# Patient Record
Sex: FEMALE | Race: WHITE | NOT HISPANIC OR LATINO | Employment: OTHER | URBAN - METROPOLITAN AREA
[De-identification: names, ages, dates, MRNs, and addresses within clinical notes are randomized per-mention and may not be internally consistent; named-entity substitution may affect disease eponyms.]

---

## 2017-01-09 ENCOUNTER — ALLSCRIPTS OFFICE VISIT (OUTPATIENT)
Dept: OTHER | Facility: OTHER | Age: 82
End: 2017-01-09

## 2017-01-09 LAB — INR PPP: 1.8

## 2017-02-06 ENCOUNTER — GENERIC CONVERSION - ENCOUNTER (OUTPATIENT)
Dept: OTHER | Facility: OTHER | Age: 82
End: 2017-02-06

## 2017-02-13 ENCOUNTER — ALLSCRIPTS OFFICE VISIT (OUTPATIENT)
Dept: OTHER | Facility: OTHER | Age: 82
End: 2017-02-13

## 2017-02-13 LAB — INR PPP: 1.4

## 2017-02-20 ENCOUNTER — ALLSCRIPTS OFFICE VISIT (OUTPATIENT)
Dept: OTHER | Facility: OTHER | Age: 82
End: 2017-02-20

## 2017-02-20 LAB — INR PPP: 1.2

## 2017-02-23 ENCOUNTER — ALLSCRIPTS OFFICE VISIT (OUTPATIENT)
Dept: OTHER | Facility: OTHER | Age: 82
End: 2017-02-23

## 2017-02-27 ENCOUNTER — ALLSCRIPTS OFFICE VISIT (OUTPATIENT)
Dept: OTHER | Facility: OTHER | Age: 82
End: 2017-02-27

## 2017-02-27 LAB — INR PPP: 1.8

## 2017-03-10 ENCOUNTER — ALLSCRIPTS OFFICE VISIT (OUTPATIENT)
Dept: OTHER | Facility: OTHER | Age: 82
End: 2017-03-10

## 2017-03-10 ENCOUNTER — HOSPITAL ENCOUNTER (OUTPATIENT)
Dept: NON INVASIVE DIAGNOSTICS | Facility: CLINIC | Age: 82
Discharge: HOME | End: 2017-03-10
Payer: MEDICARE

## 2017-03-10 DIAGNOSIS — I35.1 NONRHEUMATIC AORTIC VALVE INSUFFICIENCY: ICD-10-CM

## 2017-03-10 PROCEDURE — 93306 TTE W/DOPPLER COMPLETE: CPT

## 2017-03-13 ENCOUNTER — ALLSCRIPTS OFFICE VISIT (OUTPATIENT)
Dept: OTHER | Facility: OTHER | Age: 82
End: 2017-03-13

## 2017-03-13 ENCOUNTER — APPOINTMENT (OUTPATIENT)
Dept: LAB | Facility: CLINIC | Age: 82
End: 2017-03-13
Payer: MEDICARE

## 2017-03-13 DIAGNOSIS — E11.9 TYPE 2 DIABETES MELLITUS WITHOUT COMPLICATIONS (HCC): ICD-10-CM

## 2017-03-13 DIAGNOSIS — E78.5 HYPERLIPIDEMIA: ICD-10-CM

## 2017-03-13 DIAGNOSIS — I10 ESSENTIAL (PRIMARY) HYPERTENSION: ICD-10-CM

## 2017-03-13 DIAGNOSIS — I48.91 ATRIAL FIBRILLATION (HCC): ICD-10-CM

## 2017-03-13 DIAGNOSIS — I25.10 ATHEROSCLEROTIC HEART DISEASE OF NATIVE CORONARY ARTERY WITHOUT ANGINA PECTORIS: ICD-10-CM

## 2017-03-13 LAB
ALBUMIN SERPL BCP-MCNC: 3.7 G/DL (ref 3.5–5)
ALP SERPL-CCNC: 76 U/L (ref 46–116)
ALT SERPL W P-5'-P-CCNC: 26 U/L (ref 12–78)
ANION GAP SERPL CALCULATED.3IONS-SCNC: 8 MMOL/L (ref 4–13)
AST SERPL W P-5'-P-CCNC: 27 U/L (ref 5–45)
BASOPHILS # BLD AUTO: 0.02 THOUSANDS/ΜL (ref 0–0.1)
BASOPHILS NFR BLD AUTO: 1 % (ref 0–1)
BILIRUB SERPL-MCNC: 0.69 MG/DL (ref 0.2–1)
BUN SERPL-MCNC: 20 MG/DL (ref 5–25)
CALCIUM SERPL-MCNC: 9.5 MG/DL (ref 8.3–10.1)
CHLORIDE SERPL-SCNC: 106 MMOL/L (ref 100–108)
CHOLEST SERPL-MCNC: 204 MG/DL (ref 50–200)
CO2 SERPL-SCNC: 26 MMOL/L (ref 21–32)
CREAT SERPL-MCNC: 0.92 MG/DL (ref 0.6–1.3)
EOSINOPHIL # BLD AUTO: 0.09 THOUSAND/ΜL (ref 0–0.61)
EOSINOPHIL NFR BLD AUTO: 2 % (ref 0–6)
ERYTHROCYTE [DISTWIDTH] IN BLOOD BY AUTOMATED COUNT: 13.7 % (ref 11.6–15.1)
EST. AVERAGE GLUCOSE BLD GHB EST-MCNC: 160 MG/DL
GFR SERPL CREATININE-BSD FRML MDRD: 57.5 ML/MIN/1.73SQ M
GLUCOSE SERPL-MCNC: 144 MG/DL (ref 65–140)
HBA1C MFR BLD: 7.2 % (ref 4.2–6.3)
HCT VFR BLD AUTO: 37 % (ref 34.8–46.1)
HDLC SERPL-MCNC: 80 MG/DL (ref 40–60)
HGB BLD-MCNC: 12.2 G/DL (ref 11.5–15.4)
INR PPP: 2.4
LDLC SERPL CALC-MCNC: 102 MG/DL (ref 0–100)
LYMPHOCYTES # BLD AUTO: 1.1 THOUSANDS/ΜL (ref 0.6–4.47)
LYMPHOCYTES NFR BLD AUTO: 27 % (ref 14–44)
MCH RBC QN AUTO: 29.5 PG (ref 26.8–34.3)
MCHC RBC AUTO-ENTMCNC: 33 G/DL (ref 31.4–37.4)
MCV RBC AUTO: 90 FL (ref 82–98)
MONOCYTES # BLD AUTO: 0.44 THOUSAND/ΜL (ref 0.17–1.22)
MONOCYTES NFR BLD AUTO: 11 % (ref 4–12)
NEUTROPHILS # BLD AUTO: 2.47 THOUSANDS/ΜL (ref 1.85–7.62)
NEUTS SEG NFR BLD AUTO: 59 % (ref 43–75)
NRBC BLD AUTO-RTO: 0 /100 WBCS
PLATELET # BLD AUTO: 153 THOUSANDS/UL (ref 149–390)
PMV BLD AUTO: 12.6 FL (ref 8.9–12.7)
POTASSIUM SERPL-SCNC: 4.2 MMOL/L (ref 3.5–5.3)
PROT SERPL-MCNC: 7.7 G/DL (ref 6.4–8.2)
RBC # BLD AUTO: 4.13 MILLION/UL (ref 3.81–5.12)
SODIUM SERPL-SCNC: 140 MMOL/L (ref 136–145)
TRIGL SERPL-MCNC: 110 MG/DL
TSH SERPL DL<=0.05 MIU/L-ACNC: 3.23 UIU/ML (ref 0.36–3.74)
WBC # BLD AUTO: 4.13 THOUSAND/UL (ref 4.31–10.16)

## 2017-03-13 PROCEDURE — 80053 COMPREHEN METABOLIC PANEL: CPT

## 2017-03-13 PROCEDURE — 80061 LIPID PANEL: CPT

## 2017-03-13 PROCEDURE — 36415 COLL VENOUS BLD VENIPUNCTURE: CPT

## 2017-03-13 PROCEDURE — 83036 HEMOGLOBIN GLYCOSYLATED A1C: CPT

## 2017-03-13 PROCEDURE — 85025 COMPLETE CBC W/AUTO DIFF WBC: CPT

## 2017-03-13 PROCEDURE — 84443 ASSAY THYROID STIM HORMONE: CPT

## 2017-03-30 ENCOUNTER — ALLSCRIPTS OFFICE VISIT (OUTPATIENT)
Dept: OTHER | Facility: OTHER | Age: 82
End: 2017-03-30

## 2017-03-30 LAB — INR PPP: 2.2

## 2017-04-13 ENCOUNTER — GENERIC CONVERSION - ENCOUNTER (OUTPATIENT)
Dept: OTHER | Facility: OTHER | Age: 82
End: 2017-04-13

## 2017-04-24 ENCOUNTER — ALLSCRIPTS OFFICE VISIT (OUTPATIENT)
Dept: OTHER | Facility: OTHER | Age: 82
End: 2017-04-24

## 2017-04-24 LAB — INR PPP: 1.4

## 2017-05-08 ENCOUNTER — ALLSCRIPTS OFFICE VISIT (OUTPATIENT)
Dept: OTHER | Facility: OTHER | Age: 82
End: 2017-05-08

## 2017-05-08 LAB — INR PPP: 2.4

## 2017-05-31 ENCOUNTER — ALLSCRIPTS OFFICE VISIT (OUTPATIENT)
Dept: OTHER | Facility: OTHER | Age: 82
End: 2017-05-31

## 2017-05-31 LAB — INR PPP: 1.9

## 2017-06-06 ENCOUNTER — ALLSCRIPTS OFFICE VISIT (OUTPATIENT)
Dept: OTHER | Facility: OTHER | Age: 82
End: 2017-06-06

## 2017-06-06 LAB — HGB BLD-MCNC: 11.4 G/DL

## 2017-06-15 ENCOUNTER — TRANSCRIBE ORDERS (OUTPATIENT)
Dept: LAB | Facility: CLINIC | Age: 82
End: 2017-06-15

## 2017-06-15 ENCOUNTER — APPOINTMENT (OUTPATIENT)
Dept: LAB | Facility: CLINIC | Age: 82
End: 2017-06-15
Payer: MEDICARE

## 2017-06-15 DIAGNOSIS — Z79.01 LONG TERM (CURRENT) USE OF ANTICOAGULANTS: Primary | ICD-10-CM

## 2017-06-15 LAB
INR PPP: 2.68 (ref 0.86–1.16)
PROTHROMBIN TIME: 28.9 SECONDS (ref 12.1–14.4)

## 2017-06-15 PROCEDURE — 36415 COLL VENOUS BLD VENIPUNCTURE: CPT

## 2017-06-15 PROCEDURE — 85610 PROTHROMBIN TIME: CPT

## 2017-06-20 ENCOUNTER — ALLSCRIPTS OFFICE VISIT (OUTPATIENT)
Dept: OTHER | Facility: OTHER | Age: 82
End: 2017-06-20

## 2017-06-27 DIAGNOSIS — I48.91 ATRIAL FIBRILLATION (HCC): ICD-10-CM

## 2017-06-27 DIAGNOSIS — I25.10 ATHEROSCLEROTIC HEART DISEASE OF NATIVE CORONARY ARTERY WITHOUT ANGINA PECTORIS: ICD-10-CM

## 2017-06-27 DIAGNOSIS — Z95.1 PRESENCE OF AORTOCORONARY BYPASS GRAFT: ICD-10-CM

## 2017-06-27 DIAGNOSIS — I35.1 NONRHEUMATIC AORTIC VALVE INSUFFICIENCY: ICD-10-CM

## 2017-06-27 DIAGNOSIS — I10 ESSENTIAL (PRIMARY) HYPERTENSION: ICD-10-CM

## 2017-06-27 DIAGNOSIS — C44.91 BASAL CELL CARCINOMA OF SKIN: ICD-10-CM

## 2017-06-27 DIAGNOSIS — I35.0 NONRHEUMATIC AORTIC VALVE STENOSIS: ICD-10-CM

## 2017-06-28 ENCOUNTER — GENERIC CONVERSION - ENCOUNTER (OUTPATIENT)
Dept: OTHER | Facility: OTHER | Age: 82
End: 2017-06-28

## 2017-07-11 ENCOUNTER — GENERIC CONVERSION - ENCOUNTER (OUTPATIENT)
Dept: OTHER | Facility: OTHER | Age: 82
End: 2017-07-11

## 2017-07-21 ENCOUNTER — ALLSCRIPTS OFFICE VISIT (OUTPATIENT)
Dept: OTHER | Facility: OTHER | Age: 82
End: 2017-07-21

## 2017-07-24 ENCOUNTER — ALLSCRIPTS OFFICE VISIT (OUTPATIENT)
Dept: OTHER | Facility: OTHER | Age: 82
End: 2017-07-24

## 2017-07-24 LAB — INR PPP: 3.2

## 2017-07-31 ENCOUNTER — ALLSCRIPTS OFFICE VISIT (OUTPATIENT)
Dept: OTHER | Facility: OTHER | Age: 82
End: 2017-07-31

## 2017-07-31 LAB — INR PPP: 4.2

## 2017-08-03 ENCOUNTER — HOSPITAL ENCOUNTER (INPATIENT)
Facility: HOSPITAL | Age: 82
LOS: 4 days | Discharge: HOME HEALTH | DRG: 292 | End: 2017-08-07
Attending: EMERGENCY MEDICINE | Admitting: INTERNAL MEDICINE
Payer: MEDICARE

## 2017-08-03 ENCOUNTER — APPOINTMENT (EMERGENCY)
Dept: RADIOLOGY | Facility: HOSPITAL | Age: 82
DRG: 292 | End: 2017-08-03
Payer: MEDICARE

## 2017-08-03 DIAGNOSIS — I21.4 ACUTE NON-ST ELEVATION MYOCARDIAL INFARCTION (NSTEMI) (HCC): Primary | ICD-10-CM

## 2017-08-03 DIAGNOSIS — I50.41 ACUTE COMBINED SYSTOLIC AND DIASTOLIC CONGESTIVE HEART FAILURE (HCC): ICD-10-CM

## 2017-08-03 PROBLEM — I25.10 CAD (CORONARY ARTERY DISEASE): Status: ACTIVE | Noted: 2017-08-03

## 2017-08-03 PROBLEM — I35.0 AORTIC STENOSIS: Status: ACTIVE | Noted: 2017-08-03

## 2017-08-03 PROBLEM — I48.91 ATRIAL FIBRILLATION (HCC): Status: ACTIVE | Noted: 2017-08-03

## 2017-08-03 PROBLEM — I10 BENIGN ESSENTIAL HTN: Status: ACTIVE | Noted: 2017-08-03

## 2017-08-03 PROBLEM — E11.9 DIABETES (HCC): Status: ACTIVE | Noted: 2017-08-03

## 2017-08-03 PROBLEM — I50.9 ACUTE CHF (HCC): Status: ACTIVE | Noted: 2017-08-03

## 2017-08-03 PROBLEM — I63.9 CVA (CEREBRAL VASCULAR ACCIDENT) (HCC): Status: ACTIVE | Noted: 2017-08-03

## 2017-08-03 LAB
ALBUMIN SERPL BCP-MCNC: 3.4 G/DL (ref 3.5–5)
ALP SERPL-CCNC: 82 U/L (ref 46–116)
ALT SERPL W P-5'-P-CCNC: 25 U/L (ref 12–78)
ANION GAP SERPL CALCULATED.3IONS-SCNC: 8 MMOL/L (ref 4–13)
APTT PPP: 35 SECONDS (ref 23–35)
AST SERPL W P-5'-P-CCNC: 25 U/L (ref 5–45)
ATRIAL RATE: 112 BPM
ATRIAL RATE: 178 BPM
ATRIAL RATE: 394 BPM
BASOPHILS # BLD AUTO: 0.02 THOUSANDS/ΜL (ref 0–0.1)
BASOPHILS NFR BLD AUTO: 0 % (ref 0–1)
BILIRUB SERPL-MCNC: 0.92 MG/DL (ref 0.2–1)
BUN SERPL-MCNC: 19 MG/DL (ref 5–25)
CALCIUM SERPL-MCNC: 8.9 MG/DL (ref 8.3–10.1)
CHLORIDE SERPL-SCNC: 105 MMOL/L (ref 100–108)
CO2 SERPL-SCNC: 25 MMOL/L (ref 21–32)
CREAT SERPL-MCNC: 0.96 MG/DL (ref 0.6–1.3)
EOSINOPHIL # BLD AUTO: 0.08 THOUSAND/ΜL (ref 0–0.61)
EOSINOPHIL NFR BLD AUTO: 2 % (ref 0–6)
ERYTHROCYTE [DISTWIDTH] IN BLOOD BY AUTOMATED COUNT: 14.3 % (ref 11.6–15.1)
GFR SERPL CREATININE-BSD FRML MDRD: 52 ML/MIN/1.73SQ M
GLUCOSE SERPL-MCNC: 112 MG/DL (ref 65–140)
GLUCOSE SERPL-MCNC: 159 MG/DL (ref 65–140)
GLUCOSE SERPL-MCNC: 231 MG/DL (ref 65–140)
HCT VFR BLD AUTO: 37 % (ref 34.8–46.1)
HGB BLD-MCNC: 12.1 G/DL (ref 11.5–15.4)
INR PPP: 1.82 (ref 0.86–1.16)
LACTATE SERPL-SCNC: 1.8 MMOL/L (ref 0.5–2)
LACTATE SERPL-SCNC: 2.5 MMOL/L (ref 0.5–2)
LYMPHOCYTES # BLD AUTO: 1.01 THOUSANDS/ΜL (ref 0.6–4.47)
LYMPHOCYTES NFR BLD AUTO: 20 % (ref 14–44)
MCH RBC QN AUTO: 29.8 PG (ref 26.8–34.3)
MCHC RBC AUTO-ENTMCNC: 32.7 G/DL (ref 31.4–37.4)
MCV RBC AUTO: 91 FL (ref 82–98)
MONOCYTES # BLD AUTO: 0.36 THOUSAND/ΜL (ref 0.17–1.22)
MONOCYTES NFR BLD AUTO: 7 % (ref 4–12)
NEUTROPHILS # BLD AUTO: 3.65 THOUSANDS/ΜL (ref 1.85–7.62)
NEUTS SEG NFR BLD AUTO: 71 % (ref 43–75)
NRBC BLD AUTO-RTO: 0 /100 WBCS
NT-PROBNP SERPL-MCNC: 3270 PG/ML
PLATELET # BLD AUTO: 163 THOUSANDS/UL (ref 149–390)
PMV BLD AUTO: 11.7 FL (ref 8.9–12.7)
POTASSIUM SERPL-SCNC: 3.9 MMOL/L (ref 3.5–5.3)
PROT SERPL-MCNC: 7.1 G/DL (ref 6.4–8.2)
PROTHROMBIN TIME: 21.2 SECONDS (ref 12.1–14.4)
QRS AXIS: -86 DEGREES
QRS AXIS: 131 DEGREES
QRS AXIS: 60 DEGREES
QRSD INTERVAL: 92 MS
QRSD INTERVAL: 94 MS
QRSD INTERVAL: 96 MS
QT INTERVAL: 336 MS
QT INTERVAL: 400 MS
QT INTERVAL: 438 MS
QTC INTERVAL: 402 MS
QTC INTERVAL: 432 MS
QTC INTERVAL: 459 MS
RBC # BLD AUTO: 4.06 MILLION/UL (ref 3.81–5.12)
SODIUM SERPL-SCNC: 138 MMOL/L (ref 136–145)
SPECIMEN SOURCE: ABNORMAL
T WAVE AXIS: 105 DEGREES
T WAVE AXIS: 81 DEGREES
T WAVE AXIS: 95 DEGREES
TROPONIN I BLD-MCNC: 0.09 NG/ML (ref 0–0.08)
TROPONIN I SERPL-MCNC: 0.75 NG/ML
TROPONIN I SERPL-MCNC: 0.75 NG/ML
TROPONIN I SERPL-MCNC: 0.77 NG/ML
VENTRICULAR RATE: 66 BPM
VENTRICULAR RATE: 70 BPM
VENTRICULAR RATE: 86 BPM
WBC # BLD AUTO: 5.13 THOUSAND/UL (ref 4.31–10.16)

## 2017-08-03 PROCEDURE — 83605 ASSAY OF LACTIC ACID: CPT

## 2017-08-03 PROCEDURE — 85730 THROMBOPLASTIN TIME PARTIAL: CPT | Performed by: EMERGENCY MEDICINE

## 2017-08-03 PROCEDURE — 82948 REAGENT STRIP/BLOOD GLUCOSE: CPT

## 2017-08-03 PROCEDURE — 84484 ASSAY OF TROPONIN QUANT: CPT | Performed by: INTERNAL MEDICINE

## 2017-08-03 PROCEDURE — 83605 ASSAY OF LACTIC ACID: CPT | Performed by: EMERGENCY MEDICINE

## 2017-08-03 PROCEDURE — 99285 EMERGENCY DEPT VISIT HI MDM: CPT

## 2017-08-03 PROCEDURE — 84484 ASSAY OF TROPONIN QUANT: CPT

## 2017-08-03 PROCEDURE — 85025 COMPLETE CBC W/AUTO DIFF WBC: CPT | Performed by: EMERGENCY MEDICINE

## 2017-08-03 PROCEDURE — 80053 COMPREHEN METABOLIC PANEL: CPT | Performed by: EMERGENCY MEDICINE

## 2017-08-03 PROCEDURE — 85610 PROTHROMBIN TIME: CPT | Performed by: EMERGENCY MEDICINE

## 2017-08-03 PROCEDURE — 36415 COLL VENOUS BLD VENIPUNCTURE: CPT

## 2017-08-03 PROCEDURE — 93005 ELECTROCARDIOGRAM TRACING: CPT

## 2017-08-03 PROCEDURE — 93005 ELECTROCARDIOGRAM TRACING: CPT | Performed by: EMERGENCY MEDICINE

## 2017-08-03 PROCEDURE — 71020 HB CHEST X-RAY 2VW FRONTAL&LATL: CPT

## 2017-08-03 PROCEDURE — 83880 ASSAY OF NATRIURETIC PEPTIDE: CPT | Performed by: EMERGENCY MEDICINE

## 2017-08-03 RX ORDER — FUROSEMIDE 10 MG/ML
40 INJECTION INTRAMUSCULAR; INTRAVENOUS DAILY
Status: DISCONTINUED | OUTPATIENT
Start: 2017-08-03 | End: 2017-08-07

## 2017-08-03 RX ORDER — DILTIAZEM HYDROCHLORIDE 120 MG/1
120 TABLET, FILM COATED ORAL DAILY
COMMUNITY
End: 2018-04-22

## 2017-08-03 RX ORDER — ONDANSETRON 2 MG/ML
4 INJECTION INTRAMUSCULAR; INTRAVENOUS EVERY 6 HOURS PRN
Status: DISCONTINUED | OUTPATIENT
Start: 2017-08-03 | End: 2017-08-07 | Stop reason: HOSPADM

## 2017-08-03 RX ORDER — ATORVASTATIN CALCIUM 80 MG/1
80 TABLET, FILM COATED ORAL
Status: DISCONTINUED | OUTPATIENT
Start: 2017-08-03 | End: 2017-08-07 | Stop reason: HOSPADM

## 2017-08-03 RX ORDER — ASPIRIN 81 MG/1
81 TABLET, CHEWABLE ORAL DAILY
Status: DISCONTINUED | OUTPATIENT
Start: 2017-08-03 | End: 2017-08-07 | Stop reason: HOSPADM

## 2017-08-03 RX ORDER — DILTIAZEM HYDROCHLORIDE 120 MG/1
120 CAPSULE, COATED, EXTENDED RELEASE ORAL DAILY
Status: DISCONTINUED | OUTPATIENT
Start: 2017-08-04 | End: 2017-08-07 | Stop reason: HOSPADM

## 2017-08-03 RX ORDER — CALCIUM CARBONATE 500(1250)
1 TABLET ORAL
Status: DISCONTINUED | OUTPATIENT
Start: 2017-08-04 | End: 2017-08-07 | Stop reason: HOSPADM

## 2017-08-03 RX ORDER — MORPHINE SULFATE 2 MG/ML
1 INJECTION, SOLUTION INTRAMUSCULAR; INTRAVENOUS EVERY 4 HOURS PRN
Status: DISCONTINUED | OUTPATIENT
Start: 2017-08-03 | End: 2017-08-07 | Stop reason: HOSPADM

## 2017-08-03 RX ORDER — WARFARIN SODIUM 2.5 MG/1
2.5 TABLET ORAL
COMMUNITY
End: 2018-02-20

## 2017-08-03 RX ORDER — OXYCODONE HYDROCHLORIDE 5 MG/1
5 TABLET ORAL EVERY 4 HOURS PRN
Status: DISCONTINUED | OUTPATIENT
Start: 2017-08-03 | End: 2017-08-07 | Stop reason: HOSPADM

## 2017-08-03 RX ADMIN — ASPIRIN 81 MG 81 MG: 81 TABLET ORAL at 17:53

## 2017-08-03 RX ADMIN — INSULIN LISPRO 1 UNITS: 100 INJECTION, SOLUTION INTRAVENOUS; SUBCUTANEOUS at 17:53

## 2017-08-03 RX ADMIN — ATORVASTATIN CALCIUM 80 MG: 80 TABLET, FILM COATED ORAL at 17:53

## 2017-08-03 RX ADMIN — FUROSEMIDE 40 MG: 10 INJECTION, SOLUTION INTRAMUSCULAR; INTRAVENOUS at 11:08

## 2017-08-03 RX ADMIN — NITROGLYCERIN 1 INCH: 20 OINTMENT TOPICAL at 11:09

## 2017-08-03 RX ADMIN — NITROGLYCERIN 0.5 INCH: 20 OINTMENT TOPICAL at 17:53

## 2017-08-04 ENCOUNTER — GENERIC CONVERSION - ENCOUNTER (OUTPATIENT)
Dept: OTHER | Facility: OTHER | Age: 82
End: 2017-08-04

## 2017-08-04 PROBLEM — I50.41 ACUTE COMBINED SYSTOLIC AND DIASTOLIC CONGESTIVE HEART FAILURE (HCC): Status: ACTIVE | Noted: 2017-08-03

## 2017-08-04 LAB
ANION GAP SERPL CALCULATED.3IONS-SCNC: 10 MMOL/L (ref 4–13)
BUN SERPL-MCNC: 20 MG/DL (ref 5–25)
CALCIUM SERPL-MCNC: 9.2 MG/DL (ref 8.3–10.1)
CHLORIDE SERPL-SCNC: 103 MMOL/L (ref 100–108)
CO2 SERPL-SCNC: 26 MMOL/L (ref 21–32)
CREAT SERPL-MCNC: 0.89 MG/DL (ref 0.6–1.3)
EST. AVERAGE GLUCOSE BLD GHB EST-MCNC: 160 MG/DL
GFR SERPL CREATININE-BSD FRML MDRD: 57 ML/MIN/1.73SQ M
GLUCOSE SERPL-MCNC: 133 MG/DL (ref 65–140)
GLUCOSE SERPL-MCNC: 161 MG/DL (ref 65–140)
GLUCOSE SERPL-MCNC: 161 MG/DL (ref 65–140)
GLUCOSE SERPL-MCNC: 168 MG/DL (ref 65–140)
GLUCOSE SERPL-MCNC: 173 MG/DL (ref 65–140)
GLUCOSE SERPL-MCNC: 202 MG/DL (ref 65–140)
HBA1C MFR BLD: 7.2 % (ref 4.2–6.3)
INR PPP: 1.62 (ref 0.86–1.16)
MAGNESIUM SERPL-MCNC: 1.8 MG/DL (ref 1.6–2.6)
MAGNESIUM SERPL-MCNC: 1.9 MG/DL (ref 1.6–2.6)
POTASSIUM SERPL-SCNC: 3.6 MMOL/L (ref 3.5–5.3)
PROTHROMBIN TIME: 19.4 SECONDS (ref 12.1–14.4)
SODIUM SERPL-SCNC: 139 MMOL/L (ref 136–145)
TROPONIN I SERPL-MCNC: 0.79 NG/ML

## 2017-08-04 PROCEDURE — 85610 PROTHROMBIN TIME: CPT | Performed by: INTERNAL MEDICINE

## 2017-08-04 PROCEDURE — 83735 ASSAY OF MAGNESIUM: CPT | Performed by: INTERNAL MEDICINE

## 2017-08-04 PROCEDURE — 84484 ASSAY OF TROPONIN QUANT: CPT | Performed by: PHYSICIAN ASSISTANT

## 2017-08-04 PROCEDURE — 83735 ASSAY OF MAGNESIUM: CPT | Performed by: PHYSICIAN ASSISTANT

## 2017-08-04 PROCEDURE — 80048 BASIC METABOLIC PNL TOTAL CA: CPT | Performed by: INTERNAL MEDICINE

## 2017-08-04 PROCEDURE — 82948 REAGENT STRIP/BLOOD GLUCOSE: CPT

## 2017-08-04 PROCEDURE — 83036 HEMOGLOBIN GLYCOSYLATED A1C: CPT | Performed by: INTERNAL MEDICINE

## 2017-08-04 PROCEDURE — 93005 ELECTROCARDIOGRAM TRACING: CPT | Performed by: PHYSICIAN ASSISTANT

## 2017-08-04 RX ORDER — METOPROLOL TARTRATE 5 MG/5ML
2.5 INJECTION INTRAVENOUS ONCE
Status: COMPLETED | OUTPATIENT
Start: 2017-08-04 | End: 2017-08-04

## 2017-08-04 RX ORDER — METOPROLOL TARTRATE 5 MG/5ML
5 INJECTION INTRAVENOUS EVERY 6 HOURS PRN
Status: DISCONTINUED | OUTPATIENT
Start: 2017-08-04 | End: 2017-08-07 | Stop reason: HOSPADM

## 2017-08-04 RX ADMIN — METOPROLOL TARTRATE 2.5 MG: 5 INJECTION INTRAVENOUS at 10:46

## 2017-08-04 RX ADMIN — NITROGLYCERIN 0.5 INCH: 20 OINTMENT TOPICAL at 08:42

## 2017-08-04 RX ADMIN — METOPROLOL TARTRATE 25 MG: 25 TABLET ORAL at 20:54

## 2017-08-04 RX ADMIN — ASPIRIN 81 MG 81 MG: 81 TABLET ORAL at 08:51

## 2017-08-04 RX ADMIN — INSULIN LISPRO 1 UNITS: 100 INJECTION, SOLUTION INTRAVENOUS; SUBCUTANEOUS at 11:23

## 2017-08-04 RX ADMIN — DILTIAZEM HYDROCHLORIDE 120 MG: 120 CAPSULE, COATED, EXTENDED RELEASE ORAL at 08:51

## 2017-08-04 RX ADMIN — INSULIN LISPRO 1 UNITS: 100 INJECTION, SOLUTION INTRAVENOUS; SUBCUTANEOUS at 22:25

## 2017-08-04 RX ADMIN — ONDANSETRON 4 MG: 2 INJECTION INTRAMUSCULAR; INTRAVENOUS at 14:14

## 2017-08-04 RX ADMIN — METOPROLOL TARTRATE 2.5 MG: 5 INJECTION INTRAVENOUS at 07:52

## 2017-08-04 RX ADMIN — FUROSEMIDE 40 MG: 10 INJECTION, SOLUTION INTRAMUSCULAR; INTRAVENOUS at 08:58

## 2017-08-04 RX ADMIN — BISACODYL 5 MG: 5 TABLET, COATED ORAL at 17:59

## 2017-08-04 RX ADMIN — ATORVASTATIN CALCIUM 80 MG: 80 TABLET, FILM COATED ORAL at 16:35

## 2017-08-04 RX ADMIN — METOPROLOL TARTRATE 25 MG: 25 TABLET ORAL at 16:34

## 2017-08-04 RX ADMIN — INSULIN LISPRO 1 UNITS: 100 INJECTION, SOLUTION INTRAVENOUS; SUBCUTANEOUS at 16:37

## 2017-08-05 LAB
GLUCOSE SERPL-MCNC: 101 MG/DL (ref 65–140)
GLUCOSE SERPL-MCNC: 157 MG/DL (ref 65–140)
GLUCOSE SERPL-MCNC: 230 MG/DL (ref 65–140)
GLUCOSE SERPL-MCNC: 96 MG/DL (ref 65–140)

## 2017-08-05 PROCEDURE — 82948 REAGENT STRIP/BLOOD GLUCOSE: CPT

## 2017-08-05 RX ADMIN — FUROSEMIDE 40 MG: 10 INJECTION, SOLUTION INTRAMUSCULAR; INTRAVENOUS at 08:23

## 2017-08-05 RX ADMIN — BISACODYL 5 MG: 5 TABLET, COATED ORAL at 08:35

## 2017-08-05 RX ADMIN — DILTIAZEM HYDROCHLORIDE 120 MG: 120 CAPSULE, COATED, EXTENDED RELEASE ORAL at 08:23

## 2017-08-05 RX ADMIN — INSULIN LISPRO 1 UNITS: 100 INJECTION, SOLUTION INTRAVENOUS; SUBCUTANEOUS at 21:50

## 2017-08-05 RX ADMIN — ATORVASTATIN CALCIUM 80 MG: 80 TABLET, FILM COATED ORAL at 17:12

## 2017-08-05 RX ADMIN — METOPROLOL TARTRATE 25 MG: 25 TABLET ORAL at 08:22

## 2017-08-05 RX ADMIN — METOPROLOL TARTRATE 25 MG: 25 TABLET ORAL at 20:44

## 2017-08-05 RX ADMIN — ASPIRIN 81 MG 81 MG: 81 TABLET ORAL at 08:23

## 2017-08-05 RX ADMIN — INSULIN LISPRO 2 UNITS: 100 INJECTION, SOLUTION INTRAVENOUS; SUBCUTANEOUS at 12:20

## 2017-08-05 RX ADMIN — Medication 1 TABLET: at 08:23

## 2017-08-06 ENCOUNTER — APPOINTMENT (INPATIENT)
Dept: RADIOLOGY | Facility: HOSPITAL | Age: 82
DRG: 292 | End: 2017-08-06
Payer: MEDICARE

## 2017-08-06 ENCOUNTER — GENERIC CONVERSION - ENCOUNTER (OUTPATIENT)
Dept: OTHER | Facility: OTHER | Age: 82
End: 2017-08-06

## 2017-08-06 ENCOUNTER — APPOINTMENT (INPATIENT)
Dept: NON INVASIVE DIAGNOSTICS | Facility: HOSPITAL | Age: 82
DRG: 292 | End: 2017-08-06
Payer: MEDICARE

## 2017-08-06 PROBLEM — G93.40 ACUTE ENCEPHALOPATHY: Status: ACTIVE | Noted: 2017-08-06

## 2017-08-06 LAB
ALBUMIN SERPL BCP-MCNC: 3.8 G/DL (ref 3.5–5)
ALP SERPL-CCNC: 99 U/L (ref 46–116)
ALT SERPL W P-5'-P-CCNC: 25 U/L (ref 12–78)
AMMONIA PLAS-SCNC: 17 UMOL/L (ref 11–35)
AMYLASE SERPL-CCNC: 24 IU/L (ref 25–115)
ANION GAP SERPL CALCULATED.3IONS-SCNC: 11 MMOL/L (ref 4–13)
AST SERPL W P-5'-P-CCNC: 25 U/L (ref 5–45)
BACTERIA UR QL AUTO: ABNORMAL /HPF
BILIRUB DIRECT SERPL-MCNC: 0.4 MG/DL (ref 0–0.2)
BILIRUB SERPL-MCNC: 1.51 MG/DL (ref 0.2–1)
BILIRUB UR QL STRIP: NEGATIVE
BUN SERPL-MCNC: 26 MG/DL (ref 5–25)
CALCIUM SERPL-MCNC: 9.9 MG/DL (ref 8.3–10.1)
CHEST PAIN STATEMENT: NORMAL
CHLORIDE SERPL-SCNC: 100 MMOL/L (ref 100–108)
CLARITY UR: CLEAR
CO2 SERPL-SCNC: 28 MMOL/L (ref 21–32)
COLOR UR: YELLOW
CREAT SERPL-MCNC: 1.06 MG/DL (ref 0.6–1.3)
ERYTHROCYTE [DISTWIDTH] IN BLOOD BY AUTOMATED COUNT: 14.3 % (ref 11.6–15.1)
GFR SERPL CREATININE-BSD FRML MDRD: 46 ML/MIN/1.73SQ M
GLUCOSE SERPL-MCNC: 169 MG/DL (ref 65–140)
GLUCOSE SERPL-MCNC: 202 MG/DL (ref 65–140)
GLUCOSE SERPL-MCNC: 206 MG/DL (ref 65–140)
GLUCOSE SERPL-MCNC: 208 MG/DL (ref 65–140)
GLUCOSE SERPL-MCNC: 227 MG/DL (ref 65–140)
GLUCOSE UR STRIP-MCNC: NEGATIVE MG/DL
HCT VFR BLD AUTO: 42.1 % (ref 34.8–46.1)
HGB BLD-MCNC: 13.9 G/DL (ref 11.5–15.4)
HGB UR QL STRIP.AUTO: NEGATIVE
HYALINE CASTS #/AREA URNS LPF: ABNORMAL /LPF
KETONES UR STRIP-MCNC: ABNORMAL MG/DL
LACTATE SERPL-SCNC: 1.6 MMOL/L (ref 0.5–2)
LEUKOCYTE ESTERASE UR QL STRIP: NEGATIVE
LIPASE SERPL-CCNC: 134 U/L (ref 73–393)
MAX DIASTOLIC BP: 83 MMHG
MAX HEART RATE: 100 BPM
MAX PREDICTED HEART RATE: 130 BPM
MAX. SYSTOLIC BP: 166 MMHG
MCH RBC QN AUTO: 30.1 PG (ref 26.8–34.3)
MCHC RBC AUTO-ENTMCNC: 33 G/DL (ref 31.4–37.4)
MCV RBC AUTO: 91 FL (ref 82–98)
NITRITE UR QL STRIP: NEGATIVE
NON-SQ EPI CELLS URNS QL MICRO: ABNORMAL /HPF
PH UR STRIP.AUTO: 6 [PH] (ref 4.5–8)
PLATELET # BLD AUTO: 175 THOUSANDS/UL (ref 149–390)
PMV BLD AUTO: 11.6 FL (ref 8.9–12.7)
POTASSIUM SERPL-SCNC: 3.7 MMOL/L (ref 3.5–5.3)
PROT SERPL-MCNC: 8.2 G/DL (ref 6.4–8.2)
PROT UR STRIP-MCNC: ABNORMAL MG/DL
PROTOCOL NAME: NORMAL
RBC # BLD AUTO: 4.62 MILLION/UL (ref 3.81–5.12)
RBC #/AREA URNS AUTO: ABNORMAL /HPF
REASON FOR TERMINATION: NORMAL
SODIUM SERPL-SCNC: 139 MMOL/L (ref 136–145)
SP GR UR STRIP.AUTO: 1.01 (ref 1–1.03)
TARGET HR FORMULA: NORMAL
TEST INDICATION: NORMAL
TIME IN EXERCISE PHASE: 189 S
TROPONIN I SERPL-MCNC: 0.79 NG/ML
UROBILINOGEN UR QL STRIP.AUTO: 0.2 E.U./DL
WBC # BLD AUTO: 7.26 THOUSAND/UL (ref 4.31–10.16)
WBC #/AREA URNS AUTO: ABNORMAL /HPF

## 2017-08-06 PROCEDURE — 85027 COMPLETE CBC AUTOMATED: CPT | Performed by: NURSE PRACTITIONER

## 2017-08-06 PROCEDURE — 93017 CV STRESS TEST TRACING ONLY: CPT

## 2017-08-06 PROCEDURE — 76705 ECHO EXAM OF ABDOMEN: CPT

## 2017-08-06 PROCEDURE — 80076 HEPATIC FUNCTION PANEL: CPT | Performed by: NURSE PRACTITIONER

## 2017-08-06 PROCEDURE — A9502 TC99M TETROFOSMIN: HCPCS

## 2017-08-06 PROCEDURE — 71020 HB CHEST X-RAY 2VW FRONTAL&LATL: CPT

## 2017-08-06 PROCEDURE — 81001 URINALYSIS AUTO W/SCOPE: CPT | Performed by: NURSE PRACTITIONER

## 2017-08-06 PROCEDURE — 83605 ASSAY OF LACTIC ACID: CPT | Performed by: NURSE PRACTITIONER

## 2017-08-06 PROCEDURE — 80048 BASIC METABOLIC PNL TOTAL CA: CPT | Performed by: NURSE PRACTITIONER

## 2017-08-06 PROCEDURE — 82140 ASSAY OF AMMONIA: CPT | Performed by: NURSE PRACTITIONER

## 2017-08-06 PROCEDURE — 78452 HT MUSCLE IMAGE SPECT MULT: CPT

## 2017-08-06 PROCEDURE — 82948 REAGENT STRIP/BLOOD GLUCOSE: CPT

## 2017-08-06 PROCEDURE — 83690 ASSAY OF LIPASE: CPT | Performed by: NURSE PRACTITIONER

## 2017-08-06 PROCEDURE — 84484 ASSAY OF TROPONIN QUANT: CPT | Performed by: NURSE PRACTITIONER

## 2017-08-06 PROCEDURE — 82150 ASSAY OF AMYLASE: CPT | Performed by: NURSE PRACTITIONER

## 2017-08-06 RX ORDER — POLYETHYLENE GLYCOL 3350 17 G/17G
17 POWDER, FOR SOLUTION ORAL DAILY
Status: DISCONTINUED | OUTPATIENT
Start: 2017-08-06 | End: 2017-08-07 | Stop reason: HOSPADM

## 2017-08-06 RX ORDER — POLYETHYLENE GLYCOL 3350 17 G/17G
17 POWDER, FOR SOLUTION ORAL DAILY PRN
Status: DISCONTINUED | OUTPATIENT
Start: 2017-08-06 | End: 2017-08-06

## 2017-08-06 RX ORDER — DOCUSATE SODIUM 100 MG/1
100 CAPSULE, LIQUID FILLED ORAL 2 TIMES DAILY
Status: DISCONTINUED | OUTPATIENT
Start: 2017-08-06 | End: 2017-08-07 | Stop reason: HOSPADM

## 2017-08-06 RX ADMIN — Medication 1 TABLET: at 08:28

## 2017-08-06 RX ADMIN — POLYETHYLENE GLYCOL 3350 17 G: 17 POWDER, FOR SOLUTION ORAL at 18:17

## 2017-08-06 RX ADMIN — INSULIN LISPRO 1 UNITS: 100 INJECTION, SOLUTION INTRAVENOUS; SUBCUTANEOUS at 08:43

## 2017-08-06 RX ADMIN — ASPIRIN 81 MG 81 MG: 81 TABLET ORAL at 08:28

## 2017-08-06 RX ADMIN — INSULIN LISPRO 2 UNITS: 100 INJECTION, SOLUTION INTRAVENOUS; SUBCUTANEOUS at 21:06

## 2017-08-06 RX ADMIN — REGADENOSON 0.4 MG: 0.08 INJECTION, SOLUTION INTRAVENOUS at 11:05

## 2017-08-06 RX ADMIN — METOPROLOL TARTRATE 25 MG: 25 TABLET ORAL at 21:06

## 2017-08-06 RX ADMIN — INSULIN LISPRO 1 UNITS: 100 INJECTION, SOLUTION INTRAVENOUS; SUBCUTANEOUS at 18:17

## 2017-08-06 RX ADMIN — DOCUSATE SODIUM 100 MG: 100 CAPSULE, LIQUID FILLED ORAL at 08:28

## 2017-08-06 RX ADMIN — DILTIAZEM HYDROCHLORIDE 120 MG: 120 CAPSULE, COATED, EXTENDED RELEASE ORAL at 08:28

## 2017-08-06 RX ADMIN — DOCUSATE SODIUM 100 MG: 100 CAPSULE, LIQUID FILLED ORAL at 18:14

## 2017-08-06 RX ADMIN — ATORVASTATIN CALCIUM 80 MG: 80 TABLET, FILM COATED ORAL at 18:14

## 2017-08-06 RX ADMIN — FUROSEMIDE 40 MG: 10 INJECTION, SOLUTION INTRAMUSCULAR; INTRAVENOUS at 08:28

## 2017-08-06 RX ADMIN — METOPROLOL TARTRATE 25 MG: 25 TABLET ORAL at 08:27

## 2017-08-07 VITALS
TEMPERATURE: 98 F | OXYGEN SATURATION: 95 % | DIASTOLIC BLOOD PRESSURE: 65 MMHG | RESPIRATION RATE: 18 BRPM | SYSTOLIC BLOOD PRESSURE: 141 MMHG | HEART RATE: 74 BPM | WEIGHT: 99.87 LBS

## 2017-08-07 LAB
ANION GAP SERPL CALCULATED.3IONS-SCNC: 10 MMOL/L (ref 4–13)
ANION GAP SERPL CALCULATED.3IONS-SCNC: 7 MMOL/L (ref 4–13)
ATRIAL RATE: 100 BPM
BUN SERPL-MCNC: 31 MG/DL (ref 5–25)
BUN SERPL-MCNC: 32 MG/DL (ref 5–25)
CALCIUM SERPL-MCNC: 9.3 MG/DL (ref 8.3–10.1)
CALCIUM SERPL-MCNC: 9.4 MG/DL (ref 8.3–10.1)
CHLORIDE SERPL-SCNC: 100 MMOL/L (ref 100–108)
CHLORIDE SERPL-SCNC: 100 MMOL/L (ref 100–108)
CO2 SERPL-SCNC: 30 MMOL/L (ref 21–32)
CO2 SERPL-SCNC: 33 MMOL/L (ref 21–32)
CREAT SERPL-MCNC: 0.84 MG/DL (ref 0.6–1.3)
CREAT SERPL-MCNC: 0.91 MG/DL (ref 0.6–1.3)
GFR SERPL CREATININE-BSD FRML MDRD: 56 ML/MIN/1.73SQ M
GFR SERPL CREATININE-BSD FRML MDRD: 61 ML/MIN/1.73SQ M
GLUCOSE SERPL-MCNC: 115 MG/DL (ref 65–140)
GLUCOSE SERPL-MCNC: 132 MG/DL (ref 65–140)
GLUCOSE SERPL-MCNC: 157 MG/DL (ref 65–140)
GLUCOSE SERPL-MCNC: 260 MG/DL (ref 65–140)
MAGNESIUM SERPL-MCNC: 1.7 MG/DL (ref 1.6–2.6)
PHOSPHATE SERPL-MCNC: 3.7 MG/DL (ref 2.3–4.1)
POTASSIUM SERPL-SCNC: 3.3 MMOL/L (ref 3.5–5.3)
POTASSIUM SERPL-SCNC: 3.6 MMOL/L (ref 3.5–5.3)
QRS AXIS: 48 DEGREES
QRSD INTERVAL: 94 MS
QT INTERVAL: 370 MS
QTC INTERVAL: 491 MS
SODIUM SERPL-SCNC: 140 MMOL/L (ref 136–145)
SODIUM SERPL-SCNC: 140 MMOL/L (ref 136–145)
T WAVE AXIS: 42 DEGREES
VENTRICULAR RATE: 106 BPM

## 2017-08-07 PROCEDURE — G8978 MOBILITY CURRENT STATUS: HCPCS

## 2017-08-07 PROCEDURE — 97163 PT EVAL HIGH COMPLEX 45 MIN: CPT

## 2017-08-07 PROCEDURE — 80048 BASIC METABOLIC PNL TOTAL CA: CPT | Performed by: PHYSICIAN ASSISTANT

## 2017-08-07 PROCEDURE — 97166 OT EVAL MOD COMPLEX 45 MIN: CPT

## 2017-08-07 PROCEDURE — G8979 MOBILITY GOAL STATUS: HCPCS

## 2017-08-07 PROCEDURE — 82948 REAGENT STRIP/BLOOD GLUCOSE: CPT

## 2017-08-07 PROCEDURE — 84100 ASSAY OF PHOSPHORUS: CPT | Performed by: PHYSICIAN ASSISTANT

## 2017-08-07 PROCEDURE — 80048 BASIC METABOLIC PNL TOTAL CA: CPT | Performed by: INTERNAL MEDICINE

## 2017-08-07 PROCEDURE — 83735 ASSAY OF MAGNESIUM: CPT | Performed by: PHYSICIAN ASSISTANT

## 2017-08-07 RX ORDER — FUROSEMIDE 40 MG/1
40 TABLET ORAL DAILY
Status: DISCONTINUED | OUTPATIENT
Start: 2017-08-08 | End: 2017-08-07 | Stop reason: HOSPADM

## 2017-08-07 RX ORDER — LORAZEPAM 1 MG/1
1 TABLET ORAL
Status: DISCONTINUED | OUTPATIENT
Start: 2017-08-07 | End: 2017-08-07 | Stop reason: HOSPADM

## 2017-08-07 RX ORDER — LORAZEPAM 0.5 MG/1
0.5 TABLET ORAL EVERY MORNING
Qty: 10 TABLET | Refills: 0
Start: 2017-08-07 | End: 2018-02-20

## 2017-08-07 RX ORDER — ASPIRIN 81 MG/1
81 TABLET, CHEWABLE ORAL DAILY
Refills: 0
Start: 2017-08-07 | End: 2020-01-01 | Stop reason: HOSPADM

## 2017-08-07 RX ORDER — ATORVASTATIN CALCIUM 80 MG/1
80 TABLET, FILM COATED ORAL
Qty: 30 TABLET | Refills: 0 | Status: CANCELLED
Start: 2017-08-07

## 2017-08-07 RX ORDER — FUROSEMIDE 40 MG/1
40 TABLET ORAL DAILY
Qty: 30 TABLET | Refills: 0
Start: 2017-08-08 | End: 2017-08-07

## 2017-08-07 RX ORDER — POLYETHYLENE GLYCOL 3350 17 G/17G
17 POWDER, FOR SOLUTION ORAL DAILY PRN
Qty: 14 EACH | Refills: 0
Start: 2017-08-07 | End: 2018-04-22

## 2017-08-07 RX ORDER — SIMVASTATIN 20 MG
20 TABLET ORAL
Refills: 0
Start: 2017-08-07 | End: 2018-02-20

## 2017-08-07 RX ORDER — CALCIUM CARBONATE 500(1250)
1 TABLET ORAL
Refills: 0
Start: 2017-08-07 | End: 2018-02-20

## 2017-08-07 RX ORDER — POTASSIUM CHLORIDE 750 MG/1
10 TABLET, EXTENDED RELEASE ORAL DAILY
Status: DISCONTINUED | OUTPATIENT
Start: 2017-08-07 | End: 2017-08-07 | Stop reason: HOSPADM

## 2017-08-07 RX ORDER — FUROSEMIDE 40 MG/1
40 TABLET ORAL DAILY
Qty: 30 TABLET | Refills: 0 | Status: SHIPPED | OUTPATIENT
Start: 2017-08-08 | End: 2017-08-07

## 2017-08-07 RX ORDER — POTASSIUM CHLORIDE 20 MEQ/1
40 TABLET, EXTENDED RELEASE ORAL ONCE
Status: COMPLETED | OUTPATIENT
Start: 2017-08-07 | End: 2017-08-07

## 2017-08-07 RX ORDER — ESCITALOPRAM OXALATE 10 MG/1
5 TABLET ORAL DAILY
Refills: 0
Start: 2017-08-07 | End: 2018-02-08 | Stop reason: SDUPTHER

## 2017-08-07 RX ORDER — FUROSEMIDE 40 MG/1
40 TABLET ORAL DAILY
Qty: 30 TABLET | Refills: 0 | Status: SHIPPED | OUTPATIENT
Start: 2017-08-08 | End: 2018-02-19 | Stop reason: SDUPTHER

## 2017-08-07 RX ORDER — LORAZEPAM 0.5 MG/1
0.5 TABLET ORAL EVERY MORNING
Status: DISCONTINUED | OUTPATIENT
Start: 2017-08-07 | End: 2017-08-07 | Stop reason: HOSPADM

## 2017-08-07 RX ORDER — LORAZEPAM 1 MG/1
1 TABLET ORAL
Qty: 10 TABLET | Refills: 0
Start: 2017-08-07 | End: 2018-05-08 | Stop reason: HOSPADM

## 2017-08-07 RX ORDER — POTASSIUM CHLORIDE 750 MG/1
10 TABLET, EXTENDED RELEASE ORAL DAILY
Qty: 30 TABLET | Refills: 0 | Status: SHIPPED | OUTPATIENT
Start: 2017-08-07 | End: 2018-02-19 | Stop reason: SDUPTHER

## 2017-08-07 RX ADMIN — INSULIN LISPRO 2 UNITS: 100 INJECTION, SOLUTION INTRAVENOUS; SUBCUTANEOUS at 12:11

## 2017-08-07 RX ADMIN — DOCUSATE SODIUM 100 MG: 100 CAPSULE, LIQUID FILLED ORAL at 09:02

## 2017-08-07 RX ADMIN — ASPIRIN 81 MG 81 MG: 81 TABLET ORAL at 09:02

## 2017-08-07 RX ADMIN — POLYETHYLENE GLYCOL 3350 17 G: 17 POWDER, FOR SOLUTION ORAL at 09:02

## 2017-08-07 RX ADMIN — Medication 1 TABLET: at 09:06

## 2017-08-07 RX ADMIN — INSULIN LISPRO 1 UNITS: 100 INJECTION, SOLUTION INTRAVENOUS; SUBCUTANEOUS at 09:06

## 2017-08-07 RX ADMIN — FUROSEMIDE 40 MG: 10 INJECTION, SOLUTION INTRAMUSCULAR; INTRAVENOUS at 09:02

## 2017-08-07 RX ADMIN — METOPROLOL TARTRATE 25 MG: 25 TABLET ORAL at 09:02

## 2017-08-07 RX ADMIN — DILTIAZEM HYDROCHLORIDE 120 MG: 120 CAPSULE, COATED, EXTENDED RELEASE ORAL at 09:06

## 2017-08-07 RX ADMIN — POTASSIUM CHLORIDE 10 MEQ: 750 TABLET, EXTENDED RELEASE ORAL at 09:02

## 2017-08-07 RX ADMIN — POTASSIUM CHLORIDE 40 MEQ: 1500 TABLET, EXTENDED RELEASE ORAL at 03:32

## 2017-08-07 RX ADMIN — LORAZEPAM 0.5 MG: 0.5 TABLET ORAL at 12:13

## 2017-08-09 ENCOUNTER — GENERIC CONVERSION - ENCOUNTER (OUTPATIENT)
Dept: OTHER | Facility: OTHER | Age: 82
End: 2017-08-09

## 2017-08-10 ENCOUNTER — GENERIC CONVERSION - ENCOUNTER (OUTPATIENT)
Dept: OTHER | Facility: OTHER | Age: 82
End: 2017-08-10

## 2017-08-14 ENCOUNTER — LAB REQUISITION (OUTPATIENT)
Dept: LAB | Facility: HOSPITAL | Age: 82
End: 2017-08-14
Payer: MEDICARE

## 2017-08-14 DIAGNOSIS — I11.0 HYPERTENSIVE HEART DISEASE WITH HEART FAILURE (HCC): ICD-10-CM

## 2017-08-14 DIAGNOSIS — I50.41 ACUTE COMBINED SYSTOLIC AND DIASTOLIC CONGESTIVE HEART FAILURE (HCC): ICD-10-CM

## 2017-08-14 DIAGNOSIS — I35.0 NONRHEUMATIC AORTIC VALVE STENOSIS: ICD-10-CM

## 2017-08-14 LAB
ANION GAP SERPL CALCULATED.3IONS-SCNC: 9 MMOL/L (ref 4–13)
BUN SERPL-MCNC: 30 MG/DL (ref 5–25)
CALCIUM SERPL-MCNC: 9.5 MG/DL (ref 8.3–10.1)
CHLORIDE SERPL-SCNC: 104 MMOL/L (ref 100–108)
CO2 SERPL-SCNC: 26 MMOL/L (ref 21–32)
CREAT SERPL-MCNC: 1.17 MG/DL (ref 0.6–1.3)
GFR SERPL CREATININE-BSD FRML MDRD: 41 ML/MIN/1.73SQ M
GLUCOSE SERPL-MCNC: 114 MG/DL (ref 65–140)
POTASSIUM SERPL-SCNC: 4.7 MMOL/L (ref 3.5–5.3)
SODIUM SERPL-SCNC: 139 MMOL/L (ref 136–145)

## 2017-08-14 PROCEDURE — 80048 BASIC METABOLIC PNL TOTAL CA: CPT | Performed by: FAMILY MEDICINE

## 2017-08-15 ENCOUNTER — TRANSCRIBE ORDERS (OUTPATIENT)
Dept: ADMINISTRATIVE | Facility: HOSPITAL | Age: 82
End: 2017-08-15

## 2017-08-15 ENCOUNTER — GENERIC CONVERSION - ENCOUNTER (OUTPATIENT)
Dept: OTHER | Facility: OTHER | Age: 82
End: 2017-08-15

## 2017-08-15 ENCOUNTER — ALLSCRIPTS OFFICE VISIT (OUTPATIENT)
Dept: OTHER | Facility: OTHER | Age: 82
End: 2017-08-15

## 2017-08-15 DIAGNOSIS — I35.0 NODULAR CALCIFIC AORTIC VALVE STENOSIS: Primary | ICD-10-CM

## 2017-08-17 ENCOUNTER — GENERIC CONVERSION - ENCOUNTER (OUTPATIENT)
Dept: OTHER | Facility: OTHER | Age: 82
End: 2017-08-17

## 2017-08-17 ENCOUNTER — ALLSCRIPTS OFFICE VISIT (OUTPATIENT)
Dept: OTHER | Facility: OTHER | Age: 82
End: 2017-08-17

## 2017-08-17 LAB — INR PPP: 1.1

## 2017-08-25 ENCOUNTER — APPOINTMENT (OUTPATIENT)
Dept: RADIOLOGY | Facility: MEDICAL CENTER | Age: 82
End: 2017-08-25
Attending: PHYSICIAN ASSISTANT
Payer: MEDICARE

## 2017-08-25 ENCOUNTER — OFFICE VISIT (OUTPATIENT)
Dept: URGENT CARE | Facility: MEDICAL CENTER | Age: 82
End: 2017-08-25
Payer: MEDICARE

## 2017-08-25 DIAGNOSIS — S39.92XA INJURY OF LOWER BACK: ICD-10-CM

## 2017-08-25 PROCEDURE — 72100 X-RAY EXAM L-S SPINE 2/3 VWS: CPT

## 2017-08-25 PROCEDURE — G0463 HOSPITAL OUTPT CLINIC VISIT: HCPCS

## 2017-08-25 PROCEDURE — 72072 X-RAY EXAM THORAC SPINE 3VWS: CPT

## 2017-08-25 PROCEDURE — 99202 OFFICE O/P NEW SF 15 MIN: CPT

## 2017-08-28 ENCOUNTER — GENERIC CONVERSION - ENCOUNTER (OUTPATIENT)
Dept: OTHER | Facility: OTHER | Age: 82
End: 2017-08-28

## 2017-08-29 ENCOUNTER — GENERIC CONVERSION - ENCOUNTER (OUTPATIENT)
Dept: OTHER | Facility: OTHER | Age: 82
End: 2017-08-29

## 2017-09-06 ENCOUNTER — GENERIC CONVERSION - ENCOUNTER (OUTPATIENT)
Dept: OTHER | Facility: OTHER | Age: 82
End: 2017-09-06

## 2017-09-11 ENCOUNTER — GENERIC CONVERSION - ENCOUNTER (OUTPATIENT)
Dept: OTHER | Facility: OTHER | Age: 82
End: 2017-09-11

## 2017-09-30 DIAGNOSIS — Z79.01 LONG TERM CURRENT USE OF ANTICOAGULANT: ICD-10-CM

## 2017-09-30 DIAGNOSIS — E11.9 TYPE 2 DIABETES MELLITUS WITHOUT COMPLICATIONS (HCC): ICD-10-CM

## 2017-09-30 DIAGNOSIS — E78.5 HYPERLIPIDEMIA: ICD-10-CM

## 2017-09-30 DIAGNOSIS — I48.91 ATRIAL FIBRILLATION (HCC): ICD-10-CM

## 2017-10-12 ENCOUNTER — LAB REQUISITION (OUTPATIENT)
Dept: LAB | Facility: HOSPITAL | Age: 82
End: 2017-10-12
Payer: MEDICARE

## 2017-10-12 ENCOUNTER — GENERIC CONVERSION - ENCOUNTER (OUTPATIENT)
Dept: OTHER | Facility: OTHER | Age: 82
End: 2017-10-12

## 2017-10-12 ENCOUNTER — ALLSCRIPTS OFFICE VISIT (OUTPATIENT)
Dept: OTHER | Facility: OTHER | Age: 82
End: 2017-10-12

## 2017-10-12 DIAGNOSIS — N39.0 URINARY TRACT INFECTION: ICD-10-CM

## 2017-10-12 LAB
BILIRUB UR QL STRIP: NORMAL
CLARITY UR: NORMAL
COLOR UR: YELLOW
GLUCOSE (HISTORICAL): NORMAL
HGB UR QL STRIP.AUTO: NORMAL
INR PPP: 1.9
KETONES UR STRIP-MCNC: NORMAL MG/DL
LEUKOCYTE ESTERASE UR QL STRIP: NORMAL
NITRITE UR QL STRIP: POSITIVE
PH UR STRIP.AUTO: 6 [PH]
PROT UR STRIP-MCNC: NORMAL MG/DL
SP GR UR STRIP.AUTO: 1.01
UROBILINOGEN UR QL STRIP.AUTO: 0.2

## 2017-10-12 PROCEDURE — 87086 URINE CULTURE/COLONY COUNT: CPT | Performed by: FAMILY MEDICINE

## 2017-10-12 PROCEDURE — 87186 SC STD MICRODIL/AGAR DIL: CPT | Performed by: FAMILY MEDICINE

## 2017-10-12 PROCEDURE — 87077 CULTURE AEROBIC IDENTIFY: CPT | Performed by: FAMILY MEDICINE

## 2017-10-14 LAB — BACTERIA UR CULT: ABNORMAL

## 2017-10-16 ENCOUNTER — GENERIC CONVERSION - ENCOUNTER (OUTPATIENT)
Dept: OTHER | Facility: OTHER | Age: 82
End: 2017-10-16

## 2017-10-22 ENCOUNTER — GENERIC CONVERSION - ENCOUNTER (OUTPATIENT)
Dept: OTHER | Facility: OTHER | Age: 82
End: 2017-10-22

## 2017-10-26 ENCOUNTER — ALLSCRIPTS OFFICE VISIT (OUTPATIENT)
Dept: OTHER | Facility: OTHER | Age: 82
End: 2017-10-26

## 2017-10-26 ENCOUNTER — GENERIC CONVERSION - ENCOUNTER (OUTPATIENT)
Dept: OTHER | Facility: OTHER | Age: 82
End: 2017-10-26

## 2017-10-26 LAB — INR PPP: 1.2

## 2017-12-11 ENCOUNTER — HOSPITAL ENCOUNTER (OUTPATIENT)
Dept: NON INVASIVE DIAGNOSTICS | Facility: CLINIC | Age: 82
Discharge: HOME | End: 2017-12-11
Payer: MEDICARE

## 2017-12-11 ENCOUNTER — GENERIC CONVERSION - ENCOUNTER (OUTPATIENT)
Dept: CARDIOLOGY CLINIC | Facility: CLINIC | Age: 82
End: 2017-12-11

## 2017-12-11 DIAGNOSIS — I25.10 ATHEROSCLEROTIC HEART DISEASE OF NATIVE CORONARY ARTERY WITHOUT ANGINA PECTORIS: ICD-10-CM

## 2017-12-11 DIAGNOSIS — I10 ESSENTIAL (PRIMARY) HYPERTENSION: ICD-10-CM

## 2017-12-11 DIAGNOSIS — I48.91 ATRIAL FIBRILLATION (HCC): ICD-10-CM

## 2017-12-11 DIAGNOSIS — I35.0 NONRHEUMATIC AORTIC VALVE STENOSIS: ICD-10-CM

## 2017-12-11 DIAGNOSIS — E78.5 HYPERLIPIDEMIA: ICD-10-CM

## 2017-12-11 PROCEDURE — 93306 TTE W/DOPPLER COMPLETE: CPT

## 2017-12-15 DIAGNOSIS — I10 ESSENTIAL (PRIMARY) HYPERTENSION: ICD-10-CM

## 2017-12-15 DIAGNOSIS — E78.5 HYPERLIPIDEMIA: ICD-10-CM

## 2017-12-15 DIAGNOSIS — I35.0 NONRHEUMATIC AORTIC VALVE STENOSIS: ICD-10-CM

## 2017-12-15 DIAGNOSIS — I48.91 ATRIAL FIBRILLATION (HCC): ICD-10-CM

## 2017-12-15 DIAGNOSIS — I25.10 ATHEROSCLEROTIC HEART DISEASE OF NATIVE CORONARY ARTERY WITHOUT ANGINA PECTORIS: ICD-10-CM

## 2018-01-05 ENCOUNTER — GENERIC CONVERSION - ENCOUNTER (OUTPATIENT)
Dept: OTHER | Facility: OTHER | Age: 83
End: 2018-01-05

## 2018-01-09 NOTE — MISCELLANEOUS
Message  Patient's INR was to be done this morning by visiting nurses, however she did not recall that this was going to be occurring  Based on this, the patient arrived at the office today with her  for his office visit  Yanick Baig, office nurse, decided that she should do a fingerstick PT INR, and it was 1 2  This is extremely low, and puts her at severe risk of problems  Patient daughter-in-law also reports that patient fell last night, as reported to me by Ethel Jung  Given the patient is not responsible for her medications, and I am also quite concerned about her  and it medications, I would strongly recommend not using anticoagulants, so we will discontinue warfarin at this point  I will not make a substitute recommendation as she has fallen multiple times, putting her at severe risk for bleeding from the falls  Plan  Anticoagulant long-term use, Atrial fibrillation    · PROTHROMBIN TIME- POC; Status:Complete - Retrospective By Protocol Authorization;    Done: [id number] 09:23AM  Atrial fibrillation    · Warfarin Sodium 5 MG Oral Tablet    Signatures   Electronically signed by :  JAVIER Fregoso ; Oct 26 2017  9:53AM EST                       (Author)

## 2018-01-10 NOTE — RESULT NOTES
Verified Results  (1) BASIC METABOLIC PROFILE [id number] 02:41PM [place] [facility]     Test Name Result Flag Reference   GLUCOSE,RANDM 114 mg/dL     If the patient is fasting, the ADA then defines impaired fasting glucose as > 100 mg/dL and diabetes as > or equal to 123 mg/dL  Specimen collection should occur prior to Sulfasalazine administration due to the potential for falsely depressed results  Specimen collection should occur prior to Sulfapyridine administration due to the potential for falsely elevated results  SODIUM 139 mmol/L  136-145   POTASSIUM 4 7 mmol/L  3 5-5 3   CHLORIDE 104 mmol/L  100-108   CARBON DIOXIDE 26 mmol/L  21-32   ANION GAP (CALC) 9 mmol/L  4-13   BLOOD UREA NITROGEN 30 mg/dL H 5-25   CREATININE 1 17 mg/dL  0 60-1 30   Standardized to IDMS reference method   CALCIUM 9 5 mg/dL  8 3-10 1   eGFR 41 ml/min/1 73sq [facility] Disease Education Program recommendations are as follows:  GFR calculation is accurate only with a steady state creatinine  Chronic Kidney disease less than 60 ml/min/1 73 sq  meters  Kidney failure less than 15 ml/min/1 73 sq  meters

## 2018-01-11 NOTE — MISCELLANEOUS
Message   Recorded as Task   Date: 09/11/2017 03:31 PM, Created By: Damian Smallwood   Task Name: Medical Complaint Callback   Assigned To: Jaiden Marie   Regarding Patient: Ariel Calderon, Status: Active   CommentMila  - 11 Sep 2017 3:31 PM     TASK CREATED  Recieved called from Binu Fitzgerald with visiting nurses  Pt  was in the office 2 wks ago and was started on celebrex  Pt  does not like the way this medication makes her feel and told the VN that is she just throwing out the medication and using tylenol 500mg 1 or 2 tablets once or twice a day  Binu Fitzgerald just wanted you aware and to run this by you  any concerns we can reach Binu Fitzgerald back at [phone]  Signatures   Electronically signed by :  JAVIER Sorto ; Sep 12 2017  1:33PM EST                       (Author)

## 2018-01-11 NOTE — RESULT NOTES
Message   blood work are normal need to drink more fluid      Verified Results  (1) TSH WITH FT4 REFLEX [id number] 12:37PM Nettie Shore    Order Number: [id number]     Test Name Result Flag Reference   TSH 1 610 uIU/mL  0 358-3 740   Patients undergoing fluorescein dye angiography may retain small amounts of fluorescein in the body for 48-72 hours post procedure  Samples containing fluorescein can produce falsely depressed TSH values  If the patient had this procedure,a specimen should be resubmitted post fluorescein clearance            The recommended reference ranges for TSH during pregnancy are as follows:  First trimester 0 1 to 2 5 uIU/mL  Second trimester  0 2 to 3 0 uIU/mL  Third trimester 0 3 to 3 0 uIU/m

## 2018-01-12 NOTE — MISCELLANEOUS
Message   Recorded as Task   Date: 08/10/2017 01:29 PM, Created By: Rachele Sanders   Task Name: Follow Up   Assigned To: Jaiden Marie   Regarding Patient: Gretta Lindsay, Status: Active   Comment:    Velia Wren - 10 Aug 2017 1:29 PM     TASK CREATED  phone call rec'd from Alphonso Beltrán with [facility]:  Fingerstick INR was done today, result was 2 1  She is currently on 2 5mg Coumadine since discharge from hospital   Instructed to continue same dose and recheck in 1 week  Alphonso Beltrán also reports that on her discharge papers it says to discontinue Lorazepam after 10 days  Pt has been on this long-term and would like to continue  Please advise  She will be having a BMP next week with results to you  This was ordered upon discharge from hospital   She has a SUKHJINDER appt set with you next week  --Velia Zepeda - 10 Aug 2017 2:16 PM     TASK EDITED  per [facility] [place], i called Alphonso Beltrán back and told her it is ok for pt to continue on her current Lorazepam dose  He will see her at [address] [place] [address] [address] visit on 8/17/17 and will address any other issues at that time  Active Problems    1  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)   2  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) (I63 511)   3  AI (aortic insufficiency) (424 1) (I35 1)   4  Anticoagulant long-term use (V58 61) (Z79 01)   5  Aortic stenosis (424 1) (I35 0)   6  Arteriosclerosis of coronary artery (414 00) (I25 10)   7  Asymptomatic menopausal state (V49 81) (Z78 0)   8  Atrial fibrillation (427 31) (I48 91)   9  Basal cell carcinoma of skin (173 91) (C44 91)   10  Controlled diabetes mellitus type II without complication (207 80) (G55 8)   11  Depression with anxiety (300 4) (F41 8)   12  Edema (782 3) (R60 9)   13  Esophageal reflux (530 81) (K21 9)   14  Hematuria (599 70) (R31 9)   15  History of orthostatic hypotension (V12 59) (Z86 79)   16  Hyperlipidemia (272 4) (E78 5)   17  Hypertension (401 9) (I10)   18  Need for pneumococcal vaccination (V03 82) (Z23)   19   Neoplasm of bone, soft tissue, or skin (239 2) (D49 2)   20  Nocturia (788 43) (R35 1)   21  Osteoarthritis (715 90) (M19 90)   22  History of Rectal carcinoma (154 1) (C20)   23  Recurrent UTI (urinary tract infection) (599 0) (N39 0)   24  S/P CABG (coronary artery bypass graft) (V45 81) (Z95 1)   25  Skin lesion (709 9) (L98 9)   26  Skin neoplasm (239 2) (D49 2)   27  Squamous cell carcinoma of skin (173 92) (C44 92)   28  Tinnitus (388 30) (H93 19)   29  Urinary incontinence in female (788 30) (R32)    Current Meds   1  Calcium + D 600-200 MG-UNIT TABS; TAKE AS DIRECTED; Therapy: 43DDL6179 to Recorded   2  DilTIAZem HCl ER Beads 240 MG Oral Capsule Extended Release 24 Hour; TAKE 1   CAPSULE DAILY; Therapy: 65NBI1067 to (Evaluate:31Xeq7803)  Requested for: 04HPQ0319; Last   Rx:27Paq0622 Ordered   3  Escitalopram Oxalate 5 MG Oral Tablet (Lexapro); take 1 tablet by mouth every day; Therapy: 48OOS9100 to (Evaluate:17Jan2018)  Requested for: 62Pcu4098; Last   Rx:14Anm6545 Ordered   4  LORazepam 0 5 MG Oral Tablet (Ativan); TAKE 0 5MG AM AND 1MG PM;   Therapy: 62ZSY2402 to (Evaluate:22Izo4692)  Requested for: 17Hrs1405; Last   Rx:70Jmo6228 Ordered   5  Nitrostat 0 4 MG Sublingual Tablet Sublingual (Nitroglycerin); PLACE 1 TABLET UNDER   THE TONGUE EVERY 5 MINUTES FOR UP TO 3 DOSES AS NEEDED   FOR CHEST PAIN  CALL 911 IF PAIN PERSISTS; Therapy: 75IBT4139 to (Evaluate:13Jan2016)  Requested for: 86ZTW2322; Last   Rx:24Nov2015 Ordered   6  RaNITidine HCl - 150 MG Oral Tablet; TAKE 1 TABLET DAILY; Therapy: 52GNB9973 to (Evaluate:83Kcu1126)  Requested for: 43MPR2572; Last   Rx:36Fug3517 Ordered   7  Simvastatin 20 MG Oral Tablet; Take 1 tablet daily; Therapy: 12IPX7435 to (VMQNFNDJ:06FAG6255)  Requested for: 09DTR7447; Last   Rx:30Mar2017 Ordered   8  Warfarin Sodium 5 MG Oral Tablet; TAKE 1 TABLET DAILY AS DIRECTED; Therapy: 18SVU5763 to (Evaluate:25Mar2018)  Requested for: 10CNP6834; Last   Rx:30Mar2017 Ordered    Allergies    1  Heparin    Signatures   Electronically signed by : Ian Serrano, ; Aug 10 2017  2:16PM EST                       (Author)

## 2018-01-13 VITALS
WEIGHT: 132.2 LBS | BODY MASS INDEX: 24.33 KG/M2 | SYSTOLIC BLOOD PRESSURE: 132 MMHG | DIASTOLIC BLOOD PRESSURE: 62 MMHG | HEIGHT: 62 IN | HEART RATE: 68 BPM

## 2018-01-13 VITALS
HEIGHT: 65 IN | WEIGHT: 134.4 LBS | SYSTOLIC BLOOD PRESSURE: 122 MMHG | BODY MASS INDEX: 22.39 KG/M2 | HEART RATE: 72 BPM | DIASTOLIC BLOOD PRESSURE: 58 MMHG

## 2018-01-13 VITALS
DIASTOLIC BLOOD PRESSURE: 58 MMHG | BODY MASS INDEX: 23.65 KG/M2 | SYSTOLIC BLOOD PRESSURE: 112 MMHG | HEART RATE: 60 BPM | WEIGHT: 128.5 LBS | RESPIRATION RATE: 16 BRPM | HEIGHT: 62 IN

## 2018-01-13 VITALS
DIASTOLIC BLOOD PRESSURE: 60 MMHG | HEIGHT: 62 IN | HEART RATE: 52 BPM | SYSTOLIC BLOOD PRESSURE: 120 MMHG | WEIGHT: 127 LBS | BODY MASS INDEX: 23.37 KG/M2

## 2018-01-13 NOTE — RESULT NOTES
Verified Results  (1) URINE CULTURE [id number] 09:45AM Francisco Banuelos   TW Order Number: [id number]     Test Name Result Flag Reference   CLINICAL REPORT (Report) A    Test:        Urine culture  Specimen Source:  Urine, Other  Specimen Type:   Urine  Specimen Date:   10/12/2017 9:45 AM  Result Date:    10/14/2017 10:10 AM  Result Status:   Final result  Abnormal:      Yes  Resulting Lab:   [facility] [address] [place] [address]            Tel: [phone]      CULTURE                                       ------------------                                   80,000-89,000 cfu/ml Escherichia coli ESBL (Abnormal)     *** An Extended-Spectrum Beta-Lactamase is being produced by this organism      (requires contact precautions)  Cephalosporins are NOT effective for treating these organisms  For SEVERE infections (i e  bacteremia, septic shock, etc ), Carbapenems      are the drug of choice  For MILD infections (i e  isolated urinary or biliary infection), high-dose     Zosyn may be used   ***      SUSCEPTIBILITY                                   ------------------                                                       Escherichia coli ESBL  METHOD                 [place]  -------------------------------------  --------------------------  AMPICILLIN ($$)             >16 00 ug/ml Resistant  AMPICILLIN + SULBACTAM ($)       >16/8 ug/ml  Resistant  AZTREONAM ($$$)             <=8 ug/ml   Resistant  CEFAZOLIN ($)              >16 00 ug/ml Resistant  CEFEPIME ($)              >16 00 ug/ml Resistant  CEFOTAXIME ($)             >32 00 ug/ml Resistant  CEFTAZIDIME ($$)            <=1 ug/ml   Resistant  CEFTRIAXONE ($$)            >32 00 ug/ml Resistant  CEFUROXIME ($$)             >16 ug/ml   Resistant  CIPROFLOXACIN ($)            <=1 00 ug/ml Susceptible  ERTAPENEM ($$$)             <=2 0 ug/ml Susceptible  GENTAMICIN ($$)             <=4 ug/ml   Susceptible  IMIPENEM                <=4 ug/ml   Susceptible  LEVOFLOXACIN ($)            <=2 00 ug/ml Susceptible  MEROPENEM ($$)             <=4 00 ug/ml Susceptible  NITROFURANTOIN             <=32 ug/ml  Susceptible  PIPERACILLIN + TAZOBACTAM ($$$)     <=16 ug/ml  Susceptible  TETRACYCLINE              8 ug/ml    Intermediate  TICARCILLIN/K CLAVULANATE        <=16 ug/ml  Susceptible  TOBRAMYCIN ($)             <=4 ug/ml   Susceptible  TRIMETHOPRIM + SULFAMETHOXAZOLE ($$$)  <=2/38 ug/ml Susceptible

## 2018-01-13 NOTE — PROGRESS NOTES
Chief Complaint  faxed PT/INR from 6/26/17 to Dr Saul Mckee at [phone] today  Active Problems    1  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)   2  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) (I63 511)   3  Anticoagulant long-term use (V58 61) (Z79 01)   4  Aortic regurgitation (424 1) (I35 1)   5  Aortic stenosis (424 1) (I35 0)   6  Arteriosclerosis of coronary artery (414 00) (I25 10)   7  Asymptomatic menopausal state (V49 81) (Z78 0)   8  Atrial fibrillation (427 31) (I48 91)   9  Basal cell carcinoma of skin (173 91) (C44 91)   10  Controlled diabetes mellitus type II without complication (843 55) (C58 9)   11  Depression (311) (F32 9)   12  Depression with anxiety (300 4) (F41 8)   13  Edema (782 3) (R60 9)   14  Esophageal reflux (530 81) (K21 9)   15  Hematuria (599 70) (R31 9)   16  History of orthostatic hypotension (V12 59) (Z86 79)   17  Hyperlipidemia (272 4) (E78 5)   18  Hypertension (401 9) (I10)   19  Need for pneumococcal vaccination (V03 82) (Z23)   20  Neoplasm of bone, soft tissue, or skin (239 2) (D49 2)   21  Nocturia (788 43) (R35 1)   22  Osteoarthritis (715 90) (M19 90)   23  History of Rectal carcinoma (154 1) (C20)   24  Recurrent UTI (urinary tract infection) (599 0) (N39 0)   25  Skin lesion (709 9) (L98 9)   26  Skin neoplasm (239 2) (D49 2)   27  Squamous cell carcinoma of skin (173 92) (C44 92)   28  Tinnitus (388 30) (H93 19)   29  Urinary incontinence in female (788 30) (R32)    Current Meds   1  Calcium + D 600-200 MG-UNIT TABS; TAKE AS DIRECTED; Therapy: 07DFK7228 to Recorded   2  DilTIAZem HCl - 120 MG Oral Tablet; ONE TABLET Q D;   Therapy: 56HNE0143 to (Evaluate:47Ppx0311)  Requested for: 77Cfz8394; Last   Rx:92Mcw2824 Ordered   3  Escitalopram Oxalate 5 MG Oral Tablet; take 1 tablet by mouth every day; Therapy: 21MVI2195 to (Evaluate:31Ubk5183)  Requested for: 20Jun2017; Last   Rx:20Jun2017 Ordered   4   LORazepam 0 5 MG Oral Tablet; TAKE 0 5MG AM AND 1MG PM;   Therapy: 91AZR9025 to (Evaluate:61Wlu9214)  Requested for: 17UBP6373; Last   Azeri:65TOO0140 Ordered   5  Nitrostat 0 4 MG Sublingual Tablet Sublingual; PLACE 1 TABLET UNDER THE TONGUE   EVERY 5 MINUTES FOR UP TO 3 DOSES AS NEEDED FOR CHEST PAIN  CALL   911 IF PAIN PERSISTS; Therapy: 19UXU0595 to (Evaluate:13Jan2016)  Requested for: 31XMT6417; Last   Rx:24Nov2015 Ordered   6  RaNITidine HCl - 150 MG Oral Tablet; TAKE 1 TABLET DAILY; Therapy: 57POR9330 to (Evaluate:55Jez9476)  Requested for: 87GWX2111; Last   Rx:13Feb2017 Ordered   7  Simvastatin 20 MG Oral Tablet; Take 1 tablet daily; Therapy: 85ZJH0989 to (OARXWKVN:63RQI7301)  Requested for: 55EMD6601; Last   Rx:30Mar2017 Ordered   8  Warfarin Sodium 5 MG Oral Tablet; TAKE 1 TABLET DAILY AS DIRECTED; Therapy: 67XXL3411 to (Evaluate:25Mar2018)  Requested for: 91QEJ0234; Last   Rx:30Mar2017 Ordered    Allergies    1  Heparin    Future Appointments    Date/Time Provider Specialty Site   07/03/2017 02:30 PM [facility] [address] [place] [address] Road   07/21/2017 01:00 PM JAVIER Mcqueen  [address] [place] [address]   10/05/2017 01:00 PM JAVIER Mcqueen  [address] [place] [address]     Signatures   Electronically signed by : Germán Valle, ; Jun 28 2017 10:54AM EST                       (Author)    Electronically signed by :  JAVIER Sorto ; Jun 28 2017  8:08PM EST

## 2018-01-14 VITALS
SYSTOLIC BLOOD PRESSURE: 88 MMHG | HEIGHT: 62 IN | DIASTOLIC BLOOD PRESSURE: 52 MMHG | RESPIRATION RATE: 16 BRPM | HEART RATE: 77 BPM | BODY MASS INDEX: 24.68 KG/M2 | WEIGHT: 134.13 LBS

## 2018-01-14 VITALS
BODY MASS INDEX: 23.44 KG/M2 | WEIGHT: 127.38 LBS | HEART RATE: 138 BPM | SYSTOLIC BLOOD PRESSURE: 140 MMHG | HEIGHT: 62 IN | DIASTOLIC BLOOD PRESSURE: 64 MMHG

## 2018-01-14 VITALS
SYSTOLIC BLOOD PRESSURE: 122 MMHG | HEART RATE: 68 BPM | DIASTOLIC BLOOD PRESSURE: 62 MMHG | HEIGHT: 62 IN | BODY MASS INDEX: 23.04 KG/M2 | WEIGHT: 125.2 LBS

## 2018-01-14 VITALS
SYSTOLIC BLOOD PRESSURE: 130 MMHG | BODY MASS INDEX: 24.03 KG/M2 | WEIGHT: 130.6 LBS | HEART RATE: 80 BPM | HEIGHT: 62 IN | DIASTOLIC BLOOD PRESSURE: 60 MMHG

## 2018-01-14 NOTE — MISCELLANEOUS
History of Present Illness    The patient is being contacted for follow-up after hospitalization  This was not a readmission  The date of discharge was 8/7/17  She has a high risk for readmission  Spoke with the patient  She was discharged home with home health services  Spoke to [address]  The name and phone number of the home health agency is Lyla Scott RN is patient RN CM    hospital discharge documents 100#  The patient's discharge weight was 122 on home scale on 8/8/17  The patient's weight today is 120  Patient has follow-up appointment(s) on:   to see cardiologist and PCP next week  Patient is experiencing the following symptoms: fatigue  Knowledge Assessment/Teachback Questions: the patient is following diet, foods to limit/avoid, the patient is obtaining daily weights and she knows the name of her medications/water pill, but the patient does not know when to report symptoms  Counseling was provided to the patient  Topics counseled included diet, need for daily weights, activities of daily living, patient and family education, importance of compliance with treatment, symptoms to report and home health agency benefits  [facility] Additional Notes:   Lives with   Neither are able to cook any longer  They have started to order "[employer] Meals"  Son brings to appointments  Patient is requesting a walker and I did speak to [facility]  RN to relay to physical therapist who will be coming to see patient  Current Meds   1  Calcium + D 600-200 MG-UNIT TABS; TAKE AS DIRECTED; Therapy: 10SYK6623 to Recorded   2  DilTIAZem HCl ER Beads 240 MG Oral Capsule Extended Release 24 Hour; TAKE 1   CAPSULE DAILY; Therapy: 99PRQ5080 to (Evaluate:00Nas7760)  Requested for: 67XXG1384; Last   Rx:97Wmj5848 Ordered   3  Escitalopram Oxalate 5 MG Oral Tablet (Lexapro); take 1 tablet by mouth every day; Therapy: 50RLC0383 to (Evaluate:46Lhd6144)  Requested for: 02Yvg4626; Last   Rx:35Cex7007 Ordered   4  LORazepam 0 5 MG Oral Tablet (Ativan); TAKE 0 5MG AM AND 1MG PM;   Therapy: 80MAQ1489 to (Evaluate:45Yvc9702)  Requested for: 79Ykf5041; Last   Rx:81Pgz6572 Ordered   5  Nitrostat 0 4 MG Sublingual Tablet Sublingual (Nitroglycerin); PLACE 1 TABLET UNDER   THE TONGUE EVERY 5 MINUTES FOR UP TO 3 DOSES AS NEEDED   FOR CHEST PAIN  CALL 911 IF PAIN PERSISTS; Therapy: 46LUH7078 to (Evaluate:13Jan2016)  Requested for: 54QTY2793; Last   Rx:24Nov2015 Ordered   6  RaNITidine HCl - 150 MG Oral Tablet; TAKE 1 TABLET DAILY; Therapy: 31SYI1054 to (Evaluate:08Feb2018)  Requested for: 14CWF9067; Last   Rx:95Nbm2501 Ordered   7  Simvastatin 20 MG Oral Tablet; Take 1 tablet daily; Therapy: 80TBK3028 to (GLQTQHIV:69ZCJ0924)  Requested for: 71NBB0061; Last   Rx:30Mar2017 Ordered   8  Warfarin Sodium 5 MG Oral Tablet; TAKE 1 TABLET DAILY AS DIRECTED; Therapy: 85KBN3189 to (Evaluate:25Mar2018)  Requested for: 61GMF8641; Last   Rx:30Mar2017 Ordered    Future Appointments    Date/Time Provider Specialty Site   08/15/2017 04:00 PM JAVIER Vital  Cardiology [facility]ARDIOLOGY [facility]   08/17/2017 01:30 PM JAVIER Ribera  [address] [place] [address]   10/05/2017 01:00 PM JAVIER Ribera  [facility]     Allergies    1   Heparin    Signatures   Electronically signed by : Renee Bentley, ; Aug  9 2017  4:09PM EST                       (Author)

## 2018-01-14 NOTE — MISCELLANEOUS
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   (428 43,428 0) (I50 43)   4  Anticoagulant long-term use (V58 61) (Z79 01)   5  Aortic stenosis (424 1) (I35 0)   6  Arteriosclerosis of coronary artery (414 00) (I25 10)   7  Asymptomatic menopausal state (V49 81) (Z78 0)   8  Atrial fibrillation (427 31) (I48 91)   9  Basal cell carcinoma of skin (173 91) (C44 91)   10  Closed wedge compression fracture of eleventh thoracic vertebra, initial encounter    (805 2) (S22 080A)   11  Cognitive impairment (294 9) (R41 89)   12  Constipation (564 00) (K59 00)   13  Controlled diabetes mellitus type II without complication (915 59) (J20 8)   14  Depression with anxiety (300 4) (F41 8)   15  Edema (782 3) (R60 9)   16  Esophageal reflux (530 81) (K21 9)   17  Hematuria (599 70) (R31 9)   18  History of orthostatic hypotension (V12 59) (Z86 79)   19  Hyperlipidemia (272 4) (E78 5)   20  Hypertension (401 9) (I10)   21  Injury of back due to fall, initial encounter (959 19,E888 9) (M03 54KF,G27  XXXA)   22  Need for pneumococcal vaccination (V03 82) (Z23)   23  Neoplasm of bone, soft tissue, or skin (239 2) (D49 2)   24  Nocturia (788 43) (R35 1)   25  Nonrheumatic aortic valve insufficiency (424 1) (I35 1)   26  Osteoarthritis (715 90) (M19 90)   27  History of Rectal carcinoma (154 1) (C20)   28  Recurrent UTI (urinary tract infection) (599 0) (N39 0)   29  S/P CABG (coronary artery bypass graft) (V45 81) (Z95 1)   30  Skin lesion (709 9) (L98 9)   31  Skin neoplasm (239 2) (D49 2)   32  Squamous cell carcinoma of skin (173 92) (C44 92)   33  Tinnitus (388 30) (H93 19)   34   Urinary incontinence in female (788 30) (R32)    Current Meds   1  Aspirin 81 MG TABS; TAKE 1 TABLET DAILY; Therapy: (Recorded:17Aug2017) to Recorded   2  Celecoxib 200 MG Oral Capsule (CeleBREX); TAKE 1 CAPSULE Daily; Therapy: 27Ymk5938 to (Evaluate:73Afo0728)  Requested for: 81Jhc6925; Last   Rx:14Zkj6893; Status: ACTIVE - Renewal Denied Ordered   3  DilTIAZem HCl - 120 MG Oral Tablet; ONE TABLET Q D  Requested for: 17Aug2017; Last   Rx:17Aug2017 Ordered   4  Escitalopram Oxalate 5 MG Oral Tablet (Lexapro); take 1 tablet by mouth every day; Therapy: 42SGV3270 to (Evaluate:17Jan2018)  Requested for: 00Wej5826; Last   Rx:19Ssi5225 Ordered   5  Furosemide 40 MG Oral Tablet; Take 1 tablet daily; Therapy: (Recorded:17Aug2017) to Recorded   6  Lactulose 10 GM/15ML Oral Solution; Take 1 tablespoonful daily as needed; Therapy: 21Mmc6222 to (Last Adryan Lombardo)  Requested for: 29Aug2017; Status:   ACTIVE - Renewal Denied Ordered   7  LORazepam 0 5 MG Oral Tablet; TAKE 1 TABLET Bedtime; Last Rx:42Wxx3511 Ordered   8  Methocarbamol 500 MG Oral Tablet (Robaxin); TAKE 1 TABLET EVERY 8 HOURS AS   NEEDED FOR MUSCLE SPASM; Therapy: 79Xuu9316 to (Evaluate:44Ncu0974)  Requested for: 56Clj6733; Last   Rx:38Osw9270 Ordered   9  Metoprolol Tartrate 25 MG Oral Tablet; TAKE 1 TABLET DAILY; Therapy: (Recorded:17Aug2017) to Recorded   10  Nitrostat 0 4 MG Sublingual Tablet Sublingual (Nitroglycerin); PLACE 1 TABLET UNDER    THE TONGUE EVERY 5 MINUTES FOR UP TO 3 DOSES AS NEEDED    FOR CHEST PAIN  CALL 911 IF PAIN PERSISTS; Therapy: 95FOK4628 to (Evaluate:13Jan2016)  Requested for: 89BDC5645; Last    Rx:24Nov2015 Ordered   11  Potassium Chloride 10 MEQ TBCR; TAKE 1 TABLET DAILY; Therapy: (Recorded:17Aug2017) to Recorded   12  Warfarin Sodium 5 MG Oral Tablet; alternate one-half tablet and 1 tablet; Therapy: 26BQX3839 to (Evaluate:38Dzf7578)  Requested for: 45Hzf9711; Last    Rx:12Erd5168 Ordered    Allergies    1  Heparin    Signatures   Electronically signed by : Capo Becerra, ; Aug 29 2017  5:20PM EST                       (Author)

## 2018-01-15 NOTE — MISCELLANEOUS
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)   2  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) (I63 511)   3  Anticoagulant long-term use (V58 61) (Z79 01)   4  Aortic regurgitation (424 1) (I35 1)   5  Aortic stenosis (424 1) (I35 0)   6  Arteriosclerosis of coronary artery (414 00) (I25 10)   7  Asymptomatic menopausal state (V49 81) (Z78 0)   8  Atrial fibrillation (427 31) (I48 91)   9  Basal cell carcinoma of skin (173 91) (C44 91)   10  Controlled diabetes mellitus type II without complication (867 44) (J09 5)   11  Depression (311) (F32 9)   12  Depression with anxiety (300 4) (F41 8)   13  Edema (782 3) (R60 9)   14  Esophageal reflux (530 81) (K21 9)   15  Hematuria (599 70) (R31 9)   16  History of orthostatic hypotension (V12 59) (Z86 79)   17  Hyperlipidemia (272 4) (E78 5)   18  Hypertension (401 9) (I10)   19  Need for pneumococcal vaccination (V03 82) (Z23)   20  Neoplasm of bone, soft tissue, or skin (239 2) (D49 2)   21  Nocturia (788 43) (R35 1)   22  Osteoarthritis (715 90) (M19 90)   23  History of Rectal carcinoma (154 1) (C20)   24  Recurrent UTI (urinary tract infection) (599 0) (N39 0)   25  Skin lesion (709 9) (L98 9)   26  Skin neoplasm (239 2) (D49 2)   27  Squamous cell carcinoma of skin (173 92) (C44 92)   28  Tinnitus (388 30) (H93 19)   29  Urinary incontinence in female (788 30) (R32)    Current Meds   1  Calcium + D 600-200 MG-UNIT TABS; TAKE AS DIRECTED; Therapy: 82MNS4931 to Recorded   2   DilTIAZem HCl - 120 MG Oral Tablet; ONE TABLET Q D;   Therapy: 32SVC7242 to (Evaluate:33Zfc8540) Requested for: 38Ncz5576; Last   Rx:08Tym8282 Ordered   3  Escitalopram Oxalate 5 MG Oral Tablet (Lexapro); take 1 tablet by mouth every day; Therapy: 75JKG3923 to (Evaluate:72Rsv1024)  Requested for: 20Jun2017; Last   Rx:20Jun2017 Ordered   4  LORazepam 0 5 MG Oral Tablet (Ativan); TAKE 0 5MG AM AND 1MG PM;   Therapy: 67NUZ6358 to (Evaluate:12Emh3438)  Requested for: 79PNH3745; Last   OT:08RDA2502 Ordered   5  Nitrostat 0 4 MG Sublingual Tablet Sublingual (Nitroglycerin); PLACE 1 TABLET UNDER   THE TONGUE EVERY 5 MINUTES FOR UP TO 3 DOSES AS NEEDED   FOR CHEST PAIN  CALL 911 IF PAIN PERSISTS; Therapy: 42UYM0831 to (Evaluate:13Jan2016)  Requested for: 21MVY1508; Last   Rx:24Nov2015 Ordered   6  RaNITidine HCl - 150 MG Oral Tablet; TAKE 1 TABLET DAILY; Therapy: 58PIW9418 to (Evaluate:40Lxv0327)  Requested for: 15RPC8615; Last   Rx:13Feb2017 Ordered   7  Simvastatin 20 MG Oral Tablet; Take 1 tablet daily; Therapy: 29JUB4005 to (GYJORJ:07RXO4957)  Requested for: 58SST6976; Last   Rx:30Mar2017 Ordered   8  Warfarin Sodium 5 MG Oral Tablet; TAKE 1 TABLET DAILY AS DIRECTED; Therapy: 83MBS7380 to (Evaluate:25Mar2018)  Requested for: 83ICA2045; Last   Rx:30Mar2017 Ordered    Allergies    1   Heparin    Signatures   Electronically signed by : Segundo Campos, ; Jul 11 2017 10:31AM EST                       (Author)

## 2018-01-15 NOTE — MISCELLANEOUS
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)   2  Acute UTI (599 0) (N39 0)   3  Anticoagulant long-term use (V58 61) (Z79 01)   4  Aortic regurgitation (424 1) (I35 1)   5  Asymptomatic menopausal state (V49 81) (Z78 0)   6  Atrial fibrillation (427 31) (I48 91)   7  Controlled diabetes mellitus type II without complication (199 71) (K06 0)   8  Depression (311) (F32 9)   9  Depression with anxiety (300 4) (F41 8)   10  Edema (782 3) (R60 9)   11  Esophageal reflux (530 81) (K21 9)   12  Hematuria (599 70) (R31 9)   13  Hyperlipidemia (272 4) (E78 5)   14  Hypertension (401 9) (I10)   15  Need for pneumococcal vaccination (V03 82) (Z23)   16  Nocturia (788 43) (R35 1)   17  Osteoarthritis (715 90) (M19 90)   18  Recurrent UTI (urinary tract infection) (599 0) (N39 0)   19  Skin lesion (709 9) (L98 9)   20  Skin neoplasm (239 2) (D49 2)   21  Skin Neoplasm Of The Back (239 2)   22  Squamous cell carcinoma of skin (173 92) (C44 92)   23  Stress Incontinence (788 39)   24  Tinnitus (388 30) (H93 19)   25  Urinary incontinence in female (788 30) (R32)    Current Meds   1  AmLODIPine Besylate 5 MG Oral Tablet; TAKE 1 TABLET 4 days per week; Therapy: 85DIL0698 to (Evaluate:16Qbe0491)  Requested for: 16YKW0212; Last   Rx:18May2016 Ordered   2  Benazepril HCl - 40 MG Oral Tablet; TAKE 1 TABLET 4 days per week; Therapy: 63XEU3401 to (Evaluate:13May2017)  Requested for: 84DKY1979; Last   Rx:42Rhv3711 Ordered   3  Calcium + D 600-200 MG-UNIT TABS; TAKE AS DIRECTED; Therapy: 75EUK9505 to Recorded   4   DiltiaZEM HCl - 120 MG Oral Tablet; ONE TABLET Q D;   Therapy: 93SRT5668 to (Evaluate:13May2017)  Requested for: 84YKR9904; Last   Rx:18May2016 Ordered   5  LORazepam 0 5 MG Oral Tablet (Ativan); TAKE 0 5MG AM AND 1MG PM;   Therapy: 50ZHM6442 to (Evaluate:08Jan2017)  Requested for: 23PKS2393; Last   Rx:10Oct2016 Ordered   6  Nitrostat 0 4 MG Sublingual Tablet Sublingual (Nitroglycerin); PLACE 1 TABLET UNDER   THE TONGUE EVERY 5 MINUTES FOR UP TO 3 DOSES AS NEEDED   FOR CHEST PAIN  CALL 911 IF PAIN PERSISTS; Therapy: 01ADI7834 to (Evaluate:13Jan2016)  Requested for: 54BDM6004; Last   Rx:24Nov2015 Ordered   7  Oxybutynin Chloride 5 MG Oral Tablet; 1 po qhs;   Therapy: 63GGR8297 to (Last Rx:73Hgp8875)  Requested for: 78ZUV6172 Ordered   8  Oxybutynin Chloride ER 10 MG Oral Tablet Extended Release 24 Hour; take 1 tablet by   mouth daily; Therapy: 15FVY9099 to (Evaluate:37Hgs8818)  Requested for: 92JVN9859; Last   Rx:12Oct2016 Ordered   9  Simvastatin 20 MG Oral Tablet; Take 1 tablet daily; Therapy: 23VET3859 to (Evaluate:87Loz1115)  Requested for: 37AOT1668; Last   Rx:84Ega9403 Ordered   10  Warfarin Sodium 2 5 MG Oral Tablet; TAKE 1 TABLET DAILY AS DIRECTED; Therapy: 55TWJ9792 to (Evaluate:13May2017)  Requested for: 18PZR3176; Last    Rx:11Nxz8212 Ordered   11  Warfarin Sodium 5 MG Oral Tablet; take 1  tablet daily or as directed; Therapy: 00CJA5491 to (Evaluate:29Apr2017)  Requested for: 03HNR3653; Last    GA:59IWL7234 Ordered    Allergies    1   Heparin    Plan  Acute UTI    · Sulfamethoxazole-Trimethoprim 800-160 MG Oral Tablet; TAKE 1 TABLET TWICE  DAILY   · Urine Dip Automated- POC; Status:Complete - Retrospective By Protocol Authorization;    Done: 95LCN1156 01:33PM    Signatures   Electronically signed by : Xochitl Galvez, ; Nov 28 2016  1:49PM EST                       (Author)

## 2018-01-15 NOTE — RESULT NOTES
Verified Results  (1) COMPREHENSIVE METABOLIC PANEL 28HHX5990 68:63YN Farhan Aquino     Test Name Result Flag Reference   GLUCOSE,RANDM 175 mg/dL H    If the patient is fasting, the ADA then defines impaired fasting glucose as > 100 mg/dL and diabetes as > or equal to 123 mg/dL  SODIUM 137 mmol/L  136-145   POTASSIUM 4 5 mmol/L  3 5-5 3   CHLORIDE 104 mmol/L  100-108   CARBON DIOXIDE 24 mmol/L  21-32   ANION GAP (CALC) 9 mmol/L  4-13   BLOOD UREA NITROGEN 16 mg/dL  5-25   CREATININE 1 42 mg/dL H 0 60-1 30   Standardized to IDMS reference method   CALCIUM 10 1 mg/dL  8 3-10 1   BILI, TOTAL 0 86 mg/dL  0 20-1 00   ALK PHOSPHATAS 111 U/L     ALT (SGPT) 25 U/L  12-78   AST(SGOT) 26 U/L  5-45   ALBUMIN 4 2 g/dL  3 5-5 0   TOTAL PROTEIN 8 7 g/dL H 6 4-8 2   eGFR Non-African American 34 8 ml/min/1 73sq [facility] Disease Education Program recommendations are as follows:  GFR calculation is accurate only with a steady state creatinine  Chronic Kidney disease less than 60 ml/min/1 73 sq  meters  Kidney failure less than 15 ml/min/1 73 sq  meters  (1) CBC/PLT/DIFF 78JBW5704 01:14PM Farhan Aquino     Test Name Result Flag Reference   WBC COUNT 7 18 Thousand/uL  4 31-10 16   RBC COUNT 4 42 Million/uL  3 81-5 12   HEMOGLOBIN 13 2 g/dL  11 5-15 4   HEMATOCRIT 39 7 %  34 8-46  1   MCV 90 fL  82-98   MCH 29 9 pg  26 8-34 3   MCHC 33 2 g/dL  31 4-37 4   RDW 13 8 %  11 6-15 1   MPV 12 4 fL  8 9-12 7   PLATELET COUNT 335 Thousands/uL  149-390   nRBC AUTOMATED 0 /100 WBCs     NEUTROPHILS RELATIVE PERCENT 76 % H 43-75   LYMPHOCYTES RELATIVE PERCENT 17 %  14-44   MONOCYTES RELATIVE PERCENT 6 %  4-12   EOSINOPHILS RELATIVE PERCENT 1 %  0-6   BASOPHILS RELATIVE PERCENT 0 %  0-1   NEUTROPHILS ABSOLUTE COUNT 5 42 Thousands/?L  1 85-7 62   LYMPHOCYTES ABSOLUTE COUNT 1 23 Thousands/?L  0 60-4 47   MONOCYTES ABSOLUTE COUNT 0 46 Thousand/?L  0 17-1 22   EOSINOPHILS ABSOLUTE COUNT 0 05 Thousand/?L  0 00-0 61 BASOPHILS ABSOLUTE COUNT 0 01 Thousands/?L  0 00-0 10     (1) HEMOGLOBIN A1C 39RSW3797 01:14PM Christa Valentine     Test Name Result Flag Reference   HEMOGLOBIN A1C 7 4 % H 4 2-6 3   EST  AVG  GLUCOSE 166 mg/dl       (1) LIPID PANEL FASTING W DIRECT LDL REFLEX 26XTT7880 01:14PM Christa Mcbrider     Test Name Result Flag Reference   CHOLESTEROL 219 mg/dL H    LDL CHOLESTEROL CALCULATED 110 mg/dL H 0-100   Triglyceride:         Normal              <150 mg/dl       Borderline High    150-199 mg/dl       High               200-499 mg/dl       Very High          >499 mg/dl  Cholesterol:         Desirable        <200 mg/dl      Borderline High  200-239 mg/dl      High             >239 mg/dl  HDL Cholesterol:        High    >59 mg/dL      Low     <41 mg/dL  LDL Cholesterol:        Optimal          <100 mg/dl        Near Optimal     100-129 mg/dl        Above Optimal          Borderline High   130-159 mg/dl          High              160-189 mg/dl          Very High        >189 mg/dl  LDL CALCULATED:    This screening LDL is a calculated result  It does not have the accuracy of the Direct Measured LDL in the monitoring of patients with hyperlipidemia and/or statin therapy  Direct Measure LDL (NFI144) must be ordered separately in these patients  TRIGLYCERIDES 137 mg/dL  <=150   Specimen collection should occur prior to N-Acetylcysteine or Metamizole administration due to the potential for falsely depressed results  HDL,DIRECT 82 mg/dL H 40-60   Specimen collection should occur prior to Metamizole administration due to the potential for falsely depressed results  (1) TSH 44VRE4980 01:14PM Christa Bryan     Test Name Result Flag Reference   TSH 1 930 uIU/mL  0 358-3 740   Patients undergoing fluorescein dye angiography may retain small amounts of fluorescein in the body for 48-72 hours post procedure  Samples containing fluorescein can produce falsely depressed TSH values   If the patient had this procedure,a specimen should be resubmitted post fluorescein clearance            The recommended reference ranges for TSH during pregnancy are as follows:  First trimester 0 1 to 2 5 uIU/mL  Second trimester  0 2 to 3 0 uIU/mL  Third trimester 0 3 to 3 0 uIU/m

## 2018-01-16 NOTE — PROGRESS NOTES
Active Problems    1  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)   2  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) (I63 511)   3  Anticoagulant long-term use (V58 61) (Z79 01)   4  Aortic regurgitation (424 1) (I35 1)   5  Aortic stenosis (424 1) (I35 0)   6  Arteriosclerosis of coronary artery (414 00) (I25 10)   7  Asymptomatic menopausal state (V49 81) (Z78 0)   8  Atrial fibrillation (427 31) (I48 91)   9  Basal cell carcinoma of skin (173 91) (C44 91)   10  Controlled diabetes mellitus type II without complication (315 73) (G55 9)   11  Depression with anxiety (300 4) (F41 8)   12  Edema (782 3) (R60 9)   13  Esophageal reflux (530 81) (K21 9)   14  Hematuria (599 70) (R31 9)   15  History of orthostatic hypotension (V12 59) (Z86 79)   16  Hyperlipidemia (272 4) (E78 5)   17  Hypertension (401 9) (I10)   18  Need for pneumococcal vaccination (V03 82) (Z23)   19  Neoplasm of bone, soft tissue, or skin (239 2) (D49 2)   20  Nocturia (788 43) (R35 1)   21  Osteoarthritis (715 90) (M19 90)   22  History of Rectal carcinoma (154 1) (C20)   23  Recurrent UTI (urinary tract infection) (599 0) (N39 0)   24  Skin lesion (709 9) (L98 9)   25  Skin neoplasm (239 2) (D49 2)   26  Squamous cell carcinoma of skin (173 92) (C44 92)   27  Tinnitus (388 30) (H93 19)   28  Urinary incontinence in female (788 30) (R32)    Current Meds   1  Calcium + D 600-200 MG-UNIT TABS; TAKE AS DIRECTED; Therapy: 60PKV0060 to Recorded   2  Escitalopram Oxalate 5 MG Oral Tablet (Lexapro); take 1 tablet by mouth every day; Therapy: 64TVK3215 to (Evaluate:17Jan2018)  Requested for: 49Tox1877; Last   Rx:03Bfh6671 Ordered   3  LORazepam 0 5 MG Oral Tablet (Ativan); TAKE 0 5MG AM AND 1MG PM;   Therapy: 76EVN8490 to (Evaluate:29Lef3524)  Requested for: 70Whf1407; Last   Rx:03Jmk3507 Ordered   4  Nitrostat 0 4 MG Sublingual Tablet Sublingual (Nitroglycerin); PLACE 1 TABLET UNDER   THE TONGUE EVERY 5 MINUTES FOR UP TO 3 DOSES AS NEEDED   FOR CHEST PAIN  CALL 915 IF PAIN PERSISTS; Therapy: 13LVE1294 to (Evaluate:13Jan2016)  Requested for: 42RYM0511; Last   Rx:24Nov2015 Ordered   5  RaNITidine HCl - 150 MG Oral Tablet; TAKE 1 TABLET DAILY; Therapy: 26FDP0335 to (Evaluate:08Feb2018)  Requested for: 79UDI9570; Last   Rx:13Feb2017 Ordered   6  Simvastatin 20 MG Oral Tablet; Take 1 tablet daily; Therapy: 15DLX7771 to (DDVOETBC:41DCA4263)  Requested for: 79QYC4297; Last   Rx:30Mar2017 Ordered   7  Warfarin Sodium 5 MG Oral Tablet; TAKE 1 TABLET DAILY AS DIRECTED; Therapy: 58PRF5466 to (Evaluate:25Mar2018)  Requested for: 05DCZ2489; Last   Rx:30Mar2017 Ordered    Allergies    1  Heparin    Future Appointments    Date/Time Provider Specialty Site   07/24/2017 04:20 PM JAVIER Sanders  Cardiology [facility] CARDIOLOGY [place]   10/05/2017 01:00 PM JAVIER Asencio  [address] [place] [address]     Message   Recorded as Task   Date: 07/21/2017 01:32 PM, Created By: Hansa Reno   Task Name: Care Coordination   Assigned To: Velia Wren   Regarding Patient: Leida Olsen, Status: Active   Comment: Jaiden Marie - 21 Jul 2017 1:32 PM     TASK CREATED  Please check this patient's blood pressure when she comes in for fingerstick INR  I have just discontinued the Cardizem on her, and we'll look to add benazepril 10 mg of her blood pressure slightly elevated  Also, can you ask about orthostasis symptoms since she was discontinued from the Cardizem for continued orthostasis  Velia Wren - 24 Jul 2017 3:36 PM     TASK EDITED  pt's BP was taken at protime appt  It was 120/64  However, her HR was very rapid at 144  TH was made aware and he recommended cardio consult with Dr Lanre Hernandez ASAP  Appt was made for today at 1620  Pt's daughter was with her today and she will transport  Signatures   Electronically signed by : Gerson Marx, ; Jul 24 2017  3:37PM EST                       (Author)    Electronically signed by :  Deven Cadet JAVIER Mejia ; Jul 25 2017 10:58AM EST

## 2018-01-17 NOTE — PROGRESS NOTES
Assessment  Assessed    1  Aortic regurgitation (424 1) (I35 1)   2  Atrial fibrillation (427 31) (I48 91)   3  Hyperlipidemia (272 4) (E78 5)   4  Hypertension (401 9) (I10)   5  Aortic stenosis (424 1) (I35 0)   6  Arteriosclerosis of coronary artery (414 00) (I25 10)    Plan  Aortic regurgitation, Aortic stenosis, Arteriosclerosis of coronary artery, Atrial fibrillation    · Follow-up visit in 6 months Evaluation and Treatment  Follow-up  Status: Hold For -  Scheduling  Requested for: 09RPE9737   Ordered; For: Aortic regurgitation, Aortic stenosis, Arteriosclerosis of coronary artery, Atrial fibrillation; Ordered By: Mohit Ahmadi Performed:  Due: 32KPA4423  Atrial fibrillation    · EKG/ECG- POC; Status:Complete;   Done: 15SHO2978   Perform: In Office; Due:92Mqr0708; Last Updated By:Alex Grande; 3/10/2017 2:09:04 PM;Ordered; For:Atrial fibrillation; Ordered By:Camila Pierson; Discussion/Summary  Cardiology Discussion Summary Free Text Note Form [facility]:   I will continue her present medical regimen  She appears well compensated  I've asked her to call if there is a problem in the interim otherwise I will see her in 6 months time  Chief Complaint  Chief Complaint Free Text Note Form: OD Yearly f/u      History of Present Illness  Cardiology HPI Free Text Note Form [facility]: The patient is here for a follow-up visit  She was last seen by me in August 2015  Since that time she had an echocardiogram done today which looks comparable to her prior study  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  Her LVEF is 50%  She has been feeling well  As usual she is here today with her   She has no chest pain or significant dyspnea  Aortic Regurgitation (Brief): The patient is being seen for a routine clinic follow-up of aortic valve regurgitation  The patient is currently asymptomatic  Atrial Fibrillation (Follow-Up):  The patient presents with paroxysmal atrial fibrillation  The treatment strategy for this patient is rate control  She states her atrial fibrillation has been well controlled since the last visit  Symptoms:   Coronary Artery Disease (Follow-Up): The patient states she has been doing well with her coronary artery disease symptoms since the last visit  She has no known CAD complications  Symptoms: Aortic Stenosis (Brief): The patient is being seen for a routine clinic follow-up of aortic stenosis  The patient is currently asymptomatic  Hyperlipidemia (Follow-Up): The patient states her hyperlipidemia has been under good control since the last visit  She has no significant interval events  Symptoms:   Hypertension (Follow-Up): The patient presents for follow-up of essential hypertension  The patient states she has been doing well with her blood pressure control since the last visit  Symptoms:      Review of Systems  Cardiology Female ROS:     Cardiac: No complaints of chest pain, no palpitations, no fainting  Skin: No complaints of nonhealing sores or skin rash  Genitourinary: No complaints of recurrent urinary tract infections, frequent urination at night, difficult urination, blood in urine, kidney stones, loss of bladder control, kidney problems, denies any birth control or hormone replacement, is not post menopausal, not currently pregnant  Psychological: anxiety, but No complaints of feeling depressed, anxiety, panic attacks, or difficulty concentrating  General: No complaints of trouble sleeping, lack of energy, fatigue, appetite changes, weight changes, fever, frequent infections, or night sweats  Respiratory: No complaints of shortness of breath, cough with sputum, or wheezing  HEENT: No complaints of serious problems, hearing problems, nose problems, throat problems, or snoring     Gastrointestinal: No complaints of liver problems, nausea, vomiting, heartburn, constipation, bloody stools, diarrhea, problems swallowing, adbominal pain, or rectal bleeding  Hematologic: No complaints of bleeding disorders, anemia, blood clots, or excessive brusing  Neurological: No complaints of numbness, tingling, dizziness, weakness, seizures, headaches, syncope or fainting, AM fatigue, daytime sleepiness, no witnessed apnea episodes  Musculoskeletal: No complaints of arthritis, back pain, or painfull swelling  Active Problems  Problems    1  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)   2  Anticoagulant long-term use (V58 61) (Z79 01)   3  Aortic regurgitation (424 1) (I35 1)   4  Asymptomatic menopausal state (V49 81) (Z78 0)   5  Atrial fibrillation (427 31) (I48 91)   6  Controlled diabetes mellitus type II without complication (612 99) (J52 4)   7  Depression (311) (F32 9)   8  Depression with anxiety (300 4) (F41 8)   9  Edema (782 3) (R60 9)   10  Esophageal reflux (530 81) (K21 9)   11  Hematuria (599 70) (R31 9)   12  History of basal cell carcinoma (V10 83) (Z85 828)   13  Hyperlipidemia (272 4) (E78 5)   14  Hypertension (401 9) (I10)   15  Need for pneumococcal vaccination (V03 82) (Z23)   16  Neoplasm of bone, soft tissue, or skin (239 2) (D49 2)   17  Nocturia (788 43) (R35 1)   18  Osteoarthritis (715 90) (M19 90)   19  History of Rectal carcinoma (154 1) (C20)   20  Recurrent UTI (urinary tract infection) (599 0) (N39 0)   21  Skin lesion (709 9) (L98 9)   22  Skin neoplasm (239 2) (D49 2)   23  Squamous cell carcinoma of skin (173 92) (C44 92)   24  Tinnitus (388 30) (H93 19)   25  Urinary incontinence in female (788 30) (R32)    Past Medical History  Problems    1  History of Acute UTI (599 0) (N39 0)   2  History of Cellulitis (682 9) (L03 90)   3  History of Dysuria (788 1) (R30 0)   4  History of acute bronchitis (V12 69) (Z87 09)   5  History of basal cell carcinoma (V10 83) (Z85 828)   6  History of blurred vision (V12 49) (Z86 69)   7  History of urinary tract infection (V13 02) (Z87 478)   8   History of Need for prophylactic vaccination and inoculation against influenza (V04 81)   (Z23)   9  History of Noninfected skin tear of left leg (891 0) (S81 802A)   10  Old myocardial infarction (412) (I25 2)   11  History of Rectal carcinoma (154 1) (C20)   12  History of Visit for screening mammogram (V76 12) (Z12 31)   13  History of Wound of left leg (891 0) (N84 223Y)  Active Problems And Past Medical History Reviewed: The active problems and past medical history were reviewed and updated today  Surgical History  Problems    1  History of Previous Balloon Angioplasty   2  History of Rectal Surgery    Family History  Mother    1  Family history of    2  Family history of Stroke Syndrome (V17 1)  Father    3  Family history of    4  Family history of Stroke Syndrome (V17 1)  Family History    5  No family history of mental disorder    Social History  Problems    · Alcohol Use (History)   · Former smoker (O78 10) (H24 948)   ·    · No drug use   · No secondhand smoke exposure (V49 89) (Z78 9)   · Non-smoker (V49 89) (Z78 9)   · Retired    Current Meds   1  AmLODIPine Besylate 5 MG Oral Tablet; TAKE 1 TABLET 4 days per week; Therapy: 38WOE0242 to (Evaluate:15Yjv6343)  Requested for: 91FLH3570; Last   Rx:08Muc4760 Ordered   2  Benazepril HCl - 40 MG Oral Tablet; TAKE 1 TABLET 4 days per week; Therapy: 03MSR3959 to (Evaluate:06Hda5609)  Requested for: 46LGA8947; Last   Rx:47Hyh4120 Ordered   3  Calcium + D 600-200 MG-UNIT TABS; TAKE AS DIRECTED; Therapy: 72TWR7615 to Recorded   4  DiltiaZEM HCl - 120 MG Oral Tablet; ONE TABLET Q D;   Therapy: 78CUI9758 to (Evaluate:46Ouu8645)  Requested for: 12Kfu7959; Last   Rx:62Gzn7407 Ordered   5  LORazepam 0 5 MG Oral Tablet; TAKE 0 5MG AM AND 1MG PM;   Therapy: 10LMZ7380 to (Evaluate:60Dkk6061)  Requested for: 20YFA9635; Last   Rx:29Bgv7932 Ordered   6   Nitrostat 0 4 MG Sublingual Tablet Sublingual; PLACE 1 TABLET UNDER THE TONGUE   EVERY 5 MINUTES FOR UP TO 3 DOSES AS NEEDED FOR CHEST PAIN  CALL   911 IF PAIN PERSISTS; Therapy: 04OFU3923 to (Evaluate:13Jan2016)  Requested for: 80EWK8888; Last   Rx:90Vnm4438 Ordered   7  RaNITidine HCl - 150 MG Oral Tablet; TAKE 1 TABLET DAILY; Therapy: 36QXF8586 to (Evaluate:41Dxe7776)  Requested for: 86VHY5955; Last   Rx:15Wwv8424 Ordered   8  Simvastatin 20 MG Oral Tablet; Take 1 tablet daily; Therapy: 23NGR6568 to (Evaluate:57Jxs6330)  Requested for: 78XSU7180; Last   Rx:49Jld9815 Ordered   9  Warfarin Sodium 2 5 MG Oral Tablet; TAKE 1 TABLET DAILY AS DIRECTED; Therapy: 49GVX2691 to (Evaluate:09Tdz3571)  Requested for: 73UXL2034; Last   Rx:41Mrx6422 Ordered  Medication List Reviewed: The medication list was reviewed and updated today  Allergies  Medication    1  Heparin    Vitals  Vital Signs    Recorded: 33UXL7809 02:01PM   Heart Rate 69   Systolic 865, RUE, Sitting   Diastolic 58, RUE, Sitting   Height 5 ft 5 in   Weight 133 lb 7 oz   BMI Calculated 22 21   BSA Calculated 1 67     Physical Exam    Constitutional   General appearance: No acute distress, well appearing and well nourished  Pulmonary   Auscultation of lungs: Clear to auscultation, no rales, no rhonchi, no wheezing, good air movement  Cardiovascular   Auscultation of heart: Abnormal   Early to mid peaking systolic murmur heard best at the right upper sternal    Examination of extremities for edema and/or varicosities: Normal     Chest - Chest: Normal    Musculoskeletal Digits and nails: Normal without clubbing or cyanosis     Neurologic - Speech: Normal     Psychiatric - Orientation to person, place, and time: Normal  Mood and affect: Normal       Results/Data  ECG Report: sinus rhythm with left anterior hemiblock and nonspecific ST segment change with no significant change compared to a prior tracing done in June 2014      Future Appointments    Date/Time Provider Specialty Site   03/13/2017 02:15 PM Alejandro, [address] [place]   03/30/2017 01:00 PM JAVIER Samaniego   [address] [facility]     Signatures   Electronically signed by : JAVIER Glover ; Mar 10 2017  2:28PM EST                       (Author)

## 2018-01-17 NOTE — PROGRESS NOTES
Chief Complaint  pt presented for her fingerstick inr today  Requested refill on diltiazem but this med was no longer on her med list  She has been taken off per [facility] due to hypotension  Pt states she saw Dr Tyron Boxer last week and he told her to restart  Dr Jose Cruz Carver note did recommend she re-start Diltiazem but does not mention dose, nor did he order it for pt  Per MS, Diltiazem HCl ER 240mg one daily was ordered (this is the dose that is in her past med list)  Pt was given rx for 30 days and told to clarify with Dr Tyron Boxer as to dosing  Pt states she has an appt with him on 8/3/17  ---bb      Active Problems    1  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)   2  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) (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)   3  AI (aortic insufficiency) (424 1) (I35 1)   4  Anticoagulant long-term use (V58 61) (Z79 01)   5  Aortic stenosis (424 1) (I35 0)   6  Arteriosclerosis of coronary artery (414 00) (I25 10)   7  Asymptomatic menopausal state (V49 81) (Z78 0)   8  Atrial fibrillation (427 31) (I48 91)   9  Basal cell carcinoma of skin (173 91) (C44 91)   10  Controlled diabetes mellitus type II without complication (105 94) (S61 1)   11  Depression with anxiety (300 4) (F41 8)   12  Edema (782 3) (R60 9)   13  Esophageal reflux (530 81) (K21 9)   14  Hematuria (599 70) (R31 9)   15  History of orthostatic hypotension (V12 59) (Z86 79)   16  Hyperlipidemia (272 4) (E78 5)   17  Hypertension (401 9) (I10)   18  Need for pneumococcal vaccination (V03 82) (Z23)   19  Neoplasm of bone, soft tissue, or skin (239 2) (D49 2)   20  Nocturia (788 43) (R35 1)   21  Osteoarthritis (715 90) (M19 90)   22  History of Rectal carcinoma (154 1) (C20)   23  Recurrent UTI (urinary tract infection) (599 0) (N39 0)   24  S/P CABG (coronary artery bypass graft) (V45 81) (Z95 1)   25  Skin lesion (709 9) (L98 9)   26  Skin neoplasm (239 2) (D49 2)   27  Squamous cell carcinoma of skin (173 92) (C44 92)   28  Tinnitus (388 30) (H93 19)   29  Urinary incontinence in female (788 30) (R32)    Current Meds   1  Calcium + D 600-200 MG-UNIT TABS; TAKE AS DIRECTED; Therapy: 40BIE5890 to Recorded   2  Escitalopram Oxalate 5 MG Oral Tablet (Lexapro); take 1 tablet by mouth every day; Therapy: 68TLS7681 to (Evaluate:17Jan2018)  Requested for: 15Wor5679; Last   Rx:16Bpf7094 Ordered   3  LORazepam 0 5 MG Oral Tablet (Ativan); TAKE 0 5MG AM AND 1MG PM;   Therapy: 39BRJ0214 to (Evaluate:19Sep2017)  Requested for: 99Sip3062; Last   Rx:32Mtx6042 Ordered   4  Nitrostat 0 4 MG Sublingual Tablet Sublingual (Nitroglycerin); PLACE 1 TABLET UNDER   THE TONGUE EVERY 5 MINUTES FOR UP TO 3 DOSES AS NEEDED   FOR CHEST PAIN  CALL 911 IF PAIN PERSISTS; Therapy: 08KFW3291 to (Evaluate:13Jan2016)  Requested for: 48AXY7864; Last   Rx:24Nov2015 Ordered   5  RaNITidine HCl - 150 MG Oral Tablet; TAKE 1 TABLET DAILY; Therapy: 69EIY4937 to (Evaluate:08Feb2018)  Requested for: 82KTG3783; Last   Rx:13Feb2017 Ordered   6  Simvastatin 20 MG Oral Tablet; Take 1 tablet daily; Therapy: 08POG2453 to (SXSFYTWA:77TSO4422)  Requested for: 95BGQ6162; Last   Rx:30Mar2017 Ordered   7  Warfarin Sodium 5 MG Oral Tablet; TAKE 1 TABLET DAILY AS DIRECTED; Therapy: 86YMR4858 to (Evaluate:25Mar2018)  Requested for: 53IIP9449; Last   Rx:30Mar2017 Ordered    Allergies    1   Heparin    Results/Data  Coumadin Flow Sheet 53NAF5386 02:27PM Karina Moy     Test Name Result Flag Reference   Diagnosis afib, z79 01     Managing Provider INR Goal Range 2-3     Recheck INR 28Zim2197     Current Dose 5mg daily     New Dose      hold x 2 days, then 2 5mg daily   Patient Notified      fingerstick done in office     PROTHROMBIN TIME- POC 84XTL4220 02:26PM Patrick Moy     Test Name Result Flag Reference   INR 4 2         Plan  3-vessel CAD, Hypertension    · DilTIAZem HCl ER Beads 240 MG Oral Capsule Extended Release 24 Hour;  TAKE 1 CAPSULE DAILY  Anticoagulant long-term use, Atrial fibrillation · PROTHROMBIN TIME- POC; Status:Complete - Retrospective By Protocol Authorization;    Done: 20UPM8262 02:26PM    Future Appointments    Date/Time Provider Specialty Site   08/07/2017 03:30 PM Ramón and Radha, [address]   [facility]03[facility]2017 02:00 [facility]   10/05/2017 01:00 PM Odell Moritz, M D   [address] [facility]     Signatures   Electronically signed by : Ronaldo Epperson, ; Jul 31 2017  3:09PM EST                       (Author)

## 2018-01-17 NOTE — PROGRESS NOTES
History of Present Illness  Care Coordination Encounter Information:   Type of Encounter: Telephonic    Spoke to Other   Dena Bryan in law  Care Coordination  Nurse [address]:   The reason for call is to discuss outreach for follow up/needed services and coordination of meeting care plan treatment goals  Esperanza explained that pt lives with her  and they are both having some cognitive issues  Pt was here to see Dr Joan Zavaleta today  Pt believes that her  can take care of them, but it seems that she is not fully aware of their limitations  She is agreeable to having the family help them  Explained the [place] services to [facility] & she is in agreement & has chosen [facility] as the provider, as his wife has had her services in the past  Referral placed to Martha @ [phone]  [facility] will be the primary contact  Gave Savanna her contact info  Gave Esperanza my contact info  Esperanza & pt are agreeable to Care Coordination  After the conversation with [facility], I sent the pt and family the following information:   -[facility] List   -Non-Skilled Provider List   -[employer]   -[employer] [facility] Contact: [address] [facility]      Past Medical History    1  History of Acute UTI (599 0) (N39 0)   2  Anticoagulant long-term use (V58 61) (Z79 01)   3  History of Cellulitis (682 9) (L03 90)   4  History of Dysuria (788 1) (R30 0)   5  History of acute bronchitis (V12 69) (Z87 09)   6  History of blurred vision (V12 49) (Z86 69)   7  History of urinary tract infection (V13 02) (Z87 440)   8  History of Need for prophylactic vaccination and inoculation against influenza (V04 81)   (Z23)   9  History of Noninfected skin tear of left leg (891 0) (S81 802A)   10  Old myocardial infarction (412) (I25 2)   11  History of Rectal carcinoma (154 1) (C20)   12  History of Visit for screening mammogram (V76 12) (Z12 31)   13   History of Wound of left leg (891 0) (L80 370D)    Surgical History    1  History of Previous Balloon Angioplasty   2  History of Rectal Surgery    Family History  Mother    1  Family history of    2  Family history of Stroke Syndrome (V17 1)  Father    3  Family history of    4  Family history of Stroke Syndrome (V17 1)  Family History    5  No family history of mental disorder    Social History    · Alcohol Use (History)   · Former smoker (S29 50) (D40 385)   ·    · No drug use   · No secondhand smoke exposure (V49 89) (Z78 9)   · Non-smoker (V4 89) (Z78 9)   · Retired    Current Meds    1  Nitrostat 0 4 MG Sublingual Tablet Sublingual (Nitroglycerin); PLACE 1 TABLET UNDER   THE TONGUE EVERY 5 MINUTES FOR UP TO 3 DOSES AS NEEDED   FOR CHEST PAIN  CALL 911 IF PAIN PERSISTS; Therapy: 57ZIH8680 to (Evaluate:2016)  Requested for: 94BYO7546; Last   Rx:2015 Ordered    2  DilTIAZem HCl - 120 MG Oral Tablet; ONE TABLET Q D  Requested for: 2017; Last   Rx:2017 Ordered    3  Methocarbamol 500 MG Oral Tablet (Robaxin); TAKE 1 TABLET EVERY 8 HOURS AS   NEEDED FOR MUSCLE SPASM; Therapy: 76Tok6836 to (Evaluate:60Kjj9123)  Requested for: 08Amr4371; Last   Rx:22Suf6768 Ordered    4  Celecoxib 200 MG Oral Capsule (CeleBREX); take 1 capsule by mouth daily; Therapy: 53Qkj0930 to (Evaluate:2017)  Requested for: 81Ict5690; Last   Rx:96Ezv2378 Ordered    5  Lactulose 10 GM/15ML Oral Solution; Take 1 tablespoonful daily as needed; Therapy: 45Abn1135 to (Last [facility])  Requested for: 00Ayp3618; Status:   ACTIVE - Renewal Denied Ordered    6  Escitalopram Oxalate 5 MG Oral Tablet (Lexapro); take 1 tablet by mouth every day; Therapy: 12FGW7066 to (Evaluate:2018)  Requested for: 98Kxk8707; Last   Rx:03Xkt1307 Ordered   7  LORazepam 0 5 MG Oral Tablet; TAKE 1 TABLET Bedtime; Last Rx:98Yxu3904 Ordered    8  Furosemide 40 MG Oral Tablet; Take 1 tablet daily  Requested for: 72Zom9447; Last   Rx:08Lnt8547 Ordered    9   Metoprolol Tartrate 25 MG Oral Tablet; Take 1 tablet twice daily; Therapy: 37TAB6338 to ([place])  Requested for: 77Duw7020; Last   Rx:17Tmj3757 Ordered    10  Potassium Chloride ER 10 MEQ Oral Tablet Extended Release; TAKE 1 TABLET DAILY; Therapy: 45BQB5268 to ([place])  Requested for: 35Shf9316; Last    Rx:28Fnw4269 Ordered    11  Ciprofloxacin HCl - 500 MG Oral Tablet; TAKE 1 TABLET TWICE DAILY; Therapy: 34GOD1843 to (03 17 74 30 53)  Requested for: 86VVB8670; Last    Rx:80Ems9251 Ordered    12  Aspirin 81 MG TABS; TAKE 1 TABLET DAILY; Therapy: (Recorded:38Qkp5764) to Recorded    Allergies    1  Heparin    End of Encounter Meds    1  Nitrostat 0 4 MG Sublingual Tablet Sublingual (Nitroglycerin); PLACE 1 TABLET UNDER   THE TONGUE EVERY 5 MINUTES FOR UP TO 3 DOSES AS NEEDED   FOR CHEST PAIN  CALL 911 IF PAIN PERSISTS; Therapy: 57GJZ1016 to (Evaluate:13Jan2016)  Requested for: 38NIV3430; Last   Rx:24Nov2015 Ordered    2  DilTIAZem HCl - 120 MG Oral Tablet; ONE TABLET Q D  Requested for: 17Aug2017; Last   Rx:42Hvu0286 Ordered    3  Methocarbamol 500 MG Oral Tablet (Robaxin); TAKE 1 TABLET EVERY 8 HOURS AS   NEEDED FOR MUSCLE SPASM; Therapy: 74Mnz0160 to (Evaluate:77Qio6647)  Requested for: 73Xqe3307; Last   Rx:39Ipk1590 Ordered    4  Celecoxib 200 MG Oral Capsule (CeleBREX); take 1 capsule by mouth daily; Therapy: 87Xnu6852 to (Evaluate:24Nov2017)  Requested for: 62Ddd0289; Last   Rx:81Ymy3764 Ordered    5  Lactulose 10 GM/15ML Oral Solution; Take 1 tablespoonful daily as needed; Therapy: 19Tpb8483 to (Last [employer])  Requested for: 12Vhz3332; Status:   ACTIVE - Renewal Denied Ordered    6  Escitalopram Oxalate 5 MG Oral Tablet (Lexapro); take 1 tablet by mouth every day; Therapy: 54RAL7786 to (Evaluate:17Jan2018)  Requested for: 96Asz6059; Last   Rx:49Isk4443 Ordered   7  LORazepam 0 5 MG Oral Tablet; TAKE 1 TABLET Bedtime; Last Rx:28Aug2017 Ordered    8   Furosemide 40 MG Oral Tablet; Take 1 tablet daily  Requested for: 82Rrh9796; Last   Rx:38Zkj6916 Ordered    9  Metoprolol Tartrate 25 MG Oral Tablet; Take 1 tablet twice daily; Therapy: 54CZU2450 to ([place])  Requested for: 43Ezu6433; Last   Rx:49Ibj8073 Ordered    10  Potassium Chloride ER 10 MEQ Oral Tablet Extended Release; TAKE 1 TABLET DAILY; Therapy: 94SUX1233 to ([place])  Requested for: 76Xmb8302; Last    Rx:53Eta5544 Ordered    11  Ciprofloxacin HCl - 500 MG Oral Tablet; TAKE 1 TABLET TWICE DAILY; Therapy: 18CLN5612 to [phone])  Requested for: 47DHI1116; Last    Rx:17Oct2017 Ordered    12  Aspirin 81 MG TABS; TAKE 1 TABLET DAILY;     Therapy: (Recorded:17Aug2017) to Recorded    Patient Care Team    Care Team Member Role Specialty Office Number   Dorinda Schwarz MD  Cardiology [phone] [facility]  Physician Assistant [phone]   [facility]  Physician Assistant [phone]   Harrison Sanders [facility]  Physician Assistant [phone]     Signatures   Electronically signed by : Artemio Myers RN; Oct 26 2017  3:06PM EST                       (Author)

## 2018-01-18 NOTE — MISCELLANEOUS
Message  Message Free Text Note Form: 8:03am Sunday, Answering service first contacted me to let me know patient placed a call yesterday at 1:03pm that her sugar was running high  The answering service just realized their messages did not go thru  I called patient and she states she drank plenty of fluids and is doing fine  Her sugars came back down and she will continue to check them throughout the day        Signatures   Electronically signed by : Gabriel Newman [facility]; Oct 22 2017  8:57AM EST                       (Author)

## 2018-01-18 NOTE — MISCELLANEOUS
Plan   Arteriosclerosis of coronary artery    · Continue with our present treatment plan ; Status:Complete;   Done: [id number]   Ordered; For:Arteriosclerosis of coronary artery; Ordered By:Jaiden Marie;  Atrial fibrillation    · Continue with our present treatment plan ; Status:Complete;   Done: 68YCV4589   Ordered; For:Atrial fibrillation; Ordered By:Jaiden Marie;   · Eat a low fat and low cholesterol diet ; Status:Complete;   Done: 10IMJ7022   Ordered; For:Atrial fibrillation; Ordered By:Jaiden Marie;    DilTIAZem HCl - 120 MG Oral Tablet; ONE TABLET Q D  Requested for: 17Aug2017; Last Rx:17Aug2017; Status: ACTIVE Ordered  Rx By: Maty Lambert; Dispense: 90 Days ; #:90 Tablet; Refill: 3;   For: Atrial fibrillation; VALENTIN = N; Verified Transmission to [facility] [place]     Discussion/Summary  Discussion Summary:   1 : Atrial fibrillation: Patient is currently on Coumadin, however the INR was low  Was adjusted by nursing staff today  Follow-up as scheduled  Continue with current treatments  2 : Atherosclerosis of heart vessels: Patient following with Cardiology  Status post CABG  At this point, did not appear that she had a true heart attack/myocardial infarction, but rather demand ischemia  She did have a stress test showing some ischemia as well, so Cardiology elected to do medical management  Continue with same  Denies any symptoms currently  3 : 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, combined: Patient's weight is doing relatively well at the moment  She should be watching for little symptom changes, i e  slight decrease his activity levels, increasing shortness of breath, dyspnea on exertion  If she notices any of this, or swelling, would strongly recommend that they speak with Cardiology for follow-up  4 : CVA, right: Patient did have stroke  I am concerned that there is some aspect of memory and cognitive issues secondary to this   At this point, continue on Coumadin because of the atrial fibrillation, despite the stroke  5 : Long-term anticoagulant use: Patient is currently on warfarin  Reviewed safety with warfarin, as well as its efficacy  6 : Depression with anxiety: Patient does have a history of anxiety and depression  Patient is currently using 0 5 mg lorazepam in the morning, and 1 mg at HS  I am quite concerned about this as far as increased risk of sedation and increased risk of falls  I would recommend that she try half a mg in the morning and half a mg at HS, ie 1 0 5mg tabs BID  Overall, with regard to safety, I am very concerned that the patient and her  are not in a safe location  Currently, her  is able to make decisions for them, and he decided that he wished to stay in the home  I would strongly recommend 24 hour 7 day a week assistance, or at a minimum assistance from aging, as well as using a call bell system  Chief Complaint  Chief Complaint Free Text Note Form: Follow up: s/p hospitalization for CHF  Review labs  History of Present Illness  TCM Communication  Luke: The patient is being contacted for follow-up after hospitalization  She was hospitalized at [facility]  The dates of hospitalization: 08/3/17- 08/7/17, date of admission: 08/3/17, date of discharge: 08/7/17  Diagnosis: HEART ATTACH  She was discharged to home  She scheduled a follow up appointment  Follow-up appointments with other specialists: Poll[facility]spinosa FOR 08/17/17 @ 1:30PM WITH DR Lefty Olivera  The patient is currently asymptomatic  Communication performed and completed by Birdie Oconnor   HPI: She was recently in the Hospital, and had follow up with Dr Kala Boyle  She states she is good  Spoke with son, daughter, patient, and  about safety for them, Mr and Mrs Marian Stern  At this point, I did review the notes from Cardiology  They felt she was doing relatively well, and that she did have heart failure  Possible discharge was also reviewed  It did show that she had heart failure  It was acute on chronic combined heart failure  Also, in the hospital discharge was listed ischemia  Cardiology felt that the most appropriate way to manage this was medically  The did not wish to do any interventions  Reviewed safety in the home with both the patient, and her , as well as son and daughter  Am very concerned about this aspect of things for the fissures  Patient does have physical therapy currently  Of note, she did mention that her  is currently doing the laundry, which is in the basement of the home  Unfortunately, he has fallen multiple times  Review of Systems  Complete-Female:   Constitutional: No fever, no chills, feels well, no tiredness, no recent weight gain or weight loss  Eyes: No complaints of eye pain, no red eyes, no eyesight problems, no discharge, no dry eyes, no itching of eyes  ENT: no complaints of earache, no loss of hearing, no nose bleeds, no nasal discharge, no sore throat, no hoarseness  Cardiovascular: as noted in HPI  Respiratory: as noted in HPI  Gastrointestinal: No complaints of abdominal pain, no constipation, no nausea or vomiting, no diarrhea, no bloody stools  Genitourinary: No complaints of dysuria, no incontinence, no pelvic pain, no dysmenorrhea, no vaginal discharge or bleeding  Musculoskeletal: No complaints of arthralgias, no myalgias, no joint swelling or stiffness, no limb pain or swelling  Integumentary: No complaints of skin rash or lesions, no itching, no skin wounds, no breast pain or lump  Neurological: No complaints of headache, no confusion, no convulsions, no numbness, no dizziness or fainting, no tingling, no limb weakness, no difficulty walking  Psychiatric: Not suicidal, no sleep disturbance, no anxiety or depression, no change in personality, no emotional problems  Endocrine: No complaints of proptosis, no hot flashes, no muscle weakness, no deepening of the voice, no feelings of weakness  Hematologic/Lymphatic: No complaints of swollen glands, no swollen glands in the neck, does not bleed easily, does not bruise easily  Active Problems     1  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)   2  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) (I63 511)   3  Anticoagulant long-term use (V58 61) (Z79 01)   4  Asymptomatic menopausal state (V49 81) (Z78 0)   5  Basal cell carcinoma of skin (173 91) (C44 91)   6  Controlled diabetes mellitus type II without complication (532 15) (O47 0)   7  Depression with anxiety (300 4) (F41 8)   8  Edema (782 3) (R60 9)   9  Esophageal reflux (530 81) (K21 9)   10  Hematuria (599 70) (R31 9)   11  History of orthostatic hypotension (V12 59) (Z86 79)   12  Need for pneumococcal vaccination (V03 82) (Z23)   13  Neoplasm of bone, soft tissue, or skin (239 2) (D49 2)   14  Nocturia (788 43) (R35 1)   15  Osteoarthritis (715 90) (M19 90)   16  History of Rectal carcinoma (154 1) (C20)   17  Recurrent UTI (urinary tract infection) (599 0) (N39 0)   18  S/P CABG (coronary artery bypass graft) (V45 81) (Z95 1)   19  Skin lesion (709 9) (L98 9)   20  Skin neoplasm (239 2) (D49 2)   21  Squamous cell carcinoma of skin (173 92) (C44 92)   22  Tinnitus (388 30) (H93 19)   23  Urinary incontinence in female (788 30) (R32)    Atrial fibrillation (427 31) (I48 91)       Hyperlipidemia (272 4) (E78 5)       Hypertension (401 9) (I10)       Nonrheumatic aortic valve insufficiency (424 1) (I35 1)       Aortic stenosis (424 1) (I35 0)       Arteriosclerosis of coronary artery (414 00) (I25 10)          Past Medical History    1  History of Acute UTI (599 0) (N39 0)   2  History of Cellulitis (682 9) (L03 90)   3  History of Dysuria (788 1) (R30 0)   4  History of acute bronchitis (V12 69) (Z87 09)   5  History of blurred vision (V12 49) (Z86 69)   6  History of urinary tract infection (V13 02) (Z87 440)   7   History of Need for prophylactic vaccination and inoculation against influenza (V04 81) (Z23)   8  History of Noninfected skin tear of left leg (891 0) (S81 802A)   9  Old myocardial infarction (412) (I25 2)   10  History of Rectal carcinoma (154 1) (C20)   11  History of Visit for screening mammogram (V76 12) (Z12 31)   12  History of Wound of left leg (891 0) (N45 589K)    Surgical History    1  History of Previous Balloon Angioplasty   2  History of Rectal Surgery  Surgical History Reviewed: The surgical history was reviewed and updated today  Family History  Mother    1  Family history of    2  Family history of Stroke Syndrome (V17 1)  Father    3  Family history of    4  Family history of Stroke Syndrome (V17 1)  Family History    5  No family history of mental disorder  Family History Reviewed: The family history was reviewed and updated today  Social History    · Alcohol Use (History)   · Former smoker (Y25 99) (Y88 618)   ·    · No drug use   · No secondhand smoke exposure (V49 89) (Z78 9)   · Non-smoker (V49 89) (Z78 9)   · Retired  Social History Reviewed: The social history was reviewed and updated today  The social history was reviewed and is unchanged  Current Meds   1  Aspirin 81 MG TABS; TAKE 1 TABLET DAILY; Therapy: (Recorded:2017) to Recorded   2  Calcium + D 600-200 MG-UNIT TABS; TAKE AS DIRECTED; Therapy: 67IZL7202 to Recorded   3  DilTIAZem HCl - 120 MG Oral Tablet; ONE TABLET Q D;   Therapy: (Recorded:2017) to Recorded   4  Escitalopram Oxalate 5 MG Oral Tablet; take 1 tablet by mouth every day; Therapy: 31ZCR5607 to (Evaluate:81Cfp7837)  Requested for: 29Gsr8096; Last   Rx:28Hez2311 Ordered   5  Furosemide 40 MG Oral Tablet; Take 1 tablet daily; Therapy: (Recorded:2017) to Recorded   6  LORazepam 0 5 MG Oral Tablet; take one-half tablet in the AM and one tablet at HS; Therapy: (Recorded:2017) to Recorded   7  Metoprolol Tartrate 25 MG Oral Tablet; TAKE 1 TABLET DAILY;    Therapy: (Recorded:2017) to Recorded   8  Nitrostat 0 4 MG Sublingual Tablet Sublingual; PLACE 1 TABLET UNDER THE TONGUE   EVERY 5 MINUTES FOR UP TO 3 DOSES AS NEEDED FOR CHEST PAIN  CALL   911 IF PAIN PERSISTS; Therapy: 98XXL5406 to (Evaluate:13Jan2016)  Requested for: 51FCQ9781; Last   Rx:24Nov2015 Ordered   9  Potassium Chloride 10 MEQ TBCR; TAKE 1 TABLET DAILY; Therapy: (Recorded:17Aug2017) to Recorded   10  Warfarin Sodium 5 MG Oral Tablet; TAKE 1 TABLET DAILY AS DIRECTED; Therapy: 81DAJ0926 to (Evaluate:25Mar2018)  Requested for: 19ODL8476; Last    Rx:30Mar2017 Ordered  Medication List Reviewed: The medication list was reviewed and updated today  Allergies    1  Heparin    Vitals  Signs   Recorded: 17Aug2017 01:28PM   Heart Rate: 68, L Radial  Pulse Quality: Regular, L Radial  Systolic: 349, LUE, Sitting  Diastolic: 62, LUE, Sitting  Height: 5 ft 2 in  Weight: 125 lb 3 2 oz  BMI Calculated: 22 9  BSA Calculated: 1 57    Physical Exam    Constitutional   General appearance: No acute distress, well appearing and well nourished  Ears, Nose, Mouth, and Throat   External inspection of ears and nose: Normal     Nasal mucosa, septum, and turbinates: Normal without edema or erythema  Oropharynx: Normal with no erythema, edema, exudate or lesions  Pulmonary   Respiratory effort: No increased work of breathing or signs of respiratory distress  Auscultation of lungs: Clear to auscultation  Cardiovascular   Auscultation of heart: Abnormal   The heart rate was normal  The rhythm was regular  Heart sounds: normal S1 and normal S2  A grade 2 systolic mumur was heard in the interscapular region  Examination of extremities for edema and/or varicosities: Normal     Carotid pulses: Normal     Abdomen   Abdomen: Non-tender, no masses  Liver and spleen: No hepatomegaly or splenomegaly  Musculoskeletal   Gait and station: Normal     Digits and nails: Normal without clubbing or cyanosis      Inspection/palpation of joints, bones, and muscles: Normal     Neurologic   Cranial nerves: Cranial nerves 2-12 intact  Reflexes: 2+ and symmetric  Sensation: No sensory loss  Future Appointments    Date/Time Provider Specialty Site   09/21/2017 01:00 PM JAVIER Guadalupe  [facility]   10/05/2017 01:00 PM JAVIER Guadalupe  [address] [facility]     Signatures   Electronically signed by :  JAVIER Zeng ; Aug 20 2017  8:07AM EST

## 2018-01-22 VITALS
SYSTOLIC BLOOD PRESSURE: 124 MMHG | BODY MASS INDEX: 23.03 KG/M2 | DIASTOLIC BLOOD PRESSURE: 50 MMHG | WEIGHT: 125.13 LBS | HEIGHT: 62 IN | HEART RATE: 80 BPM

## 2018-01-22 VITALS — SYSTOLIC BLOOD PRESSURE: 120 MMHG | DIASTOLIC BLOOD PRESSURE: 64 MMHG | HEART RATE: 144 BPM

## 2018-01-22 VITALS
WEIGHT: 133.44 LBS | BODY MASS INDEX: 22.23 KG/M2 | DIASTOLIC BLOOD PRESSURE: 58 MMHG | SYSTOLIC BLOOD PRESSURE: 158 MMHG | HEART RATE: 69 BPM | HEIGHT: 65 IN

## 2018-02-08 DIAGNOSIS — F41.9 ANXIETY: Primary | ICD-10-CM

## 2018-02-08 DIAGNOSIS — F41.9 ANXIETY: ICD-10-CM

## 2018-02-08 RX ORDER — ESCITALOPRAM OXALATE 10 MG/1
TABLET ORAL
Qty: 15 TABLET | Refills: 0 | Status: SHIPPED | OUTPATIENT
Start: 2018-02-08 | End: 2018-02-08 | Stop reason: SDUPTHER

## 2018-02-08 RX ORDER — ESCITALOPRAM OXALATE 10 MG/1
TABLET ORAL
Qty: 45 TABLET | Refills: 0 | Status: SHIPPED | OUTPATIENT
Start: 2018-02-08 | End: 2018-04-22

## 2018-02-19 DIAGNOSIS — I25.10 CORONARY ARTERY DISEASE INVOLVING NATIVE HEART WITHOUT ANGINA PECTORIS, UNSPECIFIED VESSEL OR LESION TYPE: ICD-10-CM

## 2018-02-19 DIAGNOSIS — I10 BENIGN ESSENTIAL HTN: Primary | ICD-10-CM

## 2018-02-20 ENCOUNTER — OFFICE VISIT (OUTPATIENT)
Dept: CARDIOLOGY CLINIC | Facility: CLINIC | Age: 83
End: 2018-02-20
Payer: MEDICARE

## 2018-02-20 VITALS
BODY MASS INDEX: 19.43 KG/M2 | HEIGHT: 65 IN | HEART RATE: 68 BPM | DIASTOLIC BLOOD PRESSURE: 50 MMHG | WEIGHT: 116.6 LBS | SYSTOLIC BLOOD PRESSURE: 172 MMHG

## 2018-02-20 DIAGNOSIS — I35.0 NONRHEUMATIC AORTIC VALVE STENOSIS: Primary | ICD-10-CM

## 2018-02-20 DIAGNOSIS — I48.91 ATRIAL FIBRILLATION WITH RAPID VENTRICULAR RESPONSE (HCC): ICD-10-CM

## 2018-02-20 DIAGNOSIS — I25.10 CORONARY ARTERIOSCLEROSIS: ICD-10-CM

## 2018-02-20 DIAGNOSIS — I10 BENIGN ESSENTIAL HTN: ICD-10-CM

## 2018-02-20 DIAGNOSIS — I10 ESSENTIAL (PRIMARY) HYPERTENSION: ICD-10-CM

## 2018-02-20 PROCEDURE — 99214 OFFICE O/P EST MOD 30 MIN: CPT | Performed by: INTERNAL MEDICINE

## 2018-02-20 RX ORDER — POTASSIUM CHLORIDE 750 MG/1
1 CAPSULE, EXTENDED RELEASE ORAL DAILY
COMMUNITY
Start: 2017-09-05 | End: 2018-02-20

## 2018-02-20 RX ORDER — NITROGLYCERIN 0.4 MG/1
1 TABLET SUBLINGUAL
COMMUNITY
Start: 2011-04-06 | End: 2018-04-22

## 2018-02-20 RX ORDER — FUROSEMIDE 40 MG/1
40 TABLET ORAL DAILY
Qty: 90 TABLET | Refills: 1 | Status: SHIPPED | OUTPATIENT
Start: 2018-02-20 | End: 2018-02-21 | Stop reason: SDUPTHER

## 2018-02-20 RX ORDER — ESCITALOPRAM OXALATE 5 MG/1
TABLET ORAL
COMMUNITY
Start: 2018-01-10 | End: 2018-02-20

## 2018-02-20 RX ORDER — METHOCARBAMOL 500 MG/1
1 TABLET, FILM COATED ORAL EVERY 8 HOURS PRN
COMMUNITY
Start: 2017-08-25 | End: 2018-02-20

## 2018-02-20 RX ORDER — POTASSIUM CHLORIDE 750 MG/1
10 TABLET, EXTENDED RELEASE ORAL DAILY
Qty: 90 TABLET | Refills: 1 | Status: SHIPPED | OUTPATIENT
Start: 2018-02-20 | End: 2018-02-21 | Stop reason: SDUPTHER

## 2018-02-20 RX ORDER — LACTULOSE 10 G/15ML
SOLUTION ORAL
COMMUNITY
Start: 2017-08-28 | End: 2018-05-08 | Stop reason: HOSPADM

## 2018-02-20 RX ORDER — LOSARTAN POTASSIUM 50 MG/1
25 TABLET ORAL DAILY
Qty: 90 TABLET | Refills: 3 | Status: SHIPPED | OUTPATIENT
Start: 2018-02-20 | End: 2018-05-08 | Stop reason: HOSPADM

## 2018-02-20 RX ORDER — CELECOXIB 200 MG/1
1 CAPSULE ORAL DAILY
COMMUNITY
Start: 2017-08-28 | End: 2018-02-20

## 2018-02-20 NOTE — PROGRESS NOTES
Cardiology Follow Up    Gosia Santana  4/4/1927  [id number]  [address] [facility]  [address]    1  Nonrheumatic aortic valve stenosis     2  Benign essential HTN     3  Atrial fibrillation with rapid ventricular response ([address] )     4  Coronary arteriosclerosis     5  Essential (primary) hypertension         Interval History:  Patient is here for a follow-up visit  She was last seen by me in August 2017  Since that time she has done reasonably well  Her most recent echocardiogram was done December 2017 and demonstrated preserved LV systolic function with mild concentric left ventricular hypertrophy  She was noted to have moderate mitral regurgitation and moderate mixed aortic valve disease  The peak continuous wave velocity across the aortic valve was 2 92 m/sec  She had previously been on anticoagulation in reference to atrial fibrillation but this was discontinued by her primary care physician because of concern in reference to falls  She has had some shortness of breath and her blood pressure is noted to be elevated today  I will start her on losartan 50 mg per day and check a BMP in about two weeks      Patient Active Problem List   Diagnosis    Acute combined systolic and diastolic congestive heart failure (HCC)    Aortic stenosis    Benign essential HTN    Type 2 diabetes mellitus without complication ([address] )    Atrial fibrillation with rapid ventricular response ([address] )    CVA (cerebral vascular accident) ([address] )    CAD (coronary artery disease)    Acute encephalopathy     Past Medical History:   Diagnosis Date    Acute ischemic right MCA stroke ([address] )     Aortic stenosis     Atrial fibrillation (HCC)     CHF (congestive heart failure) ([address] )     Coronary artery disease     Diabetes mellitus ([address] )     Hyperlipidemia     Hypertension     Nonrheumatic aortic (valve) insufficiency  Rectal carcinoma ([facility] )      Social History     Social History    Marital status: /Civil Union     Spouse name: N/A    Number of children: N/A    Years of education: N/A     Occupational History    Not on file       Social History Main Topics    Smoking status: Former Smoker    Smokeless tobacco: Never Used    Alcohol use Yes    Drug use: No    Sexual activity: Not on file     Other Topics Concern    Not on file     Social History Narrative    No narrative on file      Family History   Problem Relation Age of Onset    Stroke Mother     Stroke Father      Past Surgical History:   Procedure Laterality Date    CORONARY ARTERY BYPASS GRAFT      RECTAL SURGERY         Current Outpatient Prescriptions:     celecoxib (CeleBREX) 200 mg capsule, Take 1 capsule by mouth daily, Disp: , Rfl:     lactulose (CHRONULAC) 10 g/15 mL solution, Take by mouth, Disp: , Rfl:     methocarbamol (ROBAXIN) 500 mg tablet, Take 1 tablet by mouth every 8 (eight) hours as needed, Disp: , Rfl:     nitroglycerin (NITROSTAT) 0 4 mg SL tablet, Place 1 tablet under the tongue every 5 (five) minutes as needed, Disp: , Rfl:     potassium chloride (MICRO-K) 10 MEQ CR capsule, Take 1 tablet by mouth daily, Disp: , Rfl:     aspirin 81 mg chewable tablet, Chew 1 tablet daily, Disp: , Rfl: 0    calcium carbonate (OYSTER SHELL,OSCAL) 500 mg, Take 1 tablet by mouth daily with breakfast, Disp: , Rfl: 0    diltiazem (CARDIZEM) 120 MG tablet, Take 120 mg by mouth daily  , Disp: , Rfl:     escitalopram (LEXAPRO) 10 mg tablet, TAKE 1/2 TABLET BY MOUTH DAILY, Disp: 45 tablet, Rfl: 0    escitalopram (LEXAPRO) 5 mg tablet, , Disp: , Rfl:     furosemide (LASIX) 40 mg tablet, Take 1 tablet (40 mg total) by mouth daily, Disp: 90 tablet, Rfl: 1    LORazepam (ATIVAN) 0 5 mg tablet, Take 1 tablet by mouth every morning for 10 days, Disp: 10 tablet, Rfl: 0    LORazepam (ATIVAN) 1 mg tablet, Take 1 tablet by mouth daily at bedtime for 10 days, Disp: 10 tablet, Rfl: 0    metoprolol tartrate (LOPRESSOR) 25 mg tablet, Take 1 tablet (25 mg total) by mouth every 12 (twelve) hours, Disp: 180 tablet, Rfl: 1    polyethylene glycol (MIRALAX) 17 g packet, Take 17 g by mouth daily as needed (constipation), Disp: 14 each, Rfl: 0    potassium chloride (K-DUR,KLOR-CON) 10 mEq tablet, Take 1 tablet (10 mEq total) by mouth daily, Disp: 90 tablet, Rfl: 1    simvastatin (ZOCOR) 20 mg tablet, Take 1 tablet by mouth daily at bedtime Unknown dose, Disp: , Rfl: 0    warfarin (COUMADIN) 2 5 mg tablet, Take 2 5 mg by mouth daily, Disp: , Rfl:   Allergies   Allergen Reactions    Heparin        Labs:not applicable  Imaging: No results found  Review of Systems:  Review of Systems   Respiratory: Positive for shortness of breath  All other systems reviewed and are negative  Physical Exam:  Physical Exam   Constitutional: She is oriented to person, place, and time  She appears well-developed and well-nourished  HENT:   Head: Normocephalic and atraumatic  Eyes: Conjunctivae and EOM are normal  Pupils are equal, round, and reactive to light  Neck: Normal range of motion  Neck supple  Cardiovascular: Normal rate  Murmur heard  Pulmonary/Chest: Effort normal and breath sounds normal    Neurological: She is alert and oriented to person, place, and time  Skin: Skin is warm and dry  Psychiatric: She has a normal mood and affect  Vitals reviewed  Discussion/Summary: At this point I will add losartan 25 mg per day to her medical regimen  I will check a BMP in about two weeks  I have asked her to call if there is a problem in the interim otherwise I will see her in follow-up in six months time in sooner as is necessary  Her family was in attendance today

## 2018-02-20 NOTE — PATIENT INSTRUCTIONS
I will continue the patient's present medical regimen except for adding losartan 25 mg per day to her medical regimen  I will check follow-up blood work in about two weeks  I have asked the patient to call if there is a problem in the interim otherwise I will see the patient in six months time

## 2018-02-21 RX ORDER — POTASSIUM CHLORIDE 750 MG/1
10 TABLET, EXTENDED RELEASE ORAL DAILY
Qty: 90 TABLET | Refills: 3 | Status: SHIPPED | OUTPATIENT
Start: 2018-02-21 | End: 2018-06-04 | Stop reason: SDUPTHER

## 2018-02-21 RX ORDER — FUROSEMIDE 40 MG/1
40 TABLET ORAL DAILY
Qty: 90 TABLET | Refills: 3 | Status: SHIPPED | OUTPATIENT
Start: 2018-02-21 | End: 2018-03-30 | Stop reason: SDUPTHER

## 2018-03-15 ENCOUNTER — OFFICE VISIT (OUTPATIENT)
Dept: FAMILY MEDICINE CLINIC | Facility: CLINIC | Age: 83
End: 2018-03-15
Payer: MEDICARE

## 2018-03-15 VITALS
HEIGHT: 59 IN | HEART RATE: 62 BPM | BODY MASS INDEX: 22.8 KG/M2 | TEMPERATURE: 97.8 F | RESPIRATION RATE: 14 BRPM | SYSTOLIC BLOOD PRESSURE: 134 MMHG | WEIGHT: 113.13 LBS | DIASTOLIC BLOOD PRESSURE: 62 MMHG

## 2018-03-15 DIAGNOSIS — G89.29 CHRONIC BILATERAL LOW BACK PAIN WITHOUT SCIATICA: ICD-10-CM

## 2018-03-15 DIAGNOSIS — I10 BENIGN ESSENTIAL HTN: ICD-10-CM

## 2018-03-15 DIAGNOSIS — R06.00 DYSPNEA, UNSPECIFIED TYPE: Primary | ICD-10-CM

## 2018-03-15 DIAGNOSIS — H61.22 EXCESSIVE CERUMEN IN LEFT EAR CANAL: ICD-10-CM

## 2018-03-15 DIAGNOSIS — E11.9 CONTROLLED TYPE 2 DIABETES MELLITUS WITHOUT COMPLICATION, WITHOUT LONG-TERM CURRENT USE OF INSULIN (HCC): ICD-10-CM

## 2018-03-15 DIAGNOSIS — M54.50 CHRONIC BILATERAL LOW BACK PAIN WITHOUT SCIATICA: ICD-10-CM

## 2018-03-15 PROBLEM — R41.89 COGNITIVE IMPAIRMENT: Status: ACTIVE | Noted: 2017-08-28

## 2018-03-15 PROBLEM — G93.40 ACUTE ENCEPHALOPATHY: Status: RESOLVED | Noted: 2017-08-06 | Resolved: 2018-03-15

## 2018-03-15 PROBLEM — I63.511 ACUTE ISCHEMIC RIGHT MCA STROKE (HCC): Status: ACTIVE | Noted: 2017-08-03

## 2018-03-15 PROBLEM — I50.43 ACUTE ON CHRONIC OVERT COMBINED SYSTOLIC AND DIASTOLIC CONGESTIVE HEART FAILURE (HCC): Status: ACTIVE | Noted: 2017-08-03

## 2018-03-15 PROCEDURE — 69209 REMOVE IMPACTED EAR WAX UNI: CPT | Performed by: FAMILY MEDICINE

## 2018-03-15 PROCEDURE — 99214 OFFICE O/P EST MOD 30 MIN: CPT | Performed by: FAMILY MEDICINE

## 2018-03-15 RX ORDER — CALCITONIN SALMON 200 [IU]/.09ML
1 SPRAY, METERED NASAL DAILY
Qty: 3.7 ML | Refills: 0 | Status: SHIPPED | OUTPATIENT
Start: 2018-03-15 | End: 2018-03-15 | Stop reason: SDUPTHER

## 2018-03-15 RX ORDER — CALCITONIN SALMON 200 [IU]/.09ML
SPRAY, METERED NASAL
Qty: 11.1 ML | Refills: 0 | Status: SHIPPED | OUTPATIENT
Start: 2018-03-15 | End: 2020-01-01 | Stop reason: HOSPADM

## 2018-03-15 RX ORDER — CALCITONIN SALMON 200 [IU]/.09ML
1 SPRAY, METERED NASAL DAILY
Qty: 11 ML | Refills: 0 | Status: SHIPPED | OUTPATIENT
Start: 2018-03-15 | End: 2018-04-13 | Stop reason: SDUPTHER

## 2018-03-15 NOTE — PROGRESS NOTES
Assessment/Plan:    No problem-specific Assessment & Plan notes found for this encounter  There are no diagnoses linked to this encounter  Subjective:      Patient ID: Emelia Black is a 80 y o  female  Back Pain   This is a chronic problem  The current episode started more than 1 year ago  The problem occurs constantly  The problem is unchanged  The pain is present in the lumbar spine  The quality of the pain is described as aching  The pain does not radiate  The pain is at a severity of 5/10  The pain is moderate  The symptoms are aggravated by bending  Associated symptoms include weakness  Pertinent negatives include no bladder incontinence, bowel incontinence, chest pain, leg pain or numbness  She has tried analgesics for the symptoms  The treatment provided mild relief  Shortness of Breath   This is a chronic problem  The current episode started 1 to 4 weeks ago  The problem occurs constantly  The problem has been gradually worsening  Associated symptoms include ear pain  Pertinent negatives include no chest pain, leg pain or leg swelling  The symptoms are aggravated by any activity and weather changes  She has tried nothing for the symptoms  The treatment provided no relief  Earache    There is pain in both ears  This is a new problem  The current episode started in the past 7 days  The problem has been waxing and waning  There has been no fever  Pertinent negatives include no coughing  The treatment provided no relief  The following portions of the patient's history were reviewed and updated as appropriate: allergies, current medications, past family history, past medical history, past social history, past surgical history and problem list     Review of Systems   Constitutional: Positive for appetite change and unexpected weight change  3 lb   HENT: Positive for ear pain  Respiratory: Positive for shortness of breath  Negative for cough      Cardiovascular: Negative for chest pain, palpitations and leg swelling  Gastrointestinal: Negative for bowel incontinence  Genitourinary: Negative for bladder incontinence  Musculoskeletal: Positive for back pain  Neurological: Positive for weakness  Negative for numbness  Objective:      /62 (BP Location: Right arm, Patient Position: Sitting, Cuff Size: Adult)   Pulse 62   Temp 97 8 °F (36 6 °C) (Temporal)   Resp 14   Ht 4' 11 01" (1 499 m)   Wt 51 3 kg (113 lb 2 oz)   BMI 22 84 kg/m²          Physical Exam   Constitutional: She appears well-developed and well-nourished  HENT:   Right Ear: Tympanic membrane normal    l tm occluded   Neck: No thyromegaly present  Cardiovascular: Normal rate, regular rhythm and normal heart sounds  Pulmonary/Chest: Breath sounds normal    Musculoskeletal: She exhibits no edema  Lymphadenopathy:     She has no cervical adenopathy       Ear cerumen removal  Date/Time: 3/15/2018 2:22 PM  Performed by: Hamilton Gracia by: Sabas Sierra     Patient location:  Clinic  Indications / Diagnosis:  Tm not visible  Other Assisting Provider: Yes (comment) (ma)    Consent:     Consent obtained:  Verbal    Consent given by:  Patient    Risks discussed:  Dizziness, incomplete removal and pain    Alternatives discussed:  No treatment  Universal protocol:     Procedure explained and questions answered to patient or proxy's satisfaction: yes    Procedure details:     Location:  L ear    Procedure type: irrigation    Post-procedure details:     Complication:  None    Hearing quality:  Diminished    Patient tolerance of procedure:  Procedure terminated at patient's request

## 2018-03-19 ENCOUNTER — APPOINTMENT (OUTPATIENT)
Dept: RADIOLOGY | Facility: MEDICAL CENTER | Age: 83
End: 2018-03-19
Payer: MEDICARE

## 2018-03-19 ENCOUNTER — TRANSCRIBE ORDERS (OUTPATIENT)
Dept: ADMINISTRATIVE | Facility: HOSPITAL | Age: 83
End: 2018-03-19

## 2018-03-19 DIAGNOSIS — R06.00 DYSPNEA, UNSPECIFIED TYPE: ICD-10-CM

## 2018-03-19 PROCEDURE — 71046 X-RAY EXAM CHEST 2 VIEWS: CPT

## 2018-03-20 ENCOUNTER — TELEPHONE (OUTPATIENT)
Dept: FAMILY MEDICINE CLINIC | Facility: CLINIC | Age: 83
End: 2018-03-20

## 2018-03-20 NOTE — TELEPHONE ENCOUNTER
Pt daughter in law was notified with results and recommendations   Pt stated to daughter in law that nasal spray is helping with sx       ----- Message from Bria Mejia MD sent at 3/19/2018  5:37 PM EDT -----  Heart and lungs stable, compression fracture seen-see how she does with calcitonin nasal sppray

## 2018-03-30 DIAGNOSIS — I25.10 CORONARY ARTERY DISEASE INVOLVING NATIVE HEART WITHOUT ANGINA PECTORIS, UNSPECIFIED VESSEL OR LESION TYPE: ICD-10-CM

## 2018-03-30 DIAGNOSIS — I10 BENIGN ESSENTIAL HTN: ICD-10-CM

## 2018-03-30 RX ORDER — FUROSEMIDE 40 MG/1
40 TABLET ORAL DAILY
Qty: 90 TABLET | Refills: 0 | Status: SHIPPED | OUTPATIENT
Start: 2018-03-30 | End: 2018-04-03 | Stop reason: SDUPTHER

## 2018-04-03 ENCOUNTER — TELEPHONE (OUTPATIENT)
Dept: FAMILY MEDICINE CLINIC | Facility: CLINIC | Age: 83
End: 2018-04-03

## 2018-04-03 DIAGNOSIS — I25.10 CORONARY ARTERY DISEASE INVOLVING NATIVE HEART WITHOUT ANGINA PECTORIS, UNSPECIFIED VESSEL OR LESION TYPE: ICD-10-CM

## 2018-04-03 DIAGNOSIS — I10 BENIGN ESSENTIAL HTN: ICD-10-CM

## 2018-04-03 RX ORDER — FUROSEMIDE 40 MG/1
40 TABLET ORAL DAILY
Qty: 90 TABLET | Refills: 3 | Status: SHIPPED | OUTPATIENT
Start: 2018-04-03 | End: 2018-04-22

## 2018-04-11 ENCOUNTER — TELEPHONE (OUTPATIENT)
Dept: FAMILY MEDICINE CLINIC | Facility: CLINIC | Age: 83
End: 2018-04-11

## 2018-04-13 DIAGNOSIS — G89.29 CHRONIC BILATERAL LOW BACK PAIN WITHOUT SCIATICA: ICD-10-CM

## 2018-04-13 DIAGNOSIS — M54.50 CHRONIC BILATERAL LOW BACK PAIN WITHOUT SCIATICA: ICD-10-CM

## 2018-04-13 RX ORDER — CALCITONIN SALMON 200 [IU]/.09ML
1 SPRAY, METERED NASAL DAILY
Qty: 11 ML | Refills: 3 | Status: SHIPPED | OUTPATIENT
Start: 2018-04-13 | End: 2018-04-22

## 2018-04-13 NOTE — TELEPHONE ENCOUNTER
Pharmacy is requesting a medication refill on nasal spray calcitonin to be sent to [facility]

## 2018-04-22 ENCOUNTER — APPOINTMENT (INPATIENT)
Dept: RADIOLOGY | Facility: HOSPITAL | Age: 83
DRG: 280 | End: 2018-04-22
Payer: MEDICARE

## 2018-04-22 ENCOUNTER — HOSPITAL ENCOUNTER (INPATIENT)
Facility: HOSPITAL | Age: 83
LOS: 2 days | Discharge: HOME HEALTH | DRG: 280 | End: 2018-04-24
Attending: EMERGENCY MEDICINE | Admitting: FAMILY MEDICINE
Payer: MEDICARE

## 2018-04-22 ENCOUNTER — APPOINTMENT (EMERGENCY)
Dept: RADIOLOGY | Facility: HOSPITAL | Age: 83
DRG: 280 | End: 2018-04-22
Payer: MEDICARE

## 2018-04-22 DIAGNOSIS — I48.91 ATRIAL FIBRILLATION WITH RAPID VENTRICULAR RESPONSE (HCC): ICD-10-CM

## 2018-04-22 DIAGNOSIS — I48.92 ATRIAL FLUTTER (HCC): ICD-10-CM

## 2018-04-22 DIAGNOSIS — R10.13 EPIGASTRIC PAIN: Primary | ICD-10-CM

## 2018-04-22 DIAGNOSIS — I50.33 ACUTE ON CHRONIC DIASTOLIC CONGESTIVE HEART FAILURE (HCC): ICD-10-CM

## 2018-04-22 PROBLEM — I21.A1 TYPE 2 MYOCARDIAL INFARCTION (HCC): Status: ACTIVE | Noted: 2018-04-22

## 2018-04-22 LAB
ALBUMIN SERPL BCP-MCNC: 3.8 G/DL (ref 3.5–5)
ALP SERPL-CCNC: 91 U/L (ref 46–116)
ALT SERPL W P-5'-P-CCNC: 22 U/L (ref 12–78)
ANION GAP SERPL CALCULATED.3IONS-SCNC: 11 MMOL/L (ref 4–13)
APTT PPP: 31 SECONDS (ref 23–35)
AST SERPL W P-5'-P-CCNC: 25 U/L (ref 5–45)
ATRIAL RATE: 258 BPM
BASOPHILS # BLD AUTO: 0.01 THOUSANDS/ΜL (ref 0–0.1)
BASOPHILS NFR BLD AUTO: 0 % (ref 0–1)
BILIRUB DIRECT SERPL-MCNC: 0.24 MG/DL (ref 0–0.2)
BILIRUB SERPL-MCNC: 0.97 MG/DL (ref 0.2–1)
BUN SERPL-MCNC: 25 MG/DL (ref 5–25)
CALCIUM SERPL-MCNC: 9.4 MG/DL (ref 8.3–10.1)
CHLORIDE SERPL-SCNC: 103 MMOL/L (ref 100–108)
CO2 SERPL-SCNC: 26 MMOL/L (ref 21–32)
CREAT SERPL-MCNC: 1.02 MG/DL (ref 0.6–1.3)
EOSINOPHIL # BLD AUTO: 0.08 THOUSAND/ΜL (ref 0–0.61)
EOSINOPHIL NFR BLD AUTO: 2 % (ref 0–6)
ERYTHROCYTE [DISTWIDTH] IN BLOOD BY AUTOMATED COUNT: 14.1 % (ref 11.6–15.1)
EST. AVERAGE GLUCOSE BLD GHB EST-MCNC: 166 MG/DL
GFR SERPL CREATININE-BSD FRML MDRD: 48 ML/MIN/1.73SQ M
GLUCOSE SERPL-MCNC: 176 MG/DL (ref 65–140)
GLUCOSE SERPL-MCNC: 197 MG/DL (ref 65–140)
GLUCOSE SERPL-MCNC: 214 MG/DL (ref 65–140)
GLUCOSE SERPL-MCNC: 221 MG/DL (ref 65–140)
HBA1C MFR BLD: 7.4 % (ref 4.2–6.3)
HCT VFR BLD AUTO: 37.6 % (ref 34.8–46.1)
HGB BLD-MCNC: 12.7 G/DL (ref 11.5–15.4)
INR PPP: 1.05 (ref 0.86–1.16)
LIPASE SERPL-CCNC: 133 U/L (ref 73–393)
LYMPHOCYTES # BLD AUTO: 0.88 THOUSANDS/ΜL (ref 0.6–4.47)
LYMPHOCYTES NFR BLD AUTO: 16 % (ref 14–44)
MAGNESIUM SERPL-MCNC: 1.7 MG/DL (ref 1.6–2.6)
MCH RBC QN AUTO: 31 PG (ref 26.8–34.3)
MCHC RBC AUTO-ENTMCNC: 33.8 G/DL (ref 31.4–37.4)
MCV RBC AUTO: 92 FL (ref 82–98)
MONOCYTES # BLD AUTO: 0.3 THOUSAND/ΜL (ref 0.17–1.22)
MONOCYTES NFR BLD AUTO: 6 % (ref 4–12)
NEUTROPHILS # BLD AUTO: 4.11 THOUSANDS/ΜL (ref 1.85–7.62)
NEUTS SEG NFR BLD AUTO: 76 % (ref 43–75)
NRBC BLD AUTO-RTO: 0 /100 WBCS
NT-PROBNP SERPL-MCNC: 1952 PG/ML
P AXIS: 0 DEGREES
P AXIS: 0 DEGREES
PLATELET # BLD AUTO: 160 THOUSANDS/UL (ref 149–390)
PMV BLD AUTO: 11.7 FL (ref 8.9–12.7)
POTASSIUM SERPL-SCNC: 3.8 MMOL/L (ref 3.5–5.3)
PROT SERPL-MCNC: 7.9 G/DL (ref 6.4–8.2)
PROTHROMBIN TIME: 13.7 SECONDS (ref 12.1–14.4)
QRS AXIS: 117 DEGREES
QRS AXIS: 235 DEGREES
QRS AXIS: 242 DEGREES
QRSD INTERVAL: 100 MS
QRSD INTERVAL: 88 MS
QRSD INTERVAL: 88 MS
QT INTERVAL: 316 MS
QT INTERVAL: 336 MS
QT INTERVAL: 390 MS
QTC INTERVAL: 474 MS
QTC INTERVAL: 474 MS
QTC INTERVAL: 487 MS
RBC # BLD AUTO: 4.1 MILLION/UL (ref 3.81–5.12)
SODIUM SERPL-SCNC: 140 MMOL/L (ref 136–145)
T WAVE AXIS: 56 DEGREES
T WAVE AXIS: 61 DEGREES
T WAVE AXIS: 94 DEGREES
TROPONIN I SERPL-MCNC: 1.06 NG/ML
TROPONIN I SERPL-MCNC: 1.13 NG/ML
TROPONIN I SERPL-MCNC: 1.16 NG/ML
VENTRICULAR RATE: 120 BPM
VENTRICULAR RATE: 135 BPM
VENTRICULAR RATE: 94 BPM
WBC # BLD AUTO: 5.38 THOUSAND/UL (ref 4.31–10.16)

## 2018-04-22 PROCEDURE — 82948 REAGENT STRIP/BLOOD GLUCOSE: CPT

## 2018-04-22 PROCEDURE — 99223 1ST HOSP IP/OBS HIGH 75: CPT | Performed by: FAMILY MEDICINE

## 2018-04-22 PROCEDURE — 84484 ASSAY OF TROPONIN QUANT: CPT | Performed by: FAMILY MEDICINE

## 2018-04-22 PROCEDURE — 71045 X-RAY EXAM CHEST 1 VIEW: CPT

## 2018-04-22 PROCEDURE — 83036 HEMOGLOBIN GLYCOSYLATED A1C: CPT | Performed by: FAMILY MEDICINE

## 2018-04-22 PROCEDURE — 74022 RADEX COMPL AQT ABD SERIES: CPT

## 2018-04-22 PROCEDURE — 96374 THER/PROPH/DIAG INJ IV PUSH: CPT

## 2018-04-22 PROCEDURE — 83690 ASSAY OF LIPASE: CPT | Performed by: EMERGENCY MEDICINE

## 2018-04-22 PROCEDURE — 99285 EMERGENCY DEPT VISIT HI MDM: CPT

## 2018-04-22 PROCEDURE — 93005 ELECTROCARDIOGRAM TRACING: CPT | Performed by: FAMILY MEDICINE

## 2018-04-22 PROCEDURE — 99222 1ST HOSP IP/OBS MODERATE 55: CPT | Performed by: INTERNAL MEDICINE

## 2018-04-22 PROCEDURE — 80048 BASIC METABOLIC PNL TOTAL CA: CPT | Performed by: EMERGENCY MEDICINE

## 2018-04-22 PROCEDURE — 83735 ASSAY OF MAGNESIUM: CPT | Performed by: EMERGENCY MEDICINE

## 2018-04-22 PROCEDURE — 96361 HYDRATE IV INFUSION ADD-ON: CPT

## 2018-04-22 PROCEDURE — 36415 COLL VENOUS BLD VENIPUNCTURE: CPT | Performed by: EMERGENCY MEDICINE

## 2018-04-22 PROCEDURE — 93010 ELECTROCARDIOGRAM REPORT: CPT | Performed by: INTERNAL MEDICINE

## 2018-04-22 PROCEDURE — 83880 ASSAY OF NATRIURETIC PEPTIDE: CPT | Performed by: EMERGENCY MEDICINE

## 2018-04-22 PROCEDURE — 85025 COMPLETE CBC W/AUTO DIFF WBC: CPT | Performed by: EMERGENCY MEDICINE

## 2018-04-22 PROCEDURE — 93005 ELECTROCARDIOGRAM TRACING: CPT

## 2018-04-22 PROCEDURE — 80076 HEPATIC FUNCTION PANEL: CPT | Performed by: EMERGENCY MEDICINE

## 2018-04-22 PROCEDURE — 84484 ASSAY OF TROPONIN QUANT: CPT | Performed by: EMERGENCY MEDICINE

## 2018-04-22 PROCEDURE — 85730 THROMBOPLASTIN TIME PARTIAL: CPT | Performed by: EMERGENCY MEDICINE

## 2018-04-22 PROCEDURE — 85610 PROTHROMBIN TIME: CPT | Performed by: EMERGENCY MEDICINE

## 2018-04-22 RX ORDER — ACETAMINOPHEN 325 MG/1
650 TABLET ORAL EVERY 6 HOURS PRN
Status: DISCONTINUED | OUTPATIENT
Start: 2018-04-22 | End: 2018-04-24 | Stop reason: HOSPADM

## 2018-04-22 RX ORDER — METOPROLOL TARTRATE 50 MG/1
50 TABLET, FILM COATED ORAL EVERY 12 HOURS SCHEDULED
Status: DISCONTINUED | OUTPATIENT
Start: 2018-04-22 | End: 2018-04-22 | Stop reason: CLARIF

## 2018-04-22 RX ORDER — FUROSEMIDE 10 MG/ML
40 INJECTION INTRAMUSCULAR; INTRAVENOUS ONCE
Status: COMPLETED | OUTPATIENT
Start: 2018-04-22 | End: 2018-04-22

## 2018-04-22 RX ORDER — LORAZEPAM 0.5 MG/1
0.5 TABLET ORAL
Status: DISCONTINUED | OUTPATIENT
Start: 2018-04-22 | End: 2018-04-24 | Stop reason: HOSPADM

## 2018-04-22 RX ORDER — POTASSIUM CHLORIDE 750 MG/1
10 TABLET, EXTENDED RELEASE ORAL DAILY
Status: DISCONTINUED | OUTPATIENT
Start: 2018-04-22 | End: 2018-04-24 | Stop reason: HOSPADM

## 2018-04-22 RX ORDER — FUROSEMIDE 10 MG/ML
40 INJECTION INTRAMUSCULAR; INTRAVENOUS
Status: DISCONTINUED | OUTPATIENT
Start: 2018-04-23 | End: 2018-04-24 | Stop reason: HOSPADM

## 2018-04-22 RX ORDER — LABETALOL HYDROCHLORIDE 5 MG/ML
10 INJECTION, SOLUTION INTRAVENOUS ONCE
Status: DISCONTINUED | OUTPATIENT
Start: 2018-04-22 | End: 2018-04-22

## 2018-04-22 RX ORDER — ONDANSETRON 2 MG/ML
4 INJECTION INTRAMUSCULAR; INTRAVENOUS EVERY 6 HOURS PRN
Status: DISCONTINUED | OUTPATIENT
Start: 2018-04-22 | End: 2018-04-24 | Stop reason: HOSPADM

## 2018-04-22 RX ORDER — METOPROLOL TARTRATE 5 MG/5ML
5 INJECTION INTRAVENOUS ONCE
Status: COMPLETED | OUTPATIENT
Start: 2018-04-22 | End: 2018-04-22

## 2018-04-22 RX ORDER — CALCITONIN SALMON 200 [IU]/.09ML
1 SPRAY, METERED NASAL DAILY
Status: DISCONTINUED | OUTPATIENT
Start: 2018-04-22 | End: 2018-04-24 | Stop reason: HOSPADM

## 2018-04-22 RX ORDER — ASPIRIN 81 MG/1
81 TABLET, CHEWABLE ORAL DAILY
Status: DISCONTINUED | OUTPATIENT
Start: 2018-04-22 | End: 2018-04-24 | Stop reason: HOSPADM

## 2018-04-22 RX ORDER — PANTOPRAZOLE SODIUM 40 MG/1
40 TABLET, DELAYED RELEASE ORAL
Status: DISCONTINUED | OUTPATIENT
Start: 2018-04-22 | End: 2018-04-24 | Stop reason: HOSPADM

## 2018-04-22 RX ORDER — MAGNESIUM HYDROXIDE/ALUMINUM HYDROXICE/SIMETHICONE 120; 1200; 1200 MG/30ML; MG/30ML; MG/30ML
30 SUSPENSION ORAL EVERY 4 HOURS PRN
Status: DISCONTINUED | OUTPATIENT
Start: 2018-04-22 | End: 2018-04-24 | Stop reason: HOSPADM

## 2018-04-22 RX ORDER — METOPROLOL TARTRATE 50 MG/1
50 TABLET, FILM COATED ORAL EVERY 12 HOURS SCHEDULED
Status: DISCONTINUED | OUTPATIENT
Start: 2018-04-22 | End: 2018-04-24 | Stop reason: HOSPADM

## 2018-04-22 RX ORDER — LOSARTAN POTASSIUM 25 MG/1
25 TABLET ORAL DAILY
Status: DISCONTINUED | OUTPATIENT
Start: 2018-04-22 | End: 2018-04-24 | Stop reason: HOSPADM

## 2018-04-22 RX ORDER — METOPROLOL TARTRATE 50 MG/1
50 TABLET, FILM COATED ORAL ONCE
Status: COMPLETED | OUTPATIENT
Start: 2018-04-22 | End: 2018-04-22

## 2018-04-22 RX ORDER — AMOXICILLIN 250 MG
1 CAPSULE ORAL
Status: DISCONTINUED | OUTPATIENT
Start: 2018-04-22 | End: 2018-04-24 | Stop reason: HOSPADM

## 2018-04-22 RX ORDER — ASPIRIN 325 MG
325 TABLET ORAL ONCE
Status: COMPLETED | OUTPATIENT
Start: 2018-04-22 | End: 2018-04-22

## 2018-04-22 RX ORDER — LACTULOSE 20 G/30ML
10 SOLUTION ORAL DAILY PRN
Status: DISCONTINUED | OUTPATIENT
Start: 2018-04-22 | End: 2018-04-24 | Stop reason: HOSPADM

## 2018-04-22 RX ADMIN — DILTIAZEM HYDROCHLORIDE 30 MG: 30 TABLET, FILM COATED ORAL at 17:33

## 2018-04-22 RX ADMIN — ASPIRIN 325 MG: 325 TABLET ORAL at 09:37

## 2018-04-22 RX ADMIN — APIXABAN 2.5 MG: 2.5 TABLET, FILM COATED ORAL at 17:33

## 2018-04-22 RX ADMIN — APIXABAN 2.5 MG: 2.5 TABLET, FILM COATED ORAL at 13:00

## 2018-04-22 RX ADMIN — METOPROLOL TARTRATE 5 MG: 5 INJECTION, SOLUTION INTRAVENOUS at 09:05

## 2018-04-22 RX ADMIN — INSULIN LISPRO 1 UNITS: 100 INJECTION, SOLUTION INTRAVENOUS; SUBCUTANEOUS at 21:20

## 2018-04-22 RX ADMIN — FUROSEMIDE 40 MG: 10 INJECTION, SOLUTION INTRAMUSCULAR; INTRAVENOUS at 15:06

## 2018-04-22 RX ADMIN — SODIUM CHLORIDE 1000 ML: 0.9 INJECTION, SOLUTION INTRAVENOUS at 08:43

## 2018-04-22 RX ADMIN — LORAZEPAM 0.5 MG: 0.5 TABLET ORAL at 21:09

## 2018-04-22 RX ADMIN — POTASSIUM CHLORIDE 10 MEQ: 750 TABLET, EXTENDED RELEASE ORAL at 13:01

## 2018-04-22 RX ADMIN — METOPROLOL TARTRATE 50 MG: 50 TABLET ORAL at 21:11

## 2018-04-22 RX ADMIN — LOSARTAN POTASSIUM 25 MG: 25 TABLET, FILM COATED ORAL at 13:01

## 2018-04-22 RX ADMIN — METOROPROLOL TARTRATE 5 MG: 5 INJECTION, SOLUTION INTRAVENOUS at 10:02

## 2018-04-22 RX ADMIN — FUROSEMIDE 40 MG: 10 INJECTION, SOLUTION INTRAMUSCULAR; INTRAVENOUS at 10:03

## 2018-04-22 RX ADMIN — DILTIAZEM HYDROCHLORIDE 30 MG: 30 TABLET, FILM COATED ORAL at 13:19

## 2018-04-22 RX ADMIN — METOPROLOL TARTRATE 50 MG: 50 TABLET ORAL at 10:37

## 2018-04-22 RX ADMIN — Medication 1 TABLET: at 21:11

## 2018-04-22 RX ADMIN — INSULIN LISPRO 1 UNITS: 100 INJECTION, SOLUTION INTRAVENOUS; SUBCUTANEOUS at 17:34

## 2018-04-22 NOTE — ED NOTES
Pt tachypnic respiratory rate increased to 30-36 breaths per minute, appears to have increased work of breathing, fluids stopped due to CHF history, Dr Gentile Jobs alerted awaiting additional orders   Pt HOB elevated to 90 degrees, continues on 2 L nasal cannula     Iris Ho, NUVIA  04/22/18 Yolette Bartholomew RN  04/22/18 [id number]

## 2018-04-22 NOTE — H&P
H&P- Elly Anon 4/4/1927, 80 y o  female MRN: [id number]    Unit/Bed#: E4 -01 Encounter: [id number]    Primary Care Provider:  Elías Caballero MD   Date and time admitted to hospital: 4/22/2018  8:14 AM        * Atrial fibrillation with rapid ventricular response [facility])   Assessment & Plan    Status post IV metoprolol in the ED with improvement in heart rate, patient given oral metoprolol, if heart rate remains uncontrolled will add oral Cardizem per Cardiology recommendations  Cardiology input appreciated  Monitor on telemetry, trend cardiac enzymes  IV Lasix due to exacerbation of heart failure  Patient is not on home anticoagulation due to recurrent falls  Placed on Eliquis during hospitalization to also cover DVT prophylaxis, heparin allergies        Type 2 myocardial infarction [facility])   Assessment & Plan    Troponin is elevated at 1 13 most likely secondary to rapid AFib and acute diastolic CHF  Patient is admitted to med surg with telemetry  Cardiology consulted, recommendations appreciated  Will continue to trend troponin  Continue with IV Lasix, oral metoprolol for rate control with potential addition of oral Cardizem, per Cardiology recommendations  Monitor daily weights, intake and output  Continue home cardiac medications        Acute on chronic diastolic congestive heart failure (HCC)   Assessment & Plan    Lasix 40 mg IV given x1, will given additional dose at 3:00 p m  and continue with b i d  dosing for now  Carefully monitor daily weights, intake and output  Metoprolol for rate control        Epigastric pain   Assessment & Plan    Current resolved  Was possibly related to uncontrolled AFib and decompensated CHF versus GERD  Continue to treat cardiac issues  PPI and Mylanta        Depression with anxiety   Assessment & Plan    Continue Ativan at bedtime        CAD (coronary artery disease)   Assessment & Plan    Resume home medications  Metoprolol increased per Cardiology recommendations  Monitor on telemetry, trend troponin  Nonspecific EKG changes are most likely due to uncontrolled AFib          Controlled diabetes mellitus type II without complication (HCC)   Assessment & Plan    Last hemoglobin A1c 7 2, 9 months ago  Will recheck hemoglobin A1c, Accu-Cheks and sliding scale at this time  Diabetic diet         Benign essential HTN   Assessment & Plan    Stable, continue home medications with increased dose of metoprolol        Aortic stenosis   Assessment & Plan    Moderate aortic stenosis per echo December 2017  Cardiology a consulted            VTE Prophylaxis: Apixaban (Eliquis)  / sequential compression device   Code Status: Full  POLST: There is no POLST form on file for this patient (pre-hospital)  Discussion with family: Yes,     Anticipated Length of Stay:  Patient will be admitted on an Inpatient basis with an anticipated length of stay of more than 2 midnights  Justification for Hospital Stay:  Need for close monitoring, IV Lasix, frequent labs    Total Time for Visit, including Counseling / Coordination of Care: 1 hour  Greater than 50% of this total time spent on direct patient counseling and coordination of care  Chief Complaint:   Epigastric pain and dyspnea    History of Present Illness: Radha Haddad is a 80 y o  female who presents with epigastric pain and dyspnea that started last night and has been getting progressively worse  The patient stated that she soak her stool softener and attempted to have a bowel movement after which her dyspnea worsened further  She states that her epigastric pain is nonradiating and was severe and sharp in quality but currently resolved  She initially received IV fluid and the emergency room after which she received a dose of Lasix  She currently denies chest pain or palpitations but states that her shortness of breath persists    She denies nausea and vomiting and up to this point of epigastric pain had been maintaining normal regular oral intake  Overnight, the patient developed orthopnea and was unable to sleep due to breathing difficulty  She reports compliance with her medications  She does not go outside due to history of recurrent falls but has not been falling at home  She has been at her baseline daily activity at home with assistance from her  and family  Review of Systems:    Review of Systems   Constitutional: Negative for appetite change, diaphoresis and fever  HENT: Negative for congestion and trouble swallowing  Eyes: Negative for photophobia  Respiratory: Positive for shortness of breath and wheezing  Cardiovascular: Negative for chest pain, palpitations and leg swelling  Gastrointestinal: Positive for abdominal pain and constipation  Negative for diarrhea, nausea and vomiting  Genitourinary: Negative for difficulty urinating and dysuria  Musculoskeletal: Positive for gait problem  Skin: Negative for pallor and rash  Neurological: Negative for dizziness and syncope  Hematological: Negative for adenopathy  Psychiatric/Behavioral: The patient is nervous/anxious          Past Medical and Surgical History:     Past Medical History:   Diagnosis Date    Acute ischemic right MCA stroke ([facility] )     Anticoagulant long-term use     last assessed: 8/17/17    Aortic stenosis     Atrial fibrillation ([facility] )     Blurred vision     last assessed: 10/25/13    CHF (congestive heart failure) ([facility] )     Coronary artery disease     Dementia     Diabetes mellitus ([facility] )     Dysuria     last assessed: 8/3/15    Hyperlipidemia     Hypertension     Nonrheumatic aortic (valve) insufficiency     Old myocardial infarction     Rectal carcinoma (HCC)        Past Surgical History:   Procedure Laterality Date    BALLOON ANGIOPLASTY, ARTERY      CORONARY ARTERY BYPASS GRAFT      RECTAL SURGERY         Meds/Allergies:    Prior to Admission medications    Medication Sig Start Date End Date Taking? Authorizing Provider   aspirin 81 mg chewable tablet Chew 1 tablet daily 8/7/17  Yes Clinton Gee DO   calcitonin, salmon, (MIACALCIN) 200 units/act nasal spray USE 1 SPRAY IN ONE NOSTRIL DAILY--ALTERNATE NOSTRILS 3/15/18  Yes Conrad Magana MD   lactulose (CHRONULAC) 10 g/15 mL solution Take by mouth 8/28/17  Yes Historical Provider, MD   LORazepam (ATIVAN) 1 mg tablet Take 1 tablet by mouth daily at bedtime for 10 days  Patient taking differently: Take 0 5 mg by mouth daily at bedtime   8/7/17 4/22/18 Yes Clinton Gee DO   losartan (COZAAR) 50 mg tablet Take 0 5 tablets (25 mg total) by mouth daily for 90 days 2/20/18 5/21/18 Yes Riana Figueroa MD   potassium chloride (K-DUR,KLOR-CON) 10 mEq tablet Take 1 tablet (10 mEq total) by mouth daily 2/21/18  Yes Devon Fine MD   calcitonin, salmon, (MIACALCIN) 200 units/act nasal spray 1 spray into each nostril daily for 90 days 4/13/18 4/22/18  Conrad Magana MD   diltiazem (CARDIZEM) 120 MG tablet Take 120 mg by mouth daily    4/22/18  Historical Provider, MD   escitalopram (LEXAPRO) 10 mg tablet TAKE 1/2 TABLET BY MOUTH DAILY 2/8/18 4/22/18  Devon Fine MD   furosemide (LASIX) 40 mg tablet Take 1 tablet (40 mg total) by mouth daily for 90 days 4/3/18 4/22/18  Conrad Magana MD   metoprolol tartrate (LOPRESSOR) 25 mg tablet Take 1 tablet (25 mg total) by mouth every 12 (twelve) hours 2/21/18 4/22/18  Devon Fine MD   nitroglycerin (NITROSTAT) 0 4 mg SL tablet Place 1 tablet under the tongue every 5 (five) minutes as needed 4/6/11 4/22/18  Historical Provider, MD   polyethylene glycol (MIRALAX) 17 g packet Take 17 g by mouth daily as needed (constipation) 8/7/17 4/22/18  Clinton Moore DO     I have reviewed home medications with a medical source (PCP, Pharmacy, other)  Allergies:    Allergies   Allergen Reactions    Heparin        Social History:     Marital Status: /Civil Union   Occupation:  Retired  Patient Pre-hospital Living Situation: With   Patient Pre-hospital Level of Mobility:  Walks with a walker  Patient Pre-hospital Diet Restrictions:  Diabetic  Substance Use History:   History   Alcohol Use    Yes     Comment: occasional     History   Smoking Status    Former Smoker    Quit date: 2002   Smokeless Tobacco    Never Used     Comment: 1 pack a week; No secondhand smoke exposure     History   Drug Use No       Family History:    non-contributory    Physical Exam:     Vitals:   Blood Pressure: 130/82 (04/22/18 1107)  Pulse: (!) 116 (04/22/18 1107)  Temperature: 98 1 °F (36 7 °C) (04/22/18 0813)  Temp Source: Oral (04/22/18 0813)  Respirations: (!) 28 (04/22/18 1107)  Weight - Scale: 57 4 kg (126 lb 8 7 oz) (04/22/18 0813)  SpO2: 95 % (04/22/18 1107)    Physical Exam   Constitutional: She is oriented to person, place, and time  She appears distressed  HENT:   Head: Normocephalic and atraumatic  Eyes: Conjunctivae are normal    Neck: No JVD present  Cardiovascular: An irregularly irregular rhythm present  Tachycardia present  No murmur heard  Pulmonary/Chest: She is in respiratory distress  She has wheezes  She has rales  Abdominal: Soft  She exhibits no distension  There is no tenderness  There is no guarding  Musculoskeletal: She exhibits no edema  Neurological: She is alert and oriented to person, place, and time  Skin: Skin is warm and dry  Psychiatric: She has a normal mood and affect  Additional Data:     Lab Results: I have personally reviewed pertinent reports          Results from last 7 days  Lab Units 04/22/18  0834   WBC Thousand/uL 5 38   HEMOGLOBIN g/dL 12 7   HEMATOCRIT % 37 6   PLATELETS Thousands/uL 160   NEUTROS PCT % 76*   LYMPHS PCT % 16   MONOS PCT % 6   EOS PCT % 2       Results from last 7 days  Lab Units 04/22/18  0834   SODIUM mmol/L 140   POTASSIUM mmol/L 3 8   CHLORIDE mmol/L 103   CO2 mmol/L 26   BUN mg/dL 25   CREATININE mg/dL 1 02   CALCIUM mg/dL 9 4   TOTAL PROTEIN g/dL 7 9 BILIRUBIN TOTAL mg/dL 0 97   ALK PHOS U/L 91   ALT U/L 22   AST U/L 25   GLUCOSE RANDOM mg/dL 221*       Results from last 7 days  Lab Units 04/22/18  0943   INR  1 05       Imaging: I have personally reviewed pertinent reports  XR chest 1 view portable    (Results Pending)       EKG, Pathology, and Other Studies Reviewed on Admission:   · EKG:  Reviewed    Allscripts / Epic Records Reviewed: Yes     ** Please Note: This note has been constructed using a voice recognition system   **

## 2018-04-22 NOTE — ASSESSMENT & PLAN NOTE
Troponin is elevated at 1 13 most likely secondary to rapid AFib and acute diastolic CHF  Patient is admitted to med surg with telemetry  Cardiology consulted, recommendations appreciated  Will continue to trend troponin  Continue with IV Lasix, oral metoprolol for rate control with potential addition of oral Cardizem, per Cardiology recommendations  Monitor daily weights, intake and output  Continue home cardiac medications

## 2018-04-22 NOTE — ASSESSMENT & PLAN NOTE
Current resolved  Was possibly related to uncontrolled AFib and decompensated CHF versus GERD  Continue to treat cardiac issues  PPI and Mylanta

## 2018-04-22 NOTE — ASSESSMENT & PLAN NOTE
Resume home medications  Metoprolol increased per Cardiology recommendations  Monitor on telemetry, trend troponin  Nonspecific EKG changes are most likely due to uncontrolled AFib

## 2018-04-22 NOTE — ED PROVIDER NOTES
History  Chief Complaint   Patient presents with    Abdominal Pain     Pt brought in by EMS from home  C/O epigastric/abdominal pain  Also started experiencing lower back pain yesterday which began to radiate upwards today  Hasn't had BM in 2 days; took lactulose with no relief  , /103 upon initial assessment; did not take BP meds today  81 YO female presents with epigastric pain starting overnight  Pt states this has been burning in quality, constant, occasionally radiating up into the neck  No known exacerbating or alleviating factors  Pt states this has been associated with intermittent lightheadedness, denies recent fevers or chills, no nausea or vomiting, no known sick contacts  Pt has had similar pain in the past but states she has never had it associated with the lightheadedness  Additionally pt notes she has been constipated for the last 2 days, prior to that she had a watery stool  Pt does have a Hx of constipation, states she has taken lactulose without helping to resolve the issue  Pt denies CP/SOB/F/C/N/V/D/C, no dysuria, burning on urination or blood in urine  Pt had an NSTEMI 7 months prior, she has had a CABG  History provided by:  Patient and spouse   used: No    Abdominal Pain   Pain location:  Epigastric  Pain quality: burning    Pain radiation: Neck  Pain severity:  Moderate  Onset quality:  Gradual  Timing:  Constant  Progression:  Waxing and waning  Chronicity:  Recurrent  Relieved by:  Nothing  Worsened by:  Nothing  Ineffective treatments:  None tried  Associated symptoms: constipation    Associated symptoms: no chest pain, no chills, no cough, no diarrhea, no dysuria, no fatigue, no fever, no hematemesis, no hematochezia, no shortness of breath and no vomiting        Prior to Admission Medications   Prescriptions Last Dose Informant Patient Reported? Taking?    LORazepam (ATIVAN) 1 mg tablet   No Yes   Sig: Take 1 tablet by mouth daily at bedtime for 10 days   Patient taking differently: Take 0 5 mg by mouth daily at bedtime     aspirin 81 mg chewable tablet  Self No Yes   Sig: Chew 1 tablet daily   calcitonin, salmon, (MIACALCIN) 200 units/act nasal spray   No Yes   Sig: USE 1 SPRAY IN ONE NOSTRIL DAILY--ALTERNATE NOSTRILS   lactulose (CHRONULAC) 10 g/15 mL solution  Self Yes Yes   Sig: Take by mouth   losartan (COZAAR) 50 mg tablet   No Yes   Sig: Take 0 5 tablets (25 mg total) by mouth daily for 90 days   potassium chloride (K-DUR,KLOR-CON) 10 mEq tablet   No Yes   Sig: Take 1 tablet (10 mEq total) by mouth daily      Facility-Administered Medications: None       Past Medical History:   Diagnosis Date    Acute ischemic right MCA stroke ([facility] )     Anticoagulant long-term use     last assessed: 8/17/17    Aortic stenosis     Atrial fibrillation ([facility] )     Blurred vision     last assessed: 10/25/13    CHF (congestive heart failure) ([facility] )     Coronary artery disease     Dementia     Diabetes mellitus ([facility] )     Dysuria     last assessed: 8/3/15    Hyperlipidemia     Hypertension     Nonrheumatic aortic (valve) insufficiency     Old myocardial infarction     Rectal carcinoma (HCC)        Past Surgical History:   Procedure Laterality Date    BALLOON ANGIOPLASTY, ARTERY      CORONARY ARTERY BYPASS GRAFT      RECTAL SURGERY         Family History   Problem Relation Age of Onset    Stroke Mother     Stroke Father      I have reviewed and agree with the history as documented  Social History   Substance Use Topics    Smoking status: Former Smoker     Quit date: 2002    Smokeless tobacco: Never Used      Comment: 1 pack a week; No secondhand smoke exposure    Alcohol use Yes      Comment: occasional        Review of Systems   Constitutional: Negative for chills, fatigue and fever  HENT: Negative for dental problem  Eyes: Negative for visual disturbance  Respiratory: Negative for cough and shortness of breath      Cardiovascular: Negative for chest pain  Gastrointestinal: Positive for abdominal pain and constipation  Negative for diarrhea, hematemesis, hematochezia and vomiting  Genitourinary: Negative for dysuria and frequency  Musculoskeletal: Negative for arthralgias  Skin: Negative for rash  Neurological: Positive for dizziness and light-headedness  Negative for weakness  Psychiatric/Behavioral: Negative for agitation, behavioral problems and confusion  All other systems reviewed and are negative  Physical Exam  ED Triage Vitals [04/22/18 0813]   Temperature Pulse Respirations Blood Pressure SpO2   98 1 °F (36 7 °C) (!) 134 (!) 25 (!) 163/103 97 %      Temp Source Heart Rate Source Patient Position - Orthostatic VS BP Location FiO2 (%)   Oral Monitor Sitting Left arm --      Pain Score       No Pain           Orthostatic Vital Signs  Vitals:    04/22/18 1015 04/22/18 1107 04/22/18 1146 04/22/18 1511   BP:  130/82 144/92 153/92   Pulse: (!) 118 (!) 116 (!) 123 104   Patient Position - Orthostatic VS:   Lying Lying       Physical Exam   Constitutional: She is oriented to person, place, and time  She appears well-developed and well-nourished  HENT:   Head: Normocephalic and atraumatic  Eyes: EOM are normal    Neck: Normal range of motion  Cardiovascular: Normal rate, regular rhythm and normal heart sounds  Pulmonary/Chest: Effort normal and breath sounds normal    Abdominal: Soft  There is tenderness in the epigastric area  Musculoskeletal: Normal range of motion  Neurological: She is alert and oriented to person, place, and time  Skin: Skin is warm and dry  Psychiatric: She has a normal mood and affect  Her behavior is normal  Thought content normal    Nursing note and vitals reviewed        ED Medications  Medications   aspirin chewable tablet 81 mg (81 mg Oral Not Given 4/22/18 1255)   LORazepam (ATIVAN) tablet 0 5 mg (not administered)   lactulose 20 g/30 mL oral solution 10 g (not administered) losartan (COZAAR) tablet 25 mg (25 mg Oral Given 4/22/18 1301)   potassium chloride (K-DUR,KLOR-CON) CR tablet 10 mEq (10 mEq Oral Given 4/22/18 1301)   calcitonin (salmon) (MIACALCIN) 200 units/act nasal spray 1 spray (1 spray Alternating Nares Not Given 4/22/18 1255)   acetaminophen (TYLENOL) tablet 650 mg (not administered)   apixaban (ELIQUIS) tablet 2 5 mg (2 5 mg Oral Given 4/22/18 1733)   furosemide (LASIX) injection 40 mg (not administered)   aluminum-magnesium hydroxide-simethicone (MYLANTA) 200-200-20 mg/5 mL oral suspension 30 mL (not administered)   ondansetron (ZOFRAN) injection 4 mg (not administered)   pantoprazole (PROTONIX) EC tablet 40 mg (40 mg Oral Not Given 4/22/18 1301)   insulin lispro (HumaLOG) 100 units/mL subcutaneous injection 1-5 Units (1 Units Subcutaneous Given 4/22/18 1734)   insulin lispro (HumaLOG) 100 units/mL subcutaneous injection 1-5 Units (not administered)   metoprolol tartrate (LOPRESSOR) tablet 50 mg (not administered)   senna-docusate sodium (SENOKOT S) 8 6-50 mg per tablet 1 tablet (not administered)   diltiazem (CARDIZEM) tablet 30 mg (30 mg Oral Given 4/22/18 1733)   sodium chloride 0 9 % bolus 1,000 mL (0 mL Intravenous Stopped 4/22/18 0948)   metoprolol (LOPRESSOR) injection 5 mg (5 mg Intravenous Given 4/22/18 0905)   aspirin tablet 325 mg (325 mg Oral Given 4/22/18 0937)   metoprolol (LOPRESSOR) injection 5 mg (5 mg Intravenous Given 4/22/18 1002)   furosemide (LASIX) injection 40 mg (40 mg Intravenous Given 4/22/18 1003)   metoprolol tartrate (LOPRESSOR) tablet 50 mg (50 mg Oral Given 4/22/18 1037)   furosemide (LASIX) injection 40 mg (40 mg Intravenous Given 4/22/18 1506)       Diagnostic Studies  Results Reviewed     Procedure Component Value Units Date/Time    Troponin I [[id number]]  (Abnormal) Collected:  04/22/18 1528    Lab Status:  Final result Specimen:  Blood from Arm, Right Updated:  04/22/18 1606     Troponin I 1 06 (H) ng/mL     Narrative: Siemens Chemistry analyzer 99% cutoff is > 0 04 ng/mL in network labs    o cTnI 99% cutoff is useful only when applied to patients in the clinical setting of myocardial ischemia  o cTnI 99% cutoff should be interpreted in the context of clinical history, ECG findings and possibly cardiac imaging to establish correct diagnosis  o cTnI 99% cutoff may be suggestive but clearly not indicative of a coronary event without the clinical setting of myocardial ischemia  Troponin I [[id number]]     Lab Status:  No result Specimen:  Blood     Troponin I [[id number]]  (Abnormal) Collected:  04/22/18 1219    Lab Status:  Final result Specimen:  Blood from Arm, Left Updated:  04/22/18 1246     Troponin I 1 16 (H) ng/mL     Narrative:         Siemens Chemistry analyzer 99% cutoff is > 0 04 ng/mL in network labs    o cTnI 99% cutoff is useful only when applied to patients in the clinical setting of myocardial ischemia  o cTnI 99% cutoff should be interpreted in the context of clinical history, ECG findings and possibly cardiac imaging to establish correct diagnosis  o cTnI 99% cutoff may be suggestive but clearly not indicative of a coronary event without the clinical setting of myocardial ischemia      APTT six (6) hours after Heparin bolus/drip initiation or dosing change [[id number]]  (Normal) Collected:  04/22/18 0943    Lab Status:  Final result Specimen:  Blood Updated:  04/22/18 0954     PTT 31 seconds     Protime-INR [[id number]]  (Normal) Collected:  04/22/18 0943    Lab Status:  Final result Specimen:  Blood Updated:  04/22/18 0954     Protime 13 7 seconds      INR 1 05    Troponin I [[id number]]  (Abnormal) Collected:  04/22/18 0834    Lab Status:  Final result Specimen:  Blood from Arm, Right Updated:  04/22/18 0926     Troponin I 1 13 (H) ng/mL     Narrative:         Siemens Chemistry analyzer 99% cutoff is > 0 04 ng/mL in network labs    o cTnI 99% cutoff is useful only when applied to patients in the clinical setting of myocardial ischemia  o cTnI 99% cutoff should be interpreted in the context of clinical history, ECG findings and possibly cardiac imaging to establish correct diagnosis  o cTnI 99% cutoff may be suggestive but clearly not indicative of a coronary event without the clinical setting of myocardial ischemia  Basic metabolic panel [[id number]]  (Abnormal) Collected:  04/22/18 0834    Lab Status:  Final result Specimen:  Blood from Arm, Right Updated:  04/22/18 0915     Sodium 140 mmol/L      Potassium 3 8 mmol/L      Chloride 103 mmol/L      CO2 26 mmol/L      Anion Gap 11 mmol/L      BUN 25 mg/dL      Creatinine 1 02 mg/dL      Glucose 221 (H) mg/dL      Calcium 9 4 mg/dL      eGFR 48 ml/min/1 73sq m     Narrative:         National Kidney Disease Education Program recommendations are as follows:  GFR calculation is accurate only with a steady state creatinine  Chronic Kidney disease less than 60 ml/min/1 73 sq  meters  Kidney failure less than 15 ml/min/1 73 sq  meters      Hepatic function panel [[id number]]  (Abnormal) Collected:  04/22/18 0834    Lab Status:  Final result Specimen:  Blood from Arm, Right Updated:  04/22/18 0915     Total Bilirubin 0 97 mg/dL      Bilirubin, Direct 0 24 (H) mg/dL      Alkaline Phosphatase 91 U/L      AST 25 U/L      ALT 22 U/L      Total Protein 7 9 g/dL      Albumin 3 8 g/dL     Magnesium [[id number]]  (Normal) Collected:  04/22/18 0834    Lab Status:  Final result Specimen:  Blood from Arm, Right Updated:  04/22/18 0915     Magnesium 1 7 mg/dL     Lipase [[id number]]  (Normal) Collected:  04/22/18 0834    Lab Status:  Final result Specimen:  Blood from Arm, Right Updated:  04/22/18 0915     Lipase 133 u/L     B-type natriuretic peptide [[id number]]  (Abnormal) Collected:  04/22/18 0834    Lab Status:  Final result Specimen:  Blood from Arm, Right Updated:  04/22/18 0913     NT-proBNP 1,952 (H) pg/mL     CBC and differential [[id number]]  (Abnormal) Collected:  04/22/18 0834    Lab Status: Final result Specimen:  Blood from Arm, Right Updated:  04/22/18 0851     WBC 5 38 Thousand/uL      RBC 4 10 Million/uL      Hemoglobin 12 7 g/dL      Hematocrit 37 6 %      MCV 92 fL      MCH 31 0 pg      MCHC 33 8 g/dL      RDW 14 1 %      MPV 11 7 fL      Platelets 853 Thousands/uL      nRBC 0 /100 WBCs      Neutrophils Relative 76 (H) %      Lymphocytes Relative 16 %      Monocytes Relative 6 %      Eosinophils Relative 2 %      Basophils Relative 0 %      Neutrophils Absolute 4 11 Thousands/µL      Lymphocytes Absolute 0 88 Thousands/µL      Monocytes Absolute 0 30 Thousand/µL      Eosinophils Absolute 0 08 Thousand/µL      Basophils Absolute 0 01 Thousands/µL     POCT urinalysis dipstick [[id number]]     Lab Status:  No result Specimen:  Urine                  XR chest 1 view portable    (Results Pending)   XR abdomen obstruction series    (Results Pending)              Procedures  ECG 12 Lead Documentation  Date/Time: 4/22/2018 8:48 AM  Performed by: Skip Quinonez  Authorized by: Tia CATALAN     ECG reviewed by me, the ED Provider: yes    Patient location:  ED  Interpretation:     Interpretation: normal    Rate:     ECG rate:  135    ECG rate assessment: tachycardic    Rhythm:     Rhythm: atrial fibrillation    Ectopy:     Ectopy: none    QRS:     QRS axis:  Normal    QRS intervals:  Normal  Conduction:     Conduction: normal    ST segments:     ST segments:  Non-specific  T waves:     T waves: normal    Comments:      Atrial fibrillation with RVR, ST depression in lateral leads  Phone Contacts  ED Phone Contact    ED Course  ED Course as of Apr 22 1842   Sun Apr 22, 2018   1195 Spoke with Dr Jasmin Gomez, cardiology  Explained pt's symptoms, ECG, troponin and clinical course  Dr Jasmin Gomez reviewed pt's ECG's from today as well  Feels most important to rate control the pt, would hold of on Heparin or argatroban at this time  Of note pt has an allergy to heparin   He does recommend metoprolol, IV and PO for better rate control  MDM  Number of Diagnoses or Management Options  Atrial flutter [facility]): new and requires workup  Epigastric pain: new and requires workup  Diagnosis management comments: 1  Abdominal pain - Pt with known cardiac Hx, constant pain since last night  Pt's ECG shows atrial fibrillation which she is known to have, she has a significant tachycardia with depressions in the lateral leads concerning for some ischemia, this does not meet criteria for a STEMI however  Will check troponins, give beta blocker to help control rate  Order CXR, lipase for pancreatitis, LFT's, electrolytes for dehydration or other abnormalities, CBC  Pt will likely require admission even with no etiology found in ED  Pt has an elevated troponin, concern for anginal pain as pt is complaining of epigatric discomfort  Spoke with cardiology, states rate control is likely best course of action and to hold off on heparin or argatroban          Amount and/or Complexity of Data Reviewed  Clinical lab tests: ordered and reviewed  Tests in the radiology section of CPT®: ordered and reviewed  Obtain history from someone other than the patient: yes  Discuss the patient with other providers: yes  Independent visualization of images, tracings, or specimens: yes    Patient Progress  Patient progress: stable    CritCare Time    Disposition  Final diagnoses:   Epigastric pain   Atrial flutter ([address] )     Time reflects when diagnosis was documented in both MDM as applicable and the Disposition within this note     Time User Action Codes Description Comment    4/22/2018  9:57 AM Lydia CATALAN Add [R10 13] Epigastric pain     4/22/2018  9:59 AM Lydia CATALAN Add [I48 92] Atrial flutter ([address] )     4/22/2018 11:06 AM Negra Rudolph Add [I48 91] Atrial fibrillation with rapid ventricular response ([address] )     4/22/2018 11:06 AM Negra Tenorio Modify [I48 91] Atrial fibrillation with rapid ventricular response ([address] ) ED Disposition     ED Disposition Condition Comment    Admit  Case was discussed with Dr Antwon Harrison and the patient's admission status was agreed to be Admission Status: inpatient status to the service of Dr Antwon Harrison   Follow-up Information    None       Current Discharge Medication List      CONTINUE these medications which have NOT CHANGED    Details   aspirin 81 mg chewable tablet Chew 1 tablet daily  Refills: 0      calcitonin, salmon, (MIACALCIN) 200 units/act nasal spray USE 1 SPRAY IN ONE NOSTRIL DAILY--ALTERNATE NOSTRILS  Qty: 11 1 mL, Refills: 0    Comments: **Patient requests 90 days supply**  Associated Diagnoses: Chronic bilateral low back pain without sciatica      lactulose (CHRONULAC) 10 g/15 mL solution Take by mouth      LORazepam (ATIVAN) 1 mg tablet Take 1 tablet by mouth daily at bedtime for 10 days  Qty: 10 tablet, Refills: 0      losartan (COZAAR) 50 mg tablet Take 0 5 tablets (25 mg total) by mouth daily for 90 days  Qty: 90 tablet, Refills: 3    Associated Diagnoses: Benign essential HTN; Atrial fibrillation with rapid ventricular response (HCC)      potassium chloride (K-DUR,KLOR-CON) 10 mEq tablet Take 1 tablet (10 mEq total) by mouth daily  Qty: 90 tablet, Refills: 3    Associated Diagnoses: Benign essential HTN; Coronary artery disease involving native heart without angina pectoris, unspecified vessel or lesion type           No discharge procedures on file      ED Provider  Electronically Signed by           Dayan Castro MD  04/22/18 6989

## 2018-04-22 NOTE — ASSESSMENT & PLAN NOTE
Lasix 40 mg IV given x1, will given additional dose at 3:00 p m  and continue with b i d  dosing for now  Carefully monitor daily weights, intake and output  Metoprolol for rate control

## 2018-04-22 NOTE — ASSESSMENT & PLAN NOTE
Status post IV metoprolol in the ED with improvement in heart rate, patient given oral metoprolol, if heart rate remains uncontrolled will add oral Cardizem per Cardiology recommendations  Cardiology input appreciated  Monitor on telemetry, trend cardiac enzymes  IV Lasix due to exacerbation of heart failure  Patient is not on home anticoagulation due to recurrent falls  Placed on Eliquis during hospitalization to also cover DVT prophylaxis, heparin allergies

## 2018-04-22 NOTE — CONSULTS
Cardiology Consult  04/22/18    Referring Physian: MD RADHA Parker    Chief Complain/Reason for Referal:     IMPRESSION/RECOMMENDATIONS/DISCUSSION:    1  Acute on chronic diastolic heart failure secondary to #2  2  Paroxysmal atrial fibrillation, now in flutter  3  Hypertension  4  Moderate aortic stenosis/mitral regurgitation/aortic regurgitation  5  Probable CAD, mild anteroapical ischemia prior nuclear stress test 08/2017  6  Type 2 NSTEMI secondary to all of above    · Heart rate improved after intravenous Lopressor in the ER  Will increase outpatient beta-blocker dose to 50 mg twice daily  · Tachypneic in ED with increased anti proBNP, bilateral wheeze, orthopnea, vascular congestion/mild pulmonary edema on chest x-ray all consistent with congestive heart failure  Furosemide 40 mg started in ED after 1 L bolus given  Will give another 40 mg dose in 4 hours, followed by 20 mg twice daily  · Continue oxygen support  · May consider adding diltiazem if need better rate control  · D/W Dr Rosemarie Dow and Elsy Alt    ======================================================    HPI:  I am seeing this patient in cardiology consultation for:  Congestive heart failure, atrial fibrillation/flutter    Lesley Cruz is a 80 y o  female with a history of atrial fibrillation, was sees Dr Gordo Corbett in the office  His office note does mention warfarin anticoagulation  She was last hospitalized August 2017 secondary to atrial fibrillation and congestive heart failure  Echocardiogram 12/2017 revealed ejection fraction about 50% with prior nuclear stress test 8/6/2017 revealing a small area of anteroapical ischemia  Medical management has been continued  She presented to the emergency department this morning with complaints of abdominal discomfort and shortness of breath along with orthopnea  She was given 800 mL intravenous fluid which caused increased shortness of breath and tachypnea    She is currently getting 40 mg furosemide  She admits to shortness of breath, and at this time denies any chest pain or abdominal pain whatsoever  She denies lower extremity edema at home  She does not know what medications she takes, states her son manages her medications, lives alone with her , and they do not go out  She is not short if she is still taking warfarin  She presented with a normal INR  Past Medical History:   Diagnosis Date    [facility] right MCA stroke ([facility] )     Anticoagulant long-term use     last assessed: 8/17/17    Aortic stenosis     Atrial fibrillation ([place] [address] )     Blurred vision     last assessed: 10/25/13    CHF (congestive heart failure) ([place] [address] )     Coronary artery disease     Diabetes mellitus ([place] [address] )     Dysuria     last assessed: 8/3/15    Hyperlipidemia     Hypertension     Nonrheumatic aortic (valve) insufficiency     Old myocardial infarction     Rectal carcinoma (HCC)          Scheduled Meds:  Current Facility-Administered Medications:  metoprolol tartrate 50 mg Oral Once Izzy Rolon MD     Continuous Infusions:   PRN Meds:  Allergies   Allergen Reactions    Heparin      I reviewed the Home Medication list in the chart  Family History   Problem Relation Age of Onset    Stroke Mother     Stroke Father        Social History     Social History    Marital status: /Civil Union     Spouse name: N/A    Number of children: N/A    Years of education: N/A     Occupational History    Retired      Social History Main Topics    Smoking status: Former Smoker     Quit date: 2002    Smokeless tobacco: Never Used      Comment: 1 pack a week;  No secondhand smoke exposure    Alcohol use Yes      Comment: occasional    Drug use: No    Sexual activity: Not on file     Other Topics Concern    Not on file     Social History Narrative    Lives with     House    2 children,5 grandchildren    retired       Review of Systems - as per HPI, all others reviewed and negative  Vitals:    04/22/18 0930   BP: 147/86   Pulse: (!) 116   Resp: (!) 28   Temp:    SpO2: 93%     I/O       04/20 0701 - 04/21 0700 04/21 0701 - 04/22 0700 04/22 0701 - 04/23 0700    IV Piggyback   800    Total Intake(mL/kg)   800 (13 9)    Net     +800               Weight (last 2 days)     Date/Time   Weight    04/22/18 0813  57 4 (126 54)              GEN:  Mildly distressed, tachypneic  HEENT: Mucus membranes moist, pink conjunctivae  EYES: Pupils equal, sclera anicteric  NECK: ++JVD/HJR, no carotid bruit  CARDIOVASCULAR: RR, tachycardic, + systolic ejection murmur, rub, gallops S1,S2, no parasternal heave/thrill  LUNGS:  Bilateral wheeze and mild bibasilar crackles  ABDOMEN: Soft, nondistended, no hepatic systolic pulsation  EXTREMITIES/VASCULAR: No edema    PSYCH:  Appears anxious  NEURO: Grossly intact by limited examination    HEME: No significant bleeding, bruising, petechia by limited examination  SKIN: No significant rashes by limited examination        EKG:  Atrial flutter  TELE:  Atrial fibrillation/flutter  CXR:  Pulmonary vascular congestion/pulmonary edema  PRIOR STRESS TEST:  08/2017 small area of anteroapical ischemia    ECHO:  Reviewed from 12/2017, ejection fraction 50% with moderate valvular disease       Results from last 7 days  Lab Units 04/22/18  0834   WBC Thousand/uL 5 38   HEMOGLOBIN g/dL 12 7   HEMATOCRIT % 37 6   PLATELETS Thousands/uL 160   NEUTROS PCT % 76*   MONOS PCT % 6       Results from last 7 days  Lab Units 04/22/18  0834   SODIUM mmol/L 140   POTASSIUM mmol/L 3 8   CHLORIDE mmol/L 103   CO2 mmol/L 26   BUN mg/dL 25   CREATININE mg/dL 1 02   GLUCOSE RANDOM mg/dL 221*   CALCIUM mg/dL 9 4       Results from last 7 days  Lab Units 04/22/18  0834   SODIUM mmol/L 140   POTASSIUM mmol/L 3 8   CHLORIDE mmol/L 103   CO2 mmol/L 26   BUN mg/dL 25   CREATININE mg/dL 1 02   CALCIUM mg/dL 9 4   TOTAL PROTEIN g/dL 7 9   BILIRUBIN TOTAL mg/dL 0 97   ALK PHOS U/L 91   ALT U/L 22   AST U/L 25   GLUCOSE RANDOM mg/dL 221*     No results found for: TROPONINT        Results from last 7 days  Lab Units 04/22/18  0834   TROPONIN I ng/mL 1 13*           Results from last 7 days  Lab Units 04/22/18  0943   INR  1 05               I have personally reviewed the EKG, CXR and Telemetry images directly  Patient Active Problem List    Diagnosis Date Noted    Cognitive impairment 08/28/2017     Priority: Low    Acute on chronic overt combined systolic and diastolic congestive heart failure ([facility] ) 08/03/2017     Priority: Low    Aortic stenosis 08/03/2017     Priority: Low    Benign essential HTN 08/03/2017     Priority: Low    Controlled diabetes mellitus type II without complication ([place] [address] ) 27/16/4882     Priority: Low    Atrial fibrillation with rapid ventricular response ([place] [address] ) 08/03/2017     Priority: Low    Acute ischemic right MCA stroke ([place] [address] ) 08/03/2017     Priority: Low    CAD (coronary artery disease) 08/03/2017     Priority: Low    Tinnitus 11/24/2015     Priority: Low    Squamous cell carcinoma of skin 12/12/2014     Priority: Low    Depression with anxiety 02/27/2013     Priority: Low    Esophageal reflux 09/21/2012     Priority: Low    Osteoarthritis 09/21/2012     Priority: Low    Hypertension 09/12/2012     Priority: Low    Hyperlipidemia 06/12/2012     Priority: Low       Portions of the record may have been created with voice recognition software  Occasional wrong word or "sound a like" substitutions may have occurred due to the inherent limitations of voice recognition software  Read the chart carefully and recognize, using context, where substitutions have occurred

## 2018-04-22 NOTE — ED NOTES
Dr Macrina Gan cardiologist at bedside     [facility]  04/22/18 8284

## 2018-04-22 NOTE — ASSESSMENT & PLAN NOTE
Last hemoglobin A1c 7 2, 9 months ago  Will recheck hemoglobin A1c, Accu-Cheks and sliding scale at this time  Diabetic diet

## 2018-04-23 ENCOUNTER — TELEPHONE (OUTPATIENT)
Dept: FAMILY MEDICINE CLINIC | Facility: CLINIC | Age: 83
End: 2018-04-23

## 2018-04-23 LAB
ALBUMIN SERPL BCP-MCNC: 3.7 G/DL (ref 3.5–5)
ALP SERPL-CCNC: 98 U/L (ref 46–116)
ALT SERPL W P-5'-P-CCNC: 30 U/L (ref 12–78)
ANION GAP SERPL CALCULATED.3IONS-SCNC: 12 MMOL/L (ref 4–13)
AST SERPL W P-5'-P-CCNC: 36 U/L (ref 5–45)
BILIRUB SERPL-MCNC: 1.09 MG/DL (ref 0.2–1)
BUN SERPL-MCNC: 24 MG/DL (ref 5–25)
CALCIUM SERPL-MCNC: 9.3 MG/DL (ref 8.3–10.1)
CHLORIDE SERPL-SCNC: 106 MMOL/L (ref 100–108)
CHOLEST SERPL-MCNC: 235 MG/DL (ref 50–200)
CO2 SERPL-SCNC: 27 MMOL/L (ref 21–32)
CREAT SERPL-MCNC: 0.93 MG/DL (ref 0.6–1.3)
ERYTHROCYTE [DISTWIDTH] IN BLOOD BY AUTOMATED COUNT: 14.3 % (ref 11.6–15.1)
GFR SERPL CREATININE-BSD FRML MDRD: 54 ML/MIN/1.73SQ M
GLUCOSE SERPL-MCNC: 112 MG/DL (ref 65–140)
GLUCOSE SERPL-MCNC: 113 MG/DL (ref 65–140)
GLUCOSE SERPL-MCNC: 151 MG/DL (ref 65–140)
GLUCOSE SERPL-MCNC: 204 MG/DL (ref 65–140)
GLUCOSE SERPL-MCNC: 257 MG/DL (ref 65–140)
HCT VFR BLD AUTO: 41.8 % (ref 34.8–46.1)
HDLC SERPL-MCNC: 81 MG/DL (ref 40–60)
HGB BLD-MCNC: 13.8 G/DL (ref 11.5–15.4)
LDLC SERPL CALC-MCNC: 140 MG/DL (ref 0–100)
MCH RBC QN AUTO: 30.7 PG (ref 26.8–34.3)
MCHC RBC AUTO-ENTMCNC: 33 G/DL (ref 31.4–37.4)
MCV RBC AUTO: 93 FL (ref 82–98)
NONHDLC SERPL-MCNC: 154 MG/DL
PLATELET # BLD AUTO: 157 THOUSANDS/UL (ref 149–390)
PMV BLD AUTO: 11.3 FL (ref 8.9–12.7)
POTASSIUM SERPL-SCNC: 3.7 MMOL/L (ref 3.5–5.3)
PROT SERPL-MCNC: 7.3 G/DL (ref 6.4–8.2)
RBC # BLD AUTO: 4.49 MILLION/UL (ref 3.81–5.12)
SODIUM SERPL-SCNC: 145 MMOL/L (ref 136–145)
TRIGL SERPL-MCNC: 71 MG/DL
WBC # BLD AUTO: 5.42 THOUSAND/UL (ref 4.31–10.16)

## 2018-04-23 PROCEDURE — 80061 LIPID PANEL: CPT | Performed by: FAMILY MEDICINE

## 2018-04-23 PROCEDURE — G8987 SELF CARE CURRENT STATUS: HCPCS

## 2018-04-23 PROCEDURE — 99232 SBSQ HOSP IP/OBS MODERATE 35: CPT | Performed by: INTERNAL MEDICINE

## 2018-04-23 PROCEDURE — 85027 COMPLETE CBC AUTOMATED: CPT | Performed by: FAMILY MEDICINE

## 2018-04-23 PROCEDURE — G8988 SELF CARE GOAL STATUS: HCPCS

## 2018-04-23 PROCEDURE — 80053 COMPREHEN METABOLIC PANEL: CPT | Performed by: FAMILY MEDICINE

## 2018-04-23 PROCEDURE — 82948 REAGENT STRIP/BLOOD GLUCOSE: CPT

## 2018-04-23 PROCEDURE — 97163 PT EVAL HIGH COMPLEX 45 MIN: CPT

## 2018-04-23 PROCEDURE — G8978 MOBILITY CURRENT STATUS: HCPCS

## 2018-04-23 PROCEDURE — 97166 OT EVAL MOD COMPLEX 45 MIN: CPT

## 2018-04-23 PROCEDURE — 97535 SELF CARE MNGMENT TRAINING: CPT

## 2018-04-23 RX ADMIN — LOSARTAN POTASSIUM 25 MG: 25 TABLET, FILM COATED ORAL at 08:52

## 2018-04-23 RX ADMIN — FUROSEMIDE 40 MG: 10 INJECTION, SOLUTION INTRAMUSCULAR; INTRAVENOUS at 08:54

## 2018-04-23 RX ADMIN — APIXABAN 2.5 MG: 2.5 TABLET, FILM COATED ORAL at 17:11

## 2018-04-23 RX ADMIN — LORAZEPAM 0.5 MG: 0.5 TABLET ORAL at 21:32

## 2018-04-23 RX ADMIN — APIXABAN 2.5 MG: 2.5 TABLET, FILM COATED ORAL at 08:52

## 2018-04-23 RX ADMIN — CALCITONIN SALMON 1 SPRAY: 200 SPRAY, METERED NASAL at 08:52

## 2018-04-23 RX ADMIN — ASPIRIN 81 MG 81 MG: 81 TABLET ORAL at 08:52

## 2018-04-23 RX ADMIN — METOPROLOL TARTRATE 50 MG: 50 TABLET ORAL at 21:33

## 2018-04-23 RX ADMIN — INSULIN LISPRO 1 UNITS: 100 INJECTION, SOLUTION INTRAVENOUS; SUBCUTANEOUS at 17:12

## 2018-04-23 RX ADMIN — DILTIAZEM HYDROCHLORIDE 30 MG: 30 TABLET, FILM COATED ORAL at 12:02

## 2018-04-23 RX ADMIN — POTASSIUM CHLORIDE 10 MEQ: 750 TABLET, EXTENDED RELEASE ORAL at 08:52

## 2018-04-23 RX ADMIN — PANTOPRAZOLE SODIUM 40 MG: 40 TABLET, DELAYED RELEASE ORAL at 05:55

## 2018-04-23 RX ADMIN — FUROSEMIDE 40 MG: 10 INJECTION, SOLUTION INTRAMUSCULAR; INTRAVENOUS at 17:12

## 2018-04-23 RX ADMIN — INSULIN LISPRO 2 UNITS: 100 INJECTION, SOLUTION INTRAVENOUS; SUBCUTANEOUS at 12:00

## 2018-04-23 RX ADMIN — DILTIAZEM HYDROCHLORIDE 30 MG: 30 TABLET, FILM COATED ORAL at 05:55

## 2018-04-23 RX ADMIN — Medication 1 TABLET: at 21:33

## 2018-04-23 RX ADMIN — DILTIAZEM HYDROCHLORIDE 30 MG: 30 TABLET, FILM COATED ORAL at 17:10

## 2018-04-23 RX ADMIN — METOPROLOL TARTRATE 50 MG: 50 TABLET ORAL at 08:52

## 2018-04-23 RX ADMIN — DILTIAZEM HYDROCHLORIDE 30 MG: 30 TABLET, FILM COATED ORAL at 01:00

## 2018-04-23 RX ADMIN — INSULIN LISPRO 1 UNITS: 100 INJECTION, SOLUTION INTRAVENOUS; SUBCUTANEOUS at 21:33

## 2018-04-23 NOTE — CASE MANAGEMENT
Initial Clinical Review    Admission: Date/Time/Statement: 4/22/18 @ 1000     Orders Placed This Encounter   Procedures    Inpatient Admission (expected length of stay for this patient is greater than two midnights)     Standing Status:   Standing     Number of Occurrences:   1     Order Specific Question:   Admitting Physician     Answer:   Ofe Land [[id number]]     Order Specific Question:   Level of Care     Answer:   Med Surg [16]     Order Specific Question:   Estimated length of stay     Answer:   More than 2 Midnights     Order Specific Question:   Certification     Answer:   I certify that inpatient services are medically necessary for this patient for a duration of greater than two midnights  See H&P and MD Progress Notes for additional information about the patient's course of treatment  ED: Date/Time/Mode of Arrival:   ED Arrival Information     Expected Arrival Acuity Means of Arrival Escorted By Service Admission Type    - 4/22/2018 08:05 [facility] General Medicine Urgent    Arrival Complaint    abdominal pain          Chief Complaint:   Chief Complaint   Patient presents with    Abdominal Pain     Pt brought in by EMS from home  C/O epigastric/abdominal pain  Also started experiencing lower back pain yesterday which began to radiate upwards today  Hasn't had BM in 2 days; took lactulose with no relief  , /103 upon initial assessment; did not take BP meds today  History of Illness: Claudia Mathias is a 80 y o  female who presents with epigastric pain and dyspnea that started last night and has been getting progressively worse  The patient stated that she soak her stool softener and attempted to have a bowel movement after which her dyspnea worsened further  She states that her epigastric pain is nonradiating and was severe and sharp in quality but currently resolved    She initially received IV fluid and the emergency room after which she received a dose of Lasix  She currently denies chest pain or palpitations but states that her shortness of breath persists  She denies nausea and vomiting and up to this point of epigastric pain had been maintaining normal regular oral intake  Overnight, the patient developed orthopnea and was unable to sleep due to breathing difficulty  She reports compliance with her medications  She does not go outside due to history of recurrent falls but has not been falling at home  She has been at her baseline daily activity at home with assistance from her  and family  ED Vital Signs:   ED Triage Vitals [04/22/18 0813]   Temperature Pulse Respirations Blood Pressure SpO2   98 1 °F (36 7 °C) (!) 134 (!) 25 (!) 163/103 97 %      Temp Source Heart Rate Source Patient Position - Orthostatic VS BP Location FiO2 (%)   Oral Monitor Sitting Left arm --      Pain Score       No Pain        Wt Readings from Last 1 Encounters:   04/23/18 52 5 kg (115 lb 11 9 oz)       Vital Signs (abnormal):    above    Abnormal Labs/Diagnostic Test Results:    Troponin   1 13  Bili, direct   0 24  BNP   1,952  CXR:     Interstitial pulmonary edema with small bilateral effusions      ED Treatment:   Medication Administration from 04/22/2018 0804 to 04/22/2018 1123       Date/Time Order Dose Route Action Action by Comments     04/22/2018 0948 sodium chloride 0 9 % bolus 1,000 mL 0 mL Intravenous Stopped Yves Montes De Oca RN      04/22/2018 0843 sodium chloride 0 9 % bolus 1,000 mL 1,000 mL Intravenous New Bag Jessica Iyer, NUVIA      04/22/2018 7155 labetalol (NORMODYNE) injection 10 mg 10 mg Intravenous Not Given Jessica Iyer RN      04/22/2018 0905 metoprolol (LOPRESSOR) injection 5 mg 5 mg Intravenous Given Jessica Iyer RN      04/22/2018 4136 aspirin tablet 325 mg 325 mg Oral Given Jessica Iyer, RN      04/22/2018 1002 metoprolol (LOPRESSOR) injection 5 mg 5 mg Intravenous Given Jessica Iyer RN      04/22/2018 1003 furosemide (LASIX) injection 40 mg 40 mg Intravenous Given Chandra Delacruz RN      04/22/2018 1037 metoprolol tartrate (LOPRESSOR) tablet 50 mg 50 mg Oral Given Chandra Delacruz RN           Past Medical/Surgical History:    Active Ambulatory Problems     Diagnosis Date Noted    Acute on chronic diastolic congestive [facility] failure ([facility] ) 08/03/2017    Aortic stenosis 08/03/2017    Benign essential HTN 08/03/2017    Controlled diabetes mellitus type II without complication ([facility]) 28/72/1379    Atrial fibrillation with rapid ventricular response ([facility] ) 08/03/2017    [facility] 08/03/2017    CAD (coronary artery disease) 08/03/2017    Cognitive impairment 08/28/2017    Depression with anxiety 02/27/2013    Esophageal reflux 09/21/2012    Hyperlipidemia 06/12/2012    Hypertension 09/12/2012    Osteoarthritis 09/21/2012    Squamous cell carcinoma of skin 12/12/2014    Tinnitus 11/24/2015     Resolved Ambulatory Problems     Diagnosis Date Noted    Acute encephalopathy 08/06/2017     Past Medical History:   Diagnosis Date    Acute ischemic right MCA stroke ([facility])     Anticoagulant long-term use     Aortic stenosis     Atrial fibrillation (HCC)     Blurred vision     CHF (congestive heart failure) ([facility])     Coronary artery disease     Dementia     Diabetes mellitus ([facility] )     Dysuria     Hyperlipidemia     Hypertension     Nonrheumatic aortic (valve) insufficiency     Old myocardial infarction     Rectal carcinoma (HCC)        Admitting Diagnosis: Atrial flutter ([facility]) [I48 92]  Epigastric pain [R10 13]  Abdominal pain [R10 9]  Atrial fibrillation with rapid ventricular response ([facility] ) [I48 91]    Age/Sex: 80 y o  female    Assessment/Plan:      * Atrial fibrillation with rapid ventricular response ([facility])   Assessment & Plan     Status post IV metoprolol in the ED with improvement in heart rate, patient given oral metoprolol, if heart rate remains uncontrolled will add oral Cardizem per Cardiology recommendations  Cardiology input appreciated  Monitor on telemetry, trend cardiac enzymes  IV Lasix due to exacerbation of heart failure  Patient is not on home anticoagulation due to recurrent falls  Placed on Eliquis during hospitalization to also cover DVT prophylaxis, heparin allergies          Type 2 myocardial infarction [facility])   Assessment & Plan     Troponin is elevated at 1 13 most likely secondary to rapid AFib and acute diastolic CHF  Patient is admitted to med surg with telemetry  Cardiology consulted, recommendations appreciated  Will continue to trend troponin  Continue with IV Lasix, oral metoprolol for rate control with potential addition of oral Cardizem, per Cardiology recommendations  Monitor daily weights, intake and output  Continue home cardiac medications        Acute on chronic diastolic congestive heart failure (HCC)   Assessment & Plan     Lasix 40 mg IV given x1, will given additional dose at 3:00 p m  and continue with b i d  dosing for now  Carefully monitor daily weights, intake and output  Metoprolol for rate control          Epigastric pain   Assessment & Plan     Current resolved  Was possibly related to uncontrolled AFib and decompensated CHF versus GERD  Continue to treat cardiac issues  PPI and Mylanta          Depression with anxiety   Assessment & Plan     Continue Ativan at bedtime        CAD (coronary artery disease)   Assessment & Plan     Resume home medications  Metoprolol increased per Cardiology recommendations  Monitor on telemetry, trend troponin  Nonspecific EKG changes are most likely due to uncontrolled AFib             Controlled diabetes mellitus type II without complication ([facility])   Assessment & Plan     Last hemoglobin A1c 7 2, 9 months ago  Will recheck hemoglobin A1c, Accu-Cheks and sliding scale at this time  Diabetic diet           Benign essential HTN   Assessment & Plan     Stable, continue home medications with increased dose of metoprolol          Aortic stenosis   Assessment & Plan     Moderate aortic stenosis per echo December 2017  Cardiology a consulted      Anticipated Length of Stay:  Patient will be admitted on an Inpatient basis with an anticipated length of stay of more than 2 midnights     Justification for Hospital Stay:  Need for close monitoring, IV Lasix, frequent labs        Admission Orders:   IP    4/22 @   1000  Scheduled Meds:   Current Facility-Administered Medications:  acetaminophen 650 mg Oral Q6H PRN Jose Juan Mathews MD   aluminum-magnesium hydroxide-simethicone 30 mL Oral Q4H PRN Jose Juan Mathews MD   apixaban 2 5 mg Oral BID Jose Juan Mathews MD   aspirin 81 mg Oral Daily Jose Juan Mathews MD   calcitonin (salmon) 1 spray Alternating Nares Daily Negra Tenorio MD   diltiazem 30 mg Oral Q6H Sully Gonzalez MD   furosemide 40 mg Intravenous BID (diuretic) Jose Juan Mathews MD   insulin lispro 1-5 Units Subcutaneous TID AC Negra Tenorio MD   insulin lispro 1-5 Units Subcutaneous HS Jose Juan Mathews MD   lactulose 10 g Oral Daily PRN Jose Juan Mathews MD   LORazepam 0 5 mg Oral HS Jose Juan Mathews MD   losartan 25 mg Oral Daily Negra Tenorio MD   metoprolol tartrate 50 mg Oral Q12H [address] Negra Tenorio MD   ondansetron 4 mg Intravenous Q6H PRN Jose Juan Mathews MD   pantoprazole 40 mg Oral Early Morning Jose Juan Mathews MD   potassium chloride 10 mEq Oral Daily Jose Juan Mathews MD   senna-docusate sodium 1 tablet Oral HS Negra Tenorio MD     Continuous Infusions:    PRN Meds:   acetaminophen    aluminum-magnesium hydroxide-simethicone    lactulose    ondansetron     Tele  MI  Teaching  Cardiac  Diet  Cons cardiology  Serial troponin  PT/OT

## 2018-04-23 NOTE — PHYSICAL THERAPY NOTE
PT EVALUATION  Time-In:  Time-Out:  Total Time:    80 y o     [id number]    Atrial flutter ([facility] ) [I48 92]  Epigastric pain [R10 13]  Abdominal pain [R10 9]  Atrial fibrillation with rapid ventricular response (HCC) [I48 91]    Past Medical History:   Diagnosis Date    Acute ischemic right MCA stroke ([facility] )     Anticoagulant long-term use     last assessed: 8/17/17    Aortic stenosis     Atrial fibrillation ([place] [address] )     Blurred vision     last assessed: 10/25/13    CHF (congestive heart failure) ([place] [address] )     Coronary artery disease     Dementia     Diabetes mellitus ([place] [address] )     Dysuria     last assessed: 8/3/15    Hyperlipidemia     Hypertension     Nonrheumatic aortic (valve) insufficiency     Old myocardial infarction     Rectal carcinoma ([place] [address] )          Past Surgical History:   Procedure Laterality Date    BALLOON ANGIOPLASTY, ARTERY      CORONARY ARTERY BYPASS GRAFT      RECTAL SURGERY        04/23/18 0822   Note Type   Note type Eval only   Pain Assessment   Pain Location Rib cage   Pain Orientation Left   Pain Descriptors Sharp  (pinching)   Pain Onset (intermittent onset)   Hospital Pain Intervention(s) Rest;Repositioned; Ambulation/increased activity; Emotional support   Response to Interventions tolerated PT   Home Living   Type of Donovan to enter with rails; Two level; Laundry in basement  (3 MURPHY; 1 FOS to basement;  does the laundry)   Bathroom Toilet Standard   Bathroom Equipment Grab bars around toilet;Grab bars in [facility]  (RW)   Additional Comments 2 steps down into living room   Prior Function   Level of [place] Independent with ADLs and functional mobility; Needs assistance with IADLs   Lives With Spouse   Receives Help From Family; Neighbor   ADL Assistance Independent   IADLs Needs assistance   Falls in the last 6 months 0  (fell in yard, neighbor helped her up)   Comments At baseline patient ambulates w/ RW   No falls inside just 1 fall out in yard   assists with laundry and transportation   Restrictions/Precautions   Weight Bearing Precautions Per Order No   Other Precautions Fall Risk;Cognitive;Telemetry;O2;Multiple lines; Chair Alarm   General   Family/Caregiver Present No   Cognition   Overall Cognitive Status Impaired   Arousal/Participation Alert   Orientation Level Oriented to person;Oriented to place;Oriented to time;Disoriented to situation   Memory Decreased short term memory;Decreased recall of precautions;Decreased recall of recent events   Following Commands Follows one step commands without difficulty   RUE Assessment   RUE Assessment WFL  (see OT eval for details)   LUE Assessment   LUE Assessment WFL  (see OT eval for details)   RLE Assessment   RLE Assessment WFL   Strength RLE   RLE Overall Strength 4/5   LLE Assessment   LLE Assessment WFL   Strength LLE   LLE Overall Strength 4/5   Coordination   Movements are Fluid and Coordinated 1   Sensation WFL   Light Touch   RLE Light Touch Grossly intact   LLE Light Touch Grossly intact   Sharp/Dull   RLE Sharp/Dull Not tested   LLE Sharp/Dull Not tested   Proprioception   RLE Proprioception Grossly intact   LLE Proprioception Grossly Intact   Bed Mobility   Supine to Sit 5  Supervision   Additional items Assist x 1; Increased time required; Bedrails;Verbal cues   Sit to Supine Unable to assess   Additional Comments Pt received supine in bed and end of PT session left sitting in bedside chair  Transfers   Sit to Stand 4  Minimal assistance   Additional items Assist x 1; Armrests; Bedrails; Increased time required;Verbal cues   Stand to Sit 4  Minimal assistance   Additional items Assist x 1; Increased time required;Verbal cues;Armrests   Additional Comments verbal cueing for hand placement  transfers w/ use of RW   Ambulation/Elevation   Gait pattern Forward Flexion; Shuffling; Short stride;Decreased foot clearance  (slow oxana)   Gait Assistance 5  Supervision   Additional items Assist x 1;Verbal cues   Assistive Device Rolling walker   Distance 12 feetx1   Stair Management Assistance Not tested   Balance   Static Sitting Fair +   Dynamic Sitting Fair   Static Standing Fair   Dynamic Standing Fair   Ambulatory Fair   Endurance Deficit   Endurance Deficit Yes   Endurance Deficit Description fatigue, SOB   Activity Tolerance   Activity Tolerance Patient limited by fatigue   Nurse Barbara Leon RN   Assessment   Prognosis Good   Problem List Decreased strength;Decreased endurance; Impaired balance;Decreased mobility; Decreased safety awareness;Decreased cognition   Assessment Pt is 79 yo female admitted from home w/  for atrial fib w/ rapid ventricular response  Pt presenting to hospital w/ c/o epigatric pain  At baseline pt lives w/ , (I) w/ functional mobility w/ RW, (I) ADLS, assisted IADLs, 3 MURPHY w/ rail, h/o 1 recent fall outside  Pt demonstrates supine>sit S, sit<>stand Sukumar w/ RW, gait training 12 feet x1 w/ RW S, BLE 4/5  Pt needing cueing for safe technique of mobility and general safety  Pt would benefit from continued skilled PT for deficits in endurance, strength, balance, functional mobility, and gait/stairs  PT recommend d/c home w/ family support and continued PT  Barriers to Discharge None   Goals   Patient Goals "to go home"   STG Expiration Date 05/03/18   Short Term Goal #1 In 10 days pt will demonstrate: mod (I) bed mobility, transfers mod (I), gait training 150 feet mod (I) w/ RW, steps mod (I) w/ railings and least restrictive AD, improve balance by 1 grade to decrease fall risk, increase MMT by 1/2 grade for strengthening, pt and family education on PT POC and discharge from PT perspective   Plan   Treatment/Interventions Functional transfer training;LE strengthening/ROM; Therapeutic exercise; Endurance training;Cognitive reorientation;Patient/family training;Bed mobility;Gait training;Spoke to nursing;Family   PT Frequency (4-5x/week) Recommendation   Recommendation Home with family support  (continued PT)   Equipment Recommended Walker   PT - OK to Discharge No   Additional Comments would benefit from progressing more towards IP PT goals to optimize safety for home discharge   Modified [place] Scale   Modified Tad Scale 3   Barthel Index   Feeding 10   Bathing 0   Grooming Score 5   Dressing Score 5   Bladder Score 5   Bowels Score 10   Toilet Use Score 5   Transfers (Bed/Chair) Score 10   Mobility (Level Surface) Score 0   Stairs Score 0   Barthel Index Score 50   History: co - morbidities, fall risk, use of assistive device, assist for Iadl's, cognition, multiple lines  Exam: impairments in locomotion, musculoskeletal, balance,posture, cognition, functional mobility, functional endurance, functional strength, Barthel 50, safety awareness w/ mobility  Clinical: unstable/unpredictable; continuous telemetry, cognition, fall risk, use of Ad, O2  Complexity:high    Marin Ordonez, PT ,DPT

## 2018-04-23 NOTE — PLAN OF CARE
Problem: OCCUPATIONAL THERAPY ADULT  Goal: Performs self-care activities at highest level of function for planned discharge setting  See evaluation for individualized goals  Treatment Interventions: ADL retraining, Functional transfer training, UE strengthening/ROM, Endurance training, Cognitive reorientation, Patient/family training, Compensatory technique education, Equipment evaluation/education, Continued evaluation, Energy conservation, Activityengagement          See flowsheet documentation for full assessment, interventions and recommendations  Outcome: Progressing  Limitation: Decreased ADL status, Decreased cognition, Decreased endurance, Decreased self-care trans, Decreased high-level ADLs  Prognosis: Fair  Assessment: Pt is a 80 y o  female seen for OT evaluation s/p adm to [facility] [address] on 4/22/2018 w/ epigastric pain and dyspnea and dx'd w/ Atrial fibrillation w/ rapid ventricular response  Comorbidities affecting pts functional performance include a significant PMH of Acute ishmeimc right MCA stroke, aortic stenosis, A-fib, blurred vision, CHF, CAD, Dementia, DM, Dysuria, HLD, HTN, and nonrheumatic aortic insufficiency  Pt with active OT orders and activity orders for Up with assistance  Pt lives with spouse in a two level home w/ 3 MURPHY and full flight of stairs to laundry in basement (pt reports spouse does laundry-she does not utilize basement)  Pt reports spouse is home during the day/night to assist as needed and local family members, therefore pt (-) home alone  At baseline, pt was I w/ ADLs and functional mobility/transfers w/ use of RW, required assist w/ IADLs, (-) , and reports 1 fall PTA   Upon evaluation, pt currently requires Min A-Supervision for overall ADLs and Min A-Supervision for functional mobility/transfers 2* the following deficits impacting occupational performance: weakness, decreased strength, decreased balance, decreased tolerance, decreased safety awareness and increased pain  These impairments, as well at pts steps to enter environment, difficulty performing ADLS and limited insight into deficits limit pts ability to safely engage in all baseline areas of occupation, including grooming, bathing, dressing, toileting, functional mobility/transfers, community mobility, social participation  and leisure activities   Pt scored overall 50/100 on the Barthel Index  Based on the aforementioned OT evaluation, functional performance deficits, and assessments, pt has been identified as a moderate complexity evaluation  Pt to continue to benefit from continued acute OT services during hospital stay to address defined deficits and to maximize level of functional independence in the following Occupational Performance areas: grooming, bathing/shower, toilet hygiene, dressing, socialization, health maintenance, functional mobility, community mobility, clothing management, social participation and transfers to common household surfaces  From OT standpoint, recommend home w/ family support upon D/C   OT will continue to follow pt 3-5x/wk to address the following goals to  w/in 7-10 days:     OT Discharge Recommendation: Home with family support  OT - OK to Discharge: Yes (when medically cleared)

## 2018-04-23 NOTE — PROGRESS NOTES
[facility] Internal Medicine Progress Note  Patient: Charity Smith 80 y o  female   MRN: [id number]  PCP: Amelie De La Garza MD  Unit/Bed#: E4 -52 Encounter: [id number]  Date Of Visit: 04/23/18    Assessment:    Principal Problem:    Atrial fibrillation with rapid ventricular response (HCC)  Active Problems:    Acute on chronic diastolic congestive heart failure (HCC)    Aortic stenosis    Benign essential HTN    Controlled diabetes mellitus type II without complication ([facility] )    CAD (coronary artery disease)    Depression with anxiety    Esophageal reflux    Type 2 myocardial infarction ([facility] )    Epigastric pain      Plan: This is a 80-year-old female with history of atrial fibrillation on Eliquis, CHF, CAD, diabetes mellitus, hyperlipidemia, hypertension, who presented with epigastric pain and dyspnea  Patient was found to have atrial fibrillation with rapid ventricular rate  Patient received IV fluid in the ED after which she became very dyspneic and likely went into CHF exacerbation  Patient was treated with Lasix and has improved      1 )  Atrial fibrillation with rapid ventricular rate  -continue with diltiazem for rate control, liquids for anticoagulation    2 )  NSTEMI type 2 likely secondary to problem 1  -peaked at 1 1 6  -chest pain-free  -continue with aspirin, beta-blocker    3 )  Acute on chronic combined systolic and diastolic heart failure  -seems improved on diuretics, continue with Lasix  -cardiology input appreciated    4 )  Acute hypoxic respiratory failure secondary to problem 3   -continue with diuretics and will wean oxygen    5 )  Hypertension  -continue with beta-blocker and calcium channel blocker, monitor blood pressure    6 )  Moderate aortic stenosis    7 )  Diabetes mellitus  -monitor Accu-Cheks, sliding scale for coverage      VTE Pharmacologic Prophylaxis:   Pharmacologic:  Eliquis    Mechanical VTE Prophylaxis in Place:  SCD  Patient Centered Rounds: I have performed bedside rounds with nursing staff today  Time Spent for Care: 20 minutes  More than 50% of total time spent on counseling and coordination of care as described above  Current Length of Stay: 1 day(s)    Current Patient Status: Inpatient   Certification Statement: The patient will continue to require additional inpatient hospital stay due to Atrial fibrillation with rapid ventricular rate, hypoxia    Discharge Plan / Estimated Discharge Date: 2-3 days    Code Status: Level 1 - Full Code      Subjective:   Patient seen and examined at bedside, currently denies any complaints  Denies any chest pain or dyspnea  Objective:     Vitals:   Temp (24hrs), Av 9 °F (36 1 °C), Min:96 1 °F (35 6 °C), Max:97 8 °F (36 6 °C)    HR:  [] 71  Resp:  [16-22] 18  BP: (137-170)/(71-93) 137/71  SpO2:  [94 %-98 %] 98 %  Body mass index is 21 17 kg/m²  Input and Output Summary (last 24 hours): Intake/Output Summary (Last 24 hours) at 18 1252  Last data filed at 18 0600   Gross per 24 hour   Intake              120 ml   Output             1555 ml   Net            -1435 ml       Physical Exam:    Constitutional: Patient is oriented to person, place and time, no acute distress  HEENT:  Normocephalic, atraumatic, EOMI, PERRLA, no scleral icterus, no pallor, moist oral mucosa  Neck:  Supple, no masses, no thyromegaly, no bruits Normal range of motion  Lymph nodes:  No lymphadenopathy  Cardiovascular: Normal S1S2, irregular, No murmurs/rubs/gallops appreciated  Pulmonary: Clear to auscultation bilaterally, No rhonchi/rales/wheezing appreciated  Abdominal: Soft, Bowel sounds present, Non-tender, Non-distended, No rebound/guarding, no hepatomegaly   Musculoskeletal: No tenderness/abnormality   Extremities:  No cyanosis, clubbing or edema  Peripheral pulses palpable and equal bilaterally  Neurological: Cranial nerves II-XII grossly intact, sensation intact, otherwise no focal neurological symptoms     Skin: Skin is warm and dry, no rashes  Additional Data:     Labs:      Results from last 7 days  Lab Units 04/23/18  0606 04/22/18  0834   WBC Thousand/uL 5 42 5 38   HEMOGLOBIN g/dL 13 8 12 7   HEMATOCRIT % 41 8 37 6   PLATELETS Thousands/uL 157 160   NEUTROS PCT %  --  76*   LYMPHS PCT %  --  16   MONOS PCT %  --  6   EOS PCT %  --  2       Results from last 7 days  Lab Units 04/23/18  0606   SODIUM mmol/L 145   POTASSIUM mmol/L 3 7   CHLORIDE mmol/L 106   CO2 mmol/L 27   BUN mg/dL 24   CREATININE mg/dL 0 93   CALCIUM mg/dL 9 3   TOTAL PROTEIN g/dL 7 3   BILIRUBIN TOTAL mg/dL 1 09*   ALK PHOS U/L 98   ALT U/L 30   AST U/L 36   GLUCOSE RANDOM mg/dL 113       Results from last 7 days  Lab Units 04/22/18  0943   INR  1 05        I Have Reviewed All Lab Data Listed Above        Imaging:    Imaging Reports Reviewed Today Include: no new images      Recent Cultures (last 7 days):           Last 24 Hours Medication List:     Current Facility-Administered Medications:  acetaminophen 650 mg Oral Q6H PRN Calos Shah MD   aluminum-magnesium hydroxide-simethicone 30 mL Oral Q4H PRN Calos Shah MD   apixaban 2 5 mg Oral BID Calos Shah MD   aspirin 81 mg Oral Daily Negra Tenorio MD   calcitonin (salmon) 1 spray Alternating Nares Daily Negra Tenorio MD   diltiazem 30 mg Oral Q6H [address] Negra Tenorio MD   furosemide 40 mg Intravenous BID (diuretic) Calos Shah MD   insulin lispro 1-5 Units Subcutaneous TID AC Negra Tenorio MD   insulin lispro 1-5 Units Subcutaneous HS Negra Tenorio MD   lactulose 10 g Oral Daily PRN Calos Shah MD   LORazepam 0 5 mg Oral HS Calos Shah MD   losartan 25 mg Oral Daily Negra Tenorio MD   metoprolol tartrate 50 mg Oral Q12H [address] Negra Tenorio MD   ondansetron 4 mg Intravenous Q6H PRN Calos Shah MD   pantoprazole 40 mg Oral Early Morning Calos Shah MD   potassium chloride 10 mEq Oral Daily Calos Shah MD   senna-docusate sodium 1 tablet Oral HS Calos Shah MD        Today, Patient Was Seen By: Margie Wheatley MD

## 2018-04-23 NOTE — TELEPHONE ENCOUNTER
FYI daughter just wanted me to let Dr Estrada Massed know pt was admitted into the hospital yesterday

## 2018-04-23 NOTE — PLAN OF CARE
Problem: PHYSICAL THERAPY ADULT  Goal: Performs mobility at highest level of function for planned discharge setting  See evaluation for individualized goals  Treatment/Interventions: Functional transfer training, LE strengthening/ROM, Therapeutic exercise, Endurance training, Cognitive reorientation, Patient/family training, Bed mobility, Gait training, Spoke to nursing, Family  Equipment Recommended: Arik Mu       See flowsheet documentation for full assessment, interventions and recommendations  Prognosis: Good  Problem List: Decreased strength, Decreased endurance, Impaired balance, Decreased mobility, Decreased safety awareness, Decreased cognition  Assessment: Pt is 79 yo female admitted from home w/  for atrial fib w/ rapid ventricular response  Pt presenting to hospital w/ c/o epigatric pain  At baseline pt lives w/ , (I) w/ functional mobility w/ RW, (I) ADLS, assisted IADLs, 3 MURPHY w/ rail, h/o 1 recent fall outside  Pt demonstrates supine>sit S, sit<>stand Sukumar w/ RW, gait training 12 feet x1 w/ RW S, BLE 4/5  Pt needing cueing for safe technique of mobility and general safety  Pt would benefit from continued skilled PT for deficits in endurance, strength, balance, functional mobility, and gait/stairs  PT recommend d/c home w/ family support and continued PT  Barriers to Discharge: None     Recommendation: Home with family support (continued PT)     PT - OK to Discharge: No    See flowsheet documentation for full assessment

## 2018-04-23 NOTE — PLAN OF CARE
Problem: DISCHARGE PLANNING - CARE MANAGEMENT  Goal: Discharge to post-acute care or home with appropriate resources  INTERVENTIONS:  - Conduct assessment to determine patient/family and health care team treatment goals, and need for post-acute services based on payer coverage, community resources, and patient preferences, and barriers to discharge  - Address psychosocial, clinical, and financial barriers to discharge as identified in assessment in conjunction with the patient/family and health care team  - Arrange appropriate level of post-acute services according to patients   needs and preference and payer coverage in collaboration with the physician and health care team  - Communicate with and update the patient/family, physician, and health care team regarding progress on the discharge plan  - Arrange appropriate transportation to post-acute venues  Outcome: Progressing  Son would like [facility] for RN & PT when pt is discharge  Referral made to [facility] and will f/u if they have accepted pt

## 2018-04-23 NOTE — PROGRESS NOTES
Progress Note - Cardiology   Shagufta Erickson 80 y o  female MRN: [id number]  Unit/Bed#: E4 -01 Encounter: [id number]      Assessment:     1  Acute diastolic heart failure  2  Atrial fibrillation then atrial flutter now appears sinus  3  Coronary artery disease status post coronary artery bypass grafting  4  Moderate aortic stenosis/mitral regurgitation/aortic regurgitation  5  Troponin increase likely type 2 in the setting of heart failure and underlying CAD-mild anteroapical ischemia from stress test in August of 2017  6  11 beats of nonsustained ventricular tachycardia    Discussion/Recommendations:    Would continue diuresis and watching renal function  Wean oxygen if able  Novel anticoagulant as outpatient hopefully  Subjective:  Feels much better      Physical Exam:  GEN:  NAD  HEENT:  MMM, NCAT, pink conjunctiva, EOMI, nonicteric sclera  CV:  NO JVD/HJR, RR, NO M/R/G, +S1/S2, NO PARASTERNAL HEAVE/THRILL, NO LE EDEMA, NO HEPATIC SYSTOLIC PULSATION, WARM EXTREMITIES  RESP:  CTAB/L  ABD:  SOFT, NT, NO GROSS ORGANOMEGALY        Vitals:   /90 (BP Location: Left arm)   Pulse 98   Temp (!) 96 8 °F (36 °C) (Tympanic)   Resp 18   Ht 5' 2" (1 575 m)   Wt 52 5 kg (115 lb 11 9 oz)   SpO2 98%   BMI 21 17 kg/m²   Vitals:    04/22/18 1146 04/23/18 0600   Weight: 53 6 kg (118 lb 2 7 oz) 52 5 kg (115 lb 11 9 oz)       Intake/Output Summary (Last 24 hours) at 04/23/18 0856  Last data filed at 04/23/18 0600   Gross per 24 hour   Intake              920 ml   Output             1555 ml   Net             -635 ml         TELEMETRY:  Probable flutter and then sinus - NSVT    Lab Results:    Results from last 7 days  Lab Units 04/23/18  0606   WBC Thousand/uL 5 42   HEMOGLOBIN g/dL 13 8   HEMATOCRIT % 41 8   PLATELETS Thousands/uL 157       Results from last 7 days  Lab Units 04/23/18  0606   SODIUM mmol/L 145   POTASSIUM mmol/L 3 7   CHLORIDE mmol/L 106   CO2 mmol/L 27   BUN mg/dL 24   CREATININE mg/dL 0 93   CALCIUM mg/dL 9 3   TOTAL PROTEIN g/dL 7 3   BILIRUBIN TOTAL mg/dL 1 09*   ALK PHOS U/L 98   ALT U/L 30   AST U/L 36   GLUCOSE RANDOM mg/dL 113       Results from last 7 days  Lab Units 04/23/18  0606   SODIUM mmol/L 145   POTASSIUM mmol/L 3 7   CHLORIDE mmol/L 106   CO2 mmol/L 27   BUN mg/dL 24   CREATININE mg/dL 0 93   GLUCOSE RANDOM mg/dL 113   CALCIUM mg/dL 9 3       Results from last 7 days  Lab Units 04/23/18  0606   CHOLESTEROL mg/dL 235*         Medications:    Current Facility-Administered Medications:     acetaminophen (TYLENOL) tablet 650 mg, 650 mg, Oral, Q6H PRN, Forrest Aquino MD    aluminum-magnesium hydroxide-simethicone (MYLANTA) 200-200-20 mg/5 mL oral suspension 30 mL, 30 mL, Oral, Q4H PRN, Forrest Aquino MD    apixaban (ELIQUIS) tablet 2 5 mg, 2 5 mg, Oral, BID, Forrest Aquino MD, 2 5 mg at 04/23/18 0630    aspirin chewable tablet 81 mg, 81 mg, Oral, Daily, Forrest Aquino MD, 81 mg at 04/23/18 0852    calcitonin (salmon) (MIACALCIN) 200 units/act nasal spray 1 spray, 1 spray, Alternating Nares, Daily, Forrest Aquino MD, 1 spray at 04/23/18 0852    diltiazem (CARDIZEM) tablet 30 mg, 30 mg, Oral, Q6H [address], Forrest Aquino MD, 30 mg at 04/23/18 0555    furosemide (LASIX) injection 40 mg, 40 mg, Intravenous, BID (diuretic), Forrest Aquino MD    insulin lispro (HumaLOG) 100 units/mL subcutaneous injection 1-5 Units, 1-5 Units, Subcutaneous, TID AC, 1 Units at 04/22/18 7414 **AND** Fingerstick Glucose (POCT), , , TID AC, Negra Tenorio MD    insulin lispro (HumaLOG) 100 units/mL subcutaneous injection 1-5 Units, 1-5 Units, Subcutaneous, HS, Negra Tenorio MD, 1 Units at 04/22/18 2120    lactulose 20 g/30 mL oral solution 10 g, 10 g, Oral, Daily PRN, Forrest Aquino MD    LORazepam (ATIVAN) tablet 0 5 mg, 0 5 mg, Oral, HS, Negra Tenorio MD, 0 5 mg at 04/22/18 2109    losartan (COZAAR) tablet 25 mg, 25 mg, Oral, Daily, Forrest Aquino MD, 25 mg at 04/23/18 0882    metoprolol tartrate (LOPRESSOR) tablet 50 mg, 50 mg, Oral, Q12H [address], Negra Tenorio MD, 50 mg at 04/23/18 0852    ondansetron (ZOFRAN) injection 4 mg, 4 mg, Intravenous, Q6H PRN, Truong Jamison MD    pantoprazole (PROTONIX) EC tablet 40 mg, 40 mg, Oral, Early Morning, Negra Tenorio MD, 40 mg at 04/23/18 0555    potassium chloride (K-DUR,KLOR-CON) CR tablet 10 mEq, 10 mEq, Oral, Daily, Truong Jamison MD, 10 mEq at 04/23/18 5517    senna-docusate sodium (SENOKOT S) 8 6-50 mg per tablet 1 tablet, 1 tablet, Oral, HS, Truong Jamison MD, 1 tablet at 04/22/18 2111    Portions of the record may have been created with voice recognition software  Occasional wrong word or "sound a like" substitutions may have occurred due to the inherent limitations of voice recognition software  Read the chart carefully and recognize, using context, where substitutions have occurred

## 2018-04-23 NOTE — SOCIAL WORK
TC to son to complete assessment  PCP is Dr Sylvia Huntley  Pt lives with her spouse in a ranch house with 1 step to enter  Pt was independent with ADLs, and amb with SPC  DME:  SPC, walker and shower chair  Pt had no prior VNA  [facility]  Pt has hx of dementia, depression with anxiety  Pt 's son will transport home   is arun Garcia at [phone]  Son would like VNA for RN & PT when pt is discharge  Referral made to [facility] and will f/u if they have accepted pt

## 2018-04-23 NOTE — OCCUPATIONAL THERAPY NOTE
[address] Evaluation     Patient Name: Usama Wilhelm  GYRSX'N Date: 4/23/2018  Problem List  Patient Active Problem List   Diagnosis    Acute on chronic diastolic congestive heart failure (HCC)    Aortic stenosis    Benign essential HTN    Controlled diabetes mellitus type II without complication (HCC)    Atrial fibrillation with rapid ventricular response (HCC)    Acute ischemic right MCA stroke ([address] )    CAD (coronary artery disease)    Cognitive impairment    Depression with anxiety    Esophageal reflux    Hyperlipidemia    Hypertension    Osteoarthritis    Squamous cell carcinoma of skin    Tinnitus    Type 2 myocardial infarction ([facility] [address] )    Epigastric pain     Past Medical History  Past Medical History:   Diagnosis Date    Acute ischemic right MCA stroke ([facility] )     Anticoagulant long-term use     last assessed: 8/17/17    Aortic stenosis     Atrial fibrillation ([facility] )     Blurred vision     last assessed: 10/25/13    CHF (congestive heart failure) ([facility] [facility] )     Coronary artery disease     Dementia     Diabetes mellitus ([facility] [address] )     Dysuria     last assessed: 8/3/15    Hyperlipidemia     Hypertension     Nonrheumatic aortic (valve) insufficiency     Old myocardial infarction     Rectal carcinoma (HCC)      Past Surgical History  Past Surgical History:   Procedure Laterality Date    BALLOON ANGIOPLASTY, ARTERY      CORONARY ARTERY BYPASS GRAFT      RECTAL SURGERY           04/23/18 0827   Note Type   Note type Eval/Treat   Restrictions/Precautions   Weight Bearing Precautions Per Order No   Other Precautions Cognitive; Chair Alarm;Multiple lines;Telemetry;O2;Fall Risk   Pain Assessment   Pain Assessment 0-10   Pain Score 6   Pain Location Rib cage   Pain Orientation Left   Pain Descriptors Sharp   Pain Frequency Intermittent   Effect of Pain on Daily Activities No report of increased pain w/ activity   Hospital Pain Intervention(s) Repositioned; Ambulation/increased activity; Emotional support   Response to Interventions Tolerated   Home Living   Type of [place] to enter with rails; Two level; Laundry in basement  (3 MURPHY; 1 FOS to basement, however pt does not go to basement)   Bathroom Shower/Tub Tub/shower unit   Bathroom Toilet Standard   Bathroom Equipment Grab bars in shower;Grab bars around toilet   [facility]   Additional Comments Pt lives with spouse in a two level home w/ 3 MURPHY and full flight of stairs to laundry in basement (pt reports spouse does laundry-she does not utilize basement)  Pt reports spouse is home during the day/night to assist as needed and local family members, therefore pt (-) home alone  Prior Function   Level of [place] Independent with ADLs and functional mobility; Needs assistance with IADLs   Lives With Spouse   Receives Help From Family; Neighbor   ADL Assistance Independent   IADLs Needs assistance   Falls in the last 6 months 1 to 4  (1 per pt report)   Vocational Retired   Comments At baseline, pt was I w/ ADLs and functional mobility/transfers w/ use of RW, required assist w/ IADLs, (-) , and reports 1 fall PTA  Lifestyle   Autonomy At baseline, pt was I w/ ADLs and functional mobility/transfers w/ use of RW, required assist w/ IADLs, (-) , and reports 1 fall PTA  Reciprocal Relationships Lives w/ spouse; Supportive family   Service to Others Retired   Intrinsic Gratification Watching TV   Psychosocial   Psychosocial (WDL) WDL   ADL   Eating Assistance 6  Modified independent   [facility] 5  [address] [facility] 5  [address] [facility] 5  [address]  5  Supervision/Setup    [facility] 4  [address]  5  Supervision/Setup   Bed Mobility   Supine to Sit 5  Supervision   Additional items Assist x 1;Bedrails; Increased time required;Verbal cues   Sit to Supine Unable to assess  (Pt seated OOB in chair at end of session)   Additional Comments Pt seated OOB in chair at end of session w/ chair alarm activated  Call bell and phone within reach  All needs met and pt reports no further questions for OT at this time   Transfers   Sit to Stand 4  Minimal assistance   Additional items Assist x 1; Armrests; Increased time required;Verbal cues   Stand to Sit 4  Minimal assistance   Additional items Assist x 1; Armrests; Increased time required;Verbal cues   Toilet transfer 4  Minimal assistance   Additional items Assist x 1; Increased time required;Verbal cues;Standard toilet  (use of grab bar)   Additional Comments Cues for safety and placement of hands during transfers   Functional Mobility   Functional Mobility 5  Supervision   Additional Comments Assist x1   Additional items Rolling walker   Balance   Static Sitting Fair +   Dynamic Sitting Fair   Static Standing Fair   Dynamic Standing Fair   Ambulatory Fair   Activity Tolerance   Activity Tolerance Patient limited by fatigue   Medical Staff Made Aware Pt able to be seen per RN Milus Retort   Nurse Made Aware yes   RUE Assessment   RUE Assessment WFL   RUE Strength   RUE Overall Strength Within Functional Limits - able to perform ADL tasks with strength  (3+/5)   LUE Assessment   LUE Assessment WFL   LUE Strength   LUE Overall Strength Within Functional Limits - able to perform ADL tasks with strength  (3+/5)   Hand Function   Gross Motor Coordination Functional   Fine Motor Coordination Functional   Sensation   Light Touch No apparent deficits   Sharp/Dull No apparent deficits   Proprioception   Proprioception No apparent deficits   Vision - Complex Assessment   Ocular Range of Motion [facility] Able to read clock/calendar on wall without difficulty   Additional Comments hx of blurred vision, however pt able to accurately read clock on wall   Cognition   Overall Cognitive Status Impaired Arousal/Participation Alert; Cooperative   Attention Within functional limits   Orientation Level Oriented to person;Oriented to place; Disoriented to situation;Oriented to time   Memory Decreased short term memory;Decreased recall of recent events;Decreased recall of precautions   Following Commands Follows one step commands without difficulty   Assessment   Limitation Decreased ADL status; Decreased cognition;Decreased endurance;Decreased self-care trans;Decreased high-level ADLs   Prognosis Fair   Assessment Pt is a 80 y o  female seen for OT evaluation s/p adm to [facility] on 4/22/2018 w/ epigastric pain and dyspnea and dx'd w/ Atrial fibrillation w/ rapid ventricular response  Comorbidities affecting pts functional performance include a significant PMH of Acute ishmeimc right MCA stroke, aortic stenosis, A-fib, blurred vision, CHF, CAD, Dementia, DM, Dysuria, HLD, HTN, and nonrheumatic aortic insufficiency  Pt with active OT orders and activity orders for Up with assistance  Pt lives with spouse in a two level home w/ 3 MURPHY and full flight of stairs to laundry in basement (pt reports spouse does laundry-she does not utilize basement)  Pt reports spouse is home during the day/night to assist as needed and local family members, therefore pt (-) home alone  At baseline, pt was I w/ ADLs and functional mobility/transfers w/ use of RW, required assist w/ IADLs, (-) , and reports 1 fall PTA  Upon evaluation, pt currently requires Min A-Supervision for overall ADLs and Min A-Supervision for functional mobility/transfers 2* the following deficits impacting occupational performance: weakness, decreased strength, decreased balance, decreased tolerance, decreased safety awareness and increased pain   These impairments, as well at pts steps to enter environment, difficulty performing ADLS and limited insight into deficits limit pts ability to safely engage in all baseline areas of occupation, including grooming, bathing, dressing, toileting, functional mobility/transfers, community mobility, social participation  and leisure activities   Pt scored overall 50/100 on the Barthel Index  Based on the aforementioned OT evaluation, functional performance deficits, and assessments, pt has been identified as a moderate complexity evaluation  Pt to continue to benefit from continued acute OT services during hospital stay to address defined deficits and to maximize level of functional independence in the following Occupational Performance areas: grooming, bathing/shower, toilet hygiene, dressing, socialization, health maintenance, functional mobility, community mobility, clothing management, social participation and transfers to common household surfaces  From OT standpoint, recommend home w/ family support upon D/C  OT will continue to follow pt 3-5x/wk to address the following goals to  w/in 7-10 days:   Goals   Patient Goals to get stronger and go home   LTG Time Frame 7-10   Long Term Goal Please refer to LTGs listed below   Plan   Treatment Interventions ADL retraining;Functional transfer training;UE strengthening/ROM; Endurance training;Cognitive reorientation;Patient/family training; Compensatory technique education;Equipment evaluation/education;Continued evaluation; Energy conservation; Activityengagement   Goal Expiration Date 18   Treatment Day 1   OT Frequency 3-5x/wk   Additional Treatment Session   Start Time 815   End Time 827   Treatment Assessment Pt seen for additional treatment session this AM focusing on functional activity tolerance, ADLs (toileting, light grooming), functional mobility/transfers, and energy conservation education  Pt alert and cooperative throughout session  Functional transfers (sit<>stand, RW<>standard toilet) completed at a Min A level w/ assist to initiate forward weight shift from surface 2* generalized weakness   Cues required for all transfers for safe hand placement 2* decreased safety awareness demonstrated  Supervision for functional mobility 2* cues required at times for safe use of RW  Toileting tasks completed at a Supervision level for perineal hygiene while seated  Light grooming tasks (brush hair, wash/dry hands and face, teeth care) completed at a Supervision level while standing at sink 2* setup required and increased time to complete task  Educated pt on energy conservation techniques (ie: pacing, prioritizing, seated rest breaks, compensatory breathing, etc ) 2* increased SOB demonstrated w/ activity and functional mobility  Pt verbalized understanding of education, however recommend additional education in future sessions  Pt returned to chair at end of session w/ chair alarm activated  NUVIA Souza notified  Call bell and phone within reach  All needs met and pt reports no further questions for OT at this time  Continue to recommend Home w/ family support upon D/C  OT to follow pt on caseload       Additional Treatment Day 1   Recommendation   OT Discharge Recommendation Home with family support   OT - OK to Discharge Yes  (when medically cleared)   Barthel Index   Feeding 10   Bathing 0   Grooming Score 5   Dressing Score 5   Bladder Score 5   Bowels Score 10   Toilet Use Score 5   Transfers (Bed/Chair) Score 10   Mobility (Level Surface) Score 0   Stairs Score 0   Barthel Index Score 50   Modified Tad Scale   Modified Tad Scale 3        GOALS    1) Pt will improve activity tolerance to G for min 45 min txment sessions    2) Pt will complete UB/LB dressing/self care w/ mod I using adaptive device and DME as needed    3) Pt will complete bathing w/ Mod I w/ use of AE and DME as needed    4) Pt will complete toileting w/ mod I w/ G hygiene/thoroughness and G balance demonstrated during clothing management up/down using DME as needed    5) Pt will improve functional transfers to Mod I on/off all surfaces using DME as needed w/ G balance/safety     6) Pt will improve functional mobility during ADL/IADL/leisure tasks to Mod I using DME as needed w/ G balance/safety     7) Pt will participate in simulated IADL management task to increase independence to Mod I w/ G safety and endurance    8) Pt will engage in ongoing cognitive assessment w/ G participation w/ mod I to assist w/ safe d/c planning/recommendations    9) Pt will demonstrate G carryover of pt/caregiver education and training as appropriate w/ mod I w/o cues w/ good tolerance    10) Pt will demonstrate 100% carryover of energy conservation techniques w/ mod I t/o functional I/ADL/leisure tasks w/o cues s/p skilled education    11) Pt will increase UE strength by 1 MM grade to increase independence in ADLs and transfers      Antony Crump OTR/L

## 2018-04-24 VITALS
SYSTOLIC BLOOD PRESSURE: 134 MMHG | HEART RATE: 89 BPM | BODY MASS INDEX: 20.89 KG/M2 | HEIGHT: 62 IN | OXYGEN SATURATION: 97 % | TEMPERATURE: 98.1 F | RESPIRATION RATE: 18 BRPM | DIASTOLIC BLOOD PRESSURE: 78 MMHG | WEIGHT: 113.54 LBS

## 2018-04-24 PROBLEM — R10.13 EPIGASTRIC PAIN: Status: RESOLVED | Noted: 2018-04-22 | Resolved: 2018-04-24

## 2018-04-24 PROBLEM — I48.91 ATRIAL FIBRILLATION WITH RAPID VENTRICULAR RESPONSE (HCC): Status: RESOLVED | Noted: 2017-08-03 | Resolved: 2018-04-24

## 2018-04-24 PROBLEM — I21.A1 TYPE 2 MYOCARDIAL INFARCTION (HCC): Status: RESOLVED | Noted: 2018-04-22 | Resolved: 2018-04-24

## 2018-04-24 LAB
ANION GAP SERPL CALCULATED.3IONS-SCNC: 10 MMOL/L (ref 4–13)
BASOPHILS # BLD AUTO: 0.02 THOUSANDS/ΜL (ref 0–0.1)
BASOPHILS NFR BLD AUTO: 0 % (ref 0–1)
BUN SERPL-MCNC: 27 MG/DL (ref 5–25)
CALCIUM SERPL-MCNC: 9.4 MG/DL (ref 8.3–10.1)
CHLORIDE SERPL-SCNC: 102 MMOL/L (ref 100–108)
CO2 SERPL-SCNC: 29 MMOL/L (ref 21–32)
CREAT SERPL-MCNC: 1.03 MG/DL (ref 0.6–1.3)
EOSINOPHIL # BLD AUTO: 0.13 THOUSAND/ΜL (ref 0–0.61)
EOSINOPHIL NFR BLD AUTO: 2 % (ref 0–6)
ERYTHROCYTE [DISTWIDTH] IN BLOOD BY AUTOMATED COUNT: 14.2 % (ref 11.6–15.1)
GFR SERPL CREATININE-BSD FRML MDRD: 48 ML/MIN/1.73SQ M
GLUCOSE SERPL-MCNC: 141 MG/DL (ref 65–140)
GLUCOSE SERPL-MCNC: 149 MG/DL (ref 65–140)
GLUCOSE SERPL-MCNC: 306 MG/DL (ref 65–140)
HCT VFR BLD AUTO: 41.2 % (ref 34.8–46.1)
HGB BLD-MCNC: 13.7 G/DL (ref 11.5–15.4)
LYMPHOCYTES # BLD AUTO: 1.11 THOUSANDS/ΜL (ref 0.6–4.47)
LYMPHOCYTES NFR BLD AUTO: 21 % (ref 14–44)
MCH RBC QN AUTO: 30.9 PG (ref 26.8–34.3)
MCHC RBC AUTO-ENTMCNC: 33.3 G/DL (ref 31.4–37.4)
MCV RBC AUTO: 93 FL (ref 82–98)
MONOCYTES # BLD AUTO: 0.45 THOUSAND/ΜL (ref 0.17–1.22)
MONOCYTES NFR BLD AUTO: 8 % (ref 4–12)
NEUTROPHILS # BLD AUTO: 3.7 THOUSANDS/ΜL (ref 1.85–7.62)
NEUTS SEG NFR BLD AUTO: 69 % (ref 43–75)
NRBC BLD AUTO-RTO: 0 /100 WBCS
PLATELET # BLD AUTO: 157 THOUSANDS/UL (ref 149–390)
PMV BLD AUTO: 11.4 FL (ref 8.9–12.7)
POTASSIUM SERPL-SCNC: 3.7 MMOL/L (ref 3.5–5.3)
RBC # BLD AUTO: 4.43 MILLION/UL (ref 3.81–5.12)
SODIUM SERPL-SCNC: 141 MMOL/L (ref 136–145)
WBC # BLD AUTO: 5.41 THOUSAND/UL (ref 4.31–10.16)

## 2018-04-24 PROCEDURE — 99239 HOSP IP/OBS DSCHRG MGMT >30: CPT | Performed by: INTERNAL MEDICINE

## 2018-04-24 PROCEDURE — 80048 BASIC METABOLIC PNL TOTAL CA: CPT | Performed by: INTERNAL MEDICINE

## 2018-04-24 PROCEDURE — 82948 REAGENT STRIP/BLOOD GLUCOSE: CPT

## 2018-04-24 PROCEDURE — 85025 COMPLETE CBC W/AUTO DIFF WBC: CPT | Performed by: INTERNAL MEDICINE

## 2018-04-24 RX ORDER — AMOXICILLIN 250 MG
1 CAPSULE ORAL
Qty: 15 TABLET | Refills: 0 | Status: SHIPPED | OUTPATIENT
Start: 2018-04-24 | End: 2019-01-01 | Stop reason: CLARIF

## 2018-04-24 RX ORDER — FUROSEMIDE 20 MG/1
20 TABLET ORAL DAILY
Qty: 30 TABLET | Refills: 0 | Status: ON HOLD | OUTPATIENT
Start: 2018-04-24 | End: 2018-05-08

## 2018-04-24 RX ORDER — METOPROLOL TARTRATE 50 MG/1
50 TABLET, FILM COATED ORAL EVERY 12 HOURS SCHEDULED
Qty: 60 TABLET | Refills: 0 | Status: SHIPPED | OUTPATIENT
Start: 2018-04-24 | End: 2018-05-08 | Stop reason: HOSPADM

## 2018-04-24 RX ADMIN — LOSARTAN POTASSIUM 25 MG: 25 TABLET, FILM COATED ORAL at 08:07

## 2018-04-24 RX ADMIN — INSULIN LISPRO 3 UNITS: 100 INJECTION, SOLUTION INTRAVENOUS; SUBCUTANEOUS at 11:42

## 2018-04-24 RX ADMIN — FUROSEMIDE 40 MG: 10 INJECTION, SOLUTION INTRAMUSCULAR; INTRAVENOUS at 08:07

## 2018-04-24 RX ADMIN — METOPROLOL TARTRATE 50 MG: 50 TABLET ORAL at 08:07

## 2018-04-24 RX ADMIN — PANTOPRAZOLE SODIUM 40 MG: 40 TABLET, DELAYED RELEASE ORAL at 05:33

## 2018-04-24 RX ADMIN — DILTIAZEM HYDROCHLORIDE 30 MG: 30 TABLET, FILM COATED ORAL at 11:42

## 2018-04-24 RX ADMIN — DILTIAZEM HYDROCHLORIDE 30 MG: 30 TABLET, FILM COATED ORAL at 01:00

## 2018-04-24 RX ADMIN — ASPIRIN 81 MG 81 MG: 81 TABLET ORAL at 08:07

## 2018-04-24 RX ADMIN — CALCITONIN SALMON 1 SPRAY: 200 SPRAY, METERED NASAL at 08:07

## 2018-04-24 RX ADMIN — DILTIAZEM HYDROCHLORIDE 30 MG: 30 TABLET, FILM COATED ORAL at 05:33

## 2018-04-24 RX ADMIN — POTASSIUM CHLORIDE 10 MEQ: 750 TABLET, EXTENDED RELEASE ORAL at 08:07

## 2018-04-24 RX ADMIN — APIXABAN 2.5 MG: 2.5 TABLET, FILM COATED ORAL at 08:07

## 2018-04-24 NOTE — SOCIAL WORK
Pt is being discharge today and will need Eliquis  Script was sent to [facility] and the cost is $45 00 a month  MD reports dtr is agreeable to this cost and [facility] will deliver to room  RN is aware med will be deliver to pt's room

## 2018-04-24 NOTE — DISCHARGE SUMMARY
Transition of Care Discharge Summary - [facility]    Patient Information: Anjel Nguyen 80 y o  female MRN: [id number]  Unit/Bed#: E4 -01 Encounter: [id number]    Discharging Physician / Practitioner: Annemarie Perry MD  PCP: Qian Hook MD  Admission Date: 4/22/2018  Discharge Date: 04/24/18    Disposition:      Other: home    For Discharges to [facility] SNF:   · Not Applicable to this Patient - Not Applicable to this Patient    Reason for Admission: afib with RVR    Discharge Diagnoses:     Principal Problem (Resolved):    Atrial fibrillation with rapid ventricular response ([facility] [address] )  Active Problems:    Acute on chronic diastolic congestive heart failure ([facility] [address] )    Aortic stenosis    Benign essential HTN    Controlled diabetes mellitus type II without complication [facility] )    CAD (coronary artery disease)    Depression with anxiety    Esophageal reflux  Resolved Problems:    Type 2 myocardial infarction ([facility] [address] )    Epigastric pain      Consultations During Hospital Stay:  · cardiology    Procedures Performed:     · none    Medication Adjustments and Discharge Medications:  · Medication Dosing Tapers - Please refer to Discharge Medication List for details on any medication dosing tapers (if applicable to patient)  · Discharge Medication List: See after visit summary for reconciled discharge medications  Wound Care Recommendations:  When applicable, please see wound care section of After Visit Summary      Diet Recommendations at Discharge:  Diet -        Diet Orders            Start     Ordered    04/23/18 0906  Room Service  Once     Question:  Type of Service  Answer:  Room Service-Appropriate    04/23/18 0104    04/22/18 1123  Diet Cardiac; Cardiac TLC 2 3 GM NA; Consistent Carbohydrate Diet Level 2 (5 carb servings/75 grams CHO/meal)  Diet effective now     Question Answer Comment   Diet Type Cardiac    Cardiac Cardiac TLC 2 3 GM NA    Other Restriction(s): Consistent Carbohydrate Diet Level 2 (5 carb servings/75 grams CHO/meal)    RD to adjust diet per protocol? Yes        04/22/18 1122        Fluid Restriction - Continue Fluid Restriction as Listed Above at Discharge  Significant Findings / Test Results:     · Chest x-ray:  Interstitial pulmonary edema with small bilateral effusions    Incidental Findings:   · none       Complications:  none    Hospital Course: Horacio Schmid is a 80 y o  female patient who originally presented to the hospital on 4/22/2018 due to epigastric pain and dyspnea  Patient was treated with IV fluids in the ED  Patient was found to have atrial fibrillation with rapid ventricular rate  Patient also became volume overloaded and dyspneic and was treated with Lasix  For patient's history of atrial fibrillation she was taken off of anticoagulation for which is unclear  Patient was then started on Eliquis  Cardiology was consulted who manage patient for acute on chronic diastolic heart failure and paroxysmal atrial fibrillation  Patient was maintained on Lasix and gradually improved  Discussed with Dr Steven Schmitz from Cardiology who recommended that patient does not need cash in general blocker and can instead be discharged home on beta-blocker, patient also be sent home on Lasix 20 mg daily, patient was tapered off oxygen and did well with ambulation and did not become hypoxic  Patient clinically improved  Discussed with patient,  and patient's daughter regarding plan of care we are all in agreement  Patient will need to follow outpatient PCP and Cardiology  Patient was initially reluctant regarding anticoagulation, Eliquis, however I discussed other options such as Coumadin and the daughter states that Eliquis is the best option and they are in agreement, discussed coughed with case management and patient and family who were in agreement    Discussed that patient would follow outpatient Cardiology regarding possible discontinuation    Condition at Discharge: good     Discharge Day Visit / Exam:     Subjective:  Patient seen examined at bedside, currently denies any complaints    Vitals: Blood Pressure: 140/79 (04/24/18 1101)  Pulse: 82 (04/24/18 1101)  Temperature: 98 5 °F (36 9 °C) (04/24/18 0700)  Temp Source: Tympanic (04/24/18 0700)  Respirations: 20 (04/24/18 0700)  Height: 5' 2" (157 5 cm) (04/22/18 1146)  Weight - Scale: 51 5 kg (113 lb 8 6 oz) (04/24/18 0550)  SpO2: 96 % (04/24/18 0700)    Physical Exam:    Constitutional: Patient is oriented to person, place and time, no acute distress  HEENT:  Normocephalic, atraumatic, EOMI, PERRLA, no scleral icterus, no pallor, moist oral mucosa  Neck:  Supple, no masses, no thyromegaly, no bruits Normal range of motion  Lymph nodes:  No lymphadenopathy  Cardiovascular: Normal S1S2, RRR, No murmurs/rubs/gallops appreciated  Pulmonary: Clear to auscultation bilaterally, No rhonchi/rales/wheezing appreciated  Abdominal: Soft, Bowel sounds present, Non-tender, Non-distended, No rebound/guarding, no hepatomegaly   Musculoskeletal: No tenderness/abnormality   Extremities:  No cyanosis, clubbing or edema  Peripheral pulses palpable and equal bilaterally  Neurological: Cranial nerves II-XII grossly intact, sensation intact, otherwise no focal neurological symptoms  Skin: Skin is warm and dry, no rashes  Discharge instructions/Information to patient and family:   See after visit summary section titled Discharge Instructions for information provided to patient and family  Planned Readmission: no      Discharge Statement:  I spent 30 minutes discharging the patient  This time was spent on the day of discharge  I had direct contact with the patient on the day of discharge  Greater than 50% of the total time was spent examining patient, answering all patient questions, arranging and discussing plan of care with patient as well as directly providing post-discharge instructions  Additional time then spent on discharge activities      ** Please Note: This note has been constructed using a voice recognition system **

## 2018-04-24 NOTE — PLAN OF CARE
Problem: DISCHARGE PLANNING - CARE MANAGEMENT  Goal: Discharge to post-acute care or home with appropriate resources  INTERVENTIONS:  - Conduct assessment to determine patient/family and health care team treatment goals, and need for post-acute services based on payer coverage, community resources, and patient preferences, and barriers to discharge  - Address psychosocial, clinical, and financial barriers to discharge as identified in assessment in conjunction with the patient/family and health care team  - Arrange appropriate level of post-acute services according to patients   needs and preference and payer coverage in collaboration with the physician and health care team  - Communicate with and update the patient/family, physician, and health care team regarding progress on the discharge plan  - Arrange appropriate transportation to post-acute venues   Outcome: Adequate for Discharge  Pt is being discharge today and will need Eliquis  Script was sent to [facility] and the cost is $45 00 a month  MD reports dtr is agreeable to this cost and [facility] will deliver to room  RN is aware med will be deliver to pt's room

## 2018-04-24 NOTE — PLAN OF CARE

## 2018-04-24 NOTE — NURSING NOTE
Pharmacy tech came to deliver Eliquis medication for patient  The other scrips with given to family member (daughter to fill)  Family got patient dressed and left by wheelchair accompanied by PCA  IV removed 1 hour prior and AVS with scripts reviewed with Ira Kamara, daughter

## 2018-04-25 ENCOUNTER — TELEPHONE (OUTPATIENT)
Dept: FAMILY MEDICINE CLINIC | Facility: CLINIC | Age: 83
End: 2018-04-25

## 2018-04-25 NOTE — TELEPHONE ENCOUNTER
Patient's daughter in law called back and she told me Kelly Lambert will be follow up with Dr William Ch on 5/14/2018

## 2018-04-26 ENCOUNTER — TELEPHONE (OUTPATIENT)
Dept: CARDIOLOGY CLINIC | Facility: CLINIC | Age: 83
End: 2018-04-26

## 2018-04-26 ENCOUNTER — TELEPHONE (OUTPATIENT)
Dept: FAMILY MEDICINE CLINIC | Facility: CLINIC | Age: 83
End: 2018-04-26

## 2018-04-26 NOTE — TELEPHONE ENCOUNTER
FYI: After reviewing hosp d/c list, visiting nurse advised pt she should not be taking Cardizem  Pt and spouse called asking for clarification? Pt stated during last OV,  Dr Justino Cardozo advised Kp Valera she is to be taking Cardizem  Dawn Dao I advised to follow hospital d/c list, unless they hear otherwise  Hosp f/u appt scheduled for 5/15/18

## 2018-04-26 NOTE — TELEPHONE ENCOUNTER
[place] form [facility] called to verify with Dr Vel More if patient is supposed to keep taking Lexapro and also if she can resume her Multivitamin  Please advice    Phone: [phone]

## 2018-04-27 ENCOUNTER — TELEPHONE (OUTPATIENT)
Dept: FAMILY MEDICINE CLINIC | Facility: CLINIC | Age: 83
End: 2018-04-27

## 2018-04-27 DIAGNOSIS — I21.4 NON-ST ELEVATION (NSTEMI) MYOCARDIAL INFARCTION (HCC): ICD-10-CM

## 2018-04-27 DIAGNOSIS — I50.9 CHRONIC CONGESTIVE HEART FAILURE, UNSPECIFIED HEART FAILURE TYPE (HCC): Primary | ICD-10-CM

## 2018-04-27 DIAGNOSIS — F41.9 ANXIETY: Primary | ICD-10-CM

## 2018-04-27 NOTE — TELEPHONE ENCOUNTER
Earnesteen Night made aware  She will check old records to see what her readings have been at  If above 130 she will have her continue to check it once a day

## 2018-04-27 NOTE — TELEPHONE ENCOUNTER
Review of hospital records showed oxygen saturation 96%-did write in order about heart problems but Medicare guidelines are oxygen saturation of 88% so may not qualify-might have to arrange for oxygen saturation test at home

## 2018-04-27 NOTE — TELEPHONE ENCOUNTER
Pt's daughter Paco Carlos calling  Pt  Was just released from the hospital which she said you were aware of but she was supposed to be on oxygen but has not been contacted by anyone and this is day two with her not having it   Paco Carlos can be reached at [phone]

## 2018-04-27 NOTE — TELEPHONE ENCOUNTER
Radha Reyes made aware  Radha Reyes had another question pt  Is not on any bs medications do you want her to continue checking her blood sugar

## 2018-04-29 ENCOUNTER — HOSPITAL ENCOUNTER (INPATIENT)
Facility: HOSPITAL | Age: 83
LOS: 9 days | DRG: 280 | End: 2018-05-08
Attending: EMERGENCY MEDICINE | Admitting: HOSPITALIST
Payer: MEDICARE

## 2018-04-29 ENCOUNTER — APPOINTMENT (EMERGENCY)
Dept: RADIOLOGY | Facility: HOSPITAL | Age: 83
DRG: 280 | End: 2018-04-29
Payer: MEDICARE

## 2018-04-29 DIAGNOSIS — G93.40 ACUTE ENCEPHALOPATHY: ICD-10-CM

## 2018-04-29 DIAGNOSIS — I50.9 ACUTE EXACERBATION OF CHF (CONGESTIVE HEART FAILURE) (HCC): ICD-10-CM

## 2018-04-29 DIAGNOSIS — R06.00 DYSPNEA: ICD-10-CM

## 2018-04-29 DIAGNOSIS — R53.1 WEAKNESS: ICD-10-CM

## 2018-04-29 DIAGNOSIS — F41.8 DEPRESSION WITH ANXIETY: ICD-10-CM

## 2018-04-29 DIAGNOSIS — I50.33 ACUTE ON CHRONIC DIASTOLIC CONGESTIVE HEART FAILURE (HCC): ICD-10-CM

## 2018-04-29 DIAGNOSIS — N17.9 AKI (ACUTE KIDNEY INJURY) (HCC): ICD-10-CM

## 2018-04-29 DIAGNOSIS — I35.0 NONRHEUMATIC AORTIC VALVE STENOSIS: ICD-10-CM

## 2018-04-29 DIAGNOSIS — R77.8 ELEVATED TROPONIN: Primary | ICD-10-CM

## 2018-04-29 PROBLEM — E43 SEVERE PROTEIN-CALORIE MALNUTRITION (GOMEZ: LESS THAN 60% OF STANDARD WEIGHT) (HCC): Chronic | Status: ACTIVE | Noted: 2018-04-29

## 2018-04-29 LAB
ALBUMIN SERPL BCP-MCNC: 3.7 G/DL (ref 3.5–5)
ALP SERPL-CCNC: 101 U/L (ref 46–116)
ALT SERPL W P-5'-P-CCNC: 71 U/L (ref 12–78)
ANION GAP SERPL CALCULATED.3IONS-SCNC: 12 MMOL/L (ref 4–13)
APTT PPP: 36 SECONDS (ref 23–35)
AST SERPL W P-5'-P-CCNC: 74 U/L (ref 5–45)
ATRIAL RATE: 129 BPM
BASOPHILS # BLD AUTO: 0.01 THOUSANDS/ΜL (ref 0–0.1)
BASOPHILS NFR BLD AUTO: 0 % (ref 0–1)
BILIRUB SERPL-MCNC: 0.98 MG/DL (ref 0.2–1)
BUN SERPL-MCNC: 48 MG/DL (ref 5–25)
CALCIUM SERPL-MCNC: 9.6 MG/DL (ref 8.3–10.1)
CHLORIDE SERPL-SCNC: 101 MMOL/L (ref 100–108)
CO2 SERPL-SCNC: 22 MMOL/L (ref 21–32)
CREAT SERPL-MCNC: 1.4 MG/DL (ref 0.6–1.3)
EOSINOPHIL # BLD AUTO: 0.01 THOUSAND/ΜL (ref 0–0.61)
EOSINOPHIL NFR BLD AUTO: 0 % (ref 0–6)
ERYTHROCYTE [DISTWIDTH] IN BLOOD BY AUTOMATED COUNT: 14.1 % (ref 11.6–15.1)
GFR SERPL CREATININE-BSD FRML MDRD: 33 ML/MIN/1.73SQ M
GLUCOSE SERPL-MCNC: 285 MG/DL (ref 65–140)
GLUCOSE SERPL-MCNC: 305 MG/DL (ref 65–140)
HCT VFR BLD AUTO: 40.8 % (ref 34.8–46.1)
HGB BLD-MCNC: 13.4 G/DL (ref 11.5–15.4)
INR PPP: 1.6 (ref 0.86–1.16)
LIPASE SERPL-CCNC: 153 U/L (ref 73–393)
LYMPHOCYTES # BLD AUTO: 0.7 THOUSANDS/ΜL (ref 0.6–4.47)
LYMPHOCYTES NFR BLD AUTO: 11 % (ref 14–44)
MCH RBC QN AUTO: 30.9 PG (ref 26.8–34.3)
MCHC RBC AUTO-ENTMCNC: 32.8 G/DL (ref 31.4–37.4)
MCV RBC AUTO: 94 FL (ref 82–98)
MONOCYTES # BLD AUTO: 0.42 THOUSAND/ΜL (ref 0.17–1.22)
MONOCYTES NFR BLD AUTO: 7 % (ref 4–12)
NEUTROPHILS # BLD AUTO: 5 THOUSANDS/ΜL (ref 1.85–7.62)
NEUTS SEG NFR BLD AUTO: 82 % (ref 43–75)
NRBC BLD AUTO-RTO: 0 /100 WBCS
NT-PROBNP SERPL-MCNC: ABNORMAL PG/ML
P AXIS: 89 DEGREES
PLATELET # BLD AUTO: 188 THOUSANDS/UL (ref 149–390)
PMV BLD AUTO: 12 FL (ref 8.9–12.7)
POTASSIUM SERPL-SCNC: 4.2 MMOL/L (ref 3.5–5.3)
PROT SERPL-MCNC: 7.7 G/DL (ref 6.4–8.2)
PROTHROMBIN TIME: 19.2 SECONDS (ref 12.1–14.4)
QRS AXIS: -75 DEGREES
QRSD INTERVAL: 90 MS
QT INTERVAL: 280 MS
QTC INTERVAL: 377 MS
RBC # BLD AUTO: 4.34 MILLION/UL (ref 3.81–5.12)
SODIUM SERPL-SCNC: 135 MMOL/L (ref 136–145)
T WAVE AXIS: 85 DEGREES
TROPONIN I SERPL-MCNC: 1.45 NG/ML
VENTRICULAR RATE: 109 BPM
WBC # BLD AUTO: 6.15 THOUSAND/UL (ref 4.31–10.16)

## 2018-04-29 PROCEDURE — 85610 PROTHROMBIN TIME: CPT | Performed by: EMERGENCY MEDICINE

## 2018-04-29 PROCEDURE — 99285 EMERGENCY DEPT VISIT HI MDM: CPT

## 2018-04-29 PROCEDURE — 36415 COLL VENOUS BLD VENIPUNCTURE: CPT | Performed by: EMERGENCY MEDICINE

## 2018-04-29 PROCEDURE — 96374 THER/PROPH/DIAG INJ IV PUSH: CPT

## 2018-04-29 PROCEDURE — 87040 BLOOD CULTURE FOR BACTERIA: CPT | Performed by: EMERGENCY MEDICINE

## 2018-04-29 PROCEDURE — 71046 X-RAY EXAM CHEST 2 VIEWS: CPT

## 2018-04-29 PROCEDURE — 85730 THROMBOPLASTIN TIME PARTIAL: CPT | Performed by: EMERGENCY MEDICINE

## 2018-04-29 PROCEDURE — 83690 ASSAY OF LIPASE: CPT | Performed by: EMERGENCY MEDICINE

## 2018-04-29 PROCEDURE — 80053 COMPREHEN METABOLIC PANEL: CPT | Performed by: EMERGENCY MEDICINE

## 2018-04-29 PROCEDURE — 84484 ASSAY OF TROPONIN QUANT: CPT | Performed by: EMERGENCY MEDICINE

## 2018-04-29 PROCEDURE — 96375 TX/PRO/DX INJ NEW DRUG ADDON: CPT

## 2018-04-29 PROCEDURE — 71250 CT THORAX DX C-: CPT

## 2018-04-29 PROCEDURE — 82948 REAGENT STRIP/BLOOD GLUCOSE: CPT

## 2018-04-29 PROCEDURE — 96361 HYDRATE IV INFUSION ADD-ON: CPT

## 2018-04-29 PROCEDURE — 99223 1ST HOSP IP/OBS HIGH 75: CPT | Performed by: HOSPITALIST

## 2018-04-29 PROCEDURE — 85025 COMPLETE CBC W/AUTO DIFF WBC: CPT | Performed by: EMERGENCY MEDICINE

## 2018-04-29 PROCEDURE — 93005 ELECTROCARDIOGRAM TRACING: CPT

## 2018-04-29 PROCEDURE — 93010 ELECTROCARDIOGRAM REPORT: CPT | Performed by: INTERNAL MEDICINE

## 2018-04-29 PROCEDURE — 83880 ASSAY OF NATRIURETIC PEPTIDE: CPT | Performed by: EMERGENCY MEDICINE

## 2018-04-29 RX ORDER — CALCITONIN SALMON 200 [IU]/.09ML
1 SPRAY, METERED NASAL DAILY
Status: DISCONTINUED | OUTPATIENT
Start: 2018-04-30 | End: 2018-05-08 | Stop reason: HOSPADM

## 2018-04-29 RX ORDER — LOSARTAN POTASSIUM 50 MG/1
25 TABLET ORAL DAILY
Status: DISCONTINUED | OUTPATIENT
Start: 2018-04-30 | End: 2018-04-30

## 2018-04-29 RX ORDER — FUROSEMIDE 10 MG/ML
40 INJECTION INTRAMUSCULAR; INTRAVENOUS ONCE
Status: COMPLETED | OUTPATIENT
Start: 2018-04-29 | End: 2018-04-29

## 2018-04-29 RX ORDER — POTASSIUM CHLORIDE 750 MG/1
10 TABLET, EXTENDED RELEASE ORAL DAILY
Status: DISCONTINUED | OUTPATIENT
Start: 2018-04-30 | End: 2018-04-30

## 2018-04-29 RX ORDER — ASPIRIN 81 MG/1
81 TABLET, CHEWABLE ORAL DAILY
Status: DISCONTINUED | OUTPATIENT
Start: 2018-04-30 | End: 2018-05-08 | Stop reason: HOSPADM

## 2018-04-29 RX ORDER — METOPROLOL TARTRATE 50 MG/1
50 TABLET, FILM COATED ORAL EVERY 12 HOURS SCHEDULED
Status: COMPLETED | OUTPATIENT
Start: 2018-04-29 | End: 2018-05-06

## 2018-04-29 RX ORDER — AMOXICILLIN 250 MG
1 CAPSULE ORAL
Status: DISCONTINUED | OUTPATIENT
Start: 2018-04-29 | End: 2018-05-08 | Stop reason: HOSPADM

## 2018-04-29 RX ORDER — ONDANSETRON 2 MG/ML
4 INJECTION INTRAMUSCULAR; INTRAVENOUS ONCE
Status: COMPLETED | OUTPATIENT
Start: 2018-04-29 | End: 2018-04-29

## 2018-04-29 RX ORDER — FUROSEMIDE 10 MG/ML
40 INJECTION INTRAMUSCULAR; INTRAVENOUS
Status: DISCONTINUED | OUTPATIENT
Start: 2018-04-29 | End: 2018-05-02

## 2018-04-29 RX ADMIN — DOCUSATE SODIUM,SENNOSIDES 1 TABLET: 50; 8.6 TABLET, FILM COATED ORAL at 21:18

## 2018-04-29 RX ADMIN — METOPROLOL TARTRATE 50 MG: 50 TABLET ORAL at 21:18

## 2018-04-29 RX ADMIN — ONDANSETRON 4 MG: 2 INJECTION INTRAMUSCULAR; INTRAVENOUS at 13:14

## 2018-04-29 RX ADMIN — FUROSEMIDE 40 MG: 10 INJECTION, SOLUTION INTRAMUSCULAR; INTRAVENOUS at 14:30

## 2018-04-29 RX ADMIN — FUROSEMIDE 40 MG: 10 INJECTION, SOLUTION INTRAMUSCULAR; INTRAVENOUS at 21:18

## 2018-04-29 RX ADMIN — SODIUM CHLORIDE 1000 ML: 0.9 INJECTION, SOLUTION INTRAVENOUS at 12:51

## 2018-04-29 RX ADMIN — APIXABAN 2.5 MG: 2.5 TABLET, FILM COATED ORAL at 18:36

## 2018-04-29 NOTE — ED PROVIDER NOTES
History  Chief Complaint   Patient presents with    Shortness of Breath     Weakness sob     Patient is a 72-year-old female with a past medical history significant for atrial fibrillation on Eliquis, CHF, aortic stenosis, hypertension, diabetes, CAD who was discharged from the hospital on 04/24/2018 after being evaluated for atrial fibrillation with rapid ventricular response and started on Eliquis  Per family, patient has not been right since discharge  Has not been eating or drinking as much as her baseline  Patient herself reports feeling very weak, today felt nauseated and had episode of nonbloody, nonbilious emesis, feels more dyspneic than baseline, and extremely weak  Denies chest pain, palpitations, abdominal pain, diarrhea, blood in the stools, lightheadedness, dizziness  Assessment and plan:  Dyspnea, weakness with nausea and vomiting  On exam, patient is tachypneic, dry mucous membranes, pale  Cardiac and infectious workup  Give 1 L of IV fluids for clinically apparent dehydration  Monitor closely and reassess  Prior to Admission Medications   Prescriptions Last Dose Informant Patient Reported? Taking?    LORazepam (ATIVAN) 1 mg tablet   No No   Sig: Take 1 tablet by mouth daily at bedtime for 10 days   Patient taking differently: Take 0 5 mg by mouth daily at bedtime     apixaban (ELIQUIS) 2 5 mg Unknown at Unknown time  No No   Sig: Take 1 tablet (2 5 mg total) by mouth 2 (two) times a day   aspirin 81 mg chewable tablet Unknown at Unknown time Self No No   Sig: Chew 1 tablet daily   calcitonin, salmon, (MIACALCIN) 200 units/act nasal spray Unknown at Unknown time  No No   Sig: USE 1 SPRAY IN ONE NOSTRIL DAILY--ALTERNATE NOSTRILS   furosemide (LASIX) 20 mg tablet Unknown at Unknown time  No No   Sig: Take 1 tablet (20 mg total) by mouth daily   lactulose (CHRONULAC) 10 g/15 mL solution Unknown at Unknown time Self Yes No   Sig: Take by mouth   losartan (COZAAR) 50 mg tablet Unknown at Unknown time  No No   Sig: Take 0 5 tablets (25 mg total) by mouth daily for 90 days   metoprolol tartrate (LOPRESSOR) 50 mg tablet Unknown at Unknown time  No No   Sig: Take 1 tablet (50 mg total) by mouth every 12 (twelve) hours   potassium chloride (K-DUR,KLOR-CON) 10 mEq tablet Unknown at Unknown time  No No   Sig: Take 1 tablet (10 mEq total) by mouth daily   senna-docusate sodium (SENOKOT S) 8 6-50 mg per tablet Unknown at Unknown time  No No   Sig: Take 1 tablet by mouth daily at bedtime      Facility-Administered Medications: None       Past Medical History:   Diagnosis Date    Acute ischemic right MCA stroke ([facility] )     Anticoagulant long-term use     last assessed: 8/17/17    Aortic stenosis     Atrial fibrillation ([facility] )     Blurred vision     last assessed: 10/25/13    CHF (congestive heart failure) ([facility] )     Coronary artery disease     Dementia     Diabetes mellitus ([facility] )     Dysuria     last assessed: 8/3/15    Hyperlipidemia     Hypertension     Nonrheumatic aortic (valve) insufficiency     Old myocardial infarction     Rectal carcinoma (HCC)        Past Surgical History:   Procedure Laterality Date    BALLOON ANGIOPLASTY, ARTERY      CORONARY ARTERY BYPASS GRAFT      RECTAL SURGERY         Family History   Problem Relation Age of Onset    Stroke Mother     Stroke Father      I have reviewed and agree with the history as documented  Social History   Substance Use Topics    Smoking status: Former Smoker     Quit date: 2002    Smokeless tobacco: Never Used      Comment: 1 pack a week; No secondhand smoke exposure    Alcohol use Yes      Comment: occasional        Review of Systems   Constitutional: Positive for activity change, appetite change and fatigue  Negative for chills and fever  HENT: Negative for congestion and rhinorrhea  Eyes: Negative for photophobia and visual disturbance  Respiratory: Positive for shortness of breath  Negative for cough and chest tightness  Cardiovascular: Negative for chest pain, palpitations and leg swelling  Gastrointestinal: Positive for nausea and vomiting  Negative for abdominal pain, blood in stool, constipation and diarrhea  Genitourinary: Negative for dysuria and hematuria  Musculoskeletal: Negative for back pain and neck pain  Skin: Positive for pallor  Negative for rash  Neurological: Positive for weakness  Negative for dizziness, syncope, light-headedness, numbness and headaches  Physical Exam  ED Triage Vitals   Temperature Pulse Respirations Blood Pressure SpO2   04/29/18 1245 04/29/18 1218 04/29/18 1218 04/29/18 1218 04/29/18 1218   97 5 °F (36 4 °C) (!) 114 20 (!) 199/89 98 %      Temp Source Heart Rate Source Patient Position - Orthostatic VS BP Location FiO2 (%)   04/29/18 1245 04/29/18 1218 04/29/18 1218 04/29/18 1218 --   Tympanic Monitor Sitting Right arm       Pain Score       04/29/18 1218       No Pain           Orthostatic Vital Signs  Vitals:    04/29/18 1600 04/29/18 1734 04/29/18 1925 04/29/18 2315   BP: (!) 158/104 133/96 124/67 128/70   Pulse: (!) 118 (!) 116 (!) 122 99   Patient Position - Orthostatic VS:  Lying  Lying       Physical Exam   Constitutional: She is oriented to person, place, and time  She appears well-developed and well-nourished  No distress  HENT:   Head: Normocephalic and atraumatic  Right Ear: External ear normal    Left Ear: External ear normal    Nose: Nose normal    Dry mucous membrane   Eyes: Conjunctivae and EOM are normal  Pupils are equal, round, and reactive to light  Neck: Normal range of motion  Neck supple  Cardiovascular: Normal rate, regular rhythm and intact distal pulses  Exam reveals no gallop and no friction rub  Murmur heard  Pulmonary/Chest: No respiratory distress  She has no wheezes  She has no rales  She exhibits no tenderness  tachypneic, some crackles at BL bases   Abdominal: Soft  Bowel sounds are normal  She exhibits no distension   There is no tenderness  There is no rebound and no guarding  Genitourinary: Rectal exam shows guaiac positive stool (trace + stool)  Musculoskeletal: Normal range of motion  She exhibits no edema  Neurological: She is alert and oriented to person, place, and time  Skin: Skin is warm and dry  No rash noted  She is not diaphoretic  No erythema  No pallor  Psychiatric: She has a normal mood and affect  Her behavior is normal    Nursing note and vitals reviewed        ED Medications  Medications   aspirin chewable tablet 81 mg (not administered)   losartan (COZAAR) tablet 25 mg (not administered)   potassium chloride (K-DUR,KLOR-CON) CR tablet 10 mEq (not administered)   calcitonin (salmon) (MIACALCIN) 200 units/act nasal spray 1 spray (not administered)   apixaban (ELIQUIS) tablet 2 5 mg (2 5 mg Oral Given 4/29/18 1836)   metoprolol tartrate (LOPRESSOR) tablet 50 mg (50 mg Oral Given 4/29/18 2118)   senna-docusate sodium (SENOKOT S) 8 6-50 mg per tablet 1 tablet (1 tablet Oral Given 4/29/18 2118)   insulin lispro (HumaLOG) 100 units/mL subcutaneous injection 1-6 Units (1 Units Subcutaneous Not Given 4/29/18 1749)   furosemide (LASIX) injection 40 mg (40 mg Intravenous Given 4/29/18 2118)   ondansetron (ZOFRAN) injection 4 mg (4 mg Intravenous Given 4/29/18 1314)   sodium chloride 0 9 % bolus 1,000 mL (0 mL Intravenous Stopped 4/29/18 1530)   furosemide (LASIX) injection 40 mg (40 mg Intravenous Given 4/29/18 1430)       Diagnostic Studies  Results Reviewed     Procedure Component Value Units Date/Time    Comprehensive metabolic panel [[id number]]  (Abnormal) Collected:  04/29/18 1247    Lab Status:  Final result Specimen:  Blood from Arm, Right Updated:  04/29/18 1314     Sodium 135 (L) mmol/L      Potassium 4 2 mmol/L      Chloride 101 mmol/L      CO2 22 mmol/L      Anion Gap 12 mmol/L      BUN 48 (H) mg/dL      Creatinine 1 40 (H) mg/dL      Glucose 305 (H) mg/dL      Calcium 9 6 mg/dL      AST 74 (H) U/L      ALT 71 U/L Alkaline Phosphatase 101 U/L      Total Protein 7 7 g/dL      Albumin 3 7 g/dL      Total Bilirubin 0 98 mg/dL      eGFR 33 ml/min/1 73sq m     Narrative:         National Kidney Disease Education Program recommendations are as follows:  GFR calculation is accurate only with a steady state creatinine  Chronic Kidney disease less than 60 ml/min/1 73 sq  meters  Kidney failure less than 15 ml/min/1 73 sq  meters  B-type natriuretic peptide [[id number]]  (Abnormal) Collected:  04/29/18 1247    Lab Status:  Final result Specimen:  Blood from Arm, Right Updated:  04/29/18 1314     NT-proBNP 18,520 (H) pg/mL     Lipase [[id number]]  (Normal) Collected:  04/29/18 1247    Lab Status:  Final result Specimen:  Blood from Arm, Right Updated:  04/29/18 1314     Lipase 153 u/L     Troponin I [[id number]]  (Abnormal) Collected:  04/29/18 1246    Lab Status:  Final result Specimen:  Blood from Arm, Right Updated:  04/29/18 1312     Troponin I 1 45 (H) ng/mL     Narrative:         [employer] Chemistry analyzer 99% cutoff is > 0 04 ng/mL in network labs    o cTnI 99% cutoff is useful only when applied to patients in the clinical setting of myocardial ischemia  o cTnI 99% cutoff should be interpreted in the context of clinical history, ECG findings and possibly cardiac imaging to establish correct diagnosis  o cTnI 99% cutoff may be suggestive but clearly not indicative of a coronary event without the clinical setting of myocardial ischemia      Protime-INR [[id number]]  (Abnormal) Collected:  04/29/18 1247    Lab Status:  Final result Specimen:  Blood from Arm, Right Updated:  04/29/18 1311     Protime 19 2 (H) seconds      INR 1 60 (H)    APTT [[id number]]  (Abnormal) Collected:  04/29/18 1247    Lab Status:  Final result Specimen:  Blood from Arm, Right Updated:  04/29/18 1311     PTT 36 (H) seconds     CBC and differential [[id number]]  (Abnormal) Collected:  04/29/18 1246    Lab Status:  Final result Specimen:  Blood from Arm, Right Updated:  04/29/18 1300     WBC 6 15 Thousand/uL      RBC 4 34 Million/uL      Hemoglobin 13 4 g/dL      Hematocrit 40 8 %      MCV 94 fL      MCH 30 9 pg      MCHC 32 8 g/dL      RDW 14 1 %      MPV 12 0 fL      Platelets 411 Thousands/uL      nRBC 0 /100 WBCs      Neutrophils Relative 82 (H) %      Lymphocytes Relative 11 (L) %      Monocytes Relative 7 %      Eosinophils Relative 0 %      Basophils Relative 0 %      Neutrophils Absolute 5 00 Thousands/µL      Lymphocytes Absolute 0 70 Thousands/µL      Monocytes Absolute 0 42 Thousand/µL      Eosinophils Absolute 0 01 Thousand/µL      Basophils Absolute 0 01 Thousands/µL     Blood culture #2 [[id number]] Collected:  04/29/18 1246    Lab Status: In process Specimen:  Blood from Arm, Left Updated:  04/29/18 1252    Blood culture #1 [[id number]] Collected:  04/29/18 1246    Lab Status: In process Specimen:  Blood from Arm, Right Updated:  04/29/18 1252    POCT urinalysis dipstick [[id number]]     Lab Status:  No result Specimen:  Urine     Lactic acid, plasma [[id number]]     Lab Status:  No result Specimen:  Blood                  CT chest without contrast   Final Result by Shon Buerger, MD (04/29 1442)      The patient has debris in the left lower lobe bronchial branches with peripheral consolidation suggesting aspiration pneumonia at the left base  There is also evidence of congestive heart failure with pulmonary edema and pleural effusions and cardiomegaly  There is 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 and there are aortic valvular calcifications noted  Patient may benefit from repeat echocardiography        Workstation performed: [id number]         XR chest 2 views   ED Interpretation by Edel Lam DO (04/29 1421)   Abnormal   Pulmonary vascular congestion with questionable effusion versus consolidation, though more likely effusion as interpreted by me independently               Procedures  ECG 12 Lead Documentation  Date/Time: 4/29/2018 11:36 PM  Performed by: Uriah Garcia  Authorized by: Ross Elizondo     Indications / Diagnosis:  Weakness  ECG reviewed by me, the ED Provider: yes    Patient location:  ED  Previous ECG:     Previous ECG:  Compared to current    Comparison ECG info:  4/22/18    Similarity:  No change  Interpretation:     Interpretation: non-specific    Rate:     ECG rate:  109    ECG rate assessment: tachycardic    Rhythm:     Rhythm: atrial fibrillation    Ectopy:     Ectopy: none    QRS:     QRS axis:  Left    QRS intervals:  Normal  Conduction:     Conduction: normal    ST segments:     ST segments:  Non-specific  T waves:     T waves: inverted      Inverted:  AVL and V6          Phone Consults  ED Phone Contact    ED Course  ED Course as of Apr 29 2330   Sun Apr 29, 2018   1350 rising Troponin I: (!) 1 45   1351 Rising      1351 REHANA Creatinine: (!) 1 40           Identification of Seniors at Risk      Most Recent Value   (ISAR) Identification of Seniors at Risk   Before the illness or injury that brought you to the Emergency, did you need someone to help you on a regular basis? 1 Filed at: 04/29/2018 1230   In the last 24 hours, have you needed more help than usual?  1 Filed at: 04/29/2018 1230   Have you been hospitalized for one or more nights during the past 6 months? 1 Filed at: 04/29/2018 1230   In general, do you see well? 1 Filed at: 04/29/2018 1230   In general, do you have serious problems with your memory? 1 Filed at: 04/29/2018 1230   Do you take more than three different medications every day? 1 Filed at: 04/29/2018 1230   ISAR Score  6 Filed at: 04/29/2018 1230                    Initial Sepsis Screening     Row Name 04/29/18 1444                Is the patient's history suggestive of a new or worsening infection? No  -LK        Suspected source of infection          Are two or more of the following signs & symptoms of infection both present and new to the patient?           Indicate SIRS criteria          If the answer is yes to both questions, suspicion of sepsis is present          If severe sepsis is present AND tissue hypoperfusion perists in the hour after fluid resuscitation or lactate > 4, the patient meets criteria for SEPTIC SHOCK          Are any of the following organ dysfunction criteria present within 6 hours of suspected infection and SIRS criteria that are NOT considered to be chronic conditions?         Organ dysfunction          Date of presentation of severe sepsis          Time of presentation of severe sepsis          Tissue hypoperfusion persists in the hour after crystalloid fluid administration, evidenced, by either:          Was hypotension present within one hour of the conclusion of crystalloid fluid administration?         Date of presentation of septic shock          Time of presentation of septic shock            User Key  (r) = Recorded By, (t) = Taken By, (c) = Cosigned By    Initials Name Provider Type    ERWIN Edouard DO Physician                  MDM  Number of Diagnoses or Management Options  Acute exacerbation of CHF (congestive heart failure) ([facility] ):   REHANA (acute kidney injury) [facility]):   Dyspnea:   Elevated troponin:   Weakness:   Diagnosis management comments: Patient noted to have an REHANA  CXR shows pulm vasc congestion with LLL effusion versus consolidation  At this point clinically, mor consistent picture with CHF exac, not PNA  Will treat with diuretic accordingly  Elevated troponin likely type 2 2/2 CHF exacerbation and REHANA      CritCare Time    Disposition  Final diagnoses:   Elevated troponin   Acute exacerbation of CHF (congestive heart failure) ([facility])   Weakness   Dyspnea   REHANA (acute kidney injury) ([facility] )     Time reflects when diagnosis was documented in both MDM as applicable and the Disposition within this note     Time User Action Codes Description Comment    4/29/2018  2:01 PM Flower Amaya Add [R74 8] Elevated troponin 4/29/2018  2:01 PM Glorious Peal Add [I50 9] Acute exacerbation of CHF (congestive heart failure) ([facility] )     4/29/2018  2:01 PM Glorious Peal Add [R53 1] Weakness     4/29/2018  2:01 PM Glorious Peal Add [R06 00] Dyspnea     4/29/2018  2:01 PM Glorious Peal Add [N17 9] REHANA (acute kidney injury) ([place] [address] )     4/29/2018  3:32 PM Anna Nikita Add [F41 8] Depression with anxiety     4/29/2018  3:32 PM Anna Nikita Modify [F41 8] Depression with anxiety     4/29/2018  3:34 PM Mookshah Radha Add [I50 33] Acute on chronic diastolic congestive heart failure ([place] [address] )     4/29/2018  3:34 PM Mocaleb Radha Add [I35 0] Nonrheumatic aortic valve stenosis       ED Disposition     ED Disposition Condition Comment    Admit  Case was discussed with Dr Yari Rosario and the patient's admission status was agreed to be Admission Status: inpatient status to the service of Dr Yari Rosario           Follow-up Information    None       Current Discharge Medication List      CONTINUE these medications which have NOT CHANGED    Details   apixaban (ELIQUIS) 2 5 mg Take 1 tablet (2 5 mg total) by mouth 2 (two) times a day  Qty: 60 tablet, Refills: 0    Associated Diagnoses: Atrial fibrillation with rapid ventricular response (HCC)      aspirin 81 mg chewable tablet Chew 1 tablet daily  Refills: 0      calcitonin, salmon, (MIACALCIN) 200 units/act nasal spray USE 1 SPRAY IN ONE NOSTRIL DAILY--ALTERNATE NOSTRILS  Qty: 11 1 mL, Refills: 0    Comments: **Patient requests 90 days supply**  Associated Diagnoses: Chronic bilateral low back pain without sciatica      furosemide (LASIX) 20 mg tablet Take 1 tablet (20 mg total) by mouth daily  Qty: 30 tablet, Refills: 0    Associated Diagnoses: Acute on chronic diastolic congestive heart failure (HCC)      lactulose (CHRONULAC) 10 g/15 mL solution Take by mouth      LORazepam (ATIVAN) 1 mg tablet Take 1 tablet by mouth daily at bedtime for 10 days  Qty: 10 tablet, Refills: 0      losartan (COZAAR) 50 mg tablet Take 0 5 tablets (25 mg total) by mouth daily for 90 days  Qty: 90 tablet, Refills: 3    Associated Diagnoses: Benign essential HTN; Atrial fibrillation with rapid ventricular response (HCC)      metoprolol tartrate (LOPRESSOR) 50 mg tablet Take 1 tablet (50 mg total) by mouth every 12 (twelve) hours  Qty: 60 tablet, Refills: 0    Associated Diagnoses: Acute on chronic diastolic congestive heart failure (HCC)      potassium chloride (K-DUR,KLOR-CON) 10 mEq tablet Take 1 tablet (10 mEq total) by mouth daily  Qty: 90 tablet, Refills: 3    Associated Diagnoses: Benign essential HTN; Coronary artery disease involving native heart without angina pectoris, unspecified vessel or lesion type      senna-docusate sodium (SENOKOT S) 8 6-50 mg per tablet Take 1 tablet by mouth daily at bedtime  Qty: 15 tablet, Refills: 0    Associated Diagnoses: Acute on chronic diastolic congestive heart failure (HCC)           No discharge procedures on file  ED Provider  Attending physically available and evaluated Lauren Ford  ABRAM managed the patient along with the ED Attending      Electronically Signed by         Birdie Hogan DO  04/29/18 [address],   04/29/18 6423

## 2018-04-29 NOTE — SEPSIS NOTE
Sepsis Note   Camila Parrish 80 y o  female MRN: [id number]  Unit/Bed#: ED 11 Encounter: [id number]            Initial Sepsis Screening     [address] Name 04/29/18 3323                Is the patient's history suggestive of a new or worsening infection? No  -LK        Suspected source of infection          Are two or more of the following signs & symptoms of infection both present and new to the patient?         Indicate SIRS criteria          If the answer is yes to both questions, suspicion of sepsis is present          If severe sepsis is present AND tissue hypoperfusion perists in the hour after fluid resuscitation or lactate > 4, the patient meets criteria for SEPTIC SHOCK          Are any of the following organ dysfunction criteria present within 6 hours of suspected infection and SIRS criteria that are NOT considered to be chronic conditions?         Organ dysfunction          Date of presentation of severe sepsis          Time of presentation of severe sepsis          Tissue hypoperfusion persists in the hour after crystalloid fluid administration, evidenced, by either:          Was hypotension present within one hour of the conclusion of crystalloid fluid administration?           Date of presentation of septic shock          Time of presentation of septic shock            User Key  (r) = Recorded By, (t) = Taken By, (c) = Cosigned By    [address] Name Provider Type    ERWIN Chin DO Physician

## 2018-04-29 NOTE — ED ATTENDING ATTESTATION
Emergency Department Note- Leeroy Melton 80 y o  female MRN: [id number]    Unit/Bed#: [facility] 482-02 Encounter: [id number]    Leeroy Melton is a 80 y o  female who presents with   Chief Complaint   Patient presents with    Shortness of Breath     Weakness sob         History of Present Illness   HPI:  Leeroy Melton is a 80 y o  female who presents with   Shortness of breath  Patient has a history of atrial fibrillation, recent hospitalization for atrial fibrillation with rapid ventricular response and was recently started on Eliquis  History of CHF as well as aortic stenosis, hypertension and diabetes and coronary artery disease  Patient states ever since she was discharged from the hospital she has not felt well  Patient complains of increased shortness of breath and generalized weakness  Denies chest pain, headache, fever, chills, diaphoresis  ROS is otherwise unremarkable  Historical Information   Past Medical History:   Diagnosis Date    Acute ischemic right MCA stroke ([facility] )     Anticoagulant long-term use     last assessed: 8/17/17    Aortic stenosis     Atrial fibrillation ([facility] )     Blurred vision     last assessed: 10/25/13    CHF (congestive heart failure) ([facility] )     Coronary artery disease     Dementia     Diabetes mellitus ([facility] )     Dysuria     last assessed: 8/3/15    Hyperlipidemia     Hypertension     Nonrheumatic aortic (valve) insufficiency     Old myocardial infarction     Rectal carcinoma (HCC)      Past Surgical History:   Procedure Laterality Date    BALLOON ANGIOPLASTY, ARTERY      CORONARY ARTERY BYPASS GRAFT      RECTAL SURGERY       Social History   History   Alcohol Use    Yes     Comment: occasional     History   Drug Use No     History   Smoking Status    Former Smoker    Quit date: 2002   Smokeless Tobacco    Never Used     Comment: 1 pack a week;  No secondhand smoke exposure       Family History:   Meds/Allergies     Allergies   Allergen Reactions    Heparin        Objective   First Vitals:   Blood Pressure: (!) 199/89 (18 1218)  Pulse: (!) 114 (18 1218)  Temperature: 97 5 °F (36 4 °C) (18 1245)  Temp Source: Tympanic (18 1245)  Respirations: 20 (18 1218)  Height: 5' 2" (157 5 cm) (18 1218)  Weight - Scale: 51 3 kg (113 lb) (18 1218)  SpO2: 98 % (18 1218)    Current Vitals:   Blood Pressure: (!) 158/104 (18 1600)  Pulse: (!) 118 (18 1600)  Temperature: 98 4 °F (36 9 °C) (18 1329)  Temp Source: Rectal (18 1329)  Respirations: 18 (18 1555)  Height: 5' 2" (157 5 cm) (18 1218)  Weight - Scale: 51 3 kg (113 lb) (18 1218)  SpO2: 99 % (18 1600)      Intake/Output Summary (Last 24 hours) at 18 1732  Last data filed at 18 1530   Gross per 24 hour   Intake             1000 ml   Output                0 ml   Net             1000 ml       Invasive Devices     Peripheral Intravenous Line            Peripheral IV 18 Left Forearm less than 1 day                      Medical Decision Makin  Patient with worsening congestive heart failure with an increase in her BNP and pleural effusion seen on chest x-ray, will obtain CT chest for further evaluation  CT chest suggestive of aspiration  Slight increase in renal function  Lasix administered  For congestive heart failure and shortness of breath  Patient admitted, family updated      Recent Results (from the past 36 hour(s))   ECG 12 lead    Collection Time: 18 12:13 PM   Result Value Ref Range    Ventricular Rate 109 BPM    Atrial Rate 129 BPM    MT Interval  ms    QRSD Interval 90 ms    QT Interval 280 ms    QTC Interval 377 ms    P Axis 89 degrees    QRS Axis -75 degrees    T Wave Axis 85 degrees   CBC and differential    Collection Time: 18 12:46 PM   Result Value Ref Range    WBC 6 15 4 31 - 10 16 Thousand/uL    RBC 4 34 3 81 - 5 12 Million/uL    Hemoglobin 13 4 11 5 - 15 4 g/dL    Hematocrit 40 8 34 8 - 46 1 %    MCV 94 82 - 98 fL    MCH 30 9 26 8 - 34 3 pg    MCHC 32 8 31 4 - 37 4 g/dL    RDW 14 1 11 6 - 15 1 %    MPV 12 0 8 9 - 12 7 fL    Platelets 807 078 - 285 Thousands/uL    nRBC 0 /100 WBCs    Neutrophils Relative 82 (H) 43 - 75 %    Lymphocytes Relative 11 (L) 14 - 44 %    Monocytes Relative 7 4 - 12 %    Eosinophils Relative 0 0 - 6 %    Basophils Relative 0 0 - 1 %    Neutrophils Absolute 5 00 1 85 - 7 62 Thousands/µL    Lymphocytes Absolute 0 70 0 60 - 4 47 Thousands/µL    Monocytes Absolute 0 42 0 17 - 1 22 Thousand/µL    Eosinophils Absolute 0 01 0 00 - 0 61 Thousand/µL    Basophils Absolute 0 01 0 00 - 0 10 Thousands/µL   Troponin I    Collection Time: 04/29/18 12:46 PM   Result Value Ref Range    Troponin I 1 45 (H) <=0 04 ng/mL   Protime-INR    Collection Time: 04/29/18 12:47 PM   Result Value Ref Range    Protime 19 2 (H) 12 1 - 14 4 seconds    INR 1 60 (H) 0 86 - 1 16   APTT    Collection Time: 04/29/18 12:47 PM   Result Value Ref Range    PTT 36 (H) 23 - 35 seconds   Comprehensive metabolic panel    Collection Time: 04/29/18 12:47 PM   Result Value Ref Range    Sodium 135 (L) 136 - 145 mmol/L    Potassium 4 2 3 5 - 5 3 mmol/L    Chloride 101 100 - 108 mmol/L    CO2 22 21 - 32 mmol/L    Anion Gap 12 4 - 13 mmol/L    BUN 48 (H) 5 - 25 mg/dL    Creatinine 1 40 (H) 0 60 - 1 30 mg/dL    Glucose 305 (H) 65 - 140 mg/dL    Calcium 9 6 8 3 - 10 1 mg/dL    AST 74 (H) 5 - 45 U/L    ALT 71 12 - 78 U/L    Alkaline Phosphatase 101 46 - 116 U/L    Total Protein 7 7 6 4 - 8 2 g/dL    Albumin 3 7 3 5 - 5 0 g/dL    Total Bilirubin 0 98 0 20 - 1 00 mg/dL    eGFR 33 ml/min/1 73sq m   B-type natriuretic peptide    Collection Time: 04/29/18 12:47 PM   Result Value Ref Range    NT-proBNP 18,520 (H) <450 pg/mL   Lipase    Collection Time: 04/29/18 12:47 PM   Result Value Ref Range    Lipase 153 73 - 393 u/L     CT chest without contrast   Final Result      The patient has debris in the left lower lobe bronchial branches with peripheral consolidation suggesting aspiration pneumonia at the left base  There is also evidence of congestive heart failure with pulmonary edema and pleural effusions and cardiomegaly  There is 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 and there are aortic valvular calcifications noted  Patient may benefit from repeat echocardiography  Workstation performed: [id number]         XR chest 2 views   ED Interpretation   Abnormal   Pulmonary vascular congestion with questionable effusion versus consolidation, though more likely effusion as interpreted by me independently               Portions of the record may have been created with voice recognition software  Occasional wrong word or "sound a like" substitutions may have occurred due to the inherent limitations of voice recognition software  Lexie Romans, DO, saw and evaluated the patient  I have discussed the patient with the resident/non-physician practitioner and agree with the resident's/non-physician practitioner's findings, Plan of Care, and MDM as documented in the resident's/non-physician practitioner's note, except where noted  All available labs and Radiology studies were reviewed  At this point I agree with the current assessment done in the Emergency Department    I have conducted an independent evaluation of this patient a history and physical is as follows:      Critical Care Time  CritCare Time    Procedures

## 2018-04-29 NOTE — H&P
H&P- Ashley Elliott 4/4/1927, 80 y o  female MRN: [id number]    Unit/Bed#: ED 11 Encounter: [id number]    Primary Care Provider: Shikha Rodrigez MD   Date and time admitted to hospital: 4/29/2018 12:02 PM        Acute on chronic diastolic congestive heart failure [facility])   Assessment & Plan    Acute exacerbation of chronic diastolic heart failure  Diminished breath sounds bilaterally on clinical exam, with cardiac wheezing  Worsening BUN creatinine compared to prior admission 48/1 40, compared to dc labs 27/1 03  Acute hepatitis secondary to worsening heart failure, with AST elevation  Underlying cardiorenal syndrome  EKG with atrial fibrillation  ProBNP elevation of 18,520, with troponin elevation 1 45 mildly higher than 1 06 from dc 4/22 (due to acute CHF exacerbation)   Patient is currently afebrile with no WBCs elevation, will avoid antibiotics for now  CT chest   The patient has debris in the left lower lobe bronchial branches with peripheral consolidation suggesting aspiration pneumonia at the left base  There is also evidence of congestive heart failure with pulmonary edema and pleural effusions and cardiomegaly  There is 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 and there are aortic valvular calcifications noted   Patient may benefit from repeat echocardiography      Cardiology consult  Echocardiogram  Telemetry monitoring  Input output  Daily weight  Continue Lasix 40mg bid IV increased from home dose, 20mg daily  Given Lasix in the ER, prior to receiving 1 L fluid bolus  Fluid restriction  Continue home meds   PT/OT  Encourage oral intake     miralx prn constipation          Osteoarthritis   Assessment & Plan    Chronic  No reports of joint pain        Hypertension   Assessment & Plan    BP controlled  Underlying diuresis may precipitate low blood pressure  Continue BP monitoring        Hyperlipidemia   Assessment & Plan    Hold statin for now  Acute elevation of AST  Continue monitor if trends down continue statin        Esophageal reflux   Assessment & Plan    Continue meds        Depression with anxiety   Assessment & Plan    Patient reported she is depressed after having a heart attack on prior admission 1 week ago  Counseling provided  Psychiatry consult        Cognitive impairment   Assessment & Plan    Reported from family, patient has mild dementia with agitation  Counseling provided to patient and family at bedside  Recommendation for outpatient counseling supportive care for management of dementia        CAD (coronary artery disease)   Assessment & Plan    Stable  Continue meds  Continue above management for acute heart failure exacerbation        Controlled diabetes mellitus type II without complication (HCC)   Assessment & Plan    A1c 7 4%  Insulin sliding scale  Blood glucose monitoring  Carb controlled diet        Benign essential HTN   Assessment & Plan    Blood pressure monitoring  Continue meds          Aortic stenosis   Assessment & Plan    Stable  Follow-up echocardiogram     Severe protein calorie malnutrition -with evidence on loss of muscle mass , anorexia -dietary supplementation provided strawberry flavored ensure, [employer]               VTE Prophylaxis: Apixaban (Eliquis)  / sequential compression device   Code Status: DNR/DNI   POLST: POLST form is not discussed and not completed at this time  Discussion with family: yes    Anticipated Length of Stay:  Patient will be admitted on an Inpatient basis with an anticipated length of stay of  > 2 midnights  Justification for Hospital Stay:  Medical management of acute heart failure exacerbation  Total Time for Visit, including Counseling / Coordination of Care: 45 minutes  Greater than 50% of this total time spent on direct patient counseling and coordination of care  Chief Complaint:   Worsening fatigue, loss of appetite    History of Present Illness:     Helena Aponte is a 80 y o  female who presents with Worsening fatigue, loss of appetite over the past week  She was recently discharged [facility] for evaluation of atrial fibrillation, paroxysmal atrial fibrillation, Congestive heart failure was brought to the hospital further evaluation of worsening fatigue, anorexia, which worsened since discharge 04/22/2018  Patient reported with family at bedside that she has no appetite, denied any chest pain or shortness of breath  As per patient she got up this morning, and felt severely weak, and fatigued as well as dizzy, needed assistance by her  to go to the living room  Patient denied any chest pain, but reported fatigue on exertion  She reported she has not been eating or drinking, or making her own meals due to generalized weakness in her body  She reported constipation for the past several days prior to hospitalization, of which her  gave her some laxative which proved bruits loose stools in the emergency room  Review of systems negative for fever, chills, rigors, productive cough, changes in urinary habits, mentation  History obtained by family members including 2 children felt very concerned that patient is currently very depressed since her last hospitalization 1 week ago  Review of Systems:    Review of Systems   Constitutional: Positive for activity change, appetite change and fatigue  Negative for chills, diaphoresis, fever and unexpected weight change  HENT: Negative for sore throat and trouble swallowing  Eyes: Negative for photophobia and visual disturbance  Respiratory: Positive for shortness of breath  Negative for apnea, cough, choking, chest tightness, wheezing and stridor  Cardiovascular: Positive for palpitations  Negative for chest pain and leg swelling  Gastrointestinal: Positive for constipation  Negative for abdominal distention, abdominal pain, anal bleeding, blood in stool, diarrhea, nausea and vomiting  Endocrine: Negative      Genitourinary: Negative for decreased urine volume, difficulty urinating and frequency  Musculoskeletal: Negative  Skin: Negative  Allergic/Immunologic: Negative  Neurological: Positive for dizziness and weakness  Negative for tremors, seizures, syncope, facial asymmetry, speech difficulty, light-headedness, numbness and headaches  Hematological: Negative  Psychiatric/Behavioral: Positive for decreased concentration  The patient is nervous/anxious  Past Medical and Surgical History:     Past Medical History:   Diagnosis Date    [facility] ischemic right MCA stroke ([facility] )     Anticoagulant long-term use     last assessed: 8/17/17    Aortic stenosis     Atrial fibrillation ([facility] )     Blurred vision     last assessed: 10/25/13    CHF (congestive heart failure) ([facility] )     Coronary artery disease     Dementia     Diabetes mellitus ([facility] )     Dysuria     last assessed: 8/3/15    Hyperlipidemia     Hypertension     Nonrheumatic aortic (valve) insufficiency     Old myocardial infarction     Rectal carcinoma (HCC)        Past Surgical History:   Procedure Laterality Date    BALLOON ANGIOPLASTY, ARTERY      CORONARY ARTERY BYPASS GRAFT      RECTAL SURGERY         Meds/Allergies:    Prior to Admission medications    Medication Sig Start Date End Date Taking?  Authorizing Provider   apixaban (ELIQUIS) 2 5 mg Take 1 tablet (2 5 mg total) by mouth 2 (two) times a day 4/24/18   Padmini Martins MD   aspirin 81 mg chewable tablet Chew 1 tablet daily 8/7/17   Hetul Gee, DO   calcitonin, salmon, (MIACALCIN) 200 units/act nasal spray USE 1 SPRAY IN ONE NOSTRIL DAILY--ALTERNATE NOSTRILS 3/15/18   Shikha Rodrigez MD   furosemide (LASIX) 20 mg tablet Take 1 tablet (20 mg total) by mouth daily 4/24/18   Padmini Martins MD   lactulose (CHRONULAC) 10 g/15 mL solution Take by mouth 8/28/17   Historical Provider, MD   LORazepam (ATIVAN) 1 mg tablet Take 1 tablet by mouth daily at bedtime for 10 days  Patient taking differently: Take 0 5 mg by mouth daily at bedtime   8/7/17 4/22/18  Clinton Gee DO   losartan (COZAAR) 50 mg tablet Take 0 5 tablets (25 mg total) by mouth daily for 90 days 2/20/18 5/21/18  Home Hankins MD   metoprolol tartrate (LOPRESSOR) 50 mg tablet Take 1 tablet (50 mg total) by mouth every 12 (twelve) hours 4/24/18   Ines Montelongo MD   potassium chloride (K-DUR,KLOR-CON) 10 mEq tablet Take 1 tablet (10 mEq total) by mouth daily 2/21/18   Dasia Navarro MD   senna-docusate sodium (SENOKOT S) 8 6-50 mg per tablet Take 1 tablet by mouth daily at bedtime 4/24/18   Ines Montelongo MD     I have reviewed home medications with patient personally  Allergies: Allergies   Allergen Reactions    Heparin        Social History:     Marital Status: /Civil Union   Occupation:  Retired  Patient Pre-hospital Living Situation:  Lives with her  alone  Patient Pre-hospital Level of Mobility:  Ambulates with walker  Patient Pre-hospital Diet Restrictions:  Low-salt diet  Substance Use History:   History   Alcohol Use    Yes     Comment: occasional     History   Smoking Status    Former Smoker    Quit date: 2002   Smokeless Tobacco    Never Used     Comment: 1 pack a week; No secondhand smoke exposure     History   Drug Use No       Family History:    Family History   Problem Relation Age of Onset    Stroke Mother     Stroke Father        Physical Exam:     Vitals:   Blood Pressure: 141/73 (04/29/18 1400)  Pulse: 100 (04/29/18 1400)  Temperature: 98 4 °F (36 9 °C) (04/29/18 1329)  Temp Source: Rectal (04/29/18 1329)  Respirations: 16 (04/29/18 1355)  Height: 5' 2" (157 5 cm) (04/29/18 1218)  Weight - Scale: 51 3 kg (113 lb) (04/29/18 1218)  SpO2: 97 % (04/29/18 1400)    Physical Exam   Constitutional: She is oriented to person, place, and time  No distress  Cachectic appearing woman with significant loss of muscle mass   HENT:   Head: Normocephalic and atraumatic  Mouth/Throat: No oropharyngeal exudate     Dry oral mucosa Eyes: EOM are normal  Pupils are equal, round, and reactive to light  No scleral icterus  Conjunctiva pallor   Neck: Normal range of motion  JVD present  Cardiovascular: Exam reveals no gallop and no friction rub  Murmur heard  Tachycardia   Pulmonary/Chest: No respiratory distress  She has wheezes  She has rales  She exhibits no tenderness  Diminished breath sounds bilateral wheezing on both sides   Abdominal: Soft  Bowel sounds are normal  She exhibits distension  There is no tenderness  There is no rebound and no guarding  Hepatomegaly 3 cm below costal margin   Musculoskeletal: Normal range of motion  She exhibits no edema, tenderness or deformity  Neurological: She is alert and oriented to person, place, and time  She displays normal reflexes  No cranial nerve deficit  She exhibits normal muscle tone  Coordination normal    Skin: Skin is warm  No rash noted  No erythema  No pallor  Psychiatric:   Depressed mood after her heart attack           Additional Data:     Lab Results: I have personally reviewed pertinent reports          Results from last 7 days  Lab Units 04/29/18  1246   WBC Thousand/uL 6 15   HEMOGLOBIN g/dL 13 4   HEMATOCRIT % 40 8   PLATELETS Thousands/uL 188   NEUTROS PCT % 82*   LYMPHS PCT % 11*   MONOS PCT % 7   EOS PCT % 0       Results from last 7 days  Lab Units 04/29/18  1247   SODIUM mmol/L 135*   POTASSIUM mmol/L 4 2   CHLORIDE mmol/L 101   CO2 mmol/L 22   BUN mg/dL 48*   CREATININE mg/dL 1 40*   CALCIUM mg/dL 9 6   TOTAL PROTEIN g/dL 7 7   BILIRUBIN TOTAL mg/dL 0 98   ALK PHOS U/L 101   ALT U/L 71   AST U/L 74*   GLUCOSE RANDOM mg/dL 305*       Results from last 7 days  Lab Units 04/29/18  1247   INR  1 60*       Results from last 7 days  Lab Units 04/24/18  1126 04/24/18  0731 04/23/18  2101 04/23/18  1535 04/23/18  1137 04/23/18  0723 04/22/18  2119 04/22/18  1649   POC GLUCOSE mg/dl 306* 141* 204* 151* 257* 112 176* 197*           Imaging: I have personally reviewed pertinent reports  CT chest without contrast   Final Result by Rozetta Nissen, MD (04/29 1442)      The patient has debris in the left lower lobe bronchial branches with peripheral consolidation suggesting aspiration pneumonia at the left base  There is also evidence of congestive heart failure with pulmonary edema and pleural effusions and cardiomegaly  There is 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 and there are aortic valvular calcifications noted  Patient may benefit from repeat echocardiography  Workstation performed: [id number]         XR chest 2 views   ED Interpretation by Summer Evans DO (04/29 8491)   Abnormal   Pulmonary vascular congestion with questionable effusion versus consolidation, though more likely effusion as interpreted by me independently             EKG, Pathology, and Other Studies Reviewed on Admission:   EKG: AF  Allscripts / Epic Records Reviewed: Yes     ** Please Note: This note has been constructed using a voice recognition system   **

## 2018-04-29 NOTE — ASSESSMENT & PLAN NOTE
Patient reported she is depressed after having a heart attack on prior admission 1 week ago  Counseling provided  Psychiatry consult

## 2018-04-29 NOTE — PLAN OF CARE
CARDIOVASCULAR - ADULT     Maintains optimal cardiac output and hemodynamic stability Progressing     Absence of cardiac dysrhythmias or at baseline rhythm Progressing        DISCHARGE PLANNING     Discharge to home or other facility with appropriate resources Progressing        INFECTION - ADULT     Absence or prevention of progression during hospitalization Progressing     Absence of fever/infection during neutropenic period Progressing        Knowledge Deficit     Patient/family/caregiver demonstrates understanding of disease process, treatment plan, medications, and discharge instructions Progressing        PAIN - ADULT     Verbalizes/displays adequate comfort level or baseline comfort level Progressing        Potential for Falls     Patient will remain free of falls Progressing        Prexisting or High Potential for Compromised Skin Integrity     Skin integrity is maintained or improved Progressing        SAFETY ADULT     Maintain or return to baseline ADL function Progressing     Maintain or return mobility status to optimal level Progressing

## 2018-04-29 NOTE — ASSESSMENT & PLAN NOTE
Acute exacerbation of chronic diastolic heart failure  Diminished breath sounds bilaterally on clinical exam, with cardiac wheezing  Worsening BUN creatinine compared to prior admission 48/1 40  Acute hepatitis secondary to worsening heart failure  Underlying cardiorenal syndrome  EKG with atrial fibrillation  ProBNP elevation of 18,520      Cardiology consult  echocardiogram  Input output  Daily weight  Continue Lasix   Given Lasix in the ER, prior to receiving 1 L fluid bolus  Fluid restriction  Continue home meds

## 2018-04-29 NOTE — ASSESSMENT & PLAN NOTE
Reported from family, patient has mild dementia with agitation  Counseling provided to patient and family at bedside  Recommendation for outpatient counseling supportive care for management of dementia

## 2018-04-30 ENCOUNTER — APPOINTMENT (INPATIENT)
Dept: NON INVASIVE DIAGNOSTICS | Facility: HOSPITAL | Age: 83
DRG: 280 | End: 2018-04-30
Payer: MEDICARE

## 2018-04-30 PROBLEM — R11.0 NAUSEA: Status: ACTIVE | Noted: 2018-04-30

## 2018-04-30 PROBLEM — R74.01 TRANSAMINITIS: Status: ACTIVE | Noted: 2018-04-30

## 2018-04-30 PROBLEM — E44.0 MODERATE PROTEIN-CALORIE MALNUTRITION (HCC): Status: ACTIVE | Noted: 2018-04-30

## 2018-04-30 LAB
ALBUMIN SERPL BCP-MCNC: 3.4 G/DL (ref 3.5–5)
ALP SERPL-CCNC: 99 U/L (ref 46–116)
ALT SERPL W P-5'-P-CCNC: 74 U/L (ref 12–78)
ANION GAP SERPL CALCULATED.3IONS-SCNC: 11 MMOL/L (ref 4–13)
AST SERPL W P-5'-P-CCNC: 76 U/L (ref 5–45)
BILIRUB SERPL-MCNC: 0.68 MG/DL (ref 0.2–1)
BUN SERPL-MCNC: 53 MG/DL (ref 5–25)
CALCIUM SERPL-MCNC: 9.3 MG/DL (ref 8.3–10.1)
CHLORIDE SERPL-SCNC: 102 MMOL/L (ref 100–108)
CO2 SERPL-SCNC: 26 MMOL/L (ref 21–32)
CREAT SERPL-MCNC: 1.42 MG/DL (ref 0.6–1.3)
ERYTHROCYTE [DISTWIDTH] IN BLOOD BY AUTOMATED COUNT: 14.5 % (ref 11.6–15.1)
GFR SERPL CREATININE-BSD FRML MDRD: 32 ML/MIN/1.73SQ M
GLUCOSE SERPL-MCNC: 104 MG/DL (ref 65–140)
GLUCOSE SERPL-MCNC: 238 MG/DL (ref 65–140)
GLUCOSE SERPL-MCNC: 261 MG/DL (ref 65–140)
GLUCOSE SERPL-MCNC: 317 MG/DL (ref 65–140)
GLUCOSE SERPL-MCNC: 87 MG/DL (ref 65–140)
HCT VFR BLD AUTO: 40.6 % (ref 34.8–46.1)
HGB BLD-MCNC: 13.3 G/DL (ref 11.5–15.4)
MAGNESIUM SERPL-MCNC: 1.9 MG/DL (ref 1.6–2.6)
MCH RBC QN AUTO: 30.6 PG (ref 26.8–34.3)
MCHC RBC AUTO-ENTMCNC: 32.8 G/DL (ref 31.4–37.4)
MCV RBC AUTO: 93 FL (ref 82–98)
PHOSPHATE SERPL-MCNC: 4.8 MG/DL (ref 2.3–4.1)
PLATELET # BLD AUTO: 190 THOUSANDS/UL (ref 149–390)
PMV BLD AUTO: 13.2 FL (ref 8.9–12.7)
POTASSIUM SERPL-SCNC: 4.1 MMOL/L (ref 3.5–5.3)
PROT SERPL-MCNC: 7.2 G/DL (ref 6.4–8.2)
RBC # BLD AUTO: 4.35 MILLION/UL (ref 3.81–5.12)
SODIUM SERPL-SCNC: 139 MMOL/L (ref 136–145)
WBC # BLD AUTO: 6.74 THOUSAND/UL (ref 4.31–10.16)

## 2018-04-30 PROCEDURE — G8978 MOBILITY CURRENT STATUS: HCPCS

## 2018-04-30 PROCEDURE — 97530 THERAPEUTIC ACTIVITIES: CPT

## 2018-04-30 PROCEDURE — 83735 ASSAY OF MAGNESIUM: CPT | Performed by: HOSPITALIST

## 2018-04-30 PROCEDURE — 99233 SBSQ HOSP IP/OBS HIGH 50: CPT | Performed by: GENERAL PRACTICE

## 2018-04-30 PROCEDURE — 93306 TTE W/DOPPLER COMPLETE: CPT | Performed by: INTERNAL MEDICINE

## 2018-04-30 PROCEDURE — 82948 REAGENT STRIP/BLOOD GLUCOSE: CPT

## 2018-04-30 PROCEDURE — 85027 COMPLETE CBC AUTOMATED: CPT | Performed by: HOSPITALIST

## 2018-04-30 PROCEDURE — 84100 ASSAY OF PHOSPHORUS: CPT | Performed by: HOSPITALIST

## 2018-04-30 PROCEDURE — G8988 SELF CARE GOAL STATUS: HCPCS

## 2018-04-30 PROCEDURE — 97163 PT EVAL HIGH COMPLEX 45 MIN: CPT

## 2018-04-30 PROCEDURE — 93306 TTE W/DOPPLER COMPLETE: CPT

## 2018-04-30 PROCEDURE — G8979 MOBILITY GOAL STATUS: HCPCS

## 2018-04-30 PROCEDURE — 99222 1ST HOSP IP/OBS MODERATE 55: CPT | Performed by: PSYCHIATRY & NEUROLOGY

## 2018-04-30 PROCEDURE — G8987 SELF CARE CURRENT STATUS: HCPCS

## 2018-04-30 PROCEDURE — 80053 COMPREHEN METABOLIC PANEL: CPT | Performed by: HOSPITALIST

## 2018-04-30 PROCEDURE — 97167 OT EVAL HIGH COMPLEX 60 MIN: CPT

## 2018-04-30 PROCEDURE — 99222 1ST HOSP IP/OBS MODERATE 55: CPT | Performed by: INTERNAL MEDICINE

## 2018-04-30 RX ORDER — FAMOTIDINE 20 MG/1
20 TABLET, FILM COATED ORAL DAILY
Status: DISCONTINUED | OUTPATIENT
Start: 2018-04-30 | End: 2018-05-08 | Stop reason: HOSPADM

## 2018-04-30 RX ORDER — ACETAMINOPHEN 325 MG/1
650 TABLET ORAL EVERY 6 HOURS PRN
Status: DISCONTINUED | OUTPATIENT
Start: 2018-04-30 | End: 2018-05-03

## 2018-04-30 RX ORDER — LACTULOSE 20 G/30ML
10 SOLUTION ORAL DAILY PRN
Status: DISCONTINUED | OUTPATIENT
Start: 2018-04-30 | End: 2018-05-08 | Stop reason: HOSPADM

## 2018-04-30 RX ORDER — LORAZEPAM 1 MG/1
1 TABLET ORAL
Qty: 30 TABLET | Refills: 0 | OUTPATIENT
Start: 2018-04-30 | End: 2018-05-30

## 2018-04-30 RX ORDER — ONDANSETRON 2 MG/ML
4 INJECTION INTRAMUSCULAR; INTRAVENOUS EVERY 6 HOURS PRN
Status: DISCONTINUED | OUTPATIENT
Start: 2018-04-30 | End: 2018-05-08 | Stop reason: HOSPADM

## 2018-04-30 RX ORDER — INSULIN GLARGINE 100 [IU]/ML
7 INJECTION, SOLUTION SUBCUTANEOUS
Status: DISCONTINUED | OUTPATIENT
Start: 2018-04-30 | End: 2018-05-01

## 2018-04-30 RX ORDER — DILTIAZEM HYDROCHLORIDE 120 MG/1
120 CAPSULE, COATED, EXTENDED RELEASE ORAL DAILY
Status: DISCONTINUED | OUTPATIENT
Start: 2018-04-30 | End: 2018-05-08 | Stop reason: HOSPADM

## 2018-04-30 RX ADMIN — DOCUSATE SODIUM,SENNOSIDES 1 TABLET: 50; 8.6 TABLET, FILM COATED ORAL at 21:31

## 2018-04-30 RX ADMIN — FAMOTIDINE 20 MG: 20 TABLET ORAL at 09:39

## 2018-04-30 RX ADMIN — INSULIN LISPRO 5 UNITS: 100 INJECTION, SOLUTION INTRAVENOUS; SUBCUTANEOUS at 11:49

## 2018-04-30 RX ADMIN — METOPROLOL TARTRATE 50 MG: 50 TABLET ORAL at 09:28

## 2018-04-30 RX ADMIN — ASPIRIN 81 MG 81 MG: 81 TABLET ORAL at 09:27

## 2018-04-30 RX ADMIN — ONDANSETRON 4 MG: 2 INJECTION INTRAMUSCULAR; INTRAVENOUS at 09:39

## 2018-04-30 RX ADMIN — METOPROLOL TARTRATE 50 MG: 50 TABLET ORAL at 21:31

## 2018-04-30 RX ADMIN — LACTULOSE 10 G: 20 SOLUTION ORAL at 11:49

## 2018-04-30 RX ADMIN — APIXABAN 2.5 MG: 2.5 TABLET, FILM COATED ORAL at 17:45

## 2018-04-30 RX ADMIN — FUROSEMIDE 40 MG: 10 INJECTION, SOLUTION INTRAMUSCULAR; INTRAVENOUS at 09:28

## 2018-04-30 RX ADMIN — APIXABAN 2.5 MG: 2.5 TABLET, FILM COATED ORAL at 09:28

## 2018-04-30 RX ADMIN — CALCITONIN SALMON 1 SPRAY: 200 SPRAY, METERED NASAL at 09:29

## 2018-04-30 RX ADMIN — DILTIAZEM HYDROCHLORIDE 120 MG: 120 CAPSULE, COATED, EXTENDED RELEASE ORAL at 11:49

## 2018-04-30 RX ADMIN — INSULIN LISPRO 3 UNITS: 100 INJECTION, SOLUTION INTRAVENOUS; SUBCUTANEOUS at 09:28

## 2018-04-30 RX ADMIN — FUROSEMIDE 40 MG: 10 INJECTION, SOLUTION INTRAMUSCULAR; INTRAVENOUS at 15:36

## 2018-04-30 NOTE — CONSULTS
Consultation - [address] [facility],First Floor 80 y o  female MRN: [id number]  Unit/Bed#: [facility] 506-01 Encounter: [id number]      Chief Complaint:  I am depressed because I am not home    History of Present Illness   Physician Requesting Consult: Romina Bryan DO  Reason for Consult / Principal Problem:  Depression with anxiety    Yaw Wharton is a 80 y o  female with congestive heart failure, osteoarthritis, hypertension, hyperlipidemia,  Esophageal reflux, cognitive impairment, coronary artery disease, diabetes mellitus type 2, aortic stenosis  and anxiety presents with worsening fatigue, loss of appetite over the past week  She was just discharged from [facility] 04/22/2018  She states that she feels depressed because she is not home with her , she also states that she worries about her children  She wants to get better and want to go home  Her  pass the all day with her  They have been  for 71 years  She denies any suicidal thoughts plans or intent  She states that she sleeps with her medication  She states that she does not want any medication for depression  Psychiatric Review Of Systems:  sleep: no  appetite changes: yes  weight changes: yes  energy/anergy: yes  interest/pleasure/anhedonia: no  somatic symptoms: no  anxiety/panic: no  corrina: no  guilty/hopeless: no  self injurious behavior/risky behavior: no    Historical Information   Past Psychiatric History:   None  Currently in treatment with primary doctor  Past Suicide attempts:   NonePast Suicide attempts:  Past Violent behavior:  None  Past Psychiatric medication trial:  Ativan    Substance Abuse History:  She denies any drugs or alcohol history     I have assessed this patient for substance use within the past 12 months     History of IP/OP rehabilitation program:  None  Smoking history:   Former smoker  Family Psychiatric History:   She denies any    Social History  Education: no high school  Learning Disabilities: None  Marital history:   Living arrangement, social support: She live with her   Occupational History: retired  Functioning Relationships: good support system  Other Pertinent History: None    Traumatic History:   Abuse: Denies any  Other Traumatic Events: None    Past Medical History:   Diagnosis Date    Acute ischemic right MCA stroke ([facility] )     Anticoagulant long-term use     last assessed: 8/17/17    Aortic stenosis     Atrial fibrillation ([facility] )     Blurred vision     last assessed: 10/25/13    [facility]congest[facility] failure) [facility]     Coronary artery disease     Dementia     Diabetes mellitus ([facility] )     Dysuria     last assessed: 8/3/15    Hyperlipidemia     Hypertension     Nonrheumatic aortic (valve) insufficiency     Old myocardial infarction     Rectal carcinoma ([facility])        Medical Review Of Systems:  Review of Systems - Negative except appetite changes, fatigue, shortness of breath, palpitations, constipation, dizziness, weakness, anxious, difficulty ambulating   All other systems reviewed were negative    Meds/Allergies   current meds:   Current Facility-Administered Medications   Medication Dose Route Frequency    acetaminophen (TYLENOL) tablet 650 mg  650 mg Oral Q6H PRN    apixaban (ELIQUIS) tablet 2 5 mg  2 5 mg Oral BID    aspirin chewable tablet 81 mg  81 mg Oral Daily    calcitonin (salmon) (MIACALCIN) 200 units/act nasal spray 1 spray  1 spray Alternating Nares Daily    diltiazem (CARDIZEM CD) 24 hr capsule 120 mg  120 mg Oral Daily    famotidine (PEPCID) tablet 20 mg  20 mg Oral Daily    furosemide (LASIX) injection 40 mg  40 mg Intravenous BID (diuretic)    insulin glargine (LANTUS) subcutaneous injection 7 Units 0 07 mL  7 Units Subcutaneous HS    insulin lispro (HumaLOG) 100 units/mL subcutaneous injection 1-6 Units  1-6 Units Subcutaneous TID AC    lactulose 20 g/30 mL oral solution 10 g  10 g Oral Daily PRN    metoprolol tartrate (LOPRESSOR) tablet 50 mg  50 mg Oral Q12H [address]    ondansetron (ZOFRAN) injection 4 mg  4 mg Intravenous Q6H PRN    senna-docusate sodium (SENOKOT S) 8 6-50 mg per tablet 1 tablet  1 tablet Oral HS     Allergies   Allergen Reactions    Heparin        Objective   Vital signs in last 24 hours:  Temp:  [97 2 °F (36 2 °C)-98 5 °F (36 9 °C)] 98 4 °F (36 9 °C)  HR:  [] 98  Resp:  [14-24] 18  BP: (110-158)/() 110/53      Intake/Output Summary (Last 24 hours) at 04/30/18 1451  Last data filed at 04/30/18 1227   Gross per 24 hour   Intake             1990 ml   Output              795 ml   Net             1195 ml       Mental Status Evaluation:  Appearance:  age appropriate   Behavior:  normal   Speech:  soft   Mood:  sad   Affect:  mood-congruent   Language: naming objects and repeating phrases   Thought Process:  goal directed   Associations: intact associations   Thought Content:  normal   Perceptual Disturbances: None   Risk Potential: She denies any suicidal thoughts plans or intent   Sensorium:  person, place and time/date   Memory:  recent memory mildly impaired, remote memory grossly intact   Cognition:  Fair   Consciousness:  alert and awake    Attention: attention span appeared shorter than expected for age   Intellect: normal   Fund of Knowledge: awareness of current events: Fair   Insight:  fair   Judgment: fair   Muscle Strength and Tone: Within normal limits   Gait/Station: Needs a walker   Motor Activity: no abnormal movements     Lab Results:   Lab Results   Component Value Date    WBC 6 74 04/30/2018    HGB 13 3 04/30/2018    HCT 40 6 04/30/2018    MCV 93 04/30/2018     04/30/2018     Lab Results   Component Value Date    GLUCOSE 261 (H) 04/30/2018    CALCIUM 9 3 04/30/2018     04/30/2018    K 4 1 04/30/2018    CO2 26 04/30/2018     04/30/2018    BUN 53 (H) 04/30/2018    CREATININE 1 42 (H) 04/30/2018          Code Status: )Level 3 - DNAR and DNI    Assessment/Plan Assessment:  Lesley Cruz is a 80 y o  female with multiple medical problems admitted with worsening fatigue, loss of appetite, for over a week  She was discharged from [address] [address] on 04/22 18  Family is concerned that she is very depressed but patient states that she does not still feels so depressed, she feels sad because she is not home  She does not want any medication for depression  She denies any suicidal thoughts plans or intent, she denies any psychotic symptoms     Diagnosis:  Depression with anxiety F41 [id number]   Plan:   Continue medical management  Patient refused any antidepressant  No other intervention at this moment  Discussed with the primary team  I will sign off  Risks, benefits and possible side effects of Medications:   No medication given      Chantale Garcia MD

## 2018-04-30 NOTE — ASSESSMENT & PLAN NOTE
Hold statin for now   Acute elevation of AST  Continue monitor if trends down continue statin   - would initiate atorvastatin 40 when LFTs stable

## 2018-04-30 NOTE — OCCUPATIONAL THERAPY NOTE
[address] Evaluation     Patient Name: Arsh Iyer  CJWKV'[id number] Date: 4/30/2018  Problem List  Patient Active Problem List   Diagnosis    Acute on chronic diastolic congestive heart failure (HCC)    Aortic stenosis    Benign essential HTN    Controlled diabetes mellitus type II without complication (HCC)    Atrial fibrillation with RVR (HCC)    [facility]    CAD (coronary artery disease)    Cognitive impairment    Depression with anxiety    Esophageal reflux    Hyperlipidemia    Hypertension    Osteoarthritis    Squamous cell carcinoma of skin    Tinnitus    Severe protein-calorie malnutrition [place]: less than 60% of standard weight) (HCC)    Nausea    Transaminitis    Moderate protein-calorie malnutrition (HCC)     Past Medical History  Past Medical History:   Diagnosis Date    Acute ischemic right MCA stroke ([facility]     Anticoagulant long-term use     last assessed: 8/17/17    Aortic stenosis     Atrial fibrillation (HCC)     Blurred vision     last assessed: 10/25/13    CHF (congestive heart failure) (HCC)     Coronary artery disease     Dementia     Diabetes mellitus ([facility]     Dysuria     last assessed: 8/3/15    Hyperlipidemia     Hypertension     Nonrheumatic aortic (valve) insufficiency     Old myocardial infarction     Rectal carcinoma (HCC)      Past Surgical History  Past Surgical History:   Procedure Laterality Date    BALLOON ANGIOPLASTY, ARTERY      CORONARY ARTERY BYPASS GRAFT      RECTAL SURGERY        04/30/18 1622   Note Type   Note type Eval/Treat   Restrictions/Precautions   Weight Bearing Precautions Per Order No   Other Precautions Telemetry;O2;Fall Risk;Multiple lines;Cognitive   Pain Assessment   Pain Assessment No/denies pain   Pain Score No Pain   Home Living   Type of Home House   Home Layout Multi-level; Laundry in basement;Able to live on main level with bedroom/bathroom;Stairs to enter with rails  (1 +1 MURPHY) Bathroom Shower/Tub Tub/shower unit   Bathroom Toilet Standard   Bathroom Equipment Grab bars in shower; Shower chair;Grab bars around toilet   [address] Walker;Cane;Grab bars  (SC, BSC)   Prior Function   Level of [place] Needs assistance with ADLs and functional mobility  (see assessment for details)   Lives With Spouse   Receives Help From Family   ADL Assistance Needs assistance   IADLs Needs assistance   Falls in the last 6 months 0   Vocational Retired   Lifestyle   Autonomy PTA,  Pt  Received A for bathing, dressing and IADLs from   Pt  Was mod I for toileting, grooming and mobility  Pt used a RW for functional mobility  Reciprocal Relationships supportive  at bedside   Service to Others retired   [address] (WDL) WDL   Subjective   Subjective "I really would like to go right home "   ADL   Grooming Assistance 5  Supervision/Setup   Grooming Deficit Supervision/safety;Verbal cueing; Increased time to complete;Wash/dry hands   UB Dressing Assistance 4  Minimal Assistance   UB Dressing Deficit Setup; Fasteners   LB Dressing Assistance 2  Maximal Assistance   LB Dressing Deficit Setup;Don/doff R sock; Don/doff L sock   Toileting Assistance  3  Moderate Assistance   Toileting Deficit Setup;Steadying;Verbal cueing;Supervison/safety; Clothing management up;Clothing management down;Perineal hygiene   Bed Mobility   Supine to Sit 3  Moderate assistance   Additional items Assist x 1; Increased time required;Verbal cues;HOB elevated;LE management; Bedrails   Sit to Supine 4  Minimal assistance   Additional items Assist x 1; Increased time required;Verbal cues;HOB elevated; Bedrails;LE management   Transfers   Sit to Stand 4  Minimal assistance   Additional items Assist x 1; Increased time required;Verbal cues   Stand to Sit 4  Minimal assistance   Additional items Assist x 1; Increased time required;Verbal cues   Toilet transfer 4  Minimal assistance   Additional items Assist x 1; Increased time required;Verbal cues;Standard toilet  (+ GB)   Functional Mobility   Functional Mobility 4  Minimal assistance   Additional Comments t/o room and bathroom   Additional items Rolling walker   Balance   Static Sitting Good   Dynamic Sitting Poor +   Static Standing Fair -   Dynamic Standing Poor +   Ambulatory Fair -   Activity Tolerance   Activity Tolerance Patient limited by fatigue;Patient tolerated treatment well   Medical Staff Made Aware Restorative Jacqueline Spearing present to A with line management   Nurse Made Aware yes, RN cleared for session   RUE Assessment   RUE Assessment WFL  (functionally assessed)   LUE Assessment   LUE Assessment WFL  (functionally assessed)   Hand Function   Gross Motor Coordination Functional   Fine Motor Coordination Functional   Sensation   Light Touch No apparent deficits   Cognition   Overall Cognitive Status Impaired   Arousal/Participation Alert; Cooperative   Attention Attends with cues to redirect   Orientation Level Oriented to person;Oriented to place   Memory Decreased short term memory;Decreased recall of precautions   Following Commands Follows one step commands with increased time or repetition   Assessment   Limitation Decreased ADL status; Decreased cognition;Decreased endurance;Decreased self-care trans;Decreased high-level ADLs   Prognosis Good   Assessment Pt  is a 80 y o  female who was [facility] with acute CHF exacerbation  Pt  With significant PHM/co-morbidities of:  REHANA,depression with anxiety, osteoarthritis, HTN, hyperlipidemia, cognitive impairment, DM II, CAD, hx  Of acute MCA stroke, hx  Of MI  Pt  currently lives in a 1 SH with spouse  PTA,  Pt  Received A for bathing, dressing and IADLs from   Pt  Was mod I for toileting, grooming and mobility  Pt used a RW for functional mobility    Currently, pt  requires min A for functional mobility and transfers, min A-close (S) for UB ADLs and max-mod A for LB ADLs  A is needed d/t the following impairments: decreased functional activity tolerance, generalized weakness, deconditioning, decreased balance, +SOB/RAMIREZ, increased impulsivity, decreased problem solving, decreased STM, decreased attention/concentration to task, and additional time required for delayed processing  Additionally, pt  requires mod v c  during functional activity 2* to cognitive deficits noted  Therefore, pt  will benefit from skilled OT services to maximize functional gains, monitor status and prevent decline  Will continue to follow pt  3-5x/week to meet the goals described below in 10-14 days  Recommend rehab vs  Home with home OT and increased family support once medically stable  Of note, pt  And  would prefer to return home right from hospital  Pt  Reporting that son may be able to stay with them for a few days s/p d c  As he does not work  Goals   Patient Goals to go home   LTG Time Frame 10-14   Plan   Treatment Interventions ADL retraining;Functional transfer training; Endurance training;Patient/family training;Equipment evaluation/education; Compensatory technique education;Continued evaluation; Activityengagement  (cog assess and tx  as needed)   Goal Expiration Date 05/14/18   OT Frequency 3-5x/wk   Recommendation   OT Discharge Recommendation Short Term Rehab  (vs  home with home OT and family support pending progress)   OT - OK to Discharge Yes  (to STR at this time)   Barthel Index   Feeding 10   Bathing 0   Grooming Score 5   Dressing Score 5   Bladder Score 5   Bowels Score 5   Toilet Use Score 5   Transfers (Bed/Chair) Score 10   Mobility (Level Surface) Score 0   Stairs Score 0   Barthel Index Score 45   Modified Gordon Scale   Modified Gordon Scale 4      GOALS    Patient will perform all functional mobility and transfers at a mod I level with use of the least restrictive device      Pt  will perform bed mobility at a mod I level with G balance and activity tolerance  Pt  will complete UB dressing at a mod I level with AE as needed  Pt  will complete LB dressing at a mod A level with AE as needed  Pt  will complete all grooming activities at a mod I level  Pt  will complete a toileting tasks at a mod I level  Pt will complete LB/UB bathing at a mod A level with the use of AE as needed  Patient will participate in 20 minutes of functional activity without any overt signs of fatigue or abnormal vitals to demonstrate G endurance as it is required for ADLs/functional activity  Pt will engage in ongoing cognitive assessment and tx  As needed w/ G participation to A w/ safe d/c planning/recommendation and increase (I) in ADL/functional tasks       Jesenia Damon, MOT, OTR/L

## 2018-04-30 NOTE — ASSESSMENT & PLAN NOTE
BP controlled  Stop ARB 2/2 REHANA  Underlying diuresis may precipitate low blood pressure  Continue BP monitoring

## 2018-04-30 NOTE — PLAN OF CARE
Problem: OCCUPATIONAL THERAPY ADULT  Goal: Performs self-care activities at highest level of function for planned discharge setting  See evaluation for individualized goals  Treatment Interventions: ADL retraining, Functional transfer training, Endurance training, Patient/family training, Equipment evaluation/education, Compensatory technique education, Continued evaluation, Activityengagement (cog assess and tx  as needed)          See flowsheet documentation for full assessment, interventions and recommendations  Limitation: Decreased ADL status, Decreased cognition, Decreased endurance, Decreased self-care trans, Decreased high-level ADLs  Prognosis: Good  Assessment: Pt  is a 80 y o  female who was admitted to [facility] on 4/29/2018 with acute CHF exacerbation  Pt  With significant PHM/co-morbidities of:  REHANA,depression with anxiety, osteoarthritis, HTN, hyperlipidemia, cognitive impairment, DM II, CAD, hx  Of acute MCA stroke, hx  Of MI  Pt  currently lives in a 1 SH with spouse  PTA,  Pt  Received A for bathing, dressing and IADLs from   Pt  Was mod I for toileting, grooming and mobility  Pt used a RW for functional mobility  Currently, pt  requires min A for functional mobility and transfers, min A-close (S) for UB ADLs and max-mod A for LB ADLs  A is needed d/t the following impairments: decreased functional activity tolerance, generalized weakness, deconditioning, decreased balance, +SOB/RAMIREZ, increased impulsivity, decreased problem solving, decreased STM, decreased attention/concentration to task, and additional time required for delayed processing  Additionally, pt  requires mod v c  during functional activity 2* to cognitive deficits noted  Therefore, pt  will benefit from skilled OT services to maximize functional gains, monitor status and prevent decline  Will continue to follow pt  3-5x/week to meet the goals described below in 10-14 days   Recommend rehab vs  Home with home OT and increased family support once medically stable  Of note, pt  And  would prefer to return home right from hospital  Pt  Reporting that son may be able to stay with them for a few days s/p d c  As he does not work        OT Discharge Recommendation: Short Term Rehab (vs  home with home OT and family support pending progress)  OT - OK to Discharge: Yes (to STR at this time)

## 2018-04-30 NOTE — PHYSICAL THERAPY NOTE
Physical Therapy Evaluation:    2 forms of pt ID verified:name,birthdate and pt ID brian    Patient's Name: Charity Smith    Admitting Diagnosis  Depression with anxiety [F41 8]  Weakness [R53 1]  Dyspnea [R06 00]  SOB (shortness of breath) [R06 02]  Elevated troponin [R74 8]  Acute exacerbation of CHF (congestive heart failure) ([facility] ) [I50 9]  REHANA (acute kidney injury) ([facility] ) [N17 9]  Acute on chronic diastolic congestive heart failure ([facility] ) [I50 33]  Nonrheumatic aortic valve stenosis [I35 0]    Problem List  Patient Active Problem List   Diagnosis    Acute on chronic diastolic congestive heart failure ([facility] )    Aortic stenosis    Benign essential HTN    Controlled diabetes mellitus type II without complication ([facility])    Atrial fibrillation with RVR (HCC)    Acute ischemic right MCA stroke ([facility] )    CAD (coronary artery disease)    Cognitive impairment    Depression with anxiety    Esophageal reflux    Hyperlipidemia    Hypertension    Osteoarthritis    Squamous cell carcinoma of skin    Tinnitus    Severe protein-calorie malnutrition [facility]: less than 60% of standard weight) ([facility])    Nausea    Transaminitis    Moderate protein-calorie malnutrition ([facility] )       Past Medical History  Past Medical History:   Diagnosis Date    Acute ischemic right MCA stroke ([facility] )     Anticoagulant long-term use     last assessed: 8/17/17    Aortic stenosis     Atrial fibrillation ([facility] )     Blurred vision     last assessed: 10/25/13    CHF (congestive heart failure) ([facility] )     Coronary artery disease     Dementia     Diabetes mellitus ([facility] )     Dysuria     last assessed: 8/3/15    Hyperlipidemia     Hypertension     Nonrheumatic aortic (valve) insufficiency     Old myocardial infarction     Rectal carcinoma (HCC)        Past Surgical History  Past Surgical History:   Procedure Laterality Date    BALLOON ANGIOPLASTY, ARTERY      CORONARY ARTERY BYPASS GRAFT      RECTAL SURGERY          04/30/18 1425   Note Type   Note type Eval/Treat   Pain Assessment   Pain Assessment No/denies pain   Pain Score No Pain   Home Living   Type of Home House   Home Layout Multi-level; Laundry in basement;Able to live on main level with bedroom/bathroom;Stairs to enter with rails  (1 MURPHY->LANDING->2 MURPHY; 2 steps within the home)   Home Equipment Cane;Walker  (RW for [employer] use of Infer; [employer])   Additional Comments pt lives with  in multilevel style home with basement area and first floor setup,(+)MURPHY,2 steps within the home,meals on wheels provided,no recent falls and use of personal DME PTA; and family A with pt care   Prior Function   Level of [place] Needs assistance with ADLs and functional mobility  (needs A from  for meal prep and bath)   Lives With [place] Help From Family;Friend(s)  (family for transportation)   ADL Assistance Needs assistance   IADLs Needs assistance   Falls in the last 6 months 0   General   Additional Pertinent History loss of appetite,dep with anxiety,weakness,dyspnea,SOB,worsening fatigue;recently D/C from [facility] 04/22/18;acute CHF exacerbation,elevated troponin,REHANA   Family/Caregiver Present Yes  (daughter,granddaughter and )   Cognition   Overall Cognitive Status Impaired   Arousal/Participation Cooperative   Orientation Level Oriented to person;Oriented to place   Following Commands Follows one step commands with increased time or repetition  (2* [place],weakness,slow mobility)   RLE Assessment   RLE Assessment (3+/5 grossly throughout)   LLE Assessment   LLE Assessment (3+/5 grossly throughout)   Coordination   Movements are Fluid and Coordinated 0   Coordination and Movement Description ataxic and unsteady,dec BLE step length,forward flexed posture   Sensation WFL   Light Touch   RLE Light Touch Grossly intact   LLE Light Touch Grossly intact   Bed Mobility   Supine to Sit Unable to assess  (pt sitting in chair pre and post mobility) Transfers   Sit to Stand 4  Minimal assistance   Additional items Assist x 1; Increased time required;Verbal cues;Armrests   Stand to Sit 4  Minimal assistance   Additional items Assist x 1; Armrests; Increased time required;Verbal cues   Additional Comments use of 4 L NC O2;pt reports use of 2 L NC home O2 at night and as needed during the day PTA   Ambulation/Elevation   Gait pattern Poor UE support; Improper Weight shift;Narrow NITHIN; Forward Flexion; Shuffling; Inconsistent oxana; Foward flexed; Short stride; Ataxia; Excessively slow   Gait Assistance 4  Minimal assist   Additional items Assist x 1;Verbal cues; Tactile cues   Assistive Device Rolling walker  (4 L NC O2)   Distance 15 feet with use of RW on tile surface   Balance   Static Sitting Fair   Dynamic Sitting Poor +   Static Standing Poor +   Dynamic Standing Poor +   Ambulatory Poor +   Endurance Deficit   Endurance Deficit Yes   Endurance Deficit Description use of 4 L NC O2,weakness,deconditioning,fatigue,SOB throughout   Activity Tolerance   Activity Tolerance Patient limited by fatigue  (fair)   Medical Staff Made Aware CM   Nurse Made Aware yes   Assessment   Prognosis Good   Problem List Decreased strength;Decreased endurance; Impaired balance;Decreased mobility; Decreased cognition;Decreased safety awareness; Impaired hearing;Decreased skin integrity   Assessment Pt is a 79 y/o female admitted to [facility] 2* acute CHF exacerbation,REHANA,dep with anxiety,weakness,dyspnea,SOB  Pt lives with  in multilevel style home with basement and first floor bed and BR,(+)MURPHY and reports use of personal DME PTA  Pt reports no recent falls and needs A for "some" ADLs  pt currently is not at functional mobility baseline,use of 4 L NC O2,dec cognition,ataxic and unsteady gait,(+)diarrhea/loose stools,multiple lines, ongoing medical care and IV medicine management   Pt demonstrates moderate to minimal deficits during functional mobility and gait including dec endurance,dec balance,dec BLE strength,ataxic and unsteady gait pattern,dec cognition (baseline?) and needs modAx1 for transfers and minAx1 for gait with use of RW  Pt would cont to benefit from skilled inpt PT services to maximize functional independence   Barriers to Discharge Inaccessible home environment  ((+)MURPHY)   Goals   Patient Goals to use the BR   STG Expiration Date 05/10/18   Short Term Goal #1 in 7-10 days:pt will be able to ambulate >150 feet with use of RW on various surfaces S level of A,activity tolerance:45mins/45mins,inc balance 1 grade,BM and transfers minAx1->S consistently to and from various surfaces,I with BLE HEP   Treatment Day 0   Plan   Treatment/Interventions Functional transfer training; Endurance training;Patient/family training;Equipment eval/education;Gait training;Spoke to nursing;Spoke to case management; Family   PT Frequency 5x/wk   Recommendation   Recommendation Other (Comment)  (inpt rhb vs home with cont family care PENDING progress)   Equipment Recommended Walker  (RW)   Barthel Index   Feeding 10   Bathing 0   Grooming Score 0   Dressing Score 0   Bladder Score 10   Bowels Score 10   Toilet Use Score 5   Transfers (Bed/Chair) Score 5   Mobility (Level Surface) Score 0   Stairs Score 0   Barthel Index Score 40   Skilled PT recommended while in hospital and upon DC to progress pt toward treatment goals       Fernando Ansari, PT, DPT@

## 2018-04-30 NOTE — ASSESSMENT & PLAN NOTE
RD consult    Malnutrition Findings:   Malnutrition type: Chronic illness  Degree of Malnutrition: Malnutrition of moderate degree (malnutrition related to dementia vs  abdominal pain as evidenced by <75% energy intake for >1 month, mild depletion of orbital body fat, and mild depletion of muscle mass seen at clavicle to be treated with liberalized diet and nutrition supplements )    BMI Findings: Body mass index is 20 55 kg/m²

## 2018-04-30 NOTE — ASSESSMENT & PLAN NOTE
A1c 7 4%  Insulin sliding scale  Lantus 7U qhs started as BSs high - likely do not need to d/c on insulin as A1c 7 4  Blood glucose monitoring  Carb controlled diet

## 2018-04-30 NOTE — ASSESSMENT & PLAN NOTE
Acute exacerbation of chronic diastolic heart failure  Diminished breath sounds bilaterally on clinical exam, with cardiac wheezing  Worsening BUN creatinine compared to prior admission 48/1 40  Trasaminitis secondary to worsening heart failure  Underlying cardiorenal syndrome  EKG with atrial fibrillation  ProBNP elevation of 18,520      Cardiology consult  echocardiogram  Input output  Daily weight  Continue IV Lasix   Given Lasix in the ER, prior to receiving 1 L fluid bolus  Fluid restriction

## 2018-04-30 NOTE — SOCIAL WORK
Met with the patient and her family to complete assessment and explain role of CM  Present were patient, her spouse, her son-Khalif and daughter-in-law, Mikey Sarabia cell # [phone]  The patient lives with her elderly spouse in a ranch style home with one MURPHY  Her spouse is elderly, uses a cane, and is limited in ability to help the patient  Patient and family reports she uses a wheeled walker and has a shower seat and safety bars in bathroom  She recently received home oxygen from [employer]  Patient and her spouse have medical alerts which they do not wear despite repeated falls in the home  Patient is open to [facility] and [facility] referral was made as her preference  The only other service they receive are MOW  Family and prior PCP have repeatedly encouraged the couple to consider assisted living but they refuse although family has toured [facility]  CM provided information on services and benefit of AL but both patient and her spouse say they do not want to leave their home  Informed family of 's benefit to investigate for future AL and encouraged them to call [facility]  Son and daughter-in-law are frustrated because the parents insist they are able to stay alone but call the family daily to come over for some urgent need in the home  Patient has new PCP, Dr Warren Damian  and they uses [facility]  Family is uncertain if patient has an Advance Directive and list son, Augusto Drummond, as primary contact  Patient denies MH and D&A treatment  CM reviewed d/c planning process including the following: identifying help at home, patient preference for d/c planning needs, Discharge ung, [facility] Meds to Bed program, availability of treatment team to discuss questions or concerns patient and/or family may have regarding understanding medications and recognizing signs and symptoms once discharged  CM also encouraged patient to follow up with all recommended appointments after discharge   Patient advised of importance for patient and family to participate in managing patients medical well being

## 2018-04-30 NOTE — RESTORATIVE TECHNICIAN NOTE
Restorative Specialist Mobility Note       Activity: Bedrest, Dangle, Stand at bedside, Turn, Ambulate in room, Bathroom privileges     Assistive Device: Front wheel walker     Ambulation Response:  Tolerated fairly well  Repositioned: Supine

## 2018-04-30 NOTE — PLAN OF CARE
Problem: DISCHARGE PLANNING - CARE MANAGEMENT  Goal: Discharge to post-acute care or home with appropriate resources  INTERVENTIONS:  - Conduct assessment to determine patient/family and health care team treatment goals, and need for post-acute services based on payer coverage, community resources, and patient preferences, and barriers to discharge  - Address psychosocial, clinical, and financial barriers to discharge as identified in assessment in conjunction with the patient/family and health care team  - Arrange appropriate level of post-acute services according to patient's   needs and preference and payer coverage in collaboration with the physician and health care team  - Communicate with and update the patient/family, physician, and health care team regarding progress on the discharge plan  - Arrange appropriate transportation to post-acute venue  - Complete referral to [facility]    Outcome: Progressing

## 2018-04-30 NOTE — TELEPHONE ENCOUNTER
Discrepancy in dose-please confirm if pt takes whole tablet (1 mg) at iot or just 1/2 tablet-whole tablet too high a dose for her age

## 2018-04-30 NOTE — PHYSICAL THERAPY NOTE
Physical Therapy Tx Session:       04/30/18 1440   Pain Assessment   Pain Assessment No/denies pain   Pain Score No Pain   Restrictions/Precautions   Other Precautions Telemetry;O2;Fall Risk;Multiple lines;Cognitive   General   Chart Reviewed Yes   Family/Caregiver Present Yes   Cognition   Overall Cognitive Status Impaired   Arousal/Participation Cooperative; Alert   Attention Attends with cues to redirect   Orientation Level Oriented to person;Oriented to place   Following Commands Follows one step commands with increased time or repetition   Subjective   Subjective pt sitting on toilet requesting to get up;"I am finished";pt willing and agreeable to ambulate further distance in hallway with use of RW   Transfers   Toilet transfer 3  Moderate assistance   Additional items Assist x 1; Armrests; Increased time required;Standard toilet   Ambulation/Elevation   Gait pattern Poor UE support; Improper Weight shift;Narrow NITHIN; Forward Flexion; Inconsistent oxana; Foward flexed; Short stride; Ataxia   Gait Assistance 4  Minimal assist   Additional items Assist x 1;Verbal cues; Tactile cues   Assistive Device Rolling walker   Distance additional 100 feet ith use of RW on tile and hardwood murtaza   Balance   Static Sitting Good   Dynamic Sitting Poor   Static Standing Poor +   Dynamic Standing Poor +   Ambulatory Poor +   Endurance Deficit   Endurance Deficit Yes   Endurance Deficit Description use of 4 L NC O2,deconditioning,weakness   Activity Tolerance   Activity Tolerance Patient limited by fatigue  (good)   Medical Staff Made Aware CM   Nurse Made Aware yes   Assessment   Prognosis Good   Problem List Decreased strength;Decreased endurance; Impaired balance;Decreased mobility; Decreased cognition;Decreased safety awareness;Decreased skin integrity   Assessment Pt able to ambulate additional 100 feet with use of RW and 4 L NC O2 on various surfaces needing minAx1   Pt cont to use 4 L NC O2 during mobility and reports inc fatigue and SOB following mobility  Pt able to perform sit to stand transfers from low toilet seat needing modAx1  Pt would cont to benefit from skilled inpt PT services to maximize functional independence   Barriers to Discharge Inaccessible home environment   Goals   Patient Goals to get better   STG Expiration Date 05/10/18   Treatment Day 1   Plan   Treatment/Interventions Functional transfer training; Endurance training;Patient/family training;Equipment eval/education;Gait training;Spoke to nursing;Spoke to case management; Family   Progress Progressing toward goals   PT Frequency 5x/wk   Recommendation   Recommendation Other (Comment)  (inpt rhb vs home with family care PENDING progress)   Equipment Recommended Walker  (RW)   Skilled PT recommended while in hospital and upon DC to progress pt toward treatment goals

## 2018-04-30 NOTE — ASSESSMENT & PLAN NOTE
Supplements    Malnutrition Findings:   Malnutrition type: Chronic illness  Degree of Malnutrition: Malnutrition of moderate degree (malnutrition related to dementia vs  abdominal pain as evidenced by <75% energy intake for >1 month, mild depletion of orbital body fat, and mild depletion of muscle mass seen at clavicle to be treated with liberalized diet and nutrition supplements )    BMI Findings: Body mass index is 20 55 kg/m²

## 2018-04-30 NOTE — PROGRESS NOTES
Progress Note - Horacio Schmid 4/4/1927, 80 y o  female MRN: [id number]    Unit/Bed#: [facility] 506-01 Encounter: [id number]    Primary Care Provider: Keyona Jolly MD   Date and time admitted to hospital: 4/29/2018 12:02 PM        * Acute on chronic diastolic congestive heart failure [facility])   Assessment & Plan    Acute exacerbation of chronic diastolic heart failure  Diminished breath sounds bilaterally on clinical exam, with cardiac wheezing  Worsening BUN creatinine compared to prior admission 48/1 40  Trasaminitis secondary to worsening heart failure  Underlying cardiorenal syndrome  EKG with atrial fibrillation  ProBNP elevation of 18,520      Cardiology consult  echocardiogram  Input output  Daily weight  Continue IV Lasix   Given Lasix in the ER, prior to receiving 1 L fluid bolus  Fluid restriction            Moderate protein-calorie malnutrition (HCC)   Assessment & Plan    Supplements    Malnutrition Findings:   Malnutrition type: Chronic illness  Degree of Malnutrition: Malnutrition of moderate degree (malnutrition related to dementia vs  abdominal pain as evidenced by <75% energy intake for >1 month, mild depletion of orbital body fat, and mild depletion of muscle mass seen at clavicle to be treated with liberalized diet and nutrition supplements )    BMI Findings: Body mass index is 20 55 kg/m²             Transaminitis   Assessment & Plan    Monitor LFTs  If LFTs rise, can check hepatitis panel and RUQ US        Nausea   Assessment & Plan    2/2 GERD and constipation  Pepcid  Zofran prn  Lactulose prn constipation        Osteoarthritis   Assessment & Plan    Chronic  No reports of joint pain        Hypertension   Assessment & Plan    BP controlled  Stop ARB 2/2 REHANA  Underlying diuresis may precipitate low blood pressure  Continue BP monitoring        Hyperlipidemia   Assessment & Plan    Hold statin for now   Acute elevation of AST  Continue monitor if trends down continue statin   - would initiate atorvastatin 40 when LFTs stable        Esophageal reflux   Assessment & Plan    Continue meds        Depression with anxiety   Assessment & Plan    Patient reported she is depressed after having a heart attack on prior admission 1 week ago  Counseling provided  Psychiatry consult appreciated - pt does not want depression medication        Cognitive impairment   Assessment & Plan    Reported from family, patient has mild dementia with agitation  Counseling provided to patient and family at bedside  4/30 - pt AAOx2 in AM  Will d/w CM d/c planning - pt may benefit from LTC as there is concern she can not take care of herself at home  However, pt and  do not want rehab  D/w Dr Jordan Chapman, in PM, pt AAOx3  Appears competent  PT says son and dtr are POAs  They will try to find if there is documentation stating children are POAs  If so, will consult neuropsych  If no documentation stating POA,  by default would be legal POA, so no purpose to consult neuropsych as  against rehab  CAD (coronary artery disease)   Assessment & Plan    Stable  Continue meds  Continue above management for acute heart failure exacerbation        Atrial fibrillation with RVR (HCC)   Assessment & Plan    On BB  Add Cardizem as rate uncontrolled  AC w/ Elliquis        Controlled diabetes mellitus type II without complication (HCC)   Assessment & Plan    A1c 7 4%  Insulin sliding scale  Lantus 7U qhs started as BSs high - likely do not need to d/c on insulin as A1c 7 4  Blood glucose monitoring  Carb controlled diet        Benign essential HTN   Assessment & Plan    Blood pressure monitoring  Continue meds          Aortic stenosis   Assessment & Plan    Stable - 12/2017 shows moderate AS  Follow-up echocardiogram          NSTEMI type 2: 2/2 CHF    Echo pending to assess LV    VTE Pharmacologic Prophylaxis:   Pharmacologic: Apixaban (Eliquis)  Mechanical VTE Prophylaxis in Place: Yes    Patient Centered Rounds: I have performed bedside rounds with nursing staff today  Discussions with Specialists or Other Care Team Provider: psych and cardio    Education and Discussions with Family / Patient:  and son    Time Spent for Care: 45 minutes  More than 50% of total time spent on counseling and coordination of care as described above  Current Length of Stay: 1 day(s)    Current Patient Status: Inpatient   Certification Statement: The patient will continue to require additional inpatient hospital stay due to need for iv diuresis and HR control    Discharge Plan: pending HR control, IV diuresis, and d/c planning    Code Status: Level 3 - DNAR and DNI      Subjective:   C/o nausea and constipation    Objective:     Vitals:   Temp (24hrs), Av 7 °F (36 5 °C), Min:97 2 °F (36 2 °C), Max:98 5 °F (36 9 °C)    HR:  [] 98  Resp:  [14-24] 18  BP: (110-158)/() 110/53  SpO2:  [93 %-99 %] 98 %  Body mass index is 20 55 kg/m²  Input and Output Summary (last 24 hours): Intake/Output Summary (Last 24 hours) at 18 1515  Last data filed at 18 1456   Gross per 24 hour   Intake             1990 ml   Output              895 ml   Net             1095 ml       Physical Exam:     Physical Exam   Constitutional: No distress  HENT:   Head: Normocephalic and atraumatic  Eyes: Conjunctivae and EOM are normal    Neck: Normal range of motion  Neck supple  Cardiovascular: Normal rate and regular rhythm  Pulmonary/Chest: Effort normal and breath sounds normal  She has no wheezes  She has no rales  Abdominal: Soft  Bowel sounds are normal  She exhibits no distension  There is no tenderness  Musculoskeletal: Normal range of motion  She exhibits no edema  Neurological: She is alert  Ox2   Skin: Skin is warm and dry  She is not diaphoretic         Additional Data:     Labs:      Results from last 7 days  Lab Units 18  0428 18  1246   WBC Thousand/uL 6 74 6 15   HEMOGLOBIN g/dL 13 3 13 4 HEMATOCRIT % 40 6 40 8   PLATELETS Thousands/uL 190 188   NEUTROS PCT %  --  82*   LYMPHS PCT %  --  11*   MONOS PCT %  --  7   EOS PCT %  --  0       Results from last 7 days  Lab Units 04/30/18  0428   SODIUM mmol/L 139   POTASSIUM mmol/L 4 1   CHLORIDE mmol/L 102   CO2 mmol/L 26   BUN mg/dL 53*   CREATININE mg/dL 1 42*   CALCIUM mg/dL 9 3   TOTAL PROTEIN g/dL 7 2   BILIRUBIN TOTAL mg/dL 0 68   ALK PHOS U/L 99   ALT U/L 74   AST U/L 76*   GLUCOSE RANDOM mg/dL 261*       Results from last 7 days  Lab Units 04/29/18  1247   INR  1 60*       * I Have Reviewed All Lab Data Listed Above  * Additional Pertinent Lab Tests Reviewed: [place] [address] Admission Reviewed      Recent Cultures (last 7 days):           Last 24 Hours Medication List:     Current Facility-Administered Medications:  acetaminophen 650 mg Oral Q6H PRN Norman Arroyo DO   apixaban 2 5 mg Oral BID Lolis Meehan MD   aspirin 81 mg Oral Daily Lolis Meehan MD   calcitonin (salmon) 1 spray Alternating Nares Daily Lolis Meehan MD   diltiazem 120 mg Oral Daily Val Mccarty MD   famotidine 20 mg Oral Daily Norman Arroyo DO   furosemide 40 mg Intravenous BID (diuretic) Lolis Meehan MD   insulin glargine 7 Units Subcutaneous HS Norman Arroyo DO   insulin lispro 1-6 Units Subcutaneous TID AC Lolis Meehan MD   lactulose 10 g Oral Daily PRN Norman Arroyo DO   metoprolol tartrate 50 mg Oral Q12H Daron Veras MD   ondansetron 4 mg Intravenous Q6H PRN Norman Arroyo DO   senna-docusate sodium 1 tablet Oral HS Lolis Meehan MD        Today, Patient Was Seen By: Norman Arroyo DO    ** Please Note: Dictation voice to text software may have been used in the creation of this document   **

## 2018-04-30 NOTE — SOCIAL WORK
Call placed to son and daughter-in-law to report there is no copy of Advance Directive in chart  Family now says patient has never completed POA document  Support provided to family who has been asking couple to move to AL but they continue to refuse  Discussed service available through [facility]  Family is aware of private duty help but parents refuse to spend resources

## 2018-04-30 NOTE — CASE MANAGEMENT
THIS IS A MEDICARE READMISSION  [facility] [place] ADMISSION 4/22/18 - 4/24/18  *CASE TASKED TO PA FOR MEDICARE READMISSION + 2ND LEVEL REVIEW        Initial Clinical Review    Admission: Date/Time/Statement:   4/29/18 AT 1432     Orders Placed This Encounter   Procedures    Inpatient Admission (expected length of stay for this patient is greater than two midnights)     Standing Status:   Standing     Number of Occurrences:   1     Order Specific Question:   Admitting Physician     Answer:   Serenity Augustin     Order Specific Question:   Level of Care     Answer:   Med Surg [16]     Order Specific Question:   Estimated length of stay     Answer:   More than 2 Midnights     Order Specific Question:   Certification     Answer:   I certify that inpatient services are medically necessary for this patient for a duration of greater than two midnights  See H&P and MD Progress Notes for additional information about the patient's course of treatment  ED: Date/Time/Mode of Arrival:   ED Arrival Information     Expected Arrival Acuity Means of Arrival Escorted By Service Admission Type    - 4/29/2018 12:02 Urgent Ambulance [facility] Urgent    Arrival Complaint    sob          Chief Complaint:   Chief Complaint   Patient presents with    Shortness of Breath     Weakness sob       Chief Complaint:   Worsening fatigue, loss of appetite     History of Present Illness:   Nelda Styles is a 80 y o  female who presents with Worsening fatigue, loss of appetite over the past week  She was recently discharged from [address] [address] for evaluation of atrial fibrillation, paroxysmal atrial fibrillation, Congestive heart failure was brought to the hospital further evaluation of worsening fatigue, anorexia, which worsened since discharge 04/22/2018  Patient reported with family at bedside that she has no appetite, denied any chest pain or shortness of breath    As per patient she got up this morning, and felt severely weak, and fatigued as well as dizzy, needed assistance by her  to go to the living room  Patient denied any chest pain, but reported fatigue on exertion  She reported she has not been eating or drinking, or making her own meals due to generalized weakness in her body  She reported constipation for the past several days prior to hospitalization, of which her  gave her some laxative which proved bruits loose stools in the emergency room  Review of systems negative for fever, chills, rigors, productive cough, changes in urinary habits, mentation  History obtained by family members including 2 children felt very concerned that patient is currently very depressed since her last hospitalization 1 week ago           Review of Systems:  Constitutional: Positive for activity change, appetite change and fatigue  Negative for chills, diaphoresis, fever and unexpected weight change  Respiratory: Positive for shortness of breath  Negative for apnea, cough, choking, chest tightness, wheezing and stridor  Cardiovascular: Positive for palpitations  Negative for chest pain and leg swelling  Gastrointestinal: Positive for constipation  Negative for abdominal distention, abdominal pain, anal bleeding, blood in stool, diarrhea, nausea and vomiting  Neurological: Positive for dizziness and weakness  Negative for tremors, seizures, syncope, facial asymmetry, speech difficulty, light-headedness, numbness and headaches  Psychiatric/Behavioral: Positive for decreased concentration  The patient is nervous/anxious  Physical Exam   Constitutional: She is oriented to person, place, and time  No distress  Cachectic appearing woman with significant loss of muscle mass   Mouth/Throat: No oropharyngeal exudate  Dry oral mucosa   Eyes: EOM are normal  Pupils are equal, round, and reactive to light  No scleral icterus  Conjunctiva pallor   Neck: Normal range of motion  JVD present  Cardiovascular: Exam reveals no gallop and no friction rub  Murmur heard  Tachycardia   Pulmonary/Chest: No respiratory distress  She has wheezes  She has rales  She exhibits no tenderness  Diminished breath sounds bilateral wheezing on both sides   Abdominal: Soft  Bowel sounds are normal  She exhibits distension  There is no tenderness  There is no rebound and no guarding  Hepatomegaly 3 cm below costal margin   Musculoskeletal: Normal range of motion  She exhibits no edema, tenderness or deformity  Neurological: She is alert and oriented to person, place, and time  She displays normal reflexes  No cranial nerve deficit  She exhibits normal muscle tone  Coordination normal    Skin: Skin is warm  No rash noted  No erythema  No pallor     Psychiatric: Depressed mood after her heart attack       ED Vital Signs:   ED Triage Vitals   Temperature Pulse Respirations Blood Pressure SpO2   04/29/18 1245 04/29/18 1218 04/29/18 1218 04/29/18 1218 04/29/18 1218   97 5 °F (36 4 °C) (!) 114 20 (!) 199/89 98 %      Temp Source Heart Rate Source Patient Position - Orthostatic VS BP Location FiO2 (%)   04/29/18 1245 04/29/18 1218 04/29/18 1218 04/29/18 1218 --   Tympanic Monitor Sitting Right arm       Pain Score       04/29/18 1218       No Pain        Wt Readings from Last 1 Encounters:   04/30/18 52 6 kg (116 lb)      04/29 0701  04/30 0700 04/30 0701  04/30 1403  Most Recent    Temperature (°F) 97 498 4 97 298 5  98 4 (36 9)    Pulse 99122 90110  98    Respirations 1424 1820  18    Blood Pressure 124/67199/89 110/53151/80  110/53    SpO2 (%) 9399 9598  98        LABS/Diagnostic Test Results:   Results from last 7 days  Lab Units 04/29/18  1246   WBC Thousand/uL 6 15   HEMOGLOBIN g/dL 13 4   HEMATOCRIT % 40 8   PLATELETS Thousands/uL 188   NEUTROS PCT % 82*   LYMPHS PCT % 11*   MONOS PCT % 7   EOS PCT % 0       Results from last 7 days  Lab Units 04/29/18  1247   SODIUM mmol/L 135*   POTASSIUM mmol/L 4 2 CHLORIDE mmol/L 101   CO2 mmol/L 22   BUN mg/dL 48*   CREATININE mg/dL 1 40*   CALCIUM mg/dL 9 6   TOTAL PROTEIN g/dL 7 7   BILIRUBIN TOTAL mg/dL 0 98   ALK PHOS U/L 101   ALT U/L 71   AST U/L 74*   GLUCOSE RANDOM mg/dL 305*       Results from last 7 days  Lab Units 04/29/18  1247   INR   1 60*          B-type natriuretic peptide [[id number]] (Abnormal) Collected: 04/29/18 1247   Lab Status: Final result Specimen: Blood from Arm, Right Updated: 04/29/18 1314    NT-proBNP 18,520 (H) <450 pg/mL      Troponin I [[id number]] (Abnormal) Collected: 04/29/18 1246   Lab Status: Final result Specimen: Blood from Arm, Right Updated: 04/29/18 1312    Troponin I 1 45 (H) <=0 04 ng/mL        CT chest without contrast   Final Result  (04/29 1442)       The patient has debris in the left lower lobe bronchial branches with peripheral consolidation suggesting aspiration pneumonia at the left base        There is also evidence of congestive heart failure with pulmonary edema and pleural effusions and cardiomegaly        There is 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 and there are aortic valvular calcifications noted  Patient may benefit from repeat echocardiography            EKG (Per H+P):  Atrial Fib      ED Treatment:   Medication Administration from 04/29/2018 1202 to 04/29/2018 1715       Date/Time Order Dose Route Action Action by Comments     04/29/2018 1314 ondansetron (ZOFRAN) injection 4 mg 4 mg Intravenous Given Hernesto Cid RN      04/29/2018 1530 sodium chloride 0 9 % bolus 1,000 mL 0 mL Intravenous Stopped Camila Nunes RN      04/29/2018 1251 sodium chloride 0 9 % bolus 1,000 mL 1,000 mL Intravenous New Bag Eric Areas, RN      04/29/2018 1430 furosemide (LASIX) injection 40 mg 40 mg Intravenous Given Hernesto Cid RN           Past Medical/Surgical History:    Active Ambulatory Problems     Diagnosis Date Noted    Acute on chronic diastolic congestive heart failure ([facility] [address] ) 08/03/2017  Aortic stenosis 08/03/2017    Benign essential HTN 08/03/2017    Controlled diabetes mellitus type II without complication ([place] [address] ) 76/37/7006    Acute ischemic right MCA stroke ([place] [address] ) 08/03/2017    CAD (coronary artery disease) 08/03/2017    Cognitive impairment 08/28/2017    Depression with anxiety 02/27/2013    Esophageal reflux 09/21/2012    Hyperlipidemia 06/12/2012    Hypertension 09/12/2012    Osteoarthritis 09/21/2012    Squamous cell carcinoma of skin 12/12/2014    Tinnitus 11/24/2015     Resolved Ambulatory Problems     Diagnosis Date Noted    Atrial fibrillation with rapid ventricular response ([place] [address] ) 08/03/2017    Acute encephalopathy 08/06/2017    Type 2 myocardial infarction ([place] [address] ) 04/22/2018    Epigastric pain 04/22/2018     Past Medical History:   Diagnosis Date    Acute ischemic right MCA stroke (HCC)     Anticoagulant long-term use     Aortic stenosis     Atrial fibrillation ([facility])     Blurred vision     CHF (congestive heart failure) ([facility])     Coronary artery disease     Dementia     Diabetes mellitus ([place] [address] )     Dysuria     Hyperlipidemia     Hypertension     Nonrheumatic aortic (valve) insufficiency     Old myocardial infarction     Rectal carcinoma (HCC)        Admitting Diagnosis: Depression with anxiety [F41 8]  Weakness [R53 1]  Dyspnea [R06 00]  SOB (shortness of breath) [R06 02]  Elevated troponin [R74 8]  Acute exacerbation of CHF (congestive heart failure) ([facility]) [I50 9]  REHANA (acute kidney injury) ([place] [address] ) [N17 9]  Acute on chronic diastolic congestive heart failure (HCC) [I50 33]  Nonrheumatic aortic valve stenosis [I35 0]    Age/Sex: 80 y o  female       Assessment/Plan:   Acute on chronic diastolic congestive heart failure (HCC)   Assessment & Plan     Acute exacerbation of chronic diastolic heart failure  Diminished breath sounds bilaterally on clinical exam, with cardiac wheezing  Worsening BUN creatinine compared to prior admission 48/1 40, compared to NH labs 27/1 03  Acute hepatitis secondary to worsening heart failure, with AST elevation  Underlying cardiorenal syndrome  EKG with atrial fibrillation  ProBNP elevation of 18,520, with troponin elevation 1 45 mildly higher than 1 06 from dc 4/22 (due to acute CHF exacerbation)   Patient is currently afebrile with no WBCs elevation, will avoid antibiotics for now      CT chest   The patient has debris in the left lower lobe bronchial branches with peripheral consolidation suggesting aspiration pneumonia at the left base  There is also evidence of congestive heart failure with pulmonary edema and pleural effusions and cardiomegaly  There is 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 and there are aortic valvular calcifications noted   Patient may benefit from repeat echocardiography        Cardiology consult  Echocardiogram  Telemetry monitoring  Input output  Daily weight  Continue Lasix 40mg bid IV increased from home dose, 20mg daily  Given Lasix in the ER, prior to receiving 1 L fluid bolus  Fluid restriction  Continue home meds   PT/OT  Encourage oral intake     miralx prn constipation             Osteoarthritis   Assessment & Plan     Chronic  No reports of joint pain          Hypertension   Assessment & Plan     BP controlled  Underlying diuresis may precipitate low blood pressure  Continue BP monitoring          Hyperlipidemia   Assessment & Plan     Hold statin for now  Acute elevation of AST  Continue monitor if trends down continue statin          Esophageal reflux   Assessment & Plan     Continue meds          Depression with anxiety   Assessment & Plan     Patient reported she is depressed after having a heart attack on prior admission 1 week ago  Counseling provided  Psychiatry consult          Cognitive impairment   Assessment & Plan     Reported from family, patient has mild dementia with agitation  Counseling provided to patient and family at bedside  Recommendation for outpatient counseling supportive care for management of dementia          CAD (coronary artery disease)   Assessment & Plan     Stable  Continue meds  Continue above management for acute heart failure exacerbation          Controlled diabetes mellitus type II without complication (HCC)   Assessment & Plan     A1c 7 4%  Insulin sliding scale  Blood glucose monitoring  Carb controlled diet          Benign essential HTN   Assessment & Plan     Blood pressure monitoring  Continue meds             Aortic stenosis   Assessment & Plan     Stable  Follow-up echocardiogram      Severe protein calorie malnutrition -with evidence on loss of muscle mass , anorexia -dietary supplementation provided strawberry flavored ensure, [employer]       VTE Prophylaxis: Apixaban (Eliquis)  / sequential compression device      Anticipated Length of Stay:  Patient will be admitted on an Inpatient basis with an anticipated length of stay of  > 2 midnights       Justification for Hospital Stay:  Medical management of acute heart failure exacerbation        Admission Orders:    4/29/18 AT 1432   ADMIT INPATIENT  TELEMETRY  VS Q4HRS    Bedrest with BRP  SCD    Diet Dewey/CHO Controlled; Diabetic Clear Liquid     Dietary nutrition supplements  - Gelatin BID with Meals    Continuous IV Infusions:  IVF sodium chloride 0 9 % bolus 1,000 mL GIVEN X 1      Scheduled Meds:   Current Facility-Administered Medications:  acetaminophen 650 mg Oral Q6H PRN Elston Sever, DO   apixaban 2 5 mg Oral BID Saskia Rollins MD   aspirin 81 mg Oral Daily Saskia Rollins MD   calcitonin (salmon) 1 spray Alternating Nares Daily Saskia Rollins MD   diltiazem 120 mg Oral Daily Ady Olivares MD   famotidine 20 mg Oral Daily Elston Sever, DO   furosemide 40 mg Intravenous BID (diuretic) Saskia Rollins MD   insulin glargine 7 Units Subcutaneous HS Elston Sever, DO   insulin lispro 1-6 Units Subcutaneous TID AC Saskia Rollins MD   lactulose 10 g Oral Daily PRN Elston Sever, DO metoprolol tartrate 50 mg Oral Q12H [address] Qian Barreto MD   ondansetron 4 mg Intravenous Q6H PRN Lucio Cheese, DO   senna-docusate sodium 1 tablet Oral HS Qian Barreto MD     PRN Meds:     acetaminophen    Lactulose 10 Gms qDay prn given x 1    IV ondansetron 4 mg q6hrs prn given x 1    CONSULT CARD    PT EVAL    OT EVAL

## 2018-04-30 NOTE — PLAN OF CARE
Problem: PHYSICAL THERAPY ADULT  Goal: Performs mobility at highest level of function for planned discharge setting  See evaluation for individualized goals  Treatment/Interventions: Functional transfer training, Endurance training, Patient/family training, Equipment eval/education, Gait training, Spoke to nursing, Spoke to case management, Family  Equipment Recommended: Kiara Mcintyre (SUSI)       See flowsheet documentation for full assessment, interventions and recommendations  Prognosis: Good  Problem List: Decreased strength, Decreased endurance, Impaired balance, Decreased mobility, Decreased cognition, Decreased safety awareness, Impaired hearing, Decreased skin integrity  Assessment: Pt is a 81 y/o female admitted to [facility] 2* acute CHF exacerbation,REHANA,dep with anxiety,weakness,dyspnea,SOB  Pt lives with  in multilevel style home with basement and first floor bed and BR,(+)MURPHY and reports use of personal DME PTA  Pt reports no recent falls and needs A for "some" ADLs  pt currently is not at functional mobility baseline,use of 4 L NC O2,dec cognition,ataxic and unsteady gait,(+)diarrhea/loose stools,multiple lines, ongoing medical care and IV medicine management  Pt demonstrates moderate to minimal deficits during functional mobility and gait including dec endurance,dec balance,dec BLE strength,ataxic and unsteady gait pattern,dec cognition (baseline?) and needs modAx1 for transfers and minAx1 for gait with use of RW  Pt would cont to benefit from skilled inpt PT services to maximize functional independence  Barriers to Discharge: Inaccessible home environment ((+)MURPHY)     Recommendation: Other (Comment) (inpt rhb vs home with cont family care PENDING progress)          See flowsheet documentation for full assessment

## 2018-04-30 NOTE — PHYSICIAN ADVISOR
This patient is a 80 y o  y/o female who is admitted to the hospital for Shortness of Breath (Weakness sob)       The patient presented to the ED on 4/29/18 at 1202 and was admitted to the hospital on 4/29/2018 1202  History of Present Illness includes: Leeroy Melton is a 80 y o  female who presents with Worsening fatigue, loss of appetite over the past week  She was recently discharged from [address] [address] for evaluation of atrial fibrillation, paroxysmal atrial fibrillation, Congestive heart failure was brought to the hospital further evaluation of worsening fatigue, anorexia, which worsened since discharge 04/22/2018  Patient reported with family at bedside that she has no appetite, denied any chest pain or shortness of breath  As per patient she got up this morning, and felt severely weak, and fatigued as well as dizzy, needed assistance by her  to go to the living room  Patient denied any chest pain, but reported fatigue on exertion  She reported she has not been eating or drinking, or making her own meals due to generalized weakness in her body  She reported constipation for the past several days prior to hospitalization, of which her  gave her some laxative which proved bruits loose stools in the emergency room  Review of systems negative for fever, chills, rigors, productive cough, changes in urinary habits, mentation  History obtained by family members including 2 children felt very concerned that patient is currently very depressed since her last hospitalization 1 week ago          Vital signs in the ER are as follows ED Triage Vitals   Temperature Pulse Respirations Blood Pressure SpO2   04/29/18 1245 04/29/18 1218 04/29/18 1218 04/29/18 1218 04/29/18 1218   97 5 °F (36 4 °C) (!) 114 20 (!) 199/89 98 %      Temp Source Heart Rate Source Patient Position - Orthostatic VS BP Location FiO2 (%)   04/29/18 1245 04/29/18 1218 04/29/18 1218 04/29/18 1218 --   Tympanic Monitor Sitting Right arm       Pain Score       04/29/18 1218       No Pain             Treatment in the ER included: basic labs, imaging and IV lasix  NT-proBNP 18,520    CT chest without contrast   Final Result by Shon Buerger, MD (04/29 1442)       The patient has debris in the left lower lobe bronchial branches with peripheral consolidation suggesting aspiration pneumonia at the left base        There is also evidence of congestive heart failure with pulmonary edema and pleural effusions and cardiomegaly        There is 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 and there are aortic valvular calcifications noted  Patient may benefit from repeat echocardiography        Workstation performed: [id number]           XR chest 2 views   ED Interpretation by Edel Lam DO (04/29 1421)   Abnormal   Pulmonary vascular congestion with questionable effusion versus consolidation, though more likely effusion as interpreted by me independently            The patient is admitted as INPATIENT  and has remained hospitalized for 1 day(s)  Last vital signs were Blood pressure 142/64, pulse 84, temperature (!) 97 4 °F (36 3 °C), temperature source Oral, resp  rate 16, height 5' 3" (1 6 m), weight 52 6 kg (116 lb), SpO2 95 %  Treatment includes:  IV lasix BID, tele, cardiology consult, ECHO, fluid restriction, wts and I&Os        Results include:       0  Lab Value Date/Time   TROPONINI 1 45 (H) 04/29/2018 1246   TROPONINI 1 06 (H) 04/22/2018 1528   TROPONINI 1 16 (H) 04/22/2018 1219   TROPONINI 1 13 (H) 04/22/2018 0834   TROPONINI 0 79 (H) 08/06/2017 0844   TROPONINI 0 79 (H) 08/04/2017 0833   TROPONINI 0 75 (H) 08/03/2017 1925   TROPONINI 0 77 (H) 08/03/2017 1541   TROPONINI 0 75 (H) 08/03/2017 1105                 Results from last 7 days  Lab Units 04/30/18  0428 04/29/18  1246 04/24/18  0555   WBC Thousand/uL 6 74 6 15 5 41   HEMOGLOBIN g/dL 13 3 13 4 13 7   HEMATOCRIT % 40 6 40 8 41 2   PLATELETS Thousands/uL 190 188 157         Results from last 7 days  Lab Units 04/30/18  0428 04/29/18  1247 04/24/18  0555   SODIUM mmol/L 139 135* 141   POTASSIUM mmol/L 4 1 4 2 3 7   CHLORIDE mmol/L 102 101 102   CO2 mmol/L 26 22 29   BUN mg/dL 53* 48* 27*   CREATININE mg/dL 1 42* 1 40* 1 03   GLUCOSE RANDOM mg/dL 261* 305* 149*   CALCIUM mg/dL 9 3 9 6 9 4       Recent Cultures:      Results from last 7 days  Lab Units 04/29/18  1246   BLOOD CULTURE  No Growth at 24 hrs  No Growth at 24 hrs  The patient is appropriate for  Inpatient Admission  The rationale is as follows: The patient is an elderly 79 y/o female who presented with weakness and SOB  She was admitted for acute CHF  She required IV lasix BID, tele, cardiology consult, ECHO, fluid restriction, wts and I&Os  Cardiology noted volume overload and need for continued diuresis  There is documentation by the admitting physician that the patient would require greater than two midnight stay based on medical necessity  Hospitalization is necessary to prevent further deterioration of her clinical condition  After review of the medical chart, labs, imaging, and notes - I agree that the patient meets criteria for INPATIENT ADMISSION  The patient had a hospitalization within the last 30 days - after reviewing the chart from that admission it was determined that the two admissions were for the same condition  Previous admission was approx 1 week ago  She had epigastric pain and dyspnea  She was treated for acute CHF  Current admission is for acute CHF  These two admissions are therefore related

## 2018-04-30 NOTE — PLAN OF CARE
Problem: PHYSICAL THERAPY ADULT  Goal: Performs mobility at highest level of function for planned discharge setting  See evaluation for individualized goals  Treatment/Interventions: Functional transfer training, Endurance training, Patient/family training, Equipment eval/education, Gait training, Spoke to nursing, Spoke to case management, Family  Equipment Recommended: Calvin Zuniga (SUSI)       See flowsheet documentation for full assessment, interventions and recommendations  Outcome: Progressing  Prognosis: Good  Problem List: Decreased strength, Decreased endurance, Impaired balance, Decreased mobility, Decreased cognition, Decreased safety awareness, Decreased skin integrity  Assessment: Pt able to ambulate additional 100 feet with use of RW and 4 L NC O2 on various surfaces needing minAx1  Pt cont to use 4 L NC O2 during mobility and reports inc fatigue and SOB following mobility  Pt able to perform sit to stand transfers from low toilet seat needing modAx1  Pt would cont to benefit from skilled inpt PT services to maximize functional independence  Barriers to Discharge: Inaccessible home environment     Recommendation: Other (Comment) (inpt rhb vs home with family care PENDING progress)          See flowsheet documentation for full assessment

## 2018-04-30 NOTE — CONSULTS
[address],  [address] [place] [address]  [phone]                                              Cardiology Consult  Usama Wilhelm 80 y o  female   YOB: 1927 MRN: [id number]  Unit/Bed#: [facility] 506-01 Encounter: [id number]      Physician Requesting Consult: Shukri Jasso DO  Reason for Consult / Principal Problem: Acute on chronic diastolic HF    Assessment and Plan  1  Acute on chronic diastolic heart failure  2  Chronic atrial fibrillation/flutter with RVR  · On eliquis  3  Aortic stenosis, moderate  · Echo - 12/2017 - LVEF 50%, mild diffuse hypokinesis, mild LVH, normal LV, mildly dilated LA, moderate MR, moderate aortic stenosis, moderate aortic regurgitation, mean transaortic gradient - 19, FELTON 1 1  4  CAD s/p CABGx3 (2006)  · CABGx3 = SVG to OM1 & sequential graft to LAD, SVG to RCA  5  Ascending thoracic aortic aneurysm, 4 3 cm on CT  6  Dementia  7  Hypertension  8  Hyperlipidemia  9  h/o Stroke    Outpatient cardiologist: Charline Glover    Plan  Acute on chronic diastolic HF, Atrial fibrillation with RVR  · NT-Pro BNP 04159 <-- 1900 (1 week ago)  · Wt up about 3 lbs from discharge - is at 116 lbs <-- 113 lbs (4/24/18)  · CT chest - features concerning for aspiration pneumonia at left these, evidence of pulmonary edema and pleural effusions  Aneurysmal dilatation of ascending thoracic aorta, measuring 4 3 cm  · Has JVD, rales at left lung base, and appears mildly overloaded  · Her heart rate inpatient has been intermittently in 110s to 140s for a few hours  · This is likely contributing to her volume overload, shortness of breath and dizziness  · Continue metoprolol 50 q12h  · Add Cardizem  for better rate control  · She was on Coumadin for anticoagulation up until late last year, which was discontinued by her primary care physician earlier this year    But at the most recent hospitalization in [address] [place] [address], she was started back on Eliquis 2 5 b i d  History of Present Illness   HPI: Micheline Valdovinos is a 80y o  year old female who presents with fatigue, weakness, dizziness and vomiting  80-year-old female, who [place]  [place], was recently discharged from the hospital [address] after treatment for shortness of breath, atrial fibrillation and volume overload  She was switched from Cardizem to metoprolol, and started on Eliquis during the hospitalization  He was eventually discharged on metoprolol 50 b i d  she also had issues with hypoxia during hospitalization and was briefly on oxygen during the stay, but was eventually titrated off at discharge  Post discharge, she had complains of shortness of breath right from the beginning  The family eventually called the primary care physician and requested for oxygen prescription  She continued to have complaints of fatigue and weakness and did not have an appetite  She also had a couple of episodes of vomiting  As a result, finally family decided to bring her in to the ED for evaluation  No complaints of chest pain, palpitations, dizziness or lightheadedness  She does report being compliant with all her medications at home, and limits her salt intake  She has limited ambulation at baseline, but does walk around inside her house  Per family, patient and her  are having difficulty taking care of themselves at home, but insist on staying home        Past Medical History:   Diagnosis Date    Acute ischemic right MCA stroke ([facility] [address] )     Anticoagulant long-term use     last assessed: 8/17/17    Aortic stenosis     Atrial fibrillation ([address] )     Blurred vision     last assessed: 10/25/13    CHF (congestive heart failure) ([address] )     Coronary artery disease     Dementia     Diabetes mellitus ([address] )     Dysuria     last assessed: 8/3/15    Hyperlipidemia     Hypertension     Nonrheumatic aortic (valve) insufficiency     Old myocardial infarction     Rectal carcinoma (HCC)      Past Surgical History:   Procedure Laterality Date    BALLOON ANGIOPLASTY, ARTERY      CORONARY ARTERY BYPASS GRAFT      RECTAL SURGERY       Family History   Problem Relation Age of Onset    Stroke Mother     Stroke Father      Meds/Allergies   all current active meds have been reviewed and current meds: Current Facility-Administered Medications   Medication Dose Route Frequency    apixaban (ELIQUIS) tablet 2 5 mg  2 5 mg Oral BID    aspirin chewable tablet 81 mg  81 mg Oral Daily    calcitonin (salmon) (MIACALCIN) 200 units/act nasal spray 1 spray  1 spray Alternating Nares Daily    furosemide (LASIX) injection 40 mg  40 mg Intravenous BID (diuretic)    insulin lispro (HumaLOG) 100 units/mL subcutaneous injection 1-6 Units  1-6 Units Subcutaneous TID AC    losartan (COZAAR) tablet 25 mg  25 mg Oral Daily    metoprolol tartrate (LOPRESSOR) tablet 50 mg  50 mg Oral Q12H NBA    potassium chloride (K-DUR,KLOR-CON) CR tablet 10 mEq  10 mEq Oral Daily    senna-docusate sodium (SENOKOT S) 8 6-50 mg per tablet 1 tablet  1 tablet Oral HS     Prescriptions Prior to Admission   Medication    apixaban (ELIQUIS) 2 5 mg    aspirin 81 mg chewable tablet    calcitonin, salmon, (MIACALCIN) 200 units/act nasal spray    furosemide (LASIX) 20 mg tablet    lactulose (CHRONULAC) 10 g/15 mL solution    LORazepam (ATIVAN) 1 mg tablet    losartan (COZAAR) 50 mg tablet    metoprolol tartrate (LOPRESSOR) 50 mg tablet    potassium chloride (K-DUR,KLOR-CON) 10 mEq tablet    senna-docusate sodium (SENOKOT S) 8 6-50 mg per tablet     Allergies   Allergen Reactions    Heparin      Social History     Social History    Marital status: /Civil Union     Spouse name: N/A    Number of children: N/A    Years of education: N/A     Occupational History    Retired      Social History Main Topics    Smoking status: Former Smoker     Quit date: 2002    Smokeless tobacco: Never Used Comment: 1 pack a week; No secondhand smoke exposure    Alcohol use Yes      Comment: occasional    Drug use: No    Sexual activity: Not Asked     Other Topics Concern    None     Social History Narrative    Lives with     House    2 children,5 grandchildren    retired         Review of Systems  No c/o chest pain, No c/o palpitations, c/o shortness of breath+, No c/o dizziness or light-headedness, No c/o abdominal pain, no c/o nausea/vomitting  All other systems negative    Vitals:    04/29/18 2315 04/30/18 0219 04/30/18 0559 04/30/18 0719   BP: 128/70 131/61  137/82   BP Location: Right arm Right arm  Left arm   Pulse: 99 99  (!) 110   Resp: 14 18  20   Temp: (!) 97 4 °F (36 3 °C) (!) 97 4 °F (36 3 °C)  98 5 °F (36 9 °C)   TempSrc: Oral Axillary  Axillary   SpO2: 97% 97%  95%   Weight:   52 6 kg (116 lb)    Height:         Orthostatic Blood Pressures      Most Recent Value   Blood Pressure  137/82 filed at 04/30/2018 0719   Patient Position - Orthostatic VS  Lying filed at 04/30/2018 0219        Body mass index is 20 55 kg/m²  Wt Readings from Last 5 Encounters:   04/30/18 52 6 kg (116 lb)   04/24/18 51 5 kg (113 lb 8 6 oz)   03/15/18 51 3 kg (113 lb 2 oz)   02/20/18 52 9 kg (116 lb 9 6 oz)   08/28/17 56 8 kg (125 lb 2 1 oz)     I/O last 3 completed shifts: In: 1240 [P O :240; IV Piggyback:1000]  Out: [address] [Urine:795]      Physical Exam    Constitutional:  Yolanda Martin appears well, alert and oriented x 3, pleasant and cooperative  No acute distress   HEENT:    Normocephalic, atraumatic   Neck:  Supple, JVD elevated   Lungs:  Left basal rales+, respirations unlabored    Chest Wall:  No tenderness or deformity    Heart::  Irregularly, irregular, tachycardia+, S1 and S2 normal, no murmur, rub or gallop    Abdomen:  Soft, non-tender, non-distended   Neurological:  Awake, alert, oriented x3   Non-focal exam    Extremities:  No pedal edema, No calf tenderness         Labs:    Results from last 7 days  Lab Units 04/30/18  0428 04/29/18  1246 04/24/18  0555   WBC Thousand/uL 6 74 6 15 5 41   HEMOGLOBIN g/dL 13 3 13 4 13 7   HEMATOCRIT % 40 6 40 8 41 2   RDW % 14 5 14 1 14 2   PLATELETS Thousands/uL 190 188 157       Results from last 7 days  Lab Units 04/30/18  0428 04/29/18  1247 04/24/18  0555   SODIUM mmol/L 139 135* 141   POTASSIUM mmol/L 4 1 4 2 3 7   CHLORIDE mmol/L 102 101 102   CO2 mmol/L 26 22 29   MAGNESIUM mg/dL 1 9  --   --    BUN mg/dL 53* 48* 27*   CREATININE mg/dL 1 42* 1 40* 1 03   CALCIUM mg/dL 9 3 9 6 9 4   TOTAL PROTEIN g/dL 7 2 7 7  --    BILIRUBIN TOTAL mg/dL 0 68 0 98  --    AST U/L 76* 74*  --    ALT U/L 74 71  --    ALK PHOS U/L 99 101  --    GLUCOSE RANDOM mg/dL 261* 305* 149*       Results from last 7 days  Lab Units 04/29/18  1246   TROPONIN I ng/mL 1 45*           Results from last 7 days  Lab Units 04/29/18  1247   INR  1 60*           EKG: Afib  Telemetry: Afib  Intermittently uncontrolled rates up to the 140s  Imaging: Xr Chest 1 View Portable    Result Date: 4/23/2018  Narrative: CHEST INDICATION:   epigastric pain  Shortness of breath COMPARISON:  3/19/2018 EXAM PERFORMED/VIEWS:  XR CHEST PORTABLE FINDINGS:  There are median sternotomy wires indicating prior cardiac surgery  There is enlargement of the cardiac silhouette There is evidence of interstitial pulmonary edema and small bilateral effusions  No pneumothorax Osseous structures appear within normal limits for patient age  Impression: Interstitial pulmonary edema with small bilateral effusions  Workstation performed: [id number]     Xr Chest 2 Views    Result Date: 4/30/2018  Narrative: CHEST INDICATION:   weakness/ sob  COMPARISON:  April 22, 2018 and March 19, 2018  EXAM PERFORMED/VIEWS:  XR CHEST PA & LATERAL  The frontal view was performed utilizing dual energy radiographic technique  FINDINGS: Heart enlarged  Prior open heart surgery  Pulmonary vessels unremarkable  Bilateral pleural effusions, larger on the left  Lungs hyperinflated  Atelectasis or consolidation in the base of the left lower lobe  Diffuse demineralization of the imaged portions of the skeleton  Chronic mild compression deformity of T10  Impression: Small bilateral pleural effusions, larger on the left  Atelectasis or consolidation in the base of the left lower lobe  Workstation performed: [id number]     Xr Abdomen Obstruction Series    Result Date: 4/23/2018  Narrative: OBSTRUCTION SERIES INDICATION:   constipation  COMPARISON:  None EXAM PERFORMED/VIEWS:  XR ABDOMEN OBSTRUCTION SERIES FINDINGS: There is moderate amount of stool in the colon  There do not appear to be any abnormally dilated small bowel loops  No free air beneath the hemidiaphragms  No pathologic calcifications or soft tissue masses evident  There is a mild S-shaped thoracolumbar scoliosis  There is degenerative arthritis at the right hip joint  There are surgical clips over the left side of the abdomen and pelvis  There is severe atherosclerotic disease  Examination of the chest reveals a normal cardiomediastinal silhouette  There is small left pleural effusion and minimal right pleural effusion  There are some interstitial prominence in the lungs  There is no pneumothorax  There is probably mild bibasilar atelectasis  Impression: Small pleural effusions and interstitial prominence  Correlate for clinical evidence of failure  No obstruction seen  Moderate stool Workstation performed: [id number]     Ct Chest Without Contrast    Result Date: 4/29/2018  Narrative: CT CHEST WITHOUT IV CONTRAST INDICATION:   Shortness of breath  COMPARISON: 11/22/2008 TECHNIQUE: CT examination of the chest was performed without intravenous contrast   Axial, sagittal, and coronal 2D reformatted images were created from the source data and submitted for interpretation  Radiation dose length product (DLP) for this visit:  278 11 mGy-cm     This examination, like all CT scans performed in the [facility], was performed utilizing techniques to minimize radiation dose exposure, including the use of iterative  reconstruction and automated exposure control  FINDINGS: LUNGS:  There is interstitial edema and some hazy groundglass pulmonary edema bilaterally  A small focal density in the subpleural area of the right upper lobe anterolaterally on image 34 appears to have been present on the previous exam in keeping with  a benign finding  There are no airway obstructions seen on the right  There does appear to be some material within the left lower lobe bronchial branches peripherally and there is some left lower lobe consolidation in that area  This would suggest possible aspiration pneumonia  PLEURA:  There are moderate right and small left pleural effusions  There is no pneumothorax  HEART/GREAT VESSELS:  There is moderate cardiomegaly  There is calcification of the aortic root and aortic valve leaflets  There is heavy coronary artery calcification  There is no pericardial effusion  Patient is status post CABG  The ascending thoracic aorta is aneurysmally dilated measuring 4 3 cm  The pulmonary veins appear distended  MEDIASTINUM AND HOLLY:  There are numerous small lymph nodes  CHEST WALL AND LOWER NECK:   Unremarkable  VISUALIZED STRUCTURES IN THE UPPER ABDOMEN:  Unremarkable  OSSEOUS STRUCTURES:  There is a compression fracture at the superior endplate of K09 which was present on prior chest x-ray from 3/19/2018  There is some osteopenia suggested diffusely  No new bony abnormality seen  Impression: The patient has debris in the left lower lobe bronchial branches with peripheral consolidation suggesting aspiration pneumonia at the left base  There is also evidence of congestive heart failure with pulmonary edema and pleural effusions and cardiomegaly  There is 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 and there are aortic valvular calcifications noted    Patient may benefit from repeat echocardiography  Workstation performed: [id number]       Cardiac testing:   Results for orders placed in visit on 17   Echo complete with contrast if indicated    Narrative [address]   [address], [address] [facility]   [phone]     Transthoracic Echocardiogram   2D, M-mode, Doppler, and Color Doppler     Study date:  11-Dec-2017     Patient: Markus Carrillo   MR number: [id number]   Account number: [de-identified]   : 1927   Age: 80 years   Gender: Female   Status: Outpatient   Location: [address] [place] [address] [facility]   Height: 62 in   Weight: 124 7 lb   BP: 124/ 50 mmHg     Indications: Aortic stenosis, coronary artery disease  Assess left ventricular function  Diagnoses: I25 10 - Atherosclerotic heart disease of native coronary artery without angina pectoris, I35 0 - Nonrheumatic aortic (valve) stenosis, I48 0 - Atrial fibrillation     Sonographer:  Solomon Betancourt BS, RDCS, RVT, RDMS   Primary Physician: Golden Mason MD   Referring Physician:  Carolyn Matamoros MD   Group:  [facility]   Interpreting Physician:  Joseph Price MD     SUMMARY     LEFT VENTRICLE:   Size was normal    Systolic function was normal  Ejection fraction was estimated to be 50 %  There was mild diffuse hypokinesis  There was mild concentric hypertrophy  RIGHT VENTRICLE:   The size was normal    Systolic function was normal      LEFT ATRIUM:   The atrium was mildly dilated  MITRAL VALVE:   There was moderate regurgitation  AORTIC VALVE:   There was moderate stenosis  There was moderate regurgitation  TRICUSPID VALVE:   There was mild regurgitation  PULMONIC VALVE:   There was mild to moderate regurgitation  IVC, HEPATIC VEINS:   The inferior vena cava was mildly dilated  Respirophasic changes were blunted (less than 50% variation)  COMPARISONS:   There has been no significant interval change  Comparison was made with the previous study of 10-Mar-2017  HISTORY: PRIOR HISTORY: Atrial fibrillation, CAD/CABG, aortic stenosis, dyslipidemia, hypertension, diabetes mellitus, fomer smoker  PROCEDURE: The study was performed in the [address] [facility]  The transthoracic approach was used  The study included complete 2D imaging, M-mode, complete spectral Doppler, and color Doppler  Images were obtained from the   parasternal, apical, subcostal, and suprasternal notch acoustic windows  Image quality was adequate  LEFT VENTRICLE: Size was normal  Systolic function was normal  Ejection fraction was estimated to be 50 %  There were no regional wall motion abnormalities  There was mild diffuse hypokinesis  Wall thickness was moderately increased  There   was mild concentric hypertrophy  DOPPLER: Transmitral flow pattern: atrial fibrillation  The study was not technically sufficient to allow evaluation of LV diastolic function  RIGHT VENTRICLE: The size was normal  Systolic function was normal  Wall thickness was normal      LEFT ATRIUM: The atrium was mildly dilated  RIGHT ATRIUM: Size was normal      MITRAL VALVE: Valve structure was normal  There was normal leaflet separation  DOPPLER: The transmitral velocity was within the normal range  There was no evidence for stenosis  There was moderate regurgitation  AORTIC VALVE: The valve was trileaflet  Leaflets exhibited moderately increased thickness, moderate calcification, moderately reduced cuspal separation, and reduced mobility  DOPPLER: Transaortic velocity and gradient were increased due to   stenosis as well as concomitant increased transaortic flow  There was moderate stenosis  There was moderate regurgitation  TRICUSPID VALVE: The valve structure was normal  There was normal leaflet separation  DOPPLER: The transtricuspid velocity was within the normal range  There was no evidence for stenosis   There was mild regurgitation  The tricuspid jet   envelope definition was inadequate for estimation of RV systolic pressure  There are no indirect findings suggestive of moderate or severe pulmonary hypertension  PULMONIC VALVE: Leaflets exhibited normal thickness, no calcification, and normal cuspal separation  DOPPLER: The transpulmonic velocity was within the normal range  There was mild to moderate regurgitation  PERICARDIUM: There was no pericardial effusion  The pericardium was normal in appearance  AORTA: The root exhibited normal size  SYSTEMIC VEINS: IVC: The inferior vena cava was mildly dilated  Respirophasic changes were blunted (less than 50% variation)  SYSTEM MEASUREMENT TABLES     2D   %FS: 34 65 %   AV Diam: 3 57 cm   EDV(Teich): 79 07 ml   EF(Cube): 72 09 %   EF(Teich): 64 19 %   ESV(Cube): 20 84 ml   ESV(Teich): 28 32 ml   IVSd: 1 45 cm   LA Area: 26 17 cm2   LA Diam: 4 87 cm   LVEDV MOD A4C: 100 51 ml   LVEF MOD A4C: 64 02 %   LVESV MOD A4C: 36 16 ml   LVIDd: 4 21 cm   LVIDs: 2 75 cm   LVLd A4C: 7 64 cm   LVLs A4C: 6 11 cm   LVOT Diam: 2 16 cm   LVPWd: 1 54 cm   MVA Planimetry: 1 59 cm2   RA Area: 19 04 cm2   RV Diam : 3 13 cm   SI(Cube): 34 29 ml/m2   SI(Teich): 32 33 ml/m2   SV MOD A4C: 64 35 ml   SV(Cube): 53 83 ml   SV(Teich): 50 75 ml     CW   AR Dec Keith: 2 23 m/s2   AR Dec Time: 1662 39 ms   AR PHT: 482 09 ms   AR Vmax: 3 7 m/s   AR maxP 8 mmHg   AV Env  Ti: 377 08 ms   AV SI: 495 56 ml/m2   AV SV: 778 03 ml   AV VTI: 77 6 cm   AV Vmax: 2 92 m/s   AV Vmean: 2 06 m/s   AV maxP 18 mmHg   AV meanP 28 mmHg   MV E Jitendra: 1 56 m/s   MV PHT: 167 44 ms   MV VTI: 48 27 cm   MV Vmax: 1 55 m/s   MV Vmean: 0 57 m/s   MV maxP 67 mmHg   MV meanP 71 mmHg   MVA By PHT: 1 31 cm2   TR Vmax: 2 95 m/s   TR maxP 84 mmHg     MM   TAPSE: 1 51 cm     PW   FELTON (VTI): 1 1 cm2   FELTON Vmax: 1 19 cm2   E': 0 04 m/s   LVOT Env  Ti: 377 08 ms   LVOT VTI: 23 36 cm   LVOT Vmax: 0 95 m/s   LVOT Vmean: 0 62 m/s   LVOT maxPG: 3 61 mmHg   LVOT meanP 8 mmHg   LVSI Dopp: 54 51 ml/m2   LVSV Dopp: 85 58 ml   MVA (VTI): 1 77 cm2     [employer] Accredited Echocardiography Laboratory     Prepared and electronically signed by     Duane Galaviz MD   Signed 11-Dec-2017 16:12:09             Counseling / Coordination of Care  Total floor / unit time spent today 45 minutes  Greater than 50% of total time was spent with the patient and / or family counseling and / or coordination of care  A description of the counseling / coordination of care: Obtained history, performed physical examination, discussed laboratory and imaging results, and explained further plan of care  Rahul Elmore

## 2018-04-30 NOTE — NUTRITION
04/30/18 1308   Recommendations/Interventions   Interventions Supplement adjust  (while on clear liquid diet changed supplement to sugar free gelatein BID  patient willing to try orange flavor  )   Nutrition Recommendations Other (specify)  (advance diet to 4g Na, CCD1 with glucerna TID once able to advance diet past clear liquids  monitor phosphorus   encourage PO intake)

## 2018-04-30 NOTE — ASSESSMENT & PLAN NOTE
Patient reported she is depressed after having a heart attack on prior admission 1 week ago  Counseling provided  Psychiatry consult appreciated - pt does not want depression medication

## 2018-05-01 PROBLEM — Z71.89 GOALS OF CARE, COUNSELING/DISCUSSION: Status: ACTIVE | Noted: 2018-05-01

## 2018-05-01 LAB
ALBUMIN SERPL BCP-MCNC: 3.3 G/DL (ref 3.5–5)
ALP SERPL-CCNC: 89 U/L (ref 46–116)
ALT SERPL W P-5'-P-CCNC: 70 U/L (ref 12–78)
ANION GAP SERPL CALCULATED.3IONS-SCNC: 10 MMOL/L (ref 4–13)
AST SERPL W P-5'-P-CCNC: 60 U/L (ref 5–45)
BILIRUB SERPL-MCNC: 0.96 MG/DL (ref 0.2–1)
BUN SERPL-MCNC: 56 MG/DL (ref 5–25)
CALCIUM SERPL-MCNC: 9.6 MG/DL (ref 8.3–10.1)
CHLORIDE SERPL-SCNC: 102 MMOL/L (ref 100–108)
CO2 SERPL-SCNC: 30 MMOL/L (ref 21–32)
CREAT SERPL-MCNC: 1.15 MG/DL (ref 0.6–1.3)
GFR SERPL CREATININE-BSD FRML MDRD: 42 ML/MIN/1.73SQ M
GLUCOSE SERPL-MCNC: 122 MG/DL (ref 65–140)
GLUCOSE SERPL-MCNC: 132 MG/DL (ref 65–140)
GLUCOSE SERPL-MCNC: 202 MG/DL (ref 65–140)
GLUCOSE SERPL-MCNC: 233 MG/DL (ref 65–140)
GLUCOSE SERPL-MCNC: 282 MG/DL (ref 65–140)
MAGNESIUM SERPL-MCNC: 1.8 MG/DL (ref 1.6–2.6)
PHOSPHATE SERPL-MCNC: 3.8 MG/DL (ref 2.3–4.1)
POTASSIUM SERPL-SCNC: 3.1 MMOL/L (ref 3.5–5.3)
PROT SERPL-MCNC: 6.8 G/DL (ref 6.4–8.2)
SODIUM SERPL-SCNC: 142 MMOL/L (ref 136–145)

## 2018-05-01 PROCEDURE — 99232 SBSQ HOSP IP/OBS MODERATE 35: CPT | Performed by: INTERNAL MEDICINE

## 2018-05-01 PROCEDURE — 83735 ASSAY OF MAGNESIUM: CPT | Performed by: GENERAL PRACTICE

## 2018-05-01 PROCEDURE — 82948 REAGENT STRIP/BLOOD GLUCOSE: CPT

## 2018-05-01 PROCEDURE — 80053 COMPREHEN METABOLIC PANEL: CPT | Performed by: GENERAL PRACTICE

## 2018-05-01 PROCEDURE — 84100 ASSAY OF PHOSPHORUS: CPT | Performed by: GENERAL PRACTICE

## 2018-05-01 RX ORDER — INSULIN GLARGINE 100 [IU]/ML
5 INJECTION, SOLUTION SUBCUTANEOUS
Status: DISCONTINUED | OUTPATIENT
Start: 2018-05-01 | End: 2018-05-08 | Stop reason: HOSPADM

## 2018-05-01 RX ORDER — POTASSIUM CHLORIDE 14.9 MG/ML
20 INJECTION INTRAVENOUS
Status: COMPLETED | OUTPATIENT
Start: 2018-05-01 | End: 2018-05-01

## 2018-05-01 RX ADMIN — INSULIN LISPRO 4 UNITS: 100 INJECTION, SOLUTION INTRAVENOUS; SUBCUTANEOUS at 11:37

## 2018-05-01 RX ADMIN — DOCUSATE SODIUM,SENNOSIDES 1 TABLET: 50; 8.6 TABLET, FILM COATED ORAL at 22:00

## 2018-05-01 RX ADMIN — FAMOTIDINE 20 MG: 20 TABLET ORAL at 08:10

## 2018-05-01 RX ADMIN — POTASSIUM CHLORIDE 20 MEQ: 200 INJECTION, SOLUTION INTRAVENOUS at 13:05

## 2018-05-01 RX ADMIN — ASPIRIN 81 MG 81 MG: 81 TABLET ORAL at 08:10

## 2018-05-01 RX ADMIN — METOPROLOL TARTRATE 50 MG: 50 TABLET ORAL at 08:10

## 2018-05-01 RX ADMIN — POTASSIUM CHLORIDE 20 MEQ: 200 INJECTION, SOLUTION INTRAVENOUS at 09:38

## 2018-05-01 RX ADMIN — INSULIN LISPRO 2 UNITS: 100 INJECTION, SOLUTION INTRAVENOUS; SUBCUTANEOUS at 16:59

## 2018-05-01 RX ADMIN — FUROSEMIDE 40 MG: 10 INJECTION, SOLUTION INTRAMUSCULAR; INTRAVENOUS at 16:59

## 2018-05-01 RX ADMIN — APIXABAN 2.5 MG: 2.5 TABLET, FILM COATED ORAL at 17:00

## 2018-05-01 RX ADMIN — INSULIN GLARGINE 5 UNITS: 100 INJECTION, SOLUTION SUBCUTANEOUS at 22:00

## 2018-05-01 RX ADMIN — APIXABAN 2.5 MG: 2.5 TABLET, FILM COATED ORAL at 08:11

## 2018-05-01 RX ADMIN — DILTIAZEM HYDROCHLORIDE 120 MG: 120 CAPSULE, COATED, EXTENDED RELEASE ORAL at 08:11

## 2018-05-01 RX ADMIN — METOPROLOL TARTRATE 50 MG: 50 TABLET ORAL at 22:00

## 2018-05-01 RX ADMIN — CALCITONIN SALMON 1 SPRAY: 200 SPRAY, METERED NASAL at 08:11

## 2018-05-01 RX ADMIN — FUROSEMIDE 40 MG: 10 INJECTION, SOLUTION INTRAMUSCULAR; INTRAVENOUS at 08:11

## 2018-05-01 NOTE — PROGRESS NOTES
Cardiology Progress Note - Bethany Maki 80 y o  female MRN: [id number]    Unit/Bed#: [facility] 506-01 Encounter: [id number]      Assessment:  1  Acute on chronic combined heart failure  · Echo - 4/30/17 - LVEF 44%, moderate diffuse hypokinesis, mild LVH, mildly dilated RV, mildly reduced RV function, moderately dilated LA & RA, moderate MR, mild-moderate aortic stenosis, FELTON 1 1  2  Chronic atrial fibrillation/flutter with RVR  · On eliquis  3  Aortic stenosis, moderate  · Echo - 12/2017 - LVEF 50%, mild diffuse hypokinesis, mild LVH, normal LV, mildly dilated LA, moderate MR, moderate aortic stenosis, moderate aortic regurgitation, mean transaortic gradient - 19, FELTON 1 1  4  Moderate MR  5  CAD s/p CABGx3 (2006)  · CABGx3 = SVG to OM1 & sequential graft to LAD, SVG to RCA  5  Ascending thoracic aortic aneurysm, 4 3 cm on CT  6  Dementia  7  Hypertension  8  Hyperlipidemia  9  h/o Stroke     Outpatient cardiologist: Kenrick Greene  · Echo - showed moderately reduced LV function with LVEF 44%, with diffuse hypokinesis  · This is likely related to her rapid atrial fibrillation episodes  · Remains in rate controlled atrial fibrillation  · Last episiode of RVR was on 4/29 evening - when she was in flutter with RVR at 150s  · No further episodes of RVR after initiating cardizem  in addition to metoprolol 50 bid  · Continue same  · Remains on apixaban 2 5 bid  · Cr improved with IV diuresis 1 1 <-- 1 4  · Although she was Net  -150 cc only, but wt down 4 lbs to 112 lbs today  · Continue IV lasix 40 bid for 1 more day  · Possibly switch to oral lasix tomorrow    Subjective:    No significant events overnight  Review of Systems    No c/o chest pain, No c/o palpitations, No c/o shortness of breath, No c/o dizziness or light-headedness, No c/o abdominal pain, no c/o nausea/vomitting  All other systems negative    Telemetry Review: Afib, rate controlled    Overnight had brief burst where she went into 140s     Objective:   Vitals: Blood pressure 148/67, pulse 74, temperature 97 5 °F (36 4 °C), resp  rate 14, height 5' 3" (1 6 m), weight 51 1 kg (112 lb 9 6 oz), SpO2 94 %  , Body mass index is 19 95 kg/m² , Orthostatic Blood Pressures      Most Recent Value   Blood Pressure  148/67 filed at 05/01/2018 1115   Patient Position - Orthostatic VS  Lying filed at 05/01/2018 2086         Systolic (40XAR), FCW:543 , Min:124 , KIU:352     Diastolic (77EIV), BFS:62, Min:64, Max:81    Wt Readings from Last 5 Encounters:   05/01/18 51 1 kg (112 lb 9 6 oz)   04/24/18 51 5 kg (113 lb 8 6 oz)   03/15/18 51 3 kg (113 lb 2 oz)   02/20/18 52 9 kg (116 lb 9 6 oz)   08/28/17 56 8 kg (125 lb 2 1 oz)     I/O       04/29 0701 - 04/30 0700 04/30 0701 - 05/01 0700 05/01 0701 - 05/02 0700    P  O  240 1310 195    IV Piggyback 1000      Total Intake(mL/kg) 1240 (23 6) 1310 (25 6) 195 (3 8)    Urine (mL/kg/hr) 795 1467 (1 2)     Total Output 795 1467      Net +445 -157 +195           Unmeasured Urine Occurrence  3 x               Physical Exam    Constitutional:  Parth Novak appears well, alert and oriented x 3, pleasant and cooperative  No acute distress   HEENT:    Normocephalic, atraumatic   Neck:  Supple, JVD+   Lungs:  Left basal rales+, respirations unlabored    Chest Wall:  No tenderness or deformity    Heart::  Irregularly, irregular, S1 and S2 normal, no murmur, rub or gallop    Abdomen:  Soft, non-tender, non-distended   Neurological:  Awake, alert, oriented x3   Non-focal exam    Extremities:  No pedal edema, No calf tenderness         Laboratory Results:    Results from last 7 days  Lab Units 04/29/18  1246   TROPONIN I ng/mL 1 45*       CBC with diff:     Results from last 7 days  Lab Units 04/30/18  0428 04/29/18  1246   WBC Thousand/uL 6 74 6 15   HEMOGLOBIN g/dL 13 3 13 4   HEMATOCRIT % 40 6 40 8   MCV fL 93 94   PLATELETS Thousands/uL 190 188   MCH pg 30 6 30 9   MCHC g/dL 32 8 32 8   RDW % 14 5 14 1   MPV fL 13 2* 12 0 NRBC AUTO /100 WBCs  --  0         CMP:  Results from last 7 days  Lab Units 05/01/18  0508 04/30/18  0428 04/29/18  1247   SODIUM mmol/L 142 139 135*   POTASSIUM mmol/L 3 1* 4 1 4 2   CHLORIDE mmol/L 102 102 101   CO2 mmol/L 30 26 22   ANION GAP mmol/L 10 11 12   BUN mg/dL 56* 53* 48*   CREATININE mg/dL 1 15 1 42* 1 40*   GLUCOSE RANDOM mg/dL 122 261* 305*   CALCIUM mg/dL 9 6 9 3 9 6   AST U/L 60* 76* 74*   ALT U/L 70 74 71   ALK PHOS U/L 89 99 101   TOTAL PROTEIN g/dL 6 8 7 2 7 7   BILIRUBIN TOTAL mg/dL 0 96 0 68 0 98   EGFR ml/min/1 73sq m 42 32 33         BMP:  Results from last 7 days  Lab Units 05/01/18  0508 04/30/18  0428 04/29/18  1247   SODIUM mmol/L 142 139 135*   POTASSIUM mmol/L 3 1* 4 1 4 2   CHLORIDE mmol/L 102 102 101   CO2 mmol/L 30 26 22   BUN mg/dL 56* 53* 48*   CREATININE mg/dL 1 15 1 42* 1 40*   GLUCOSE RANDOM mg/dL 122 261* 305*   CALCIUM mg/dL 9 6 9 3 9 6       BNP: No results for input(s): BNP in the last 72 hours      Magnesium:   Results from last 7 days  Lab Units 05/01/18  0508 04/30/18  0428   MAGNESIUM mg/dL 1 8 1 9       Coags:   Results from last 7 days  Lab Units 04/29/18  1247   PTT seconds 36*   INR  1 60*       TSH:        Hemoglobin A1C       Lipid Profile:       Meds/Allergies   all current active meds have been reviewed and current meds: Current Facility-Administered Medications   Medication Dose Route Frequency    acetaminophen (TYLENOL) tablet 650 mg  650 mg Oral Q6H PRN    apixaban (ELIQUIS) tablet 2 5 mg  2 5 mg Oral BID    aspirin chewable tablet 81 mg  81 mg Oral Daily    calcitonin (salmon) (MIACALCIN) 200 units/act nasal spray 1 spray  1 spray Alternating Nares Daily    diltiazem (CARDIZEM CD) 24 hr capsule 120 mg  120 mg Oral Daily    famotidine (PEPCID) tablet 20 mg  20 mg Oral Daily    furosemide (LASIX) injection 40 mg  40 mg Intravenous BID (diuretic)    insulin glargine (LANTUS) subcutaneous injection 7 Units 0 07 mL  7 Units Subcutaneous HS    insulin lispro (HumaLOG) 100 units/mL subcutaneous injection 1-6 Units  1-6 Units Subcutaneous TID AC    lactulose 20 g/30 mL oral solution 10 g  10 g Oral Daily PRN    metoprolol tartrate (LOPRESSOR) tablet 50 mg  50 mg Oral Q12H [address]    ondansetron (ZOFRAN) injection 4 mg  4 mg Intravenous Q6H PRN    senna-docusate sodium (SENOKOT S) 8 6-50 mg per tablet 1 tablet  1 tablet Oral HS     Prescriptions Prior to Admission   Medication    apixaban (ELIQUIS) 2 5 mg    aspirin 81 mg chewable tablet    calcitonin, salmon, (MIACALCIN) 200 units/act nasal spray    furosemide (LASIX) 20 mg tablet    lactulose (CHRONULAC) 10 g/15 mL solution    LORazepam (ATIVAN) 1 mg tablet    losartan (COZAAR) 50 mg tablet    metoprolol tartrate (LOPRESSOR) 50 mg tablet    potassium chloride (K-DUR,KLOR-CON) 10 mEq tablet    senna-docusate sodium (SENOKOT S) 8 6-50 mg per tablet            Cardiac testing:   Results for orders placed during the hospital encounter of 18   Echo complete with contrast if indicated    Narrative [facility] [address]  [facility], [address] [facility]  [phone]    Transthoracic Echocardiogram  2D, M-mode, Doppler, and Color Doppler    Study date:  2018    Patient: Carolina Cadena  MR number: [id number]  Account number: [de-identified]  : 1927  Age: 80 years  Gender: Female  Status: Inpatient  Location: Bedside  Height: 62 in  Weight: 123 6 lb  BP: 151/ 79 mmHg    Indications: Heart Failure    Diagnoses: I50 9 - Heart failure, unspecified    Sonographer:  Madai Valle RDCS  Primary Physician:  Mita Posadas:  [facility]  Interpreting Physician:  Steffi Medina MD    SUMMARY    LEFT VENTRICLE:  The ventricle was mildly dilated  Systolic function was moderately reduced  Ejection fraction was estimated to be 44 %  There was moderate diffuse hypokinesis with regional variations  Wall thickness was mildly increased    The changes were consistent with eccentric hypertrophy  RIGHT VENTRICLE:  The ventricle was mildly dilated  Systolic function was mildly reduced  LEFT ATRIUM:  The atrium was moderately dilated  RIGHT ATRIUM:  The atrium was moderately dilated  MITRAL VALVE:  There was mild annular calcification  There was moderate regurgitation  AORTIC VALVE:  The valve was probably trileaflet  Leaflets exhibited moderately increased thickness, moderate calcification, and moderately reduced cuspal separation  Transaortic velocity was increased due to valvular stenosis  There was mild to moderate stenosis  There was mild regurgitation  Valve mean gradient was 14 mmHg  Estimated aortic valve area (by VTI) was 1 09 cm squared  Estimated aortic valve area (by Vmax) was 1 04 cm squared  Estimated aortic valve area (by Vmean) was 1 12 cm squared  TRICUSPID VALVE:  There was mild regurgitation  Estimated peak PA pressure was 56 mmHg  The findings suggest moderate pulmonary hypertension  PULMONIC VALVE:  There was mild regurgitation  PERICARDIUM:  There was a small left pleural effusion  HISTORY: PRIOR HISTORY: Afib, Bilateral Edema, HTN, CKD4    PROCEDURE: The procedure was performed at the bedside  This was a routine study  The transthoracic approach was used  The study included complete 2D imaging, M-mode, complete spectral Doppler, and color Doppler  The heart rate was 85 bpm,  at the start of the study  Images were obtained from the parasternal, apical, subcostal, and suprasternal notch acoustic windows  Image quality was adequate  LEFT VENTRICLE: The ventricle was mildly dilated  Systolic function was moderately reduced  Ejection fraction was estimated to be 44 %  There was moderate diffuse hypokinesis with regional variations  Wall thickness was mildly increased  The changes were consistent with eccentric hypertrophy  DOPPLER: Normal sinus rhythm was absent   The study was not technically sufficient to allow evaluation of LV diastolic function  RIGHT VENTRICLE: The ventricle was mildly dilated  Systolic function was mildly reduced  LEFT ATRIUM: The atrium was moderately dilated  RIGHT ATRIUM: The atrium was moderately dilated  MITRAL VALVE: There was mild annular calcification  Valve structure was normal  There was normal leaflet separation  DOPPLER: The transmitral velocity was within the normal range  There was no evidence for stenosis  There was moderate  regurgitation  AORTIC VALVE: The valve was probably trileaflet  Leaflets exhibited moderately increased thickness, moderate calcification, and moderately reduced cuspal separation  DOPPLER: Transaortic velocity was increased due to valvular stenosis  There was mild to moderate stenosis  There was mild regurgitation  TRICUSPID VALVE: The valve structure was normal  There was normal leaflet separation  DOPPLER: The transtricuspid velocity was within the normal range  There was no evidence for stenosis  There was mild regurgitation  Estimated peak PA  pressure was 56 mmHg  The findings suggest moderate pulmonary hypertension  PULMONIC VALVE: Leaflets exhibited normal thickness, no calcification, and normal cuspal separation  DOPPLER: The transpulmonic velocity was within the normal range  There was mild regurgitation  PERICARDIUM: There was no pericardial effusion  There was a small left pleural effusion  The pericardium was normal in appearance  AORTA: The root exhibited normal size      MEASUREMENT TABLES    2D MEASUREMENTS  LVOT   (Reference normals)  Diam   18 mm   (--)    DOPPLER MEASUREMENTS  LVOT   (Reference normals)  Peak preet   103 cm/s   (--)  Mean preet   77 cm/s   (--)  VTI   20 cm   (--)  Stroke vol   50 89 ml   (--)  Aortic valve   (Reference normals)  Peak preet   252 cm/s   (--)  Mean preet   175 cm/s   (--)  VTI   47 cm   (--)  Peak gradient   25 mmHg   (--)  Mean gradient   14 mmHg   (--)  Obstr index, VTI   0 43    (--)  Valve area, VTI   1 09 cm squared   (--)  Area index, VTI   0 7 cm squared/m squared   (--)  Obstr index, Vmax   0 41    (--)  Valve area, Vmax   1 04 cm squared   (--)  Area index, Vmax   0 67 cm squared/m squared   (--)  Obstr index, Vmean   0 44    (--)  Valve area, Vmean   1 12 cm squared   (--)  Area index, Vmean   0 72 cm squared/m squared   (--)    SYSTEM MEASUREMENT TABLES    2D  %FS: 24 19 %  Ao Diam: 3 48 cm  EDV(Teich): 146 94 ml  EF Biplane: 28 78 %  EF(Teich): 47 61 %  ESV(Teich): 76 98 ml  IVSd: 1 22 cm  LA Area: 27 53 cm2  LA Diam: 4 54 cm  LVEDV MOD A2C: 90 55 ml  LVEDV MOD A4C: 90 87 ml  LVEDV MOD BP: 92 55 ml  LVEF MOD A2C: 25 45 %  LVEF MOD A4C: 29 15 %  LVESV MOD A2C: 67 51 ml  LVESV MOD A4C: 64 38 ml  LVESV MOD BP: 65 92 ml  LVIDd: 5 49 cm  LVIDs: 4 16 cm  LVLd A2C: 6 71 cm  LVLd A4C: 7 06 cm  LVLs A2C: 6 58 cm  LVLs A4C: 6 52 cm  LVOT Diam: 1 81 cm  LVPWd: 1 22 cm  RA Area: 24 68 cm2  RVIDd: 3 69 cm  SV MOD A2C: 23 04 ml  SV MOD A4C: 26 49 ml  SV(Teich): 69 95 ml    CW  AR Dec Fleming: 2 3 m/s2  AR Dec Time: 1532 66 ms  AR PHT: 444 47 ms  AR Vmax: 3 52 m/s  AR maxP 65 mmHg  AV Env  Ti: 267 59 ms  AV VTI: 46 77 cm  AV Vmax: 2 52 m/s  AV Vmean: 1 75 m/s  AV maxP 37 mmHg  AV meanP 98 mmHg  MR VTI: 152 09 cm  MR Vmax: 4 99 m/s  MR Vmean: 3 79 m/s  MR maxP 41 mmHg  MR meanP 6 mmHg  TR Vmax: 3 49 m/s  TR maxP 73 mmHg    MM  TAPSE: 0 96 cm    PW  FELTON (VTI): 0 81 cm2  FELTON Vmax: 0 94 cm2  E': 0 04 m/s  LVOT Env  Ti: 226 07 ms  LVOT VTI: 14 67 cm  LVOT Vmax: 0 91 m/s  LVOT Vmean: 0 65 m/s  LVOT maxPG: 3 34 mmHg  LVOT meanP 98 mmHg  LVSI Dopp: 24 27 ml/m2  LVSV Dopp: 37 86 ml    [facility] Accredited Echocardiography [facility]    Prepared and electronically signed by    Mily Trevino MD  Signed 2018 16:48:11                     Counseling / Coordination of Care  Total floor / unit time spent today 30 minutes    Greater than 50% of total time was spent with the patient and / or family counseling and / or coordination of care  A description of the counseling / coordination of care: Obtained history, performed physical examination, discussed laboratory and imaging results, and explained further plan of care  Dawn Dao

## 2018-05-01 NOTE — ASSESSMENT & PLAN NOTE
Patient and  refusing rehab/assisted living  Patient and  not able to take care of themselves at home  Their children her also not happy about this  Patient had very high risk of readmission if she does not take her medications properly as advised and follow diet  Will consult palliative Care for Goals of care and psychosocial support to family

## 2018-05-01 NOTE — ASSESSMENT & PLAN NOTE
A1c 7 4%  Decrease Lantus to 5 units  Patient did not receive Lantus last night because of low blood sugars  Continue low-dose sliding scale     Blood glucose monitoring  Carb controlled diet

## 2018-05-01 NOTE — RESTORATIVE TECHNICIAN NOTE
Restorative Specialist Mobility Note       Activity: Bedrest, Dangle, Stand at bedside, Turn, Ambulate in room, Bathroom privileges, Ambulate in dunaway     Assistive Device: Front wheel walker     Ambulation Response:  Tolerated fairly well  Repositioned: Sitting, Up in chair

## 2018-05-01 NOTE — PROGRESS NOTES
Progress Note - Sanaz Gregory 4/4/1927, 80 y o  female MRN: [id number]    Unit/Bed#: [facility] 506-01 Encounter: [id number]    Primary Care Provider: Josue Velarde MD   Date and time admitted to hospital: 4/29/2018 12:02 PM        * Acute on chronic diastolic congestive heart failure [facility])   Assessment & Plan    Echo - 4/30/17 - LVEF 44%, moderate diffuse hypokinesis, mild LVH, mildly dilated RV, mildly reduced RV function, moderately dilated LA & RA, moderate MR, mild-moderate aortic stenosis, FELTON 1 1  Possibly tachycardia induced because of AFib  Continue IV Lasix today as per Cardiology  Possible switch to oral in next 24-48 hours  Atrial fibrillation with RVR (HCC)   Assessment & Plan    On BB and Cardizem  Rate is better controlled after starting Cardizem  AC w/ Elliquis  Continue to monitor  Cardiology following  Appreciate recommendations  Aortic stenosis   Assessment & Plan    Stable - 12/2017 shows moderate AS  Follow-up echocardiogram  Cardiology following  Appreciate recommendations  Goals of care, counseling/discussion   Assessment & Plan    Patient and  refusing rehab/assisted living  Patient and  not able to take care of themselves at home  Their children her also not happy about this  Patient had very high risk of readmission if she does not take her medications properly as advised and follow diet  Will consult palliative Care for Goals of care and psychosocial support to family          Moderate protein-calorie malnutrition (HCC)   Assessment & Plan    Supplements    Malnutrition Findings:   Malnutrition type: Chronic illness  Degree of Malnutrition: Malnutrition of moderate degree (malnutrition related to dementia vs  abdominal pain as evidenced by <75% energy intake for >1 month, mild depletion of orbital body fat, and mild depletion of muscle mass seen at clavicle to be treated with liberalized diet and nutrition supplements )    BMI Findings: Body mass index is 19 95 kg/m²  Controlled diabetes mellitus type II without complication (HCC)   Assessment & Plan    A1c 7 4%  Decrease Lantus to 5 units  Patient did not receive Lantus last night because of low blood sugars  Continue low-dose sliding scale  Blood glucose monitoring  Carb controlled diet            VTE Pharmacologic Prophylaxis:   Pharmacologic: Apixaban (Eliquis)  Mechanical VTE Prophylaxis in Place: Yes  Patient Centered Rounds: I have performed bedside rounds with nursing staff today  Discussions with Specialists or Other Care Team Provider: cardio  Education and Discussions with Family / Patient: pt and   Time Spent for Care: 30 minutes  More than 50% of total time spent on counseling and coordination of care as described above  Current Length of Stay: 2 day(s)  Current Patient Status: Inpatient   Certification Statement: The patient will continue to require additional inpatient hospital stay due to above  Discharge Plan: once off iv lasix  Code Status: Level 3 - DNAR and DNI      Subjective:   Pt seen and examined by me this morning  Pt denies any specific complaints  Shortness of breath much better  Still feels a little short of breath  Objective:     Vitals:   Temp (24hrs), Av 5 °F (36 4 °C), Min:97 4 °F (36 3 °C), Max:97 7 °F (36 5 °C)    HR:  [] 75  Resp:  [14-18] 18  BP: (124-148)/(64-81) 125/80  SpO2:  [94 %-98 %] 96 %  Body mass index is 19 95 kg/m²  Input and Output Summary (last 24 hours): Intake/Output Summary (Last 24 hours) at 18 1543  Last data filed at 18 1229   Gross per 24 hour   Intake              755 ml   Output             1367 ml   Net             -612 ml       Physical Exam:     Physical Exam    Constitutional: Pt appears poorly developed and nourished  Not in any acute distress  HENT:   Head: Normocephalic and atraumatic  Eyes: EOM are normal    Neck: Neck supple     Cardiovascular: Normal rate, irregularly irregular rhythm, normal heart sounds and intact distal pulses  Exam reveals no gallop and no friction rub  No murmur heard  Pulmonary/Chest: Effort normal and breath sounds normal  No respiratory distress  Pt has no wheezes or rales  patient has bilateral crackles  Abdominal: Soft  Non-distended, Non-tender  Bowel sounds are normal    Musculoskeletal: Normal range of motion  Neurological: alert and oriented to person, place, and time  Normal strength and sensations  Psychiatric: normal mood and affect  Additional Data:     Labs:      Results from last 7 days  Lab Units 04/30/18  0428 04/29/18  1246   WBC Thousand/uL 6 74 6 15   HEMOGLOBIN g/dL 13 3 13 4   HEMATOCRIT % 40 6 40 8   PLATELETS Thousands/uL 190 188   NEUTROS PCT %  --  82*   LYMPHS PCT %  --  11*   MONOS PCT %  --  7   EOS PCT %  --  0       Results from last 7 days  Lab Units 05/01/18  0508   SODIUM mmol/L 142   POTASSIUM mmol/L 3 1*   CHLORIDE mmol/L 102   CO2 mmol/L 30   BUN mg/dL 56*   CREATININE mg/dL 1 15   CALCIUM mg/dL 9 6   TOTAL PROTEIN g/dL 6 8   BILIRUBIN TOTAL mg/dL 0 96   ALK PHOS U/L 89   ALT U/L 70   AST U/L 60*   GLUCOSE RANDOM mg/dL 122       Results from last 7 days  Lab Units 04/29/18  1247   INR  1 60*       Results from last 7 days  Lab Units 05/01/18  1044 05/01/18  0601 04/30/18  2030 04/30/18  1631 04/30/18  1104 04/30/18  0628 04/29/18  2110   POC GLUCOSE mg/dl 282* 132 104 87 317* 238* 285*             * I Have Reviewed All Lab Data Listed Above  * Additional Pertinent Lab Tests Reviewed: Magaly 66 Admission Reviewed    Imaging:    Imaging Reports Reviewed Today Include:   Imaging Personally Reviewed by Myself Includes:      Recent Cultures (last 7 days):       Results from last 7 days  Lab Units 04/29/18  1246   BLOOD CULTURE  No Growth at 24 hrs  No Growth at 24 hrs         Last 24 Hours Medication List:     Current Facility-Administered Medications:  acetaminophen 650 mg Oral Q6H PRN Caffie [place], DO   apixaban 2 5 mg Oral BID Geo Fontaine MD   aspirin 81 mg Oral Daily Geo Fontaine MD   calcitonin (salmon) 1 spray Alternating Nares Daily Geo Fontaine MD   diltiazem 120 mg Oral Daily Ady Richard MD   famotidine 20 mg Oral Daily Caffie [place], DO   furosemide 40 mg Intravenous BID (diuretic) Geo Fontaine MD   insulin glargine 5 Units Subcutaneous HS Nydia Jiménez MD   insulin lispro 1-6 Units Subcutaneous TID AC Geo Fontaine MD   lactulose 10 g Oral Daily PRN Caffie [place], DO   metoprolol tartrate 50 mg Oral Q12H [address] Geo Fontaine MD   ondansetron 4 mg Intravenous Q6H PRN Caffie [place], DO   senna-docusate sodium 1 tablet Oral HS Geo Fontaine MD        Today, Patient Was Seen By: Nydia Jiménez MD    ** Please Note: Dictation voice to text software may have been used in the creation of this document   **

## 2018-05-01 NOTE — SOCIAL WORK
Heart failure Care Coordinator: met with patient, her , daughter and S-I-L to explain role, provide education and identify any barriers to self management at home  Patient was recently discharged with afib/HF last week from [facility]  Patient went home with her  and daughter states home nurse was out twice  PCP appointment was scheduled for 5/2 and Cardiology for 5/14  PCP notes after discharge state that daughter called office stating that patient was suppose to have 02 ordered at discharge, but hospital documentation does not support that and patient room air 02 sat on room air was 96%  Dr Cyndie Goldberg did order 02 and it was delivered to home  Home care notes state that medication box is usually filled by grandson, but RN will fill over next few weeks and med reconcilitation was done and needed phone call to both PCP and cardiologist  Patient forgetful with weights and recording  Daughter states patient and her dad get Meals on Wheels daily, but also eat a lot of processed fast food that other relatives bring in  She asked me to reinforce diet with patient and  and I did  Family will try to provide increased support, but daughter states she feels parents would benefit from assisted living, but are not agreeable  Daughter arranges appointments and provides transportation  HF booklet reviewed and  and patient receptive- state they did not get diet education during last stay  I did stress importance of calling provider before coming to the emergency room to manage non-urgent concerns

## 2018-05-01 NOTE — ASSESSMENT & PLAN NOTE
Echo - 4/30/17 - LVEF 44%, moderate diffuse hypokinesis, mild LVH, mildly dilated RV, mildly reduced RV function, moderately dilated LA & RA, moderate MR, mild-moderate aortic stenosis, FELTON 1 1  Possibly tachycardia induced because of AFib  Continue IV Lasix today as per Cardiology  Possible switch to oral in next 24-48 hours

## 2018-05-01 NOTE — ASSESSMENT & PLAN NOTE
Stable - 12/2017 shows moderate AS  Follow-up echocardiogram  Cardiology following  Appreciate recommendations

## 2018-05-01 NOTE — ASSESSMENT & PLAN NOTE
Supplements    Malnutrition Findings:   Malnutrition type: Chronic illness  Degree of Malnutrition: Malnutrition of moderate degree (malnutrition related to dementia vs  abdominal pain as evidenced by <75% energy intake for >1 month, mild depletion of orbital body fat, and mild depletion of muscle mass seen at clavicle to be treated with liberalized diet and nutrition supplements )    BMI Findings: Body mass index is 19 95 kg/m²

## 2018-05-01 NOTE — ASSESSMENT & PLAN NOTE
On BB and Cardizem  Rate is better controlled after starting Cardizem  AC w/ Jocelyn  Continue to monitor  Cardiology following  Appreciate recommendations

## 2018-05-02 LAB
ANION GAP SERPL CALCULATED.3IONS-SCNC: 7 MMOL/L (ref 4–13)
BUN SERPL-MCNC: 59 MG/DL (ref 5–25)
CALCIUM SERPL-MCNC: 9.1 MG/DL (ref 8.3–10.1)
CHLORIDE SERPL-SCNC: 104 MMOL/L (ref 100–108)
CO2 SERPL-SCNC: 33 MMOL/L (ref 21–32)
CREAT SERPL-MCNC: 1.04 MG/DL (ref 0.6–1.3)
ERYTHROCYTE [DISTWIDTH] IN BLOOD BY AUTOMATED COUNT: 14.5 % (ref 11.6–15.1)
GFR SERPL CREATININE-BSD FRML MDRD: 47 ML/MIN/1.73SQ M
GLUCOSE SERPL-MCNC: 119 MG/DL (ref 65–140)
GLUCOSE SERPL-MCNC: 140 MG/DL (ref 65–140)
GLUCOSE SERPL-MCNC: 207 MG/DL (ref 65–140)
GLUCOSE SERPL-MCNC: 208 MG/DL (ref 65–140)
GLUCOSE SERPL-MCNC: 256 MG/DL (ref 65–140)
HCT VFR BLD AUTO: 39 % (ref 34.8–46.1)
HGB BLD-MCNC: 12.4 G/DL (ref 11.5–15.4)
MCH RBC QN AUTO: 30.5 PG (ref 26.8–34.3)
MCHC RBC AUTO-ENTMCNC: 31.8 G/DL (ref 31.4–37.4)
MCV RBC AUTO: 96 FL (ref 82–98)
PLATELET # BLD AUTO: 168 THOUSANDS/UL (ref 149–390)
PMV BLD AUTO: 12.1 FL (ref 8.9–12.7)
POTASSIUM SERPL-SCNC: 3 MMOL/L (ref 3.5–5.3)
POTASSIUM SERPL-SCNC: 4.8 MMOL/L (ref 3.5–5.3)
RBC # BLD AUTO: 4.06 MILLION/UL (ref 3.81–5.12)
SODIUM SERPL-SCNC: 144 MMOL/L (ref 136–145)
WBC # BLD AUTO: 5.06 THOUSAND/UL (ref 4.31–10.16)

## 2018-05-02 PROCEDURE — 97535 SELF CARE MNGMENT TRAINING: CPT

## 2018-05-02 PROCEDURE — 97530 THERAPEUTIC ACTIVITIES: CPT

## 2018-05-02 PROCEDURE — 99497 ADVNCD CARE PLAN 30 MIN: CPT | Performed by: FAMILY MEDICINE

## 2018-05-02 PROCEDURE — 82948 REAGENT STRIP/BLOOD GLUCOSE: CPT

## 2018-05-02 PROCEDURE — 80048 BASIC METABOLIC PNL TOTAL CA: CPT | Performed by: INTERNAL MEDICINE

## 2018-05-02 PROCEDURE — 85027 COMPLETE CBC AUTOMATED: CPT | Performed by: INTERNAL MEDICINE

## 2018-05-02 PROCEDURE — 84132 ASSAY OF SERUM POTASSIUM: CPT | Performed by: PHYSICIAN ASSISTANT

## 2018-05-02 PROCEDURE — 97110 THERAPEUTIC EXERCISES: CPT

## 2018-05-02 PROCEDURE — 97116 GAIT TRAINING THERAPY: CPT

## 2018-05-02 PROCEDURE — 99232 SBSQ HOSP IP/OBS MODERATE 35: CPT | Performed by: INTERNAL MEDICINE

## 2018-05-02 RX ORDER — POTASSIUM CHLORIDE 20 MEQ/1
40 TABLET, EXTENDED RELEASE ORAL ONCE
Status: DISCONTINUED | OUTPATIENT
Start: 2018-05-02 | End: 2018-05-08 | Stop reason: HOSPADM

## 2018-05-02 RX ORDER — MAGNESIUM SULFATE HEPTAHYDRATE 40 MG/ML
2 INJECTION, SOLUTION INTRAVENOUS ONCE
Status: COMPLETED | OUTPATIENT
Start: 2018-05-02 | End: 2018-05-02

## 2018-05-02 RX ORDER — POTASSIUM CHLORIDE 14.9 MG/ML
20 INJECTION INTRAVENOUS
Status: DISCONTINUED | OUTPATIENT
Start: 2018-05-02 | End: 2018-05-02

## 2018-05-02 RX ORDER — POTASSIUM CHLORIDE 20 MEQ/1
40 TABLET, EXTENDED RELEASE ORAL ONCE
Status: COMPLETED | OUTPATIENT
Start: 2018-05-02 | End: 2018-05-02

## 2018-05-02 RX ORDER — FUROSEMIDE 40 MG/1
40 TABLET ORAL DAILY
Status: DISCONTINUED | OUTPATIENT
Start: 2018-05-03 | End: 2018-05-08 | Stop reason: HOSPADM

## 2018-05-02 RX ORDER — POTASSIUM CHLORIDE 14.9 MG/ML
20 INJECTION INTRAVENOUS
Status: COMPLETED | OUTPATIENT
Start: 2018-05-02 | End: 2018-05-02

## 2018-05-02 RX ADMIN — INSULIN LISPRO 2 UNITS: 100 INJECTION, SOLUTION INTRAVENOUS; SUBCUTANEOUS at 17:19

## 2018-05-02 RX ADMIN — INSULIN LISPRO 3 UNITS: 100 INJECTION, SOLUTION INTRAVENOUS; SUBCUTANEOUS at 12:18

## 2018-05-02 RX ADMIN — METOPROLOL TARTRATE 50 MG: 50 TABLET ORAL at 21:06

## 2018-05-02 RX ADMIN — POTASSIUM CHLORIDE 20 MEQ: 200 INJECTION, SOLUTION INTRAVENOUS at 13:49

## 2018-05-02 RX ADMIN — METOPROLOL TARTRATE 50 MG: 50 TABLET ORAL at 09:09

## 2018-05-02 RX ADMIN — CALCITONIN SALMON 1 SPRAY: 200 SPRAY, METERED NASAL at 09:37

## 2018-05-02 RX ADMIN — APIXABAN 2.5 MG: 2.5 TABLET, FILM COATED ORAL at 17:27

## 2018-05-02 RX ADMIN — DILTIAZEM HYDROCHLORIDE 120 MG: 120 CAPSULE, COATED, EXTENDED RELEASE ORAL at 09:09

## 2018-05-02 RX ADMIN — DOCUSATE SODIUM,SENNOSIDES 1 TABLET: 50; 8.6 TABLET, FILM COATED ORAL at 21:04

## 2018-05-02 RX ADMIN — INSULIN GLARGINE 5 UNITS: 100 INJECTION, SOLUTION SUBCUTANEOUS at 21:04

## 2018-05-02 RX ADMIN — FUROSEMIDE 40 MG: 10 INJECTION, SOLUTION INTRAMUSCULAR; INTRAVENOUS at 09:10

## 2018-05-02 RX ADMIN — ASPIRIN 81 MG 81 MG: 81 TABLET ORAL at 09:09

## 2018-05-02 RX ADMIN — FAMOTIDINE 20 MG: 20 TABLET ORAL at 09:09

## 2018-05-02 RX ADMIN — ACETAMINOPHEN 650 MG: 325 TABLET, FILM COATED ORAL at 13:06

## 2018-05-02 RX ADMIN — MAGNESIUM SULFATE HEPTAHYDRATE 2 G: 40 INJECTION, SOLUTION INTRAVENOUS at 09:09

## 2018-05-02 RX ADMIN — POTASSIUM CHLORIDE 20 MEQ: 200 INJECTION, SOLUTION INTRAVENOUS at 09:37

## 2018-05-02 RX ADMIN — POTASSIUM CHLORIDE 40 MEQ: 1500 TABLET, EXTENDED RELEASE ORAL at 09:09

## 2018-05-02 RX ADMIN — APIXABAN 2.5 MG: 2.5 TABLET, FILM COATED ORAL at 09:09

## 2018-05-02 NOTE — ASSESSMENT & PLAN NOTE
A1c 7 4%  Cont Lantus to 5 units  Continue low-dose sliding scale     Blood glucose monitoring  Carb controlled diet

## 2018-05-02 NOTE — PROGRESS NOTES
Progress Note - Cardiology   Annalee Valero 80 y o  female MRN: [id number]  Unit/Bed#: [facility] 506-01 Encounter: [id number]    Assessment:  Principal Problem:    Acute on chronic diastolic congestive heart failure ([address] )  Active Problems: Aortic stenosis    Benign essential HTN    Controlled diabetes mellitus type II without complication (HCC)    Atrial fibrillation with RVR (HCC)    CAD (coronary artery disease)    Cognitive impairment    Depression with anxiety    Esophageal reflux    Hyperlipidemia    Hypertension    Osteoarthritis    Nausea    Transaminitis    Moderate protein-calorie malnutrition (HCC)    Goals of care, counseling/discussion    63-year-old female  Combined systolic-diastolic congestive heart failure  Ejection fraction newly found to be decreased to 40-45%  Moderate aortic stenosis which is low flow in the setting of decreased EF  Atrial fibrillation now with better rate control  Recent admission at the [facility] for congestive heart failure as well  Recommendations:  Convert to p o  Lasix  Put her on 40 mg Lasix daily starting tomorrow  She did receive IV Lasix this morning  On admission, she was on 20 mg Lasix daily  Potassium supplementation  Family has been involved and at the bedside, and primary team and palliative care are evaluating options for best care support for her  Subjective/Objective     Subjective:  Denies significant complaints  She does have a lot a urination after getting the IV Lasix  Breathing is improved  She was weaned off of oxygen  Oxygen saturation will be followed closely  Of note, the patient does have oxygen set up at home      Objective:    Vitals: /92 (BP Location: Left arm)   Pulse 76   Temp 97 5 °F (36 4 °C) (Oral)   Resp 18   Ht 5' 3" (1 6 m)   Wt 50 8 kg (111 lb 15 9 oz)   SpO2 97%   BMI 19 84 kg/m²   Vitals:    05/01/18 0600 05/02/18 0600   Weight: 51 1 kg (112 lb 9 6 oz) 50 8 kg (111 lb 15 9 oz)     Orthostatic Blood Pressures      Most Recent Value   Blood Pressure  157/92 filed at 05/02/2018 4859   Patient Position - Orthostatic VS  Lying filed at 05/02/2018 0235          Intake/Output Summary (Last 24 hours) at 05/02/18 1030  Last data filed at 05/02/18 1008   Gross per 24 hour   Intake              620 ml   Output             1165 ml   Net             -545 ml     Physical Exam:   General appearance: elderly female, NAD  Head: Normocephalic, without obvious abnormality, atraumatic  Neck: mild elevation in JVP  Lungs: clear bilaterally, slightly decreased left lower base  Heart:  S1, S2 irregular  3/6 systolic ejection murmur  Abdomen: soft, non-tender; bowel sounds normal; no masses,  no organomegaly  Extremities: extremities normal, atraumatic, no cyanosis or edema  Skin: Skin color, texture, turgor normal  No rashes or lesions  Neurologic: poor memory      Medications:    Current Facility-Administered Medications:     acetaminophen (TYLENOL) tablet 650 mg, 650 mg, Oral, Q6H PRN, Jacki Ruiz DO    apixaban (ELIQUIS) tablet 2 5 mg, 2 5 mg, Oral, BID, Peterson Baugh MD, 2 5 mg at 05/02/18 0909    aspirin chewable tablet 81 mg, 81 mg, Oral, Daily, Peterson Baugh MD, 81 mg at 05/02/18 0909    calcitonin (salmon) (MIACALCIN) 200 units/act nasal spray 1 spray, 1 spray, Alternating Nares, Daily, Peterson Baugh MD, 1 spray at 05/02/18 0937    diltiazem (CARDIZEM CD) 24 hr capsule 120 mg, 120 mg, Oral, Daily, Kristy Valle MD, 120 mg at 05/02/18 0909    famotidine (PEPCID) tablet 20 mg, 20 mg, Oral, Daily, Jacki Ruiz DO, 20 mg at 05/02/18 0909    [START ON 5/3/2018] furosemide (LASIX) tablet 40 mg, 40 mg, Oral, Daily, Tom Ferrara MD    insulin glargine (LANTUS) subcutaneous injection 5 Units 0 05 mL, 5 Units, Subcutaneous, HS, Laine Simms MD, 5 Units at 05/01/18 2200    insulin lispro (HumaLOG) 100 units/mL subcutaneous injection 1-6 Units, 1-6 Units, Subcutaneous, TID AC, 2 Units at 05/01/18 1659 **AND** Fingerstick Glucose (POCT), , , TID AC, Qian Barreto MD    lactulose 20 g/30 mL oral solution 10 g, 10 g, Oral, Daily PRN, Lucio Cheese, DO, 10 g at 04/30/18 1149    metoprolol tartrate (LOPRESSOR) tablet 50 mg, 50 mg, Oral, Q12H [address], Qian Barreto MD, 50 mg at 05/02/18 0909    ondansetron (ZOFRAN) injection 4 mg, 4 mg, Intravenous, Q6H PRN, Lucio Cheese, DO, 4 mg at 04/30/18 0939    potassium chloride (K-DUR,KLOR-CON) CR tablet 40 mEq, 40 mEq, Oral, Once, Anisha Henriquez PA-C    potassium chloride (K-DUR,KLOR-CON) CR tablet 40 mEq, 40 mEq, Oral, Once, Anisha Henriquez PA-C    potassium chloride 20 mEq IVPB (premix), 20 mEq, Intravenous, Q2H, Bibi Jules MD, Last Rate: 50 mL/hr at 05/02/18 0937, 20 mEq at 05/02/18 0937    senna-docusate sodium (SENOKOT S) 8 6-50 mg per tablet 1 tablet, 1 tablet, Oral, HS, Qian Barreto MD, 1 tablet at 05/01/18 2200    Lab Results:    Results from last 7 days  Lab Units 04/29/18  1246   TROPONIN I ng/mL 1 45*       Results from last 7 days  Lab Units 05/02/18  0450 04/30/18  0428 04/29/18  1246   WBC Thousand/uL 5 06 6 74 6 15   HEMOGLOBIN g/dL 12 4 13 3 13 4   HEMATOCRIT % 39 0 40 6 40 8   PLATELETS Thousands/uL 168 190 188           Results from last 7 days  Lab Units 05/02/18  0450 05/01/18  0508 04/30/18  0428 04/29/18  1247   SODIUM mmol/L 144 142 139 135*   POTASSIUM mmol/L 3 0* 3 1* 4 1 4 2   CHLORIDE mmol/L 104 102 102 101   CO2 mmol/L 33* 30 26 22   BUN mg/dL 59* 56* 53* 48*   CREATININE mg/dL 1 04 1 15 1 42* 1 40*   CALCIUM mg/dL 9 1 9 6 9 3 9 6   TOTAL PROTEIN g/dL  --  6 8 7 2 7 7   BILIRUBIN TOTAL mg/dL  --  0 96 0 68 0 98   ALK PHOS U/L  --  89 99 101   ALT U/L  --  70 74 71   AST U/L  --  60* 76* 74*   GLUCOSE RANDOM mg/dL 119 122 261* 305*       Results from last 7 days  Lab Units 04/29/18  1247   INR  1 60*   PTT seconds 36*       Results from last 7 days  Lab Units 05/01/18  0508 04/30/18  0428   MAGNESIUM mg/dL 1 8 1 9 Telemetry: Personally reviewed  Atrial fibrillation, rate is controlled    Echo 4/30/18:  LEFT VENTRICLE:  The ventricle was mildly dilated  Systolic function was moderately reduced  Ejection fraction was estimated to be 44 %  There was moderate diffuse hypokinesis with regional variations  Wall thickness was mildly increased  The changes were consistent with eccentric hypertrophy      RIGHT VENTRICLE:  The ventricle was mildly dilated  Systolic function was mildly reduced      LEFT ATRIUM:  The atrium was moderately dilated      RIGHT ATRIUM:  The atrium was moderately dilated      MITRAL VALVE:  There was mild annular calcification  There was moderate regurgitation      AORTIC VALVE:  The valve was probably trileaflet  Leaflets exhibited moderately increased thickness, moderate calcification, and moderately reduced cuspal separation  Transaortic velocity was increased due to valvular stenosis  There was mild to moderate stenosis  There was mild regurgitation  Valve mean gradient was 14 mmHg  Estimated aortic valve area (by VTI) was 1 09 cm squared  Estimated aortic valve area (by Vmax) was 1 04 cm squared  Estimated aortic valve area (by Vmean) was 1 12 cm squared      TRICUSPID VALVE:  There was mild regurgitation  Estimated peak PA pressure was 56 mmHg    The findings suggest moderate pulmonary hypertension      PULMONIC VALVE:  There was mild regurgitation      PERICARDIUM:  There was a small left pleural effusion

## 2018-05-02 NOTE — SOCIAL WORK
MCG Guide Used for Initial Round: Heart Failure RRG  Optimal GLOS: 2  Hospital Day: 3 days  DC Readiness: Discharge readiness is indicated by patient meeting Recovery Milestones, including ALL of the following:   Hemodynamic stability   MI excluded   Cardiac rate and rhythm acceptable   Tachypnea absent   Immediate precipitating factors absent or controlled   Pulmonary edema absent or acceptable for next level of care   Peripheral or sacral edema absent or improved   Volume status acceptable on oral medication   Oxygenation at baseline or acceptable for next level of care   Renal function at baseline or acceptable for next level of care   Electrolyte levels normal or acceptable for next level of care   Mental status at baseline   Oral hydration, medications, and diet   Ambulatory   Discharge plans and education understood    Identified Barriers: Pt switched from IV to PO lasix starting tomorrow  Palliative consult pending    Discussion Date (Time): 05/02/18 with Dr Bert Shipley

## 2018-05-02 NOTE — SOCIAL WORK
Call from Ranjana Chan [facility] , [facility] admissions  Referral is denied for [facility]  [facility] is still reviewing

## 2018-05-02 NOTE — PHYSICAL THERAPY NOTE
Physical Therapy Progress Note        05/02/18 9109   Pain Assessment   Pain Assessment 0-10   Pain Score 5   Pain Location Arm   Pain Orientation Right   Hospital Pain Intervention(s) Ambulation/increased activity;Repositioned;Distraction   Response to Interventions Tolerated   Restrictions/Precautions   Weight Bearing Precautions Per Order No   Other Precautions Fall Risk;Pain;Multiple lines   General   Chart Reviewed Yes   Response to Previous Treatment Patient with no complaints from previous session  Family/Caregiver Present Yes   Cognition   Overall Cognitive Status Impaired   Arousal/Participation Alert; Cooperative   Comments Patient is pleasant and cooperative throughout session   Transfers   Sit to Stand 4  Minimal assistance   Additional items Assist x 1; Increased time required;Verbal cues  (retropulsive with initial stand)   Stand to Sit 4  Minimal assistance   Additional items Assist x 1; Increased time required;Verbal cues   Stand pivot 4  Minimal assistance   Additional items Assist x 1; Armrests; Increased time required   Ambulation/Elevation   Gait pattern Poor UE support; Forward Flexion;Decreased foot clearance; Step to;Excessively slow   Gait Assistance 4  Minimal assist   Additional items Assist x 1;Verbal cues   Assistive Device Rolling walker   Distance 70 feet x 2   Balance   Static Sitting Good   Dynamic Sitting Poor +   Static Standing Fair -   Endurance Deficit   Endurance Deficit Yes   Endurance Deficit Description Able to ambulate on room air    Activity Tolerance   Activity Tolerance Patient tolerated treatment well   Nurse Made Aware Appropriate to see per RN   Exercises   Hip Flexion Sitting;15 reps;Bilateral   Hip Abduction Sitting;15 reps;Bilateral   Hip Adduction Sitting;15 reps;Bilateral   Knee AROM Long Arc Quad Sitting;15 reps;Bilateral   Ankle Pumps Sitting;15 reps;Bilateral   Assessment   Prognosis Good   Problem List Decreased strength;Decreased endurance; Impaired balance;Decreased mobility; Decreased coordination;Decreased cognition;Decreased safety awareness;Pain   Assessment Patient seated in bedside recliner, agreeable to participate in therapy  She was able to stand, retropulsive on initial stand, however able to maintain stability inside RW  She ambulated improved distances with use of RW with one standing rest break  She performed seated TE with assistance to end range due to weakness  SHe would continue to benefit from skilled PT to maxmiize functional independence  Barriers to Discharge Inaccessible home environment   Goals   Patient Goals To go home   STG Expiration Date 05/10/18   Treatment Day 2   Plan   Treatment/Interventions Functional transfer training;LE strengthening/ROM; Therapeutic exercise;Gait training;Spoke to nursing;Family   Progress Progressing toward goals   PT Frequency 5x/wk   Recommendation   Recommendation (inpatient rehab vs home pending HOME care)   Equipment Recommended Walker  (Rolling)     Héctor Billy, PTA

## 2018-05-02 NOTE — PLAN OF CARE
Problem: PHYSICAL THERAPY ADULT  Goal: Performs mobility at highest level of function for planned discharge setting  See evaluation for individualized goals  Treatment/Interventions: Functional transfer training, Endurance training, Patient/family training, Equipment eval/education, Gait training, Spoke to nursing, Spoke to case management, Family  Equipment Recommended: Nithya Vee (SUSI)       See flowsheet documentation for full assessment, interventions and recommendations  Outcome: Progressing  Prognosis: Good  Problem List: Decreased strength, Decreased endurance, Impaired balance, Decreased mobility, Decreased coordination, Decreased cognition, Decreased safety awareness, Pain  Assessment: Patient seated in bedside recliner, agreeable to participate in therapy  She was able to stand, retropulsive on initial stand, however able to maintain stability inside RW  She ambulated improved distances with use of RW with one standing rest break  She performed seated TE with assistance to end range due to weakness  SHe would continue to benefit from skilled PT to maxmiize functional independence  Barriers to Discharge: Inaccessible home environment     Recommendation:  (inpatient rehab vs home pending HOME care)          See flowsheet documentation for full assessment

## 2018-05-02 NOTE — PROGRESS NOTES
Progress Note - Parth Novak 4/4/1927, 80 y o  female MRN: [id number]    Unit/Bed#: [facility] 506-01 Encounter: [id number]    Primary Care Provider: Brisa Kersn MD   Date and time admitted to hospital: 4/29/2018 12:02 PM        * Acute on chronic diastolic congestive heart failure [facility])   Assessment & Plan    Echo - 4/30/17 - LVEF 44%, moderate diffuse hypokinesis, mild LVH, mildly dilated RV, mildly reduced RV function, moderately dilated LA & RA, moderate MR, mild-moderate aortic stenosis, FELTON 1 1  Possibly tachycardia induced because of AFib  Change to PO lasix 40 mg daily as per cardio  Atrial fibrillation with RVR (HCC)   Assessment & Plan    On BB and Cardizem  Rate is better controlled after starting Cardizem  AC w/ Elliquis  Continue to monitor  Cardiology following  Appreciate recommendations  Aortic stenosis   Assessment & Plan    Stable - 12/2017 shows moderate AS  Follow-up echocardiogram  Cardiology following  Appreciate recommendations  Goals of care, counseling/discussion   Assessment & Plan    Patient and  refusing rehab/assisted living  Patient and  not able to take care of themselves at home  Their children her also not happy about this  Patient had very high risk of readmission if she does not take her medications properly as advised and follow diet  Palliative Care consulted for Goals of care and psychosocial support to family  Had a lengthy discussion with patient, , son and daughter-in-law at bedside today  Patient and her  still very reluctant to leave the house that they are living in right now  I explained them the importance of taking medications regularly and if that does not happen she might have to be admitted to hospital again  Patient understood the risks and understood the importance of taking medications regularly  She is agreeable to home health care arranged  Also discussed about going to rehab  Explained then the importance  After discussion patient and family were agreeable for rehab for short term   made aware  Controlled diabetes mellitus type II without complication (HCC)   Assessment & Plan    A1c 7 4%  Cont Lantus to 5 units  Continue low-dose sliding scale  Blood glucose monitoring  Carb controlled diet            VTE Pharmacologic Prophylaxis:   Pharmacologic: Apixaban (Eliquis)  Mechanical VTE Prophylaxis in Place: Yes  Patient Centered Rounds: I have performed bedside rounds with nursing staff today  Discussions with Specialists or Other Care Team Provider: cardio  Education and Discussions with Family / Patient: pt and , son and daughter-in-law at bedside  Time Spent for Care: 30 minutes  More than 50% of total time spent on counseling and coordination of care as described above  Current Length of Stay: 3 day(s)  Current Patient Status: Inpatient   Certification Statement: The patient will continue to require additional inpatient hospital stay due to above  Discharge Plan: possibly tomorrow pending placement  Code Status: Level 3 - DNAR and DNI    Subjective:   Pt seen and examined by me this morning  Pt said that she is feeling much better today  She has been off oxygen since morning  Shortness of breath has resolved  Objective:     Vitals:   Temp (24hrs), Av 6 °F (36 4 °C), Min:97 4 °F (36 3 °C), Max:98 °F (36 7 °C)    HR:  [53-77] 76  Resp:  [14-20] 18  BP: (123-157)/(59-92) 147/67  SpO2:  [93 %-97 %] 94 %  Body mass index is 19 84 kg/m²  Input and Output Summary (last 24 hours): Intake/Output Summary (Last 24 hours) at 18 1439  Last data filed at 18 1141   Gross per 24 hour   Intake              960 ml   Output             1365 ml   Net             -405 ml       Physical Exam:     Physical Exam    Constitutional: Pt appears poorly developed and nourished  Not in any acute distress    Cardiovascular: Normal rate, irregularly irregular rhythm, normal heart sounds and intact distal pulses  Exam reveals no gallop and no friction rub  No murmur heard  Pulmonary/Chest: Effort normal and breath sounds normal  No respiratory distress  Pt has no wheezes or rales  patient has no crackles  Abdominal: Soft  Non-distended, Non-tender  Bowel sounds are normal    Musculoskeletal: Normal range of motion  Neurological: alert and oriented to person, place, and time  Normal strength and sensations  Psychiatric: normal mood and affect    Additional Data:     Labs:      Results from last 7 days  Lab Units 05/02/18  0450  04/29/18  1246   WBC Thousand/uL 5 06  < > 6 15   HEMOGLOBIN g/dL 12 4  < > 13 4   HEMATOCRIT % 39 0  < > 40 8   PLATELETS Thousands/uL 168  < > 188   NEUTROS PCT %  --   --  82*   LYMPHS PCT %  --   --  11*   MONOS PCT %  --   --  7   EOS PCT %  --   --  0   < > = values in this interval not displayed  Results from last 7 days  Lab Units 05/02/18  0450 05/01/18  0508   SODIUM mmol/L 144 142   POTASSIUM mmol/L 3 0* 3 1*   CHLORIDE mmol/L 104 102   CO2 mmol/L 33* 30   BUN mg/dL 59* 56*   CREATININE mg/dL 1 04 1 15   CALCIUM mg/dL 9 1 9 6   TOTAL PROTEIN g/dL  --  6 8   BILIRUBIN TOTAL mg/dL  --  0 96   ALK PHOS U/L  --  89   ALT U/L  --  70   AST U/L  --  60*   GLUCOSE RANDOM mg/dL 119 122       Results from last 7 days  Lab Units 04/29/18  1247   INR  1 60*       Results from last 7 days  Lab Units 05/02/18  1052 05/02/18  0639 05/01/18  2106 05/01/18  1602 05/01/18  1044 05/01/18  0601 04/30/18  2030 04/30/18  1631 04/30/18  1104 04/30/18  0628 04/29/18  2110   POC GLUCOSE mg/dl 256* 140 233* 202* 282* 132 104 87 317* 238* 285*             * I Have Reviewed All Lab Data Listed Above  * Additional Pertinent Lab Tests Reviewed:  All [place] Admission Reviewed    Imaging:    Imaging Reports Reviewed Today Include:   Imaging Personally Reviewed by Myself Includes:      Recent Cultures (last 7 days):       Results from last 7 days  Lab Units 04/29/18  1246   BLOOD CULTURE  No Growth at 48 hrs  No Growth at 48 hrs  Last 24 Hours Medication List:     Current Facility-Administered Medications:  acetaminophen 650 mg Oral Q6H PRN [facility],    apixaban 2 5 mg Oral BID Summer Huang MD   aspirin 81 mg Oral Daily Summer Huang MD   calcitonin (salmon) 1 spray Alternating Nares Daily Summer Huang MD   diltiazem 120 mg Oral Daily Zeferino Hanson MD   famotidine 20 mg Oral Daily [facility],    [START ON 5/3/2018] furosemide 40 mg Oral Daily Amy Diop MD   insulin glargine 5 Units Subcutaneous HS Wendy Mata MD   insulin lispro 1-6 Units Subcutaneous TID AC Summer Huang MD   lactulose 10 g Oral Daily PRN [facility],    metoprolol tartrate 50 mg Oral Q12H Brina Quarles MD   ondansetron 4 mg Intravenous Q6H PRN [facility],    potassium chloride 40 mEq Oral Once Atiya Londoning, PA-C   potassium chloride 40 mEq Oral Once Atiya Cowing, PA-C   potassium chloride 20 mEq Intravenous Mirna Hair MD   senna-docusate sodium 1 tablet Oral HS Summer Huang MD        Today, Patient Was Seen By: Wendy Mata MD    ** Please Note: Dictation voice to text software may have been used in the creation of this document   **

## 2018-05-02 NOTE — CONSULTS
Consultation - Palliative and Supportive Care   Sanaz Gregory 80 y o  female [id number]    Assessment:  -chronic diastolic congestive heart failure  -moderate aortic stenosis  -atrial fibrillation with RVR,Now rate controlled  -CAD-depression with anxiety  -moderate protein calorie malnutrition  -type 2 diabetes, not controlled  -goals of care conversation  Plan:  1  Symptom management - currently no symptoms to be managed   -     2  Goals - the patient is currently level 3 DNR DNI  Discussed with the patient and her  the POLST form  She clearly stated that she never wanted to have dialysis, did not want to be intubated and be put on a ventilator, she is okay with nasal O2 at home, does not want chest compressions, does not want tube feeding, might be interested in blood transfusion  Record of POLST discussion     I completed a POLST form with the patient and/or the patient's designated decision-maker  The pt is competent for medical decisions today  After discussing the problems addressed during this hospitalization, and prior to this encounter, the following limits and goals were set:    Part A)  DNAR: Do Not Attempt Resuscitation  Part B)  Limited Additional Interventions:   - Transfer to hospital for symptom crisis only   - Do not escalate to ICU   - May use IV medicines or fluids for comfort    Part C)  Determine utility of antibiotics, and consider use  Part D)  No artificial nutrition nor hydration  Additional goals)  Should symptom crisis evolve, please consider admission to [facility]note 3[facility] service  Make referral to [phone]  Contacts)   -  Anabella Ng will act as primary contact and decision-maker for this patient  The son and daughter [Twins!] share alternate health care representation (per the )  I am told that the son and daughter do have financial responsibility  Form was then signed by myself and the patient, Venufabriciojuana Negreteanil    Additional parties in discussion included:  Johann Moise  Copies of form were given to CM/SW for D/C planning, and given to family  Original of form was left in paper chart at nursing station, to be sent home with the patient  Counseling and Coordination of care   I spent 30 total minutes with the patient today, more than 50% of which was spent in counseling, coordination of care, and other face-to-face interactions and cares on the floor  Counseled patient/family regarding goals and means as recorded above  Vanessa Gutierrez MD    Code Status: Level 3 - DNAR and DNI  Advance Directive and Living Will:      States that she has; nothing on file  Power of :   Presumed to be  as per his response for medical decisions  When he lacks capacity it is son and daughter per Johann Moise  The patient currently is capable of making decisions  POLST:    Finished today          We appreciate the invitation to be involved in this patient's care  We will continue to follow and offer support   Please do not hesitate to reach our on call provider through our clinic answering service at  should you have acute symptom control concerns  IDENTIFICATION:  Consults  Reason for Consult / Principal Problem: goals of care  Hx and PE limited by: none    HISTORY OF PRESENT ILLNESS:       Bethany Maki is a 80 y o  female who presents to hospital with fatigue, decreased appetite  She was [facility] in [facility] with Afib and CHF  She had no complaint of chest pain or dyspnea or any symptoms of URI  She lives at home in a one-story dwelling with her elderly  who admits that they really need some help in the home to help take care of both of them  Patient states that she is able to tend to her personal bathing and dressing [except for washing her back], but currently she is much weaker    Medical team looking for some short term rehab to get her back into the home, though unknown if this is a realistic goal   Palliative care tasked with Goals of care  Please see above  Review of Systems    Past Medical History:   Diagnosis Date    Acute ischemic right MCA stroke ([facility] )     Anticoagulant long-term use     last assessed: 8/17/17    Aortic stenosis     Atrial fibrillation ([facility] )     Blurred vision     last assessed: 10/25/13    [facility]estive [facility]     Coronary artery disease     Dementia     Diabetes mellitus ([facility] )     Dysuria     last assessed: 8/3/15    Hyperlipidemia     Hypertension     Nonrheumatic aortic (valve) insufficiency     Old myocardial infarction     Rectal carcinoma (HCC)      Past Surgical History:   Procedure Laterality Date    BALLOON ANGIOPLASTY, ARTERY      CORONARY ARTERY BYPASS GRAFT      RECTAL SURGERY       Social History     Social History    Marital status: /Civil Union     Spouse name: N/A    Number of children: N/A    Years of education: N/A     Occupational History    Retired      Social History Main Topics    Smoking status: Former Smoker     Quit date: 2002    Smokeless tobacco: Never Used      Comment: 1 pack a week;  No secondhand smoke exposure    Alcohol use Yes      Comment: occasional    Drug use: No    Sexual activity: Not on file     Other Topics Concern    Not on file     Social History Narrative    Lives with     House    2 children,5 grandchildren    retired     Family History   Problem Relation Age of Onset    Stroke Mother     Stroke Father        MEDICATIONS / ALLERGIES:    all current active meds have been reviewed, current meds:   Current Facility-Administered Medications   Medication Dose Route Frequency    acetaminophen (TYLENOL) tablet 650 mg  650 mg Oral Q6H PRN    apixaban (ELIQUIS) tablet 2 5 mg  2 5 mg Oral BID    aspirin chewable tablet 81 mg  81 mg Oral Daily    calcitonin (salmon) (MIACALCIN) 200 units/act nasal spray 1 spray  1 spray Alternating Nares Daily    diltiazem (CARDIZEM CD) 24 hr capsule 120 mg  120 mg Oral Daily    famotidine (PEPCID) tablet 20 mg  20 mg Oral Daily    [START ON 5/3/2018] furosemide (LASIX) tablet 40 mg  40 mg Oral Daily    insulin glargine (LANTUS) subcutaneous injection 5 Units 0 05 mL  5 Units Subcutaneous HS    insulin lispro (HumaLOG) 100 units/mL subcutaneous injection 1-6 Units  1-6 Units Subcutaneous TID AC    lactulose 20 g/30 mL oral solution 10 g  10 g Oral Daily PRN    metoprolol tartrate (LOPRESSOR) tablet 50 mg  50 mg Oral Q12H [facility]    ondansetron (ZOFRAN) injection 4 mg  4 mg Intravenous Q6H PRN    potassium chloride (K-DUR,KLOR-CON) CR tablet 40 mEq  40 mEq Oral Once    potassium chloride (K-DUR,KLOR-CON) CR tablet 40 mEq  40 mEq Oral Once    potassium chloride 20 mEq IVPB (premix)  20 mEq Intravenous Q2H    senna-docusate sodium (SENOKOT S) 8 6-50 mg per tablet 1 tablet  1 tablet Oral HS    and PTA meds:   Prior to Admission Medications   Prescriptions Last Dose Informant Patient Reported? Taking?    LORazepam (ATIVAN) 1 mg tablet   No No   Sig: Take 1 tablet by mouth daily at bedtime for 10 days   Patient taking differently: Take 0 5 mg by mouth daily at bedtime     apixaban (ELIQUIS) 2 5 mg Unknown at Unknown time  No No   Sig: Take 1 tablet (2 5 mg total) by mouth 2 (two) times a day   aspirin 81 mg chewable tablet Unknown at Unknown time Self No No   Sig: Chew 1 tablet daily   calcitonin, salmon, (MIACALCIN) 200 units/act nasal spray Unknown at Unknown time  No No   Sig: USE 1 SPRAY IN ONE NOSTRIL DAILY--ALTERNATE NOSTRILS   furosemide (LASIX) 20 mg tablet Unknown at Unknown time  No No   Sig: Take 1 tablet (20 mg total) by mouth daily   lactulose (CHRONULAC) 10 g/15 mL solution Unknown at Unknown time Self Yes No   Sig: Take by mouth   losartan (COZAAR) 50 mg tablet Unknown at Unknown time  No No   Sig: Take 0 5 tablets (25 mg total) by mouth daily for 90 days   metoprolol tartrate (LOPRESSOR) 50 mg tablet Unknown at Unknown time  No No   Sig: Take 1 tablet (50 mg total) by mouth every 12 (twelve) hours   potassium chloride (K-DUR,KLOR-CON) 10 mEq tablet Unknown at Unknown time  No No   Sig: Take 1 tablet (10 mEq total) by mouth daily   senna-docusate sodium (SENOKOT S) 8 6-50 mg per tablet Unknown at Unknown time  No No   Sig: Take 1 tablet by mouth daily at bedtime      Facility-Administered Medications: None       Allergies   Allergen Reactions    Heparin        OBJECTIVE:    Physical Exam    Physical Exam  No physical exam done as the entire time was spent on the POLST form and patient wishes  Lab Results:   I have personally reviewed pertinent labs  , CBC:   Lab Results   Component Value Date    WBC 5 06 05/02/2018    HGB 12 4 05/02/2018    HCT 39 0 05/02/2018    MCV 96 05/02/2018     05/02/2018    MCH 30 5 05/02/2018    MCHC 31 8 05/02/2018    RDW 14 5 05/02/2018    MPV 12 1 05/02/2018   , CMP:   Lab Results   Component Value Date     05/02/2018    K 3 0 (L) 05/02/2018     05/02/2018    CO2 33 (H) 05/02/2018    ANIONGAP 7 05/02/2018    BUN 59 (H) 05/02/2018    CREATININE 1 04 05/02/2018    GLUCOSE 119 05/02/2018    CALCIUM 9 1 05/02/2018    EGFR 47 05/02/2018     Imaging Studies:  CT chest without contrast:  IMPRESSION:     The patient has debris in the left lower lobe bronchial branches with peripheral consolidation suggesting aspiration pneumonia at the left base      There is also evidence of congestive heart failure with pulmonary edema and pleural effusions and cardiomegaly      There is 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 and there are aortic valvular calcifications noted  Patient may benefit from repeat echocardiography  Counseling / Coordination of Care  Total floor / unit time spent today 30 minutes  Greater than 50% of total time was spent with the patient and / or family counseling and / or coordination of care  A description of the counseling / coordination of care: total discussion was regarding POLST only    See documentation above  Thank you kindly for allowing us to help provide care for your patient  Ritesh Estrellita Oconnell MD  [facility] Palliative and Supportive Care  [phone]    ** Please Note: This note may be constructed using a voice recognition dictation system   **

## 2018-05-02 NOTE — PLAN OF CARE
Problem: OCCUPATIONAL THERAPY ADULT  Goal: Performs self-care activities at highest level of function for planned discharge setting  See evaluation for individualized goals  Treatment Interventions: ADL retraining, Functional transfer training, Endurance training, Patient/family training, Equipment evaluation/education, Compensatory technique education, Continued evaluation, Activityengagement (cog assess and tx  as needed)          See flowsheet documentation for full assessment, interventions and recommendations  Outcome: Progressing  Limitation: Decreased ADL status, Decreased cognition, Decreased endurance, Decreased self-care trans, Decreased high-level ADLs  Prognosis: Good  Assessment: Pt seen bedside for OT treatment  Feels well  Complains of mild breathlessness with exertion   On room air  (baseline O2 as needed at home)  Agreeable to participate in OT  Focus of treatment on improving ADL, functional mobility, endurance, safety  Overall min assist ADL, min ADL transfers  Fair tolerance for activity, stood for sink ADL x 5 min  Educated on ECT, PLB with activity, ex as needed  Improved Barthel index from IE   Making good progress toward OT goals  Continues with following occupational deficits: weakness, balance, safety, decreased activity tolerance  Training provided in : safety, precautions, equip use, body mechanics and compensatory tech for ADL, functional transfers  Continues to function below baseline  Continue to follow per OT POC   Recommend STR  Good candidate         OT Discharge Recommendation: Short Term Rehab (good candidate )  OT - OK to Discharge: Yes (to rehab )

## 2018-05-02 NOTE — RESTORATIVE TECHNICIAN NOTE
Restorative Specialist Mobility Note       Activity: Chair, Dangle, Stand at bedside, Turn, Ambulate in room, Ambulate in dunaway     Assistive Device: Front wheel walker     Ambulation Response:  Tolerated fairly well  Repositioned: Sitting, Up in chair

## 2018-05-02 NOTE — SOCIAL WORK
Asked by RN to call daughter, Genesis Matute, [phone]  Spoke at length to daughter to answer her questions about acute rehab, snf and care in the home  Family plan had been for daughter to move in with parents to provide care but her situation has changed since she is now raising two young grandchildren, one of whom is disabled  She is in agreement that parents should not be living alone without more help but her father does not want to move to [place] nor pay for increased help at home  Explained to daughter that [facility] has denied and answer is pending from [facility]

## 2018-05-02 NOTE — ASSESSMENT & PLAN NOTE
Echo - 4/30/17 - LVEF 44%, moderate diffuse hypokinesis, mild LVH, mildly dilated RV, mildly reduced RV function, moderately dilated LA & RA, moderate MR, mild-moderate aortic stenosis, FELTON 1 1  Possibly tachycardia induced because of AFib  Change to PO lasix 40 mg daily as per cardio

## 2018-05-02 NOTE — SOCIAL WORK
Discussed patient with SLIM during care coordination rounds  Palliative care consult has been ordered  CM informed by Dr Cash Elias that he discussed rehab with patient and family and she is now in agreement  Met with patient, her spouse, son and daughter-in-law  They report that the patient and her spouse request [facility] because it is close to their home  Explained criteria for acute rehab and SNF  Informed family of ARC and need to make referral to [facility]  ECIN referrals made to [facility]  Gave family medicare bundle snf list to review and asked them to provide snf choices as backup plan since patient may not meet criteria for acute rehab  Requested that OT eval be completed this morning

## 2018-05-02 NOTE — ASSESSMENT & PLAN NOTE
Patient and  refusing rehab/assisted living  Patient and  not able to take care of themselves at home  Their children her also not happy about this  Patient had very high risk of readmission if she does not take her medications properly as advised and follow diet  Palliative Care consulted for Goals of care and psychosocial support to family  Had a lengthy discussion with patient, , son and daughter-in-law at bedside today  Patient and her  still very reluctant to leave the house that they are living in right now  I explained them the importance of taking medications regularly and if that does not happen she might have to be admitted to hospital again  Patient understood the risks and understood the importance of taking medications regularly  She is agreeable to home health care arranged  Also discussed about going to rehab  Explained then the importance  After discussion patient and family were agreeable for rehab for short term   made aware

## 2018-05-03 PROBLEM — Z74.09 IMPAIRED MOBILITY AND ADLS: Status: ACTIVE | Noted: 2018-05-03

## 2018-05-03 PROBLEM — R41.0 DELIRIUM: Status: ACTIVE | Noted: 2018-05-03

## 2018-05-03 PROBLEM — Z78.9 IMPAIRED MOBILITY AND ADLS: Status: ACTIVE | Noted: 2018-05-03

## 2018-05-03 PROBLEM — R26.2 AMBULATORY DYSFUNCTION: Status: ACTIVE | Noted: 2018-05-03

## 2018-05-03 PROBLEM — R53.81 PHYSICAL DECONDITIONING: Status: ACTIVE | Noted: 2018-05-03

## 2018-05-03 PROBLEM — F03.90 DEMENTIA (HCC): Status: ACTIVE | Noted: 2018-05-03

## 2018-05-03 PROBLEM — I21.A1 TYPE 2 MYOCARDIAL INFARCTION WITHOUT ST ELEVATION (HCC): Status: ACTIVE | Noted: 2018-05-03

## 2018-05-03 LAB
ANION GAP SERPL CALCULATED.3IONS-SCNC: 7 MMOL/L (ref 4–13)
BUN SERPL-MCNC: 48 MG/DL (ref 5–25)
CALCIUM SERPL-MCNC: 9.6 MG/DL (ref 8.3–10.1)
CHLORIDE SERPL-SCNC: 102 MMOL/L (ref 100–108)
CO2 SERPL-SCNC: 29 MMOL/L (ref 21–32)
CREAT SERPL-MCNC: 1.08 MG/DL (ref 0.6–1.3)
GFR SERPL CREATININE-BSD FRML MDRD: 45 ML/MIN/1.73SQ M
GLUCOSE SERPL-MCNC: 153 MG/DL (ref 65–140)
GLUCOSE SERPL-MCNC: 168 MG/DL (ref 65–140)
GLUCOSE SERPL-MCNC: 172 MG/DL (ref 65–140)
GLUCOSE SERPL-MCNC: 193 MG/DL (ref 65–140)
GLUCOSE SERPL-MCNC: 257 MG/DL (ref 65–140)
POTASSIUM SERPL-SCNC: 4.1 MMOL/L (ref 3.5–5.3)
SODIUM SERPL-SCNC: 138 MMOL/L (ref 136–145)

## 2018-05-03 PROCEDURE — 99232 SBSQ HOSP IP/OBS MODERATE 35: CPT | Performed by: NURSE PRACTITIONER

## 2018-05-03 PROCEDURE — 97110 THERAPEUTIC EXERCISES: CPT

## 2018-05-03 PROCEDURE — 82948 REAGENT STRIP/BLOOD GLUCOSE: CPT

## 2018-05-03 PROCEDURE — 97530 THERAPEUTIC ACTIVITIES: CPT

## 2018-05-03 PROCEDURE — 99232 SBSQ HOSP IP/OBS MODERATE 35: CPT | Performed by: INTERNAL MEDICINE

## 2018-05-03 PROCEDURE — 80048 BASIC METABOLIC PNL TOTAL CA: CPT | Performed by: INTERNAL MEDICINE

## 2018-05-03 PROCEDURE — 99232 SBSQ HOSP IP/OBS MODERATE 35: CPT | Performed by: PHYSICIAN ASSISTANT

## 2018-05-03 RX ORDER — QUETIAPINE FUMARATE 25 MG/1
12.5 TABLET, FILM COATED ORAL
Status: DISCONTINUED | OUTPATIENT
Start: 2018-05-04 | End: 2018-05-08 | Stop reason: HOSPADM

## 2018-05-03 RX ORDER — QUETIAPINE FUMARATE 25 MG/1
12.5 TABLET, FILM COATED ORAL ONCE
Status: COMPLETED | OUTPATIENT
Start: 2018-05-03 | End: 2018-05-03

## 2018-05-03 RX ORDER — QUETIAPINE FUMARATE 25 MG/1
12.5 TABLET, FILM COATED ORAL
Status: DISCONTINUED | OUTPATIENT
Start: 2018-05-03 | End: 2018-05-03

## 2018-05-03 RX ORDER — OLANZAPINE 10 MG/1
2.5 INJECTION, POWDER, LYOPHILIZED, FOR SOLUTION INTRAMUSCULAR ONCE
Status: COMPLETED | OUTPATIENT
Start: 2018-05-03 | End: 2018-05-03

## 2018-05-03 RX ORDER — LORAZEPAM 0.5 MG/1
0.5 TABLET ORAL ONCE
Status: COMPLETED | OUTPATIENT
Start: 2018-05-03 | End: 2018-05-03

## 2018-05-03 RX ORDER — AMOXICILLIN 250 MG
1 CAPSULE ORAL
Status: DISCONTINUED | OUTPATIENT
Start: 2018-05-03 | End: 2018-05-03

## 2018-05-03 RX ORDER — ACETAMINOPHEN 325 MG/1
650 TABLET ORAL EVERY 8 HOURS SCHEDULED
Status: DISCONTINUED | OUTPATIENT
Start: 2018-05-03 | End: 2018-05-08 | Stop reason: HOSPADM

## 2018-05-03 RX ORDER — HALOPERIDOL 5 MG/ML
1 INJECTION INTRAMUSCULAR ONCE
Status: COMPLETED | OUTPATIENT
Start: 2018-05-03 | End: 2018-05-03

## 2018-05-03 RX ADMIN — DOCUSATE SODIUM,SENNOSIDES 1 TABLET: 50; 8.6 TABLET, FILM COATED ORAL at 21:11

## 2018-05-03 RX ADMIN — METOPROLOL TARTRATE 50 MG: 50 TABLET ORAL at 08:48

## 2018-05-03 RX ADMIN — ACETAMINOPHEN 650 MG: 325 TABLET ORAL at 14:30

## 2018-05-03 RX ADMIN — DILTIAZEM HYDROCHLORIDE 120 MG: 120 CAPSULE, COATED, EXTENDED RELEASE ORAL at 08:48

## 2018-05-03 RX ADMIN — ASPIRIN 81 MG 81 MG: 81 TABLET ORAL at 08:48

## 2018-05-03 RX ADMIN — FAMOTIDINE 20 MG: 20 TABLET ORAL at 08:49

## 2018-05-03 RX ADMIN — INSULIN LISPRO 1 UNITS: 100 INJECTION, SOLUTION INTRAVENOUS; SUBCUTANEOUS at 12:33

## 2018-05-03 RX ADMIN — ACETAMINOPHEN 650 MG: 325 TABLET ORAL at 21:11

## 2018-05-03 RX ADMIN — INSULIN LISPRO 3 UNITS: 100 INJECTION, SOLUTION INTRAVENOUS; SUBCUTANEOUS at 17:36

## 2018-05-03 RX ADMIN — QUETIAPINE FUMARATE 12.5 MG: 25 TABLET ORAL at 20:08

## 2018-05-03 RX ADMIN — INSULIN GLARGINE 5 UNITS: 100 INJECTION, SOLUTION SUBCUTANEOUS at 21:11

## 2018-05-03 RX ADMIN — INSULIN LISPRO 1 UNITS: 100 INJECTION, SOLUTION INTRAVENOUS; SUBCUTANEOUS at 07:33

## 2018-05-03 RX ADMIN — LORAZEPAM 0.5 MG: 0.5 TABLET ORAL at 19:08

## 2018-05-03 RX ADMIN — APIXABAN 2.5 MG: 2.5 TABLET, FILM COATED ORAL at 17:35

## 2018-05-03 RX ADMIN — OLANZAPINE 2.5 MG: 10 INJECTION, POWDER, FOR SOLUTION INTRAMUSCULAR at 03:10

## 2018-05-03 RX ADMIN — FUROSEMIDE 40 MG: 40 TABLET ORAL at 08:49

## 2018-05-03 RX ADMIN — APIXABAN 2.5 MG: 2.5 TABLET, FILM COATED ORAL at 08:48

## 2018-05-03 RX ADMIN — HALOPERIDOL LACTATE 1 MG: 5 INJECTION, SOLUTION INTRAMUSCULAR at 04:09

## 2018-05-03 RX ADMIN — CALCITONIN SALMON 1 SPRAY: 200 SPRAY, METERED NASAL at 08:49

## 2018-05-03 RX ADMIN — WATER 10 ML: 1 INJECTION INTRAMUSCULAR; INTRAVENOUS; SUBCUTANEOUS at 03:21

## 2018-05-03 RX ADMIN — METOPROLOL TARTRATE 50 MG: 50 TABLET ORAL at 20:08

## 2018-05-03 NOTE — PROGRESS NOTES
Progress Note - Catalina Quinones 4/4/1927, 80 y o  female MRN: [id number]    Unit/Bed#: [facility] 506-01 Encounter: [id number]    Primary Care Provider: Mateo Pollock MD   Date and time admitted to hospital: 4/29/2018 12:02 PM        * Acute on chronic diastolic congestive heart failure [facility])   Assessment & Plan    Echo - 4/30/17 - LVEF 44%, moderate diffuse hypokinesis, mild LVH, mildly dilated RV, mildly reduced RV function, moderately dilated LA & RA, moderate MR, mild-moderate aortic stenosis, FELTON 1 1  Possibly tachycardia induced because of AFib  Continue PO lasix 40 mg daily as per cardio  Atrial fibrillation with RVR (HCC)   Assessment & Plan    On BB and Cardizem  Rate is better controlled after starting Cardizem  AC w/ Elliquis  Continue to monitor  Cardiology following  Appreciate recommendations  Acute encephalopathy   Assessment & Plan    Jose secondary to delirium  Patient was very agitated last night quiet restraints  She has been off restraints morning  This morning patient was still confused  She did remember me talk to her and her family yesterday but she thought that I was at her home  She thought that she was in a diner this morning  Geriatrics consulted  Possibly start Seroquel at bedtime after checking QTc        Aortic stenosis   Assessment & Plan    Stable - 12/2017 shows moderate AS  Cardiology following  Appreciate recommendations  Goals of care, counseling/discussion   Assessment & Plan    Patient and  refusing rehab/assisted living  Patient and  not able to take care of themselves at home  Their children her also not happy about this  Patient had very high risk of readmission if she does not take her medications properly as advised and follow diet  Palliative Care consulted for Goals of care and psychosocial support to family  Had a lengthy discussion with patient, , son and daughter-in-law at bedside on 5/2/2018    Patient and her  still very reluctant to leave the house that they are living in right now  I explained them the importance of taking medications regularly and if that does not happen she might have to be admitted to hospital again  Patient understood the risks and understood the importance of taking medications regularly  She is agreeable to home health care arranged  Also discussed about going to rehab  Explained then the importance  After discussion patient and family were agreeable for rehab for short term   made aware  Moderate protein-calorie malnutrition (HCC)   Assessment & Plan    Supplements    Malnutrition Findings:   Malnutrition type: Chronic illness  Degree of Malnutrition: Malnutrition of moderate degree (malnutrition related to dementia vs  abdominal pain as evidenced by <75% energy intake for >1 month, mild depletion of orbital body fat, and mild depletion of muscle mass seen at clavicle to be treated with liberalized diet and nutrition supplements )    BMI Findings: Body mass index is 20 64 kg/m²  Controlled diabetes mellitus type II without complication (HCC)   Assessment & Plan    A1c 7 4%  Cont Lantus to 5 units  Continue low-dose sliding scale  Blood glucose monitoring  Carb controlled diet            Type 2 Myocardial Infarction:  Most likely due to CHF  Pt denies any chest pain  VTE Pharmacologic Prophylaxis:   Pharmacologic: Apixaban (Eliquis)  Mechanical VTE Prophylaxis in Place: Yes  Patient Centered Rounds: I have performed bedside rounds with nursing staff today  Discussions with Specialists or Other Care Team Provider: cardio, Geriatrics  Education and Discussions with Family / Patient: pt and  and daughter-in-law at bedside  Time Spent for Care: 30 minutes   More than 50% of total time spent on counseling and coordination of care as described above    Current Length of Stay: 4 day(s)  Current Patient Status: Inpatient   Certification Statement: The patient will continue to require additional inpatient hospital stay due to above  Discharge Plan: possibly tomorrow pending improvement in mental status  Code Status: Level 3 - DNAR and DNI    Subjective:   Pt seen and examined by me this morning  Pt any complaints  She thought that she was at a diner  She did remember be from yesterday talking to her and her family also she remembered that I gave her my card with my pictures on it, but she thinks that all this happened at her home  Objective:     Vitals:   Temp (24hrs), Av 5 °F (36 4 °C), Min:97 4 °F (36 3 °C), Max:97 7 °F (36 5 °C)    HR:  [] 88  Resp:  [16-20] 20  BP: (122-164)/(61-75) 135/67  SpO2:  [93 %-100 %] 94 %  Body mass index is 20 64 kg/m²  Input and Output Summary (last 24 hours): Intake/Output Summary (Last 24 hours) at 18 1316  Last data filed at 18 1200   Gross per 24 hour   Intake             2020 ml   Output              746 ml   Net             1274 ml       Physical Exam:     Physical Exam    Constitutional: Pt appears poorly developed and nourished  Not in any acute distress  Cardiovascular: Normal rate, irregularly irregular rhythm, normal heart sounds and intact distal pulses   Exam reveals no gallop and no friction rub   No murmur heard  Pulmonary/Chest: Effort normal and breath sounds normal  No respiratory distress  Pt has no wheezes or rales   patient has no crackles  Abdominal: Soft  Non-distended, Non-tender  Bowel sounds are normal    Musculoskeletal: Normal range of motion  Neurological: alert and oriented to self, person  Not oriented to time or place  Normal strength and sensations  Psychiatric: normal mood and affect  No agitation      Additional Data:     Labs:      Results from last 7 days  Lab Units 18  0450  18  1246   WBC Thousand/uL 5 06  < > 6 15   HEMOGLOBIN g/dL 12 4  < > 13 4   HEMATOCRIT % 39 0  < > 40 8   PLATELETS Thousands/uL 168  < > 188   NEUTROS PCT %  --   --  82*   LYMPHS PCT %  --   --  11*   MONOS PCT %  --   --  7   EOS PCT %  --   --  0   < > = values in this interval not displayed  Results from last 7 days  Lab Units 05/03/18  0926  05/01/18  0508   SODIUM mmol/L 138  < > 142   POTASSIUM mmol/L 4 1  < > 3 1*   CHLORIDE mmol/L 102  < > 102   CO2 mmol/L 29  < > 30   BUN mg/dL 48*  < > 56*   CREATININE mg/dL 1 08  < > 1 15   CALCIUM mg/dL 9 6  < > 9 6   TOTAL PROTEIN g/dL  --   --  6 8   BILIRUBIN TOTAL mg/dL  --   --  0 96   ALK PHOS U/L  --   --  89   ALT U/L  --   --  70   AST U/L  --   --  60*   GLUCOSE RANDOM mg/dL 193*  < > 122   < > = values in this interval not displayed  Results from last 7 days  Lab Units 04/29/18  1247   INR  1 60*       Results from last 7 days  Lab Units 05/03/18  1117 05/03/18  0730 05/02/18  2106 05/02/18  1700 05/02/18  1052 05/02/18  0639 05/01/18  2106 05/01/18  1602 05/01/18  1044 05/01/18  0601 04/30/18  2030 04/30/18  1631   POC GLUCOSE mg/dl 168* 172* 207* 208* 256* 140 233* 202* 282* 132 104 87             * I Have Reviewed All Lab Data Listed Above  * Additional Pertinent Lab Tests Reviewed: [address] Admission Reviewed    Imaging:    Imaging Reports Reviewed Today Include:   Imaging Personally Reviewed by Myself Includes:      Recent Cultures (last 7 days):       Results from last 7 days  Lab Units 04/29/18  1246   BLOOD CULTURE  No Growth at 72 hrs  No Growth at 72 hrs         Last 24 Hours Medication List:     Current Facility-Administered Medications:  acetaminophen 650 mg Oral Q8H [address] Harleen Kelley PA-C   apixaban 2 5 mg Oral BID Jaron Hansen MD   aspirin 81 mg Oral Daily Jaron Hansen MD   calcitonin (salmon) 1 spray Alternating Nares Daily Jaron Hansen MD   diltiazem 120 mg Oral Daily Bryce Mohr MD   famotidine 20 mg Oral Daily Wisam Brady DO   furosemide 40 mg Oral Daily Marie Tuttle MD   insulin glargine 5 Units Subcutaneous HS Chioma Alan MD   insulin lispro 1-6 Units Subcutaneous TID AC Yadira Delarosa MD   lactulose 10 g Oral Daily PRN Anahi Reason, DO   metoprolol tartrate 50 mg Oral Q12H [address] Yadira Delarosa MD   ondansetron 4 mg Intravenous Q6H PRN Anahi Reason, DO   potassium chloride 40 mEq Oral Once Rand Araiza PA-C   potassium chloride 40 mEq Oral Once Rand Araiza PA-C   QUEtiapine 12 5 mg Oral HS Bobby Gonzalez PA-C   senna-docusate sodium 1 tablet Oral HS Yadira Delarosa MD        Today, Patient Was Seen By: Chioma Alan MD    ** Please Note: Dictation voice to text software may have been used in the creation of this document   **

## 2018-05-03 NOTE — PROGRESS NOTES
Progress Note - Cardiology   Vernon Dorsey 80 y o  female MRN: [id number]  Unit/Bed#: [facility] 506-01 Encounter: [id number]    Assessment:  Assessment:  1  Acute on chronic combined heart failure  · Echo - 4/30/17 - LVEF 44%, moderate diffuse hypokinesis, mild LVH, mildly dilated RV, mildly reduced RV function, moderately dilated LA & RA, moderate MR, mild-moderate aortic stenosis, FELTON 1 1  2  Chronic atrial fibrillation/flutter with RVR  · On eliquis  3  Aortic stenosis, moderate  · Echo - 12/2017 - LVEF 50%, mild diffuse hypokinesis, mild LVH, normal LV, mildly dilated LA, moderate MR, moderate aortic stenosis, moderate aortic regurgitation, mean transaortic gradient - 19, FELTON 1 1  4  Moderate MR  5  CAD s/p CABGx3 (2006)  · CABGx3 = SVG to OM1 & sequential graft to LAD, SVG to RCA  5  Ascending thoracic aortic aneurysm, 4 3 cm on CT  6  Dementia  7  Hypertension  8  Hyperlipidemia  9  h/o Stroke     Outpatient cardiologist: Geneva Lujan       Plan  · Got IV lasix 40 yesterday morning, and then switched to oral lasix  · , Net +1 2 L yesterday  · Uo only 100 cc this morning after the oral lasix 40  But she is incontinent and is unable to follow directions well for urine collection  · Wt up to 116 lbs <-- 111 lbs  ?inaccurate  · Remains in afib, better rate controlled on cardizem + metoprolol  · Overnight a she got agitated and with it her heart rates were uncontrolled in the 140s  · HR again improved at present  · Awaiting discharge to rehab        Subjective/Objective     Subjective:  Harriet ctive complaints   Shortness of breath is improved    Objective:    Vitals: /67 (BP Location: Left arm)   Pulse 88   Temp 97 5 °F (36 4 °C) (Oral)   Resp 20   Ht 5' 3" (1 6 m)   Wt 52 8 kg (116 lb 8 oz)   SpO2 94%   BMI 20 64 kg/m²   Vitals:    05/02/18 0600 05/03/18 0529   Weight: 50 8 kg (111 lb 15 9 oz) 52 8 kg (116 lb 8 oz)     Orthostatic Blood Pressures      Most Recent Value   Blood Pressure  135/67 filed at 05/03/2018 1100   Patient Position - Orthostatic VS  Lying filed at 05/02/2018 0235          Intake/Output Summary (Last 24 hours) at 05/03/18 1315  Last data filed at 05/03/18 1200   Gross per 24 hour   Intake             2020 ml   Output              746 ml   Net             1274 ml     Physical Exam:   General appearance: elderly female, NAD  Head: Normocephalic, without obvious abnormality, atraumatic  Neck: mild elevation in JVP  Lungs: clear bilaterally, slightly decreased left lower base  Heart:  S1, S2 irregular  3/6 systolic ejection murmur  Abdomen: soft, non-tender; bowel sounds normal; no masses,  no organomegaly  Extremities: extremities normal, atraumatic, no cyanosis or edema  Skin: Skin color, texture, turgor normal  No rashes or lesions  Neurologic: poor memory      Medications:    Current Facility-Administered Medications:     acetaminophen (TYLENOL) tablet 650 mg, 650 mg, Oral, Q8H [facility], Morgan Pierre PA-C    apixaban (ELIQUIS) tablet 2 5 mg, 2 5 mg, Oral, BID, Aliza Ho MD, 2 5 mg at 05/03/18 0848    aspirin chewable tablet 81 mg, 81 mg, Oral, Daily, Aliza Ho MD, 81 mg at 05/03/18 0848    calcitonin (salmon) (MIACALCIN) 200 units/act nasal spray 1 spray, 1 spray, Alternating Nares, Daily, Aliza Ho MD, 1 spray at 05/03/18 0849    diltiazem (CARDIZEM CD) 24 hr capsule 120 mg, 120 mg, Oral, Daily, Ady Mathew MD, 120 mg at 05/03/18 0848    famotidine (PEPCID) tablet 20 mg, 20 mg, Oral, Daily, Aaron Faye DO, 20 mg at 05/03/18 0849    furosemide (LASIX) tablet 40 mg, 40 mg, Oral, Daily, Ginger Herron MD, 40 mg at 05/03/18 0849    insulin glargine (LANTUS) subcutaneous injection 5 Units 0 05 mL, 5 Units, Subcutaneous, HS, Antony Watson MD, 5 Units at 05/02/18 2104    insulin lispro (HumaLOG) 100 units/mL subcutaneous injection 1-6 Units, 1-6 Units, Subcutaneous, TID AC, 1 Units at 05/03/18 1233 **AND** Fingerstick Glucose (POCT), , , TID AC, Gabriel Wilcox MD    lactulose 20 g/30 mL oral solution 10 g, 10 g, Oral, Daily PRN, Delcia Part, DO, 10 g at 04/30/18 1149    metoprolol tartrate (LOPRESSOR) tablet 50 mg, 50 mg, Oral, Q12H [address], Gabriel Wilcox MD, 50 mg at 05/03/18 0848    ondansetron (ZOFRAN) injection 4 mg, 4 mg, Intravenous, Q6H PRN, Delcia Part, DO, 4 mg at 04/30/18 0939    potassium chloride (K-DUR,KLOR-CON) CR tablet 40 mEq, 40 mEq, Oral, Once, Chadd [place] PA-C    potassium chloride (K-DUR,KLOR-CON) CR tablet 40 mEq, 40 mEq, Oral, Once, Evert Ellsworth PA-C    QUEtiapine (SEROquel) tablet 12 5 mg, 12 5 mg, Oral, HS, Kristen Leblanc PA-C    senna-docusate sodium (SENOKOT S) 8 6-50 mg per tablet 1 tablet, 1 tablet, Oral, HS, Gabriel Wilcox MD, 1 tablet at 05/02/18 2104    Lab Results:    Results from last 7 days  Lab Units 04/29/18  1246   TROPONIN I ng/mL 1 45*       Results from last 7 days  Lab Units 05/02/18  0450 04/30/18  0428 04/29/18  1246   WBC Thousand/uL 5 06 6 74 6 15   HEMOGLOBIN g/dL 12 4 13 3 13 4   HEMATOCRIT % 39 0 40 6 40 8   PLATELETS Thousands/uL 168 190 188           Results from last 7 days  Lab Units 05/03/18  0926 05/02/18  1803 05/02/18  0450 05/01/18  0508 04/30/18  0428 04/29/18  1247   SODIUM mmol/L 138  --  144 142 139 135*   POTASSIUM mmol/L 4 1 4 8 3 0* 3 1* 4 1 4 2   CHLORIDE mmol/L 102  --  104 102 102 101   CO2 mmol/L 29  --  33* 30 26 22   BUN mg/dL 48*  --  59* 56* 53* 48*   CREATININE mg/dL 1 08  --  1 04 1 15 1 42* 1 40*   CALCIUM mg/dL 9 6  --  9 1 9 6 9 3 9 6   TOTAL PROTEIN g/dL  --   --   --  6 8 7 2 7 7   BILIRUBIN TOTAL mg/dL  --   --   --  0 96 0 68 0 98   ALK PHOS U/L  --   --   --  89 99 101   ALT U/L  --   --   --  70 74 71   AST U/L  --   --   --  60* 76* 74*   GLUCOSE RANDOM mg/dL 193*  --  119 122 261* 305*       Results from last 7 days  Lab Units 04/29/18  1247   INR  1 60*   PTT seconds 36*       Results from last 7 days  Lab Units 05/01/18  0508 04/30/18  0428   MAGNESIUM mg/dL 1 8 1 9     Telemetry: Personally reviewed  Atrial fibrillation, rate is controlled    Echo 4/30/18:  LEFT VENTRICLE:  The ventricle was mildly dilated  Systolic function was moderately reduced  Ejection fraction was estimated to be 44 %  There was moderate diffuse hypokinesis with regional variations  Wall thickness was mildly increased  The changes were consistent with eccentric hypertrophy      RIGHT VENTRICLE:  The ventricle was mildly dilated  Systolic function was mildly reduced      LEFT ATRIUM:  The atrium was moderately dilated      RIGHT ATRIUM:  The atrium was moderately dilated      MITRAL VALVE:  There was mild annular calcification  There was moderate regurgitation      AORTIC VALVE:  The valve was probably trileaflet  Leaflets exhibited moderately increased thickness, moderate calcification, and moderately reduced cuspal separation  Transaortic velocity was increased due to valvular stenosis  There was mild to moderate stenosis  There was mild regurgitation  Valve mean gradient was 14 mmHg  Estimated aortic valve area (by VTI) was 1 09 cm squared  Estimated aortic valve area (by Vmax) was 1 04 cm squared  Estimated aortic valve area (by Vmean) was 1 12 cm squared      TRICUSPID VALVE:  There was mild regurgitation  Estimated peak PA pressure was 56 mmHg    The findings suggest moderate pulmonary hypertension      PULMONIC VALVE:  There was mild regurgitation      PERICARDIUM:  There was a small left pleural effusion

## 2018-05-03 NOTE — ASSESSMENT & PLAN NOTE
Echo - 4/30/17 - LVEF 44%, moderate diffuse hypokinesis, mild LVH, mildly dilated RV, mildly reduced RV function, moderately dilated LA & RA, moderate MR, mild-moderate aortic stenosis, FELTON 1 1  Possibly tachycardia induced because of AFib  Continue PO lasix 40 mg daily as per cardio

## 2018-05-03 NOTE — PLAN OF CARE
Problem: PHYSICAL THERAPY ADULT  Goal: Performs mobility at highest level of function for planned discharge setting  See evaluation for individualized goals  Treatment/Interventions: Functional transfer training, Endurance training, Patient/family training, Equipment eval/education, Gait training, Spoke to nursing, Spoke to case management, Family  Equipment Recommended: Amanda Marin (RW)       See flowsheet documentation for full assessment, interventions and recommendations  Outcome: Progressing  Prognosis: Good  Problem List: Decreased strength, Decreased endurance, Impaired balance, Decreased mobility, Decreased cognition, Impaired judgement, Decreased safety awareness  Assessment: Pt is progressing well with use of rolling walker  Family present upon entering room  Improvement with ambulation distance noted  Requires min assist and safety cues for transfers  Completed minimal reps for seated TE and declined further activity  Mildly unsteady with rolling walker however without loss of balance  Pt would benefit from continued physical therapy to maximize functional mobility and safety  Barriers to Discharge: Inaccessible home environment     Recommendation:  (Inpt Rehab vs  home pending home care)          See flowsheet documentation for full assessment

## 2018-05-03 NOTE — ASSESSMENT & PLAN NOTE
Jose secondary to delirium  Patient was very agitated last night quiet restraints  She has been off restraints morning  This morning patient was still confused  She did remember me talk to her and her family yesterday but she thought that I was at her home  She thought that she was in a diner this morning  Geriatrics consulted    Possibly start Seroquel at bedtime after checking QTc

## 2018-05-03 NOTE — PROGRESS NOTES
Pt becoming more confused  Discussed her plan of care and that her  is at home sleeping  In room numerous times ending with nurse sitting in room as comfort so patient could fall asleep  Pt finally states she won't sleep but she will rest for tomorrow  Promised to keep coming and checking in on her  Pt agreeable

## 2018-05-03 NOTE — SOCIAL WORK
Call received from daughter-in-law, Lucita Officer, that family has located medical POA document and she will bring it to hospital tomorrow

## 2018-05-03 NOTE — PHYSICAL THERAPY NOTE
Physical Therapy Progress Note     05/03/18 1443   Pain Assessment   Pain Assessment No/denies pain   Pain Score No Pain   Restrictions/Precautions   Weight Bearing Precautions Per Order No   Other Precautions Bed Alarm; Fall Risk;Telemetry   General   Family/Caregiver Present Yes   Cognition   Overall Cognitive Status Impaired   Arousal/Participation Alert; Responsive   Subjective   Subjective Pt agrees to participate   Bed Mobility   Supine to Sit 4  Minimal assistance   Additional items Assist x 1;LE management   Sit to Supine 4  Minimal assistance   Additional items Assist x 1;LE management   Transfers   Sit to Stand 4  Minimal assistance   Additional items Assist x 1;Verbal cues   Stand to Sit 4  Minimal assistance   Additional items Assist x 1;Verbal cues   Stand pivot 4  Minimal assistance   Additional items Assist x 1;Verbal cues   Ambulation/Elevation   Gait pattern (slow  forward flexed, short stride)   Gait Assistance 4  Minimal assist   Additional items Assist x 1  (contact guard assist)   Assistive Device Rolling walker   Distance 180 feet and 60 feet   Stair Management Assistance Not tested   Balance   Static Sitting Good   Dynamic Sitting Poor +   Static Standing Fair -   Dynamic Standing Poor +   Ambulatory Fair -   Endurance Deficit   Endurance Deficit Yes   Endurance Deficit Description fatigue   Activity Tolerance   Activity Tolerance Patient limited by fatigue   Nurse [facility] to see per RN    Exercises   TKR Sitting;10 reps;AROM; Bilateral   Assessment   Prognosis Good   Problem List Decreased strength;Decreased endurance; Impaired balance;Decreased mobility; Decreased cognition; Impaired judgement;Decreased safety awareness   Assessment Pt is progressing well with use of rolling walker  Family present upon entering room  Improvement with ambulation distance noted  Requires min assist and safety cues for transfers  Completed minimal reps for seated TE and declined further activity    Mildly unsteady with rolling walker however without loss of balance  Pt would benefit from continued physical therapy to maximize functional mobility and safety  Barriers to Discharge Inaccessible home environment   Goals   Patient Goals None stated   STG Expiration Date 05/10/18   Treatment Day 3   Plan   Treatment/Interventions Functional transfer training;LE strengthening/ROM; Therapeutic exercise; Endurance training;Cognitive reorientation;Patient/family training;Bed mobility;Gait training   Progress Progressing toward goals   PT Frequency 5x/wk   Recommendation   Recommendation (Inpt Rehab vs  home pending home care)   Equipment Recommended Opal Brown, PTA

## 2018-05-03 NOTE — SOCIAL WORK
Discussed patient with [facility] with Geriatrics after she met with patient and family  She recommends follow up with Aging Office after discharge from rehab if patient goes home with spouse  Met with patient, spouse, daughter, daughter-in-law and grandchildren  Reviewed with them that the patient is approved for [facility] but is not cleared for dc today

## 2018-05-03 NOTE — CONSULTS
Consultation - Angelakaylee Singleton 80 y o  female MRN: [id number]  Unit/Bed#: [facility] 506-01 Encounter: [id number]      Assessment/Plan  1  Impaired mobility and ADLS  Patient lives at home with her elderly   Apparently they are resistant to placement in an assisted living facility, but often call the family daily for assistance and urgent needs  Family at bedside during my exam feel that patient and  both do not understand their situation, they report patient has dementia,  is not quite as bad as her but "is not far off"  Plan is for patient to go to [place] [id number] with family about speaking with  AAA after discharge from [facility] if patient and  still decline placement  Understand we cannot force patient to be placed, but ultimately discharging home with  is not a safe discharge  Patient is likely not competent to make decisions given her underlying dementia, today is alert and oriented to self and that is it  May need to consider neuropsych eval for capacity/competency     2  Delirium  Patient became delirious last night, this a m  she is alert and oriented to self, thinks she is at a diner  Family at bedside reports this is worse than her normal baseline  Patient received both Zyprexa and Haldol last night  Patient's QTC is not prolonged, she would benefit from Seroquel 12 5 mg q h s , will order  Will order Tylenol 650 mg Q 8 as pain can precipitate delirium  Refer to geriatric pain med order set for further pain med recommendations  Monitor for urinary retention, bladder scan and straight cath as needed  Patient has not had a bowel movement since admission, will add Senokot S daily, as constipation can cause delirium  Redirect unwanted patient behaviors as first line tx  Reorient patient frequently    Avoid deliriogenic meds including tramadol, benzodiazepines, benadryl  Good sleep hygiene important, limit night time interruptions  Encourage patient to stay awake during the day  Ensure adequate hydration/nutrition  Mobilize often     3  Dementia  Family reports previously diagnosed, mild  Is currently complicated by 2  Will defer B12, folate checked at this time  Patient likely not competent, if this is needed legally to assist with safe placement for the patient and her , would recommend neuropsych eval  See 2     4   Ambulatory dysfunction  Patient uses assistive devices at baseline  Continue with PT, OT  [facility] is planned for discharge, agree    5  Deconditioning  Chronic  Mobilize patient frequently to prevent further decline  PT, OT    6  Depression with anxiety  Patient seen by Psychiatry, she refused SSRI    7  Acute on chronic diastolic congestive heart failure  Per Cards, poor outlook, patient will likely continue to decline  Patient likely to be readmitted in the future  Would recommend considering PALS program, hospice to help keep patient with  as she is upset when she is in the hospital and away from him    8  Malnutrition  Recommend adding Glucerna t i d  as recommended by dietitian       History of Present Illness   Physician Requesting Consult: Williams Araiza MD  Reason for Consult / Principal Problem: delirium  Hx and PE limited by: n/a  HPI: Micheline Valdovinos is a 80y o  year old female who presents to [facility] with chief complaint deconditioning, poor p o  intake  Patient was discharged on 04/24/2018 after being in the hospital for AFib with RV R  Patient was discharged home and family felt she has not been right since then, they brought her back to the hospital where patient was admitted  Under [facility] for acute on chronic diastolic congestive heart failure, as well as OA, hypertension, hyperlipidemia, esophageal reflux, depression with anxiety, cognitive impairment as reported by the family, coronary artery disease, DM type 2    Patient was also found to have malnutrition  Cardiology was consulted and Cardizem was resumed  It is felt that patient overall is declining, and patient would be appropriate for a long-term care facility as it seems that she has difficulty maintaining at home, and  unable to provide appropriate care   and patient are resistant to moving into assisted living facility  Palliative Care was consulted to determine goals of care and patient was changed to level 3 DNR DNI  As stated above prior to arrival patient lives with her   Patient needs assistance with ADLs and IADLs  Patient uses assistive devices at baseline  Patient became increasingly confused the morning of 05/03/2018  Patient was administered Zyprexa as well as Haldol  Inpatient consult to Gerontology  Consult performed by: Hilaria Jay ordered by: Leisa Kehr D          Review of Systems   Unable to perform ROS: Dementia       Historical Information   Past Medical History:   Diagnosis Date    Acute ischemic right MCA stroke [facility] [address] [facility]     Anticoagulant long-term use     last assessed: 8/17/17    Aortic stenosis     Atrial fibrillation ([facility] [address] [facility]     Blurred vision     last assessed: 10/25/13    CHF (congestive heart failure) ([facility] [address] [facility]     Coronary artery disease     Dementia     Diabetes mellitus ([facility] [address] [facility]     Dysuria     last assessed: 8/3/15    Hyperlipidemia     Hypertension     Nonrheumatic aortic (valve) insufficiency     Old myocardial infarction     Rectal carcinoma ([facility] [address] [facility]      Past Surgical History:   Procedure Laterality Date    BALLOON ANGIOPLASTY, ARTERY      CORONARY ARTERY BYPASS GRAFT      RECTAL SURGERY       Social History   History   Alcohol Use    Yes     Comment: occasional     History   Drug Use No     History   Smoking Status    Former Smoker    Quit date: 2002   Smokeless Tobacco    Never Used     Comment: 1 pack a week;  No secondhand smoke exposure         Family History: non-contributory    Meds/Allergies   Current meds:   Current Facility-Administered Medications   Medication Dose Route Frequency    acetaminophen (TYLENOL) tablet 650 mg  650 mg Oral Q6H PRN    apixaban (ELIQUIS) tablet 2 5 mg  2 5 mg Oral BID    aspirin chewable tablet 81 mg  81 mg Oral Daily    calcitonin (salmon) (MIACALCIN) 200 units/act nasal spray 1 spray  1 spray Alternating Nares Daily    diltiazem (CARDIZEM CD) 24 hr capsule 120 mg  120 mg Oral Daily    famotidine (PEPCID) tablet 20 mg  20 mg Oral Daily    furosemide (LASIX) tablet 40 mg  40 mg Oral Daily    insulin glargine (LANTUS) subcutaneous injection 5 Units 0 05 mL  5 Units Subcutaneous HS    insulin lispro (HumaLOG) 100 units/mL subcutaneous injection 1-6 Units  1-6 Units Subcutaneous TID AC    lactulose 20 g/30 mL oral solution 10 g  10 g Oral Daily PRN    metoprolol tartrate (LOPRESSOR) tablet 50 mg  50 mg Oral Q12H NBA    ondansetron (ZOFRAN) injection 4 mg  4 mg Intravenous Q6H PRN    potassium chloride (K-DUR,KLOR-CON) CR tablet 40 mEq  40 mEq Oral Once    potassium chloride (K-DUR,KLOR-CON) CR tablet 40 mEq  40 mEq Oral Once    senna-docusate sodium (SENOKOT S) 8 6-50 mg per tablet 1 tablet  1 tablet Oral HS      Current PTA meds:  Prescriptions Prior to Admission   Medication    apixaban (ELIQUIS) 2 5 mg    aspirin 81 mg chewable tablet    calcitonin, salmon, (MIACALCIN) 200 units/act nasal spray    furosemide (LASIX) 20 mg tablet    lactulose (CHRONULAC) 10 g/15 mL solution    LORazepam (ATIVAN) 1 mg tablet    losartan (COZAAR) 50 mg tablet    metoprolol tartrate (LOPRESSOR) 50 mg tablet    potassium chloride (K-DUR,KLOR-CON) 10 mEq tablet    senna-docusate sodium (SENOKOT S) 8 6-50 mg per tablet        Allergies   Allergen Reactions    Heparin        Objective   Vitals: Blood pressure 164/74, pulse (!) 108, temperature 97 5 °F (36 4 °C), temperature source Oral, resp   rate 18, height 5' 3" (1 6 m), weight 52 8 kg (116 lb 8 oz), SpO2 100 %  ,Body mass index is 20 64 kg/m²  Physical Exam   Constitutional: She appears well-developed and well-nourished  No distress  HENT:   Head: Normocephalic and atraumatic  Mouth/Throat: No oropharyngeal exudate  Eyes: Conjunctivae and EOM are normal  No scleral icterus  Neck: Neck supple  Cardiovascular: Normal rate  Pulmonary/Chest: Effort normal and breath sounds normal  She has no wheezes  She has no rales  Abdominal: Soft  Bowel sounds are normal  She exhibits no distension  Musculoskeletal: She exhibits no edema  Neurological: She is alert  Skin: Skin is warm and dry  Psychiatric: She is not agitated and not actively hallucinating  Cognition and memory are impaired  She expresses inappropriate judgment  Patient is alert and oriented to self  Thinks she is at a diner  Family reports dementia at baseline, reports she is acutely worse, was delirious overnight  Still delirious  She is pleasant though  Nursing note and vitals reviewed  Lab Results:   Results from last 7 days  Lab Units 05/02/18  0450   WBC Thousand/uL 5 06   HEMOGLOBIN g/dL 12 4   HEMATOCRIT % 39 0   PLATELETS Thousands/uL 168        Results from last 7 days  Lab Units 05/03/18  0926  05/01/18  0508   SODIUM mmol/L 138  < > 142   POTASSIUM mmol/L 4 1  < > 3 1*   CHLORIDE mmol/L 102  < > 102   CO2 mmol/L 29  < > 30   BUN mg/dL 48*  < > 56*   CREATININE mg/dL 1 08  < > 1 15   CALCIUM mg/dL 9 6  < > 9 6   TOTAL PROTEIN g/dL  --   --  6 8   BILIRUBIN TOTAL mg/dL  --   --  0 96   ALK PHOS U/L  --   --  89   ALT U/L  --   --  70   AST U/L  --   --  60*   GLUCOSE RANDOM mg/dL 193*  < > 122   < > = values in this interval not displayed  Imaging Studies: I have personally reviewed pertinent reports  EKG, Pathology, and Other Studies: I have personally reviewed pertinent reports      VTE Prophylaxis: Sequential compression device Didi Comblakshmi)     Code Status: Level 3 - DNAR and DNI      Counseling/Coordination of Care: Total floor / unit time spent today 25 minutes  Greater than 50% of total time was spent with the patient and / or family counseling and / or coordination of care   A description of the counseling / coordination of care: Assessing examine the patient, reviewing EMR meds, speaking to nursing staff, speaking to case management, speaking to family at bedside, speaking to attending physician, speaking to slim

## 2018-05-03 NOTE — PROGRESS NOTES
Pt extremely agitated  Attempted to get patient back to bed  She insisted she is going home to make sure her  is ok  Got dressed, swinging at anyone who tried to deter  Called [facility] for Zyprexa order but could not get close enough to patient to administer  Attempted to call contact numbers (all available in chart) to try to calm with talking to family  Called security to assist with help into bed and restraint to administer zyprexa as ordered  Pt yelling and swearing at staff

## 2018-05-03 NOTE — ASSESSMENT & PLAN NOTE
Supplements    Malnutrition Findings:   Malnutrition type: Chronic illness  Degree of Malnutrition: Malnutrition of moderate degree (malnutrition related to dementia vs  abdominal pain as evidenced by <75% energy intake for >1 month, mild depletion of orbital body fat, and mild depletion of muscle mass seen at clavicle to be treated with liberalized diet and nutrition supplements )    BMI Findings: Body mass index is 20 64 kg/m²
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
## 2018-05-03 NOTE — PROGRESS NOTES
Progress note - Palliative and Supportive Care   Isa Ramesh 80 y o  female [id number]    Assessment:     Patient Active Problem List   Diagnosis    Acute on chronic diastolic congestive heart failure ([facility] [address] )    Aortic stenosis    Benign essential HTN    Controlled diabetes mellitus type II without complication (HCC)    Atrial fibrillation with RVR (HCC)    Acute ischemic right MCA stroke ([facility] [address] )    CAD (coronary artery disease)    Acute encephalopathy    Cognitive impairment    Depression with anxiety    Esophageal reflux    Hyperlipidemia    Hypertension    Osteoarthritis    Squamous cell carcinoma of skin    Tinnitus    Severe protein-calorie malnutrition Lovette Stuart: less than 60% of standard weight) ([facility])    Nausea    Transaminitis    Moderate protein-calorie malnutrition (HCC)    Goals of care, counseling/discussion    Impaired mobility and ADLs    Delirium    Dementia    Ambulatory dysfunction    Physical deconditioning   FRAILTY  FALLS    Plan:  1  Symptom management -  Delirium   - Recommend global delirium precautions including:      - Establishment of day/night cycle via lights during the day and blinds open   Please limit interruptions at night as medically appropriate  - Provide glasses/hearing aids as apprioriate     - Minimize deliriogenic meds as able  - Provide reorientation including date on board and visible clock  - Avoid restraints as able, frequent verbal reorientations or patient care sitter as appropriate       - Geriatrics consult reviewed and appreciate their recommendations  Agree with Seroquel at bedtime and Neuropsych eval for capacity to help legally support family with placement    2  Goals -    - Today, pt is agreeable to STR   - Discussed and Supported family with conversation regarding possible transition to LTC   Pt would be more agreeable as long as she and her  are kept together      Code Status: DNR/DNI- Level 3   Decisional apparatus:  Patient is competent on my exam today  If competence is lost, patient's substitute decision maker would default to spouse by [place]  Advance Directive / Living Will / POLST:  POLST completed 5/2      Interval history:       Pt seen at bedside, alert and pleasant  She appears oriented but has periods of confusion  She had period of increased agitation and confusion this morning requiring dose of haldol  She is confused about events that occurred but  tells me that she is going to go to rehab tomorrow and may need long term care that "everyone worried about our safety"  She does not want to be  from her spouse, they will celebrate their 67 nd wedding anniversary next month  MEDICATIONS / ALLERGIES:     all current active meds have been reviewed    Allergies   Allergen Reactions    Heparin        OBJECTIVE:    Physical Exam  Physical Exam   Constitutional:   Alert, pleasant and cooperative  Appears frail    HENT:   Head: Normocephalic and atraumatic  Eyes: Pupils are equal, round, and reactive to light  No scleral icterus  Cardiovascular: An irregularly irregular rhythm present  Pulmonary/Chest: Effort normal  No respiratory distress  Abdominal: Soft  Musculoskeletal: She exhibits no edema  Neurological: She is alert  Oriented to person and place, she could correctly identify family in the room and tell me she was at [facility]   Skin: Skin is warm and dry  Psychiatric: She is not agitated  Cognition and memory are impaired  She appeared confused at times especially when relating the events of her agitation earlier this morning   She also reported walking halls with her grandchildren today, however family at bedside state that no grandkids have been here today to visit       Lab Results:   CBC: No results found for: WBC, HGB, HCT, MCV, PLT, ADJUSTEDWBC, MCH, MCHC, RDW, MPV, NRBC, CMP:   Lab Results   Component Value Date     05/03/2018    K 4 1 05/03/2018     05/03/2018    CO2 29 05/03/2018    ANIONGAP 7 05/03/2018    BUN 48 (H) 05/03/2018    CREATININE 1 08 05/03/2018    GLUCOSE 193 (H) 05/03/2018    CALCIUM 9 6 05/03/2018    EGFR 45 05/03/2018     Imaging Studies: EKG, Pathology, and Other Studies:     Counseling / Coordination of Care  Total floor / unit time spent today 25 minutes  Greater than 50% of total time was spent with the patient and / or family counseling and / or coordination of care   A description of the counseling / coordination of care: chart review, bedside assessment of symptoms

## 2018-05-03 NOTE — PROGRESS NOTES
Provider on call: RN requesting dose of ativan for patients agitation   states she takes ativan at home  0 5mg ativan PO ordered  Patient still agitated  Getting dressed and trying to leave floor  Nursing staff unable to reach family  Charge RN  requesting 1:1 order  Discussed that this will prolong hospital course but patient is high elopement risk and unsafe discharge  Give bedtime dose of seroquel 12 5mg now

## 2018-05-03 NOTE — PROGRESS NOTES
Walked past pt room  Pt resting soundly  Will let patient rest and attempt blood work later on this morning

## 2018-05-03 NOTE — ASSESSMENT & PLAN NOTE
Patient and  refusing rehab/assisted living  Patient and  not able to take care of themselves at home  Their children her also not happy about this  Patient had very high risk of readmission if she does not take her medications properly as advised and follow diet  Palliative Care consulted for Goals of care and psychosocial support to family  Had a lengthy discussion with patient, , son and daughter-in-law at bedside on 5/2/2018  Patient and her  still very reluctant to leave the house that they are living in right now  I explained them the importance of taking medications regularly and if that does not happen she might have to be admitted to hospital again  Patient understood the risks and understood the importance of taking medications regularly  She is agreeable to home health care arranged  Also discussed about going to rehab  Explained then the importance  After discussion patient and family were agreeable for rehab for short term   made aware

## 2018-05-03 NOTE — CASE MANAGEMENT
Continued Stay Review    Date: 5/3/18 Thursday ACUTE MED SURG LEVEL OF CARE    Vital Signs: /69   Pulse 94   Temp 98 2 °F (36 8 °C) (Oral)   Resp 18   Ht 5' 3" (1 6 m)   Wt 52 8 kg (116 lb 8 oz)   SpO2 96%   BMI 20 64 kg/m²        Vitals:   Temp (24hrs), Av 5 °F (36 4 °C), Min:97 4 °F (36 3 °C), Max:97 7 °F (36 5 °C)   HR:  [] 88  Resp:  [16-20] 20  BP: (122-164)/(61-75) 135/67  SpO2:  [93 %-100 %] 94 %  Body mass index is 20 64 kg/m²         Input and Output Summary (last 24 hours):   Last data filed at 18 1200    Gross per 24 hour   Intake             2020 ml   Output              746 ml   Net             1274 ml       Diet Dewey/CHO Controlled; Consistent Carbohydrate Diet Level 1 (4 carb servings/60 grams CHO/meal); Sodium 4 GM (MEENAKSHI)    Dietary nutrition supplements GLUCERNA + GELATEIN BID with Meals       IV ACCESS    Medications:   Scheduled Meds:   Current Facility-Administered Medications:  acetaminophen 650 mg Oral Q8H [address] Harleen Kelley PA-C   apixaban 2 5 mg Oral BID Josselin Truong MD   aspirin 81 mg Oral Daily Josselin Truong MD   calcitonin (salmon) 1 spray Alternating Nares Daily Josselin Truong MD   diltiazem 120 mg Oral Daily Neha Alvarez MD   famotidine 20 mg Oral Daily Dharmesh [facility], DO   furosemide 40 mg Oral Daily Latasha Galvan MD   insulin glargine 5 Units Subcutaneous HS Anabel Angela MD   insulin lispro 1-6 Units Subcutaneous TID AC Josselin Turong MD   lactulose 10 g Oral Daily PRN [facility], DO   metoprolol tartrate 50 mg Oral Q12H [address] Josselin Truong MD   ondansetron 4 mg Intravenous Q6H PRN Dharmesh [facility], DO   potassium chloride 40 mEq Oral Once Morris Thacker PA-C   potassium chloride 40 mEq Oral Once Morris Thacker PA-C   QUEtiapine 12 5 mg Oral HS Harleen Kelley PA-C   senna-docusate sodium 1 tablet Oral HS Josselin Truong MD       PRN Meds:       Acetaminophen 650 mg q6hrs prn given x 1/ 24 hrs     lactulose   ondansetron      LABS/Diagnostic Results:    Results from last 7 days  Lab Units 05/02/18  0450   04/29/18  1246   WBC Thousand/uL 5 06  < > 6 15   HEMOGLOBIN g/dL 12 4  < > 13 4   HEMATOCRIT % 39 0  < > 40 8   PLATELETS Thousands/uL 168  < > 188   NEUTROS PCT %  --   --  82*   LYMPHS PCT %  --   --  11*   MONOS PCT %  --   --  7   EOS PCT %  --   --  0      Results from last 7 days  Lab Units 05/03/18  0926   05/01/18  0508   SODIUM mmol/L 138  < > 142   POTASSIUM mmol/L 4 1  < > 3 1*   CHLORIDE mmol/L 102  < > 102   CO2 mmol/L 29  < > 30   BUN mg/dL 48*  < > 56*   CREATININE mg/dL 1 08  < > 1 15   CALCIUM mg/dL 9 6  < > 9 6   TOTAL PROTEIN g/dL  --   --  6 8   BILIRUBIN TOTAL mg/dL  --   --  0 96   ALK PHOS U/L  --   --  89   ALT U/L  --   --  70   AST U/L  --   --  60*   GLUCOSE RANDOM mg/dL 193*  < > 122      Results from last 7 days  Lab Units 04/29/18  1247   INR   1 60*       Results from last 7 days  Lab Units 05/03/18  1117 05/03/18  0730 05/02/18  2106 05/02/18  1700 05/02/18  1052 05/02/18  0639 05/01/18  2106 05/01/18  1602 05/01/18  1044 05/01/18  0601 04/30/18  2030 04/30/18  1631   POC GLUCOSE mg/dl 168* 172* 207* 208* 256* 140 233* 202* 282* 132 104 87     Results from last 7 days  Lab Units 04/29/18  1246   BLOOD CULTURE   No Growth at 72 hrs  No Growth at 72 hrs  Age/Sex: 80 y o  female       Assessment/Plan:     Acute on chronic diastolic congestive heart failure (HCC)   Assessment & Plan     Echo - 4/30/17 - LVEF 44%, moderate diffuse hypokinesis, mild LVH, mildly dilated RV, mildly reduced RV function, moderately dilated LA & RA, moderate MR, mild-moderate aortic stenosis, FELTON 1 1  Possibly tachycardia induced because of AFib  Continue PO lasix 40 mg daily as per cardio              Atrial fibrillation with RVR (HCC)   Assessment & Plan     On BB and Cardizem  Rate is better controlled after starting Cardizem  AC w/ Jocelyn  Continue to monitor  Cardiology following    Appreciate recommendations           Acute encephalopathy   Assessment & Plan     Jose secondary to delirium  Patient was very agitated last night quiet restraints  She has been off restraints morning  This morning patient was still confused  She did remember me talk to her and her family yesterday but she thought that I was at her home  She thought that she was in a diner this morning  Geriatrics consulted  Possibly start Seroquel at bedtime after checking QTc          Aortic stenosis   Assessment & Plan     Stable - 12/2017 shows moderate AS  Cardiology following  Appreciate recommendations             Goals of care, counseling/discussion   Assessment & Plan     Patient and  refusing rehab/assisted living  Patient and  not able to take care of themselves at home  Their children her also not happy about this  Patient had very high risk of readmission if she does not take her medications properly as advised and follow diet  Palliative Care consulted for Goals of care and psychosocial support to family      Had a lengthy discussion with patient, , son and daughter-in-law at bedside on 5/2/2018  Patient and her  still very reluctant to leave the house that they are living in right now  I explained them the importance of taking medications regularly and if that does not happen she might have to be admitted to hospital again  Patient understood the risks and understood the importance of taking medications regularly  She is agreeable to home health care arranged      Also discussed about going to rehab  Explained then the importance  After discussion patient and family were agreeable for rehab for short term     made aware           Moderate protein-calorie malnutrition ([address] )   Assessment & Plan     Supplements     Malnutrition Findings:   Malnutrition type: Chronic illness  Degree of Malnutrition: Malnutrition of moderate degree (malnutrition related to dementia vs  abdominal pain as evidenced by <75% energy intake for >1 month, mild depletion of orbital body fat, and mild depletion of muscle mass seen at clavicle to be treated with liberalized diet and nutrition supplements )     BMI Findings: Body mass index is 20 64 kg/m²               Controlled diabetes mellitus type II without complication (HCC)   Assessment & Plan     A1c 7 4%  Cont Lantus to 5 units  Continue low-dose sliding scale  Blood glucose monitoring  Carb controlled diet              Type 2 Myocardial Infarction:  Most likely due to CHF  Pt denies any chest pain       VTE Pharmacologic Prophylaxis:   Pharmacologic: Apixaban (Eliquis)  Mechanical VTE Prophylaxis in Place: Yes    Current Length of Stay: 4 day(s)  Current Patient Status: Inpatient   Certification Statement: The patient will continue to require additional inpatient hospital stay due to above          Discharge Plan:   TO BE DETERMINED - [address]    PER PT 5/3/18  Plan   Treatment/Interventions Functional transfer training;LE strengthening/ROM; Therapeutic exercise; Endurance training;Cognitive reorientation;Patient/family training;Bed mobility;Gait training   Progress Progressing toward goals   PT Frequency 5x/wk   Recommendation   Recommendation (Inpt Rehab vs  home pending home care)     CASE MANAGEMENT FOLLOWING CLOSELY FOR ALL DISCHARGE NEEDS

## 2018-05-03 NOTE — SOCIAL WORK
Cm met with Martine Ball from [facility] who was on site to evaluate pt  Pt is appropriate for admission and they will continue to follow  Cm to inform of d/c date once pt medically cleared

## 2018-05-03 NOTE — OCCUPATIONAL THERAPY NOTE
Occupational Therapy Cancellation Notice     OT orders received and chart review completed-Attempted to see pt for OT tx session today however pt unavailable w/ another service in the room this PM  Will continue to follow and attempt to see pt at another time as able    Emelina Cobb MS, OTR/L

## 2018-05-03 NOTE — PROGRESS NOTES
Rounded with Dr Hay Arnett, plan of care addressed  Plan for geriatric consult  Pt off wrist restraints  Much more calm and cooperative, however still confused

## 2018-05-04 LAB
BACTERIA BLD CULT: NORMAL
BACTERIA BLD CULT: NORMAL
GLUCOSE SERPL-MCNC: 167 MG/DL (ref 65–140)
GLUCOSE SERPL-MCNC: 195 MG/DL (ref 65–140)
GLUCOSE SERPL-MCNC: 208 MG/DL (ref 65–140)
GLUCOSE SERPL-MCNC: 85 MG/DL (ref 65–140)

## 2018-05-04 PROCEDURE — 82948 REAGENT STRIP/BLOOD GLUCOSE: CPT

## 2018-05-04 PROCEDURE — 99232 SBSQ HOSP IP/OBS MODERATE 35: CPT | Performed by: INTERNAL MEDICINE

## 2018-05-04 PROCEDURE — 99233 SBSQ HOSP IP/OBS HIGH 50: CPT | Performed by: NURSE PRACTITIONER

## 2018-05-04 PROCEDURE — 97116 GAIT TRAINING THERAPY: CPT

## 2018-05-04 PROCEDURE — 97530 THERAPEUTIC ACTIVITIES: CPT

## 2018-05-04 RX ORDER — DOCUSATE SODIUM 100 MG/1
100 CAPSULE, LIQUID FILLED ORAL 2 TIMES DAILY
Status: DISCONTINUED | OUTPATIENT
Start: 2018-05-04 | End: 2018-05-08 | Stop reason: HOSPADM

## 2018-05-04 RX ORDER — POLYETHYLENE GLYCOL 3350 17 G/17G
17 POWDER, FOR SOLUTION ORAL DAILY
Status: DISCONTINUED | OUTPATIENT
Start: 2018-05-04 | End: 2018-05-08 | Stop reason: HOSPADM

## 2018-05-04 RX ADMIN — ACETAMINOPHEN 650 MG: 325 TABLET ORAL at 13:07

## 2018-05-04 RX ADMIN — METOPROLOL TARTRATE 50 MG: 50 TABLET ORAL at 21:53

## 2018-05-04 RX ADMIN — APIXABAN 2.5 MG: 2.5 TABLET, FILM COATED ORAL at 17:12

## 2018-05-04 RX ADMIN — POLYETHYLENE GLYCOL 3350 17 G: 17 POWDER, FOR SOLUTION ORAL at 13:07

## 2018-05-04 RX ADMIN — QUETIAPINE FUMARATE 12.5 MG: 25 TABLET ORAL at 21:56

## 2018-05-04 RX ADMIN — APIXABAN 2.5 MG: 2.5 TABLET, FILM COATED ORAL at 10:06

## 2018-05-04 RX ADMIN — DOCUSATE SODIUM 100 MG: 100 CAPSULE, LIQUID FILLED ORAL at 13:07

## 2018-05-04 RX ADMIN — INSULIN GLARGINE 5 UNITS: 100 INJECTION, SOLUTION SUBCUTANEOUS at 21:55

## 2018-05-04 RX ADMIN — INSULIN LISPRO 2 UNITS: 100 INJECTION, SOLUTION INTRAVENOUS; SUBCUTANEOUS at 11:58

## 2018-05-04 RX ADMIN — FAMOTIDINE 20 MG: 20 TABLET ORAL at 10:06

## 2018-05-04 RX ADMIN — ACETAMINOPHEN 650 MG: 325 TABLET ORAL at 21:53

## 2018-05-04 RX ADMIN — ASPIRIN 81 MG 81 MG: 81 TABLET ORAL at 10:06

## 2018-05-04 RX ADMIN — DOCUSATE SODIUM,SENNOSIDES 1 TABLET: 50; 8.6 TABLET, FILM COATED ORAL at 22:00

## 2018-05-04 RX ADMIN — CALCITONIN SALMON 1 SPRAY: 200 SPRAY, METERED NASAL at 10:07

## 2018-05-04 RX ADMIN — DILTIAZEM HYDROCHLORIDE 120 MG: 120 CAPSULE, COATED, EXTENDED RELEASE ORAL at 10:06

## 2018-05-04 RX ADMIN — INSULIN LISPRO 2 UNITS: 100 INJECTION, SOLUTION INTRAVENOUS; SUBCUTANEOUS at 17:05

## 2018-05-04 RX ADMIN — FUROSEMIDE 40 MG: 40 TABLET ORAL at 10:05

## 2018-05-04 RX ADMIN — DOCUSATE SODIUM 100 MG: 100 CAPSULE, LIQUID FILLED ORAL at 17:12

## 2018-05-04 RX ADMIN — METOPROLOL TARTRATE 50 MG: 50 TABLET ORAL at 10:06

## 2018-05-04 NOTE — PHYSICAL THERAPY NOTE
Physical Therapy Progress Note     05/04/18 1055   Pain Assessment   Pain Assessment No/denies pain   Pain Score No Pain   Restrictions/Precautions   Weight Bearing Precautions Per Order No   Other Precautions Chair Alarm; Bed Alarm; Fall Risk;Hard of hearing   General   Chart Reviewed Yes   Additional Pertinent History pt received OOB in bedside chair, family at side, agreeable to therapy session   Response to Previous Treatment Patient with no complaints from previous session  Family/Caregiver Present Yes  (, son and DIL)   Cognition   Overall Cognitive Status Impaired   Arousal/Participation Alert; Responsive   Attention Within functional limits   Orientation Level Oriented X4   Memory Decreased recall of recent events   Following Commands Follows one step commands without difficulty   Comments Pt is pleasent and cooperative t/o session   Subjective   Subjective Agreeable to therapy session    Bed Mobility   Additional Comments pt was positioned/recevied OOB in bedside chair   Transfers   Sit to Stand 4  Minimal assistance   Additional items Impulsive;Assist x 1   Stand to Sit 4  Minimal assistance   Additional items Assist x 1;Verbal cues   Stand pivot 4  Minimal assistance   Additional items Verbal cues   Ambulation/Elevation   Gait pattern Narrow NITHIN; Forward Flexion; Short stride; Inconsistent oxana; Excessively slow   Gait Assistance 4  Minimal assist   Additional items Assist x 1;Verbal cues   Assistive Device Rolling walker   Distance 50ft x3, 65ft x4 multiple standing rest breaks    Balance   Static Sitting Good   Dynamic Sitting Fair +   Static Standing Fair +   Dynamic Standing Fair -   Ambulatory Poor +   Endurance Deficit   Endurance Deficit Yes   Endurance Deficit Description generalized deconditioning, +SOB   Activity Tolerance   Activity Tolerance Patient limited by fatigue  (dyspnea)   Medical Staff Made Aware Edi CASTANO updated   Nurse Made Aware yes   Exercises   Hip Flexion Sitting;15 reps;AROM; Bilateral   Knee AROM Long Arc Quad Sitting;15 reps;Bilateral;AROM   Ankle Pumps Sitting;15 reps;AROM; Bilateral   Assessment   Prognosis Good   Problem List Decreased strength;Decreased endurance;Decreased mobility; Decreased safety awareness; Impaired balance   Assessment Pt is progressing toward goals however continues to require assist with mobility tasks  Pt would benefit from continued skilled therapy at the next level of care in order to progress functional mobility and address deficits  Addressed family's concerns at length re: patient and husbands placement post STR  and their concerns for them to live alone  Educated family to speak with CM and make sure they have all information re: various levels of care, etc  Family pleased with answers - grateful for information      Barriers to Discharge Inaccessible home environment;Decreased caregiver support   Goals   Patient Goals none stated   STG Expiration Date 05/10/18   Treatment Day 4   Plan   Treatment/Interventions Functional transfer training   Progress Progressing toward goals   PT Frequency 5x/wk   Recommendation   Recommendation Short-term skilled PT     Mone Jerry, PT

## 2018-05-04 NOTE — PROGRESS NOTES
Pt appears comfortable in bed   at bedside  Pt is pleasant, but appears sleepy  Pt answering ?s appropriately  Lung fields are CTA and pt does not appear in respiratory distress  Will monitor

## 2018-05-04 NOTE — PROGRESS NOTES
Progress Note - Cardiology   Charity Smith 80 y o  female MRN: [id number]  Unit/Bed#: [facility] 506-01 Encounter: [id number]    Assessment:  Principal Problem:    Acute on chronic diastolic congestive heart failure ([address] )  Active Problems: Aortic stenosis    Benign essential HTN    Controlled diabetes mellitus type II without complication (HCC)    Atrial fibrillation with RVR (HCC)    CAD (coronary artery disease)    Acute encephalopathy    Cognitive impairment    Depression with anxiety    Esophageal reflux    Hyperlipidemia    Hypertension    Osteoarthritis    Nausea    Transaminitis    Moderate protein-calorie malnutrition (HCC)    Goals of care, counseling/discussion    Impaired mobility and ADLs    Delirium    Dementia    Ambulatory dysfunction    Physical deconditioning    Type 2 myocardial infarction without ST elevation [facility])    80-year-old female  Severe aortic stenosis  Acute on chronic combined congestive heart failure in the setting of severe AS with a decline in LV function  Delirium and dementia    Plan:  Conservative management with regards to cardiac disease  Continue oral Lasix  Overall poor prognosis given severe aortic stenosis  Plan is for discharge to rehab  Blood pressure currently controlled  She is rate controlled in atrial fibrillation on metoprolol and Cardizem  She is on anticoagulation for the time being with apixaban  Subjective/Objective     Subjective:   Continues to have episodes of confusion  She was confused yesterday afternoon and was attempting to elope  She is not on telemetry  She denies any chest pain palpitations  Denies any shortness of breath      Objective:    Vitals: /72 (BP Location: Left arm)   Pulse 65   Temp (!) 94 6 °F (34 8 °C) (Oral)   Resp 18   Ht 5' 3" (1 6 m)   Wt 52 9 kg (116 lb 10 oz)   SpO2 94%   BMI 20 66 kg/m²   Vitals:    05/03/18 0529 05/04/18 0555   Weight: 52 8 kg (116 lb 8 oz) 52 9 kg (116 lb 10 oz)     Orthostatic Blood Pressures      Most Recent Value   Blood Pressure  127/72 filed at 05/04/2018 0741   Patient Position - Orthostatic VS  Lying filed at 05/03/2018 2308          Intake/Output Summary (Last 24 hours) at 05/04/18 1153  Last data filed at 05/03/18 1701   Gross per 24 hour   Intake              640 ml   Output              250 ml   Net              390 ml     Physical Exam:  General appearance: Elderly female  Sitting in chair  Head: Normocephalic, without obvious abnormality, atraumatic  Neck: no carotid bruit, no JVD and supple, symmetrical, trachea midline  Lungs: decreased at base  Heart: S1, S2  Irregular  3/6 ALICE    Abdomen: soft, non-tender; bowel sounds normal; no masses,  no organomegaly  Extremities: extremities normal, atraumatic, no cyanosis or edema  Skin: Skin color, texture, turgor normal  No rashes or lesions    Medications:    Current Facility-Administered Medications:     acetaminophen (TYLENOL) tablet 650 mg, 650 mg, Oral, Q8H [facility], Harleen Kelley PA-C, 650 mg at 05/03/18 2111    apixaban (ELIQUIS) tablet 2 5 mg, 2 5 mg, Oral, BID, Annette Valera MD, 2 5 mg at 05/04/18 1006    aspirin chewable tablet 81 mg, 81 mg, Oral, Daily, Annette Valera MD, 81 mg at 05/04/18 1006    calcitonin (salmon) (MIACALCIN) 200 units/act nasal spray 1 spray, 1 spray, Alternating Nares, Daily, Annette Valera MD, 1 spray at 05/04/18 1007    diltiazem (CARDIZEM CD) 24 hr capsule 120 mg, 120 mg, Oral, Daily, Ady Rogers MD, 120 mg at 05/04/18 1006    famotidine (PEPCID) tablet 20 mg, 20 mg, Oral, Daily, Mortimer Bayley, DO, 20 mg at 05/04/18 1006    furosemide (LASIX) tablet 40 mg, 40 mg, Oral, Daily, Yolanda Luna MD, 40 mg at 05/04/18 1005    insulin glargine (LANTUS) subcutaneous injection 5 Units 0 05 mL, 5 Units, Subcutaneous, HS, Jaya Bruno MD, 5 Units at 05/03/18 2111    insulin lispro (HumaLOG) 100 units/mL subcutaneous injection 1-6 Units, 1-6 Units, Subcutaneous, TID AC, 3 Units at 05/03/18 1736 **AND** Fingerstick Glucose (POCT), , , TID AC, Chelly Reyna MD    lactulose 20 g/30 mL oral solution 10 g, 10 g, Oral, Daily PRN, Lara Lumberton, DO, 10 g at 04/30/18 1149    metoprolol tartrate (LOPRESSOR) tablet 50 mg, 50 mg, Oral, Q12H [address], Chelly Reyna MD, 50 mg at 05/04/18 1006    ondansetron (ZOFRAN) injection 4 mg, 4 mg, Intravenous, Q6H PRN, Lara Lumberton, DO, 4 mg at 04/30/18 0939    potassium chloride (K-DUR,KLOR-CON) CR tablet 40 mEq, 40 mEq, Oral, Once, Teddy Gambino PA-C    potassium chloride (K-DUR,KLOR-CON) CR tablet 40 mEq, 40 mEq, Oral, Once, Evert Ellsworth PA-C    QUEtiapine (SEROquel) tablet 12 5 mg, 12 5 mg, Oral, HS, Arie Mathews PA-C    senna-docusate sodium (SENOKOT S) 8 6-50 mg per tablet 1 tablet, 1 tablet, Oral, HS, Chelly Reyna MD, 1 tablet at 05/03/18 2111    Lab Results:    Results from last 7 days  Lab Units 04/29/18  1246   TROPONIN I ng/mL 1 45*       Results from last 7 days  Lab Units 05/02/18  0450 04/30/18  0428 04/29/18  1246   WBC Thousand/uL 5 06 6 74 6 15   HEMOGLOBIN g/dL 12 4 13 3 13 4   HEMATOCRIT % 39 0 40 6 40 8   PLATELETS Thousands/uL 168 190 188           Results from last 7 days  Lab Units 05/03/18  0926 05/02/18  1803 05/02/18  0450 05/01/18  0508 04/30/18  0428 04/29/18  1247   SODIUM mmol/L 138  --  144 142 139 135*   POTASSIUM mmol/L 4 1 4 8 3 0* 3 1* 4 1 4 2   CHLORIDE mmol/L 102  --  104 102 102 101   CO2 mmol/L 29  --  33* 30 26 22   BUN mg/dL 48*  --  59* 56* 53* 48*   CREATININE mg/dL 1 08  --  1 04 1 15 1 42* 1 40*   CALCIUM mg/dL 9 6  --  9 1 9 6 9 3 9 6   TOTAL PROTEIN g/dL  --   --   --  6 8 7 2 7 7   BILIRUBIN TOTAL mg/dL  --   --   --  0 96 0 68 0 98   ALK PHOS U/L  --   --   --  89 99 101   ALT U/L  --   --   --  70 74 71   AST U/L  --   --   --  60* 76* 74*   GLUCOSE RANDOM mg/dL 193*  --  119 122 261* 305*       Results from last 7 days  Lab Units 04/29/18  1247   INR  1 60*   PTT seconds 36*       Results from last 7 days  Lab Units 05/01/18  0508 04/30/18  0428   MAGNESIUM mg/dL 1 8 1 9     Telemetry: not on tele  Echo:  LEFT VENTRICLE:  The ventricle was mildly dilated  Systolic function was moderately reduced  Ejection fraction was estimated to be 44 %  There was moderate diffuse hypokinesis with regional variations  Wall thickness was mildly increased  The changes were consistent with eccentric hypertrophy      RIGHT VENTRICLE:  The ventricle was mildly dilated  Systolic function was mildly reduced      LEFT ATRIUM:  The atrium was moderately dilated      RIGHT ATRIUM:  The atrium was moderately dilated      MITRAL VALVE:  There was mild annular calcification  There was moderate regurgitation      AORTIC VALVE:  The valve was probably trileaflet  Leaflets exhibited moderately increased thickness, moderate calcification, and moderately reduced cuspal separation  Transaortic velocity was increased due to valvular stenosis  There was mild to moderate stenosis  There was mild regurgitation  Valve mean gradient was 14 mmHg  Estimated aortic valve area (by VTI) was 1 09 cm squared  Estimated aortic valve area (by Vmax) was 1 04 cm squared  Estimated aortic valve area (by Vmean) was 1 12 cm squared      TRICUSPID VALVE:  There was mild regurgitation  Estimated peak PA pressure was 56 mmHg  The findings suggest moderate pulmonary hypertension      PULMONIC VALVE:  There was mild regurgitation      PERICARDIUM:  There was a small left pleural effusion

## 2018-05-04 NOTE — ASSESSMENT & PLAN NOTE
Patient and  refusing rehab/assisted living  Patient and  not able to take care of themselves at home  Their children her also not happy about this  Patient had very high risk of readmission if she does not take her medications properly as advised and follow diet  Palliative Care following  Appreciate help  Had a lengthy discussion with patient, , son and daughter-in-law at bedside on 5/2/2018  Patient and her  still very reluctant to leave the house that they are living in right now  I explained them the importance of taking medications regularly and if that does not happen she might have to be admitted to hospital again  Patient understood the risks and understood the importance of taking medications regularly  She is agreeable to home health care arranged      Patient was initially refusing to go to rehab but then she agreed after explaining current having lengthy discussion with her and her family

## 2018-05-04 NOTE — PLAN OF CARE
Problem: PHYSICAL THERAPY ADULT  Goal: Performs mobility at highest level of function for planned discharge setting  See evaluation for individualized goals  Treatment/Interventions: Functional transfer training, Endurance training, Patient/family training, Equipment eval/education, Gait training, Spoke to nursing, Spoke to case management, Family  Equipment Recommended: Natacha Bradley (SUSI)       See flowsheet documentation for full assessment, interventions and recommendations  Outcome: Progressing  Prognosis: Good  Problem List: Decreased strength, Decreased endurance, Decreased mobility, Decreased safety awareness, Impaired balance  Assessment: Pt is progressing toward goals however continues to require assist with mobility tasks  Pt would benefit from continued skilled therapy at the next level of care in order to progress functional mobility and address deficits  Addressed family's concerns at length re: patient and husbands placement post STR  and their concerns for them to live alone  Educated family to speak with CM and make sure they have all information re: various levels of care, etc  Family pleased with answers - grateful for information  Barriers to Discharge: Inaccessible home environment, Decreased caregiver support     Recommendation: Short-term skilled PT          See flowsheet documentation for full assessment

## 2018-05-04 NOTE — PROGRESS NOTES
Progress Note - Hakeem Jerez 4/4/1927, 80 y o  female MRN: [id number]    Unit/Bed#: [facility] 506-01 Encounter: [id number]    Primary Care Provider: Roberta An MD   Date and time admitted to hospital: 4/29/2018 12:02 PM        * Acute on chronic diastolic congestive heart failure [facility])   Assessment & Plan    Echo - 4/30/17 - LVEF 44%, moderate diffuse hypokinesis, mild LVH, mildly dilated RV, mildly reduced RV function, moderately dilated LA & RA, moderate MR, mild-moderate aortic stenosis, FELTON 1 1  Possibly tachycardia induced because of AFib  Continue PO lasix 40 mg daily as per cardio  Atrial fibrillation with RVR (HCC)   Assessment & Plan    On BB and Cardizem  Rate is better controlled after starting Cardizem  AC w/ Elliquis  Continue to monitor  Cardiology following  Appreciate recommendations  Acute encephalopathy   Assessment & Plan    Most likely secondary to delirium  Patient was agitated again last night requiring Ativan which did not work  She became Calm after she got her bedtime dose of Seroquel  She was transiently on one-to-one  As per family patient's mental status worsens after  leaves  Geriatrics following  Appreciate recommendations  Continue Seroquel 12 5 at bedtime if needed can increase the dose  Continue to monitor        Aortic stenosis   Assessment & Plan    Stable - 12/2017 shows moderate AS  Cardiology following  Appreciate recommendations  Type 2 myocardial infarction without ST elevation [facility])   Assessment & Plan    Most likely due to CHF  Pt denies any chest pain  Cont to monitor  Goals of care, counseling/discussion   Assessment & Plan    Patient and  refusing rehab/assisted living  Patient and  not able to take care of themselves at home  Their children her also not happy about this    Patient had very high risk of readmission if she does not take her medications properly as advised and follow diet   Palliative Care following  Appreciate help  Had a lengthy discussion with patient, , son and daughter-in-law at bedside on 5/2/2018  Patient and her  still very reluctant to leave the house that they are living in right now  I explained them the importance of taking medications regularly and if that does not happen she might have to be admitted to hospital again  Patient understood the risks and understood the importance of taking medications regularly  She is agreeable to home health care arranged  Patient was initially refusing to go to rehab but then she agreed after explaining current having lengthy discussion with her and her family        Moderate protein-calorie malnutrition ([address] )   Assessment & Plan    Supplements    Malnutrition Findings:   Malnutrition type: Chronic illness  Degree of Malnutrition: Malnutrition of moderate degree (malnutrition related to dementia vs  abdominal pain as evidenced by <75% energy intake for >1 month, mild depletion of orbital body fat, and mild depletion of muscle mass seen at clavicle to be treated with liberalized diet and nutrition supplements )    BMI Findings: Body mass index is 20 66 kg/m²  Controlled diabetes mellitus type II without complication (HCC)   Assessment & Plan    A1c 7 4%  Cont Lantus to 5 units  Continue low-dose sliding scale  Blood glucose monitoring  Carb controlled diet            VTE Pharmacologic Prophylaxis:   Pharmacologic: Apixaban (Eliquis)  Mechanical VTE Prophylaxis in Place: Yes  Patient Centered Rounds: I have performed bedside rounds with nursing staff today  Discussions with Specialists or Other Care Team Provider: cardio, Geriatrics  Education and Discussions with Family / Patient: pt son  and daughter-in-law at bedside  Time Spent for Care: 30 minutes   More than 50% of total time spent on counseling and coordination of care as described above    Current Length of Stay: 5 day(s)  Current Patient Status: Inpatient   Certification Statement: The patient will continue to require additional inpatient hospital stay due to above  Discharge Plan: possibly tomorrow pending improvement in mental status  Code Status: Level 3 - DNAR and DNI    Subjective:   Pt seen and examined by me this morning  Pt denies any complaints this morning  She was awake and alert  Was stable  Was off one-to-one  She was alert and oriented x3  She knew exactly where she was and answered all questions appropriately  Objective:     Vitals:   Temp (24hrs), Av 7 °F (36 5 °C), Min:97 4 °F (36 3 °C), Max:97 9 °F (36 6 °C)    HR:  [] 91  Resp:  [18] 18  BP: (114-128)/(57-72) 128/60  SpO2:  [94 %-96 %] 95 %  Body mass index is 20 66 kg/m²  Input and Output Summary (last 24 hours): Intake/Output Summary (Last 24 hours) at 18 1714  Last data filed at 18 1548   Gross per 24 hour   Intake              680 ml   Output              200 ml   Net              480 ml       Physical Exam:     Physical Exam    Constitutional: Pt appears poorly developed and nourished  Not in any acute distress  Cardiovascular: Normal rate, irregularly irregular rhythm, normal heart sounds and intact distal pulses   Exam reveals no gallop and no friction rub   No murmur heard  Pulmonary/Chest: Effort normal and breath sounds normal  No respiratory distress  Pt has no wheezes or rales   patient has no crackles  Abdominal: Soft  Non-distended, Non-tender  Bowel sounds are normal    Musculoskeletal: Normal range of motion  Neurological: alert and oriented x 3  Normal strength and sensations  Psychiatric: normal mood and affect   No agitation    Additional Data:     Labs:      Results from last 7 days  Lab Units 18  0450  18  1246   WBC Thousand/uL 5 06  < > 6 15   HEMOGLOBIN g/dL 12 4  < > 13 4   HEMATOCRIT % 39 0  < > 40 8   PLATELETS Thousands/uL 168  < > 188   NEUTROS PCT %  --   --  82* LYMPHS PCT %  --   --  11*   MONOS PCT %  --   --  7   EOS PCT %  --   --  0   < > = values in this interval not displayed  Results from last 7 days  Lab Units 05/03/18  0926  05/01/18  0508   SODIUM mmol/L 138  < > 142   POTASSIUM mmol/L 4 1  < > 3 1*   CHLORIDE mmol/L 102  < > 102   CO2 mmol/L 29  < > 30   BUN mg/dL 48*  < > 56*   CREATININE mg/dL 1 08  < > 1 15   CALCIUM mg/dL 9 6  < > 9 6   TOTAL PROTEIN g/dL  --   --  6 8   BILIRUBIN TOTAL mg/dL  --   --  0 96   ALK PHOS U/L  --   --  89   ALT U/L  --   --  70   AST U/L  --   --  60*   GLUCOSE RANDOM mg/dL 193*  < > 122   < > = values in this interval not displayed  Results from last 7 days  Lab Units 04/29/18  1247   INR  1 60*       Results from last 7 days  Lab Units 05/04/18  1630 05/04/18  1100 05/04/18  0646 05/03/18  2050 05/03/18  1642 05/03/18  1117 05/03/18  0730 05/02/18  2106 05/02/18  1700 05/02/18  1052 05/02/18  0639 05/01/18  2106   POC GLUCOSE mg/dl 208* 195* 85 153* 257* 168* 172* 207* 208* 256* 140 233*             * I Have Reviewed All Lab Data Listed Above  * Additional Pertinent Lab Tests Reviewed: [address] Admission Reviewed    Imaging:    Imaging Reports Reviewed Today Include:   Imaging Personally Reviewed by Myself Includes:      Recent Cultures (last 7 days):       Results from last 7 days  Lab Units 04/29/18  1246   BLOOD CULTURE  No Growth After 4 Days  No Growth After 4 Days         Last 24 Hours Medication List:     Current Facility-Administered Medications:  acetaminophen 650 mg Oral Q8H [facility] Harleen Kelley PA-C   apixaban 2 5 mg Oral BID Niya Sweet MD   aspirin 81 mg Oral Daily Niya Sweet MD   calcitonin (salmon) 1 spray Alternating Nares Daily Niya Sweet MD   diltiazem 120 mg Oral Daily Chace Davison MD   docusate sodium 100 mg Oral BID Ana Hernandez MD   famotidine 20 mg Oral Daily Shukri Jasso DO   furosemide 40 mg Oral Daily Juanito Hedrick MD   insulin glargine 5 Units Subcutaneous HS Dequan Parish MD   insulin lispro 1-6 Units Subcutaneous TID AC Kathya Sánchez MD   lactulose 10 g Oral Daily PRN Mignon Cowden, DO   metoprolol tartrate 50 mg Oral Q12H [facility] Kathya Sánchez MD   ondansetron 4 mg Intravenous Q6H PRN Mignon Cowden, DO   polyethylene glycol 17 g Oral Daily Dequan Parish MD   potassium chloride 40 mEq Oral Once Juanita Rodriguez PA-C   potassium chloride 40 mEq Oral Once Juanita Rodriguez PA-C   QUEtiapine 12 5 mg Oral HS Javed Payne PA-C   senna-docusate sodium 1 tablet Oral HS Kathya Sánchez MD        Today, Patient Was Seen By: Dequan Parish MD    ** Please Note: Dictation voice to text software may have been used in the creation of this document   **

## 2018-05-04 NOTE — PROGRESS NOTES
Progress Note - Helena Aponte 80 y o  female MRN: [id number]    Unit/Bed#: [facility] 506-01 Encounter: [id number]      Assessment/Plan:  1  Impaired mobilty  Plan is for discharge to [facility]  May need to consider neuropsych eval for capacity/competency      2  Delirium  Patient became delirious last night remains on 1:1,  this a m  she is alert and oriented x3  Family at bedside reports this is worse than her normal baseline  Patient received Seroquel 12 5 mg q h s , continue with same  Continue Tylenol 650 mg Q 8 as pain can precipitate delirium  Refer to geriatric pain med order set for further pain med recommendations  Continue to monitor for urinary retention, bladder scan and straight cath as needed  No bowel movement since admission, will add Senokot S daily, consider the addition of miralax as constipation can cause delirium  Redirect unwanted patient behaviors as first line tx  Reorient patient frequently  Avoid deliriogenic meds including tramadol, benzodiazepines, benadryl  Good sleep hygiene important, limit night time interruptions  Encourage patient to stay awake during the day  Ensure adequate hydration/nutrition  Mobilize often      3  Dementia  Family reports previously diagnosed, mild  Is currently complicated by 2  Will defer B12, folate checked at this time  Patient likely not competent, if this is needed legally to assist with safe placement for the patient and her , would recommend neuropsych eval  See 2      4  Ambulatory dysfunction  Patient uses assistive devices at baseline  Continue with PT, OT  [facility] is planned for discharge     5  Deconditioning  Chronic  Mobilize patient frequently to prevent further decline  PT, OT     6   Depression with anxiety  Patient seen by Psychiatry, she refused SSRI     7    Acute on chronic diastolic congestive heart failure  Per Cards, poor outlook, patient will likely continue to decline  Patient likely to be readmitted in the future  Would recommend considering PALS program, hospice to help keep patient with  as she is upset when she is in the hospital and away from him     8  Malnutrition  Recommend adding Glucerna t i d  as recommended by dietitian       Subjective:   Patient had reported episode of agitation trying to leave the hospital last pm, patient was started on 1:1 and given Ativan and pm dose of Seroquel  Currently alert and oriented x3  Family in room visiting  Family member asked patient how session with PT went today  Patient gestured to family using her middle finger  Objective:       Vitals: Blood pressure 127/72, pulse 65, temperature (!) 94 6 °F (34 8 °C), temperature source Oral, resp  rate 18, height 5' 3" (1 6 m), weight 52 9 kg (116 lb 10 oz), SpO2 94 %  ,Body mass index is 20 66 kg/m²  Intake/Output Summary (Last 24 hours) at 05/04/18 1122  Last data filed at 05/03/18 1701   Gross per 24 hour   Intake              640 ml   Output              250 ml   Net              390 ml       Physical Exam:   Gen: alert and oriented x3, no acute distress  CV: S1, S2, no murmur, rub or gallop  Pulm: CTA, no wheezes, rales or rubs  Abd: soft, non tender, non distended, bs +x4    Invasive Devices     Peripheral Intravenous Line            Peripheral IV 04/30/18 Right Antecubital 3 days                Lab, Imaging and other studies: I have personally reviewed pertinent reports      VTE Pharmacologic Prophylaxis: Sequential compression device (Venodyne)   VTE Mechanical Prophylaxis: sequential compression device

## 2018-05-04 NOTE — PLAN OF CARE
CARDIOVASCULAR - ADULT     Maintains optimal cardiac output and hemodynamic stability Progressing     Absence of cardiac dysrhythmias or at baseline rhythm Progressing        DISCHARGE PLANNING     Discharge to home or other facility with appropriate resources Progressing        DISCHARGE PLANNING - CARE MANAGEMENT     Discharge to post-acute care or home with appropriate resources Progressing        INFECTION - ADULT     Absence or prevention of progression during hospitalization Progressing     Absence of fever/infection during neutropenic period Progressing        Knowledge Deficit     Patient/family/caregiver demonstrates understanding of disease process, treatment plan, medications, and discharge instructions Progressing        Nutrition/Hydration-ADULT     Nutrient/Hydration intake appropriate for improving, restoring or maintaining nutritional needs Progressing        PAIN - ADULT     Verbalizes/displays adequate comfort level or baseline comfort level Progressing        Potential for Falls     Patient will remain free of falls Progressing        Prexisting or High Potential for Compromised Skin Integrity     Skin integrity is maintained or improved Progressing        SAFETY ADULT     Maintain or return to baseline ADL function Progressing     Maintain or return mobility status to optimal level Progressing        SAFETY,RESTRAINT: NV/NON-SELF DESTRUCTIVE BEHAVIOR     Remains free of harm/injury (restraint for non violent/non self-detsructive behavior) Progressing     Returns to optimal restraint-free functioning Progressing

## 2018-05-04 NOTE — PROGRESS NOTES
Pt  Appears a little more sob, crackles noted in bases that were not there this morning  sats 96% on R/A   Georges made aware   Will be evaluated by georges

## 2018-05-04 NOTE — SOCIAL WORK
CM and Dr Pito Peck met with pt and family  She is not cleared for dc to [facility] due to delirium  Per Lorraine Peña will re-eval Monday  Family provided copy of Advance Directive and placed on chart

## 2018-05-04 NOTE — ASSESSMENT & PLAN NOTE
Supplements    Malnutrition Findings:   Malnutrition type: Chronic illness  Degree of Malnutrition: Malnutrition of moderate degree (malnutrition related to dementia vs  abdominal pain as evidenced by <75% energy intake for >1 month, mild depletion of orbital body fat, and mild depletion of muscle mass seen at clavicle to be treated with liberalized diet and nutrition supplements )    BMI Findings: Body mass index is 20 66 kg/m²

## 2018-05-04 NOTE — ASSESSMENT & PLAN NOTE
Most likely secondary to delirium  Patient was agitated again last night requiring Ativan which did not work  She became Calm after she got her bedtime dose of Seroquel  She was transiently on one-to-one  As per family patient's mental status worsens after  leaves  Geriatrics following  Appreciate recommendations  Continue Seroquel 12 5 at bedtime if needed can increase the dose    Continue to monitor

## 2018-05-05 LAB
ANION GAP SERPL CALCULATED.3IONS-SCNC: 6 MMOL/L (ref 4–13)
BUN SERPL-MCNC: 47 MG/DL (ref 5–25)
CALCIUM SERPL-MCNC: 8.9 MG/DL (ref 8.3–10.1)
CHLORIDE SERPL-SCNC: 104 MMOL/L (ref 100–108)
CO2 SERPL-SCNC: 33 MMOL/L (ref 21–32)
CREAT SERPL-MCNC: 1.27 MG/DL (ref 0.6–1.3)
ERYTHROCYTE [DISTWIDTH] IN BLOOD BY AUTOMATED COUNT: 14.8 % (ref 11.6–15.1)
GFR SERPL CREATININE-BSD FRML MDRD: 37 ML/MIN/1.73SQ M
GLUCOSE SERPL-MCNC: 109 MG/DL (ref 65–140)
GLUCOSE SERPL-MCNC: 112 MG/DL (ref 65–140)
GLUCOSE SERPL-MCNC: 115 MG/DL (ref 65–140)
GLUCOSE SERPL-MCNC: 248 MG/DL (ref 65–140)
GLUCOSE SERPL-MCNC: 250 MG/DL (ref 65–140)
HCT VFR BLD AUTO: 41.4 % (ref 34.8–46.1)
HGB BLD-MCNC: 12.8 G/DL (ref 11.5–15.4)
MCH RBC QN AUTO: 30 PG (ref 26.8–34.3)
MCHC RBC AUTO-ENTMCNC: 30.9 G/DL (ref 31.4–37.4)
MCV RBC AUTO: 97 FL (ref 82–98)
PLATELET # BLD AUTO: 160 THOUSANDS/UL (ref 149–390)
PMV BLD AUTO: 12.3 FL (ref 8.9–12.7)
POTASSIUM SERPL-SCNC: 4.2 MMOL/L (ref 3.5–5.3)
RBC # BLD AUTO: 4.27 MILLION/UL (ref 3.81–5.12)
SODIUM SERPL-SCNC: 143 MMOL/L (ref 136–145)
WBC # BLD AUTO: 4.92 THOUSAND/UL (ref 4.31–10.16)

## 2018-05-05 PROCEDURE — 85027 COMPLETE CBC AUTOMATED: CPT | Performed by: INTERNAL MEDICINE

## 2018-05-05 PROCEDURE — 99232 SBSQ HOSP IP/OBS MODERATE 35: CPT | Performed by: INTERNAL MEDICINE

## 2018-05-05 PROCEDURE — 80048 BASIC METABOLIC PNL TOTAL CA: CPT | Performed by: INTERNAL MEDICINE

## 2018-05-05 PROCEDURE — 82948 REAGENT STRIP/BLOOD GLUCOSE: CPT

## 2018-05-05 RX ADMIN — DOCUSATE SODIUM,SENNOSIDES 1 TABLET: 50; 8.6 TABLET, FILM COATED ORAL at 21:15

## 2018-05-05 RX ADMIN — ACETAMINOPHEN 650 MG: 325 TABLET ORAL at 06:17

## 2018-05-05 RX ADMIN — FUROSEMIDE 40 MG: 40 TABLET ORAL at 08:22

## 2018-05-05 RX ADMIN — APIXABAN 2.5 MG: 2.5 TABLET, FILM COATED ORAL at 17:07

## 2018-05-05 RX ADMIN — APIXABAN 2.5 MG: 2.5 TABLET, FILM COATED ORAL at 08:22

## 2018-05-05 RX ADMIN — INSULIN LISPRO 3 UNITS: 100 INJECTION, SOLUTION INTRAVENOUS; SUBCUTANEOUS at 12:41

## 2018-05-05 RX ADMIN — METOPROLOL TARTRATE 50 MG: 50 TABLET ORAL at 08:22

## 2018-05-05 RX ADMIN — CALCITONIN SALMON 1 SPRAY: 200 SPRAY, METERED NASAL at 08:23

## 2018-05-05 RX ADMIN — ACETAMINOPHEN 650 MG: 325 TABLET ORAL at 21:13

## 2018-05-05 RX ADMIN — ACETAMINOPHEN 650 MG: 325 TABLET ORAL at 13:25

## 2018-05-05 RX ADMIN — DILTIAZEM HYDROCHLORIDE 120 MG: 120 CAPSULE, COATED, EXTENDED RELEASE ORAL at 08:22

## 2018-05-05 RX ADMIN — FAMOTIDINE 20 MG: 20 TABLET ORAL at 08:21

## 2018-05-05 RX ADMIN — ASPIRIN 81 MG 81 MG: 81 TABLET ORAL at 08:21

## 2018-05-05 RX ADMIN — INSULIN GLARGINE 5 UNITS: 100 INJECTION, SOLUTION SUBCUTANEOUS at 21:13

## 2018-05-05 RX ADMIN — DOCUSATE SODIUM 100 MG: 100 CAPSULE, LIQUID FILLED ORAL at 08:22

## 2018-05-05 RX ADMIN — METOPROLOL TARTRATE 50 MG: 50 TABLET ORAL at 21:21

## 2018-05-05 RX ADMIN — POLYETHYLENE GLYCOL 3350 17 G: 17 POWDER, FOR SOLUTION ORAL at 08:21

## 2018-05-05 RX ADMIN — DOCUSATE SODIUM 100 MG: 100 CAPSULE, LIQUID FILLED ORAL at 17:07

## 2018-05-05 RX ADMIN — QUETIAPINE FUMARATE 12.5 MG: 25 TABLET ORAL at 21:15

## 2018-05-05 NOTE — ASSESSMENT & PLAN NOTE
Echo - 4/30/17 - LVEF 44%, moderate diffuse hypokinesis, mild LVH, mildly dilated RV, mildly reduced RV function, moderately dilated LA & RA, moderate MR, mild-moderate aortic stenosis, FELTON 1 1  Possibly tachycardia induced because of AFib  Patient was off oxygen yesterday but later in the day she was having mild respiratory distress had be put on oxygen again  This morning she says she is feeling completely fine her lungs are clear to auscultation  So will not make any changes to her medications currently  Will try to wean off oxygen  Continue PO lasix 40 mg daily

## 2018-05-05 NOTE — PROGRESS NOTES
Progress Note - Helena Fay 4/4/1927, 80 y o  female MRN: [id number]    Unit/Bed#: [facility] 506-01 Encounter: [id number]    Primary Care Provider: Darrell Abebe MD   Date and time admitted to hospital: 4/29/2018 12:02 PM        * Acute on chronic diastolic congestive heart failure [facility])   Assessment & Plan    Echo - 4/30/17 - LVEF 44%, moderate diffuse hypokinesis, mild LVH, mildly dilated RV, mildly reduced RV function, moderately dilated LA & RA, moderate MR, mild-moderate aortic stenosis, FELTON 1 1  Possibly tachycardia induced because of AFib  Patient was off oxygen yesterday but later in the day she was having mild respiratory distress had be put on oxygen again  This morning she says she is feeling completely fine her lungs are clear to auscultation  So will not make any changes to her medications currently  Will try to wean off oxygen  Continue PO lasix 40 mg daily  Atrial fibrillation with RVR (HCC)   Assessment & Plan    On BB and Cardizem  Rate is better controlled after starting Cardizem  AC w/ Elliquis  Continue to monitor  Cardiology following  Appreciate recommendations  Acute encephalopathy   Assessment & Plan    Most likely secondary to delirium  Patient slept well last night  She did not have any more episodes of agitation or confusion  This morning she was alert and oriented x3  As per family patient's mental status worsens after  leaves  Patient's  was with her last night  Geriatrics following  Appreciate recommendations  Continue Seroquel 12 5 at bedtime if needed can increase the dose  Continue to monitor        Aortic stenosis   Assessment & Plan    Stable - 12/2017 shows moderate AS  Cardiology following  Appreciate recommendations  Goals of care, counseling/discussion   Assessment & Plan    Patient and  refusing rehab/assisted living  Patient and  not able to take care of themselves at home    Their children her also not happy about this  Patient had very high risk of readmission if she does not take her medications properly as advised and follow diet  Palliative Care following  Appreciate help  Had a lengthy discussion with patient, , son and daughter-in-law at bedside on 5/2/2018  Patient and her  still very reluctant to leave the house that they are living in right now  I explained them the importance of taking medications regularly and if that does not happen she might have to be admitted to hospital again  Patient understood the risks and understood the importance of taking medications regularly  She is agreeable to home health care arranged  Patient was initially refusing to go to rehab but then she agreed after explaining current having lengthy discussion with her and her family    Patient was initially accepted at [address] home but discharge was held because of acute delirium  They will review the patient's case again on Monday  Moderate protein-calorie malnutrition (HCC)   Assessment & Plan    Supplements    Malnutrition Findings:   Malnutrition type: Chronic illness  Degree of Malnutrition: Malnutrition of moderate degree (malnutrition related to dementia vs  abdominal pain as evidenced by <75% energy intake for >1 month, mild depletion of orbital body fat, and mild depletion of muscle mass seen at clavicle to be treated with liberalized diet and nutrition supplements )    BMI Findings: Body mass index is 20 74 kg/m²  Controlled diabetes mellitus type II without complication (HCC)   Assessment & Plan    A1c 7 4%  Cont Lantus to 5 units  Continue low-dose sliding scale  Blood glucose monitoring  Carb controlled diet            VTE Pharmacologic Prophylaxis:   Pharmacologic: Apixaban (Eliquis)  Mechanical VTE Prophylaxis in Place: Yes  Patient Centered Rounds: I have performed bedside rounds with nursing staff today    Discussions with Specialists or Other Care Team Provider:   Education and Discussions with Family / Patient: pt  and daughter-in-law at bedside  Time Spent for Care: 30 minutes   More than 50% of total time spent on counseling and coordination of care as described above  Current Length of Stay: 6 day(s)  Current Patient Status: Inpatient   Certification Statement: The patient will continue to require additional inpatient hospital stay due to above  Discharge Plan: possibly Monday   Code Status: Level 3 - DNAR and DNI    Subjective:   Pt seen and examined by me this morning  Pt had mild shortness of breath last evening which has resolved this morning  She said she is feeling fine this morning no shortness of breath  She also slept well last night  No more episodes of confusion/agitation  Objective:     Vitals:   Temp (24hrs), Av °F (36 7 °C), Min:97 9 °F (36 6 °C), Max:98 3 °F (36 8 °C)    HR:  [] 78  Resp:  [16-18] 18  BP: (111-140)/(57-70) 111/57  SpO2:  [95 %-99 %] 99 %  Body mass index is 20 74 kg/m²  Input and Output Summary (last 24 hours): Intake/Output Summary (Last 24 hours) at 18 1204  Last data filed at 18 0827   Gross per 24 hour   Intake             1051 ml   Output             1154 ml   Net             -103 ml       Physical Exam:     Physical Exam    Constitutional: Pt appears poorly developed and nourished  Not in any acute distress  Cardiovascular: Normal rate, irregularly irregular rhythm, normal heart sounds and intact distal pulses   Exam reveals no gallop and no friction rub   No murmur heard  Pulmonary/Chest: Effort normal and breath sounds normal  No respiratory distress  Pt has no wheezes or rales   patient has no crackles  Abdominal: Soft  Non-distended, Non-tender  Bowel sounds are normal    Musculoskeletal: Normal range of motion  Neurological: alert and oriented x 3  Normal strength and sensations  Psychiatric: normal mood and affect      Additional Data: Labs:      Results from last 7 days  Lab Units 05/05/18  0449  04/29/18  1246   WBC Thousand/uL 4 92  < > 6 15   HEMOGLOBIN g/dL 12 8  < > 13 4   HEMATOCRIT % 41 4  < > 40 8   PLATELETS Thousands/uL 160  < > 188   NEUTROS PCT %  --   --  82*   LYMPHS PCT %  --   --  11*   MONOS PCT %  --   --  7   EOS PCT %  --   --  0   < > = values in this interval not displayed  Results from last 7 days  Lab Units 05/05/18  0449  05/01/18  0508   SODIUM mmol/L 143  < > 142   POTASSIUM mmol/L 4 2  < > 3 1*   CHLORIDE mmol/L 104  < > 102   CO2 mmol/L 33*  < > 30   BUN mg/dL 47*  < > 56*   CREATININE mg/dL 1 27  < > 1 15   CALCIUM mg/dL 8 9  < > 9 6   TOTAL PROTEIN g/dL  --   --  6 8   BILIRUBIN TOTAL mg/dL  --   --  0 96   ALK PHOS U/L  --   --  89   ALT U/L  --   --  70   AST U/L  --   --  60*   GLUCOSE RANDOM mg/dL 109  < > 122   < > = values in this interval not displayed  Results from last 7 days  Lab Units 04/29/18  1247   INR  1 60*       Results from last 7 days  Lab Units 05/05/18  1117 05/05/18  0649 05/04/18  2128 05/04/18  1630 05/04/18  1100 05/04/18  0646 05/03/18  2050 05/03/18  1642 05/03/18  1117 05/03/18  0730 05/02/18  2106 05/02/18  1700   POC GLUCOSE mg/dl 248* 115 167* 208* 195* 85 153* 257* 168* 172* 207* 208*             * I Have Reviewed All Lab Data Listed Above  * Additional Pertinent Lab Tests Reviewed: [address] Admission Reviewed    Imaging:    Imaging Reports Reviewed Today Include:   Imaging Personally Reviewed by Myself Includes:      Recent Cultures (last 7 days):       Results from last 7 days  Lab Units 04/29/18  1246   BLOOD CULTURE  No Growth After 5 Days  No Growth After 5 Days         Last 24 Hours Medication List:     Current Facility-Administered Medications:  acetaminophen 650 mg Oral Q8H [facility] Harleen Kelley PA-C   apixaban 2 5 mg Oral BID Risa Puente MD   aspirin 81 mg Oral Daily Risa Puente MD   calcitonin (salmon) 1 spray Alternating Nares Daily Eloise Tinsley MD   diltiazem 120 mg Oral Daily Adrianna White MD   docusate sodium 100 mg Oral BID Kt Magaña MD   famotidine 20 mg Oral Daily Ruba Ledbetter DO   furosemide 40 mg Oral Daily Jena Sanchez MD   insulin glargine 5 Units Subcutaneous HS Kt Magaña MD   insulin lispro 1-6 Units Subcutaneous TID AC Eloise Tinsley MD   lactulose 10 g Oral Daily PRN Ruba Ledbetter DO   metoprolol tartrate 50 mg Oral Q12H Faiza Vázquez MD   ondansetron 4 mg Intravenous Q6H PRN Ruba Ledbetter DO   polyethylene glycol 17 g Oral Daily Kt Magaña MD   potassium chloride 40 mEq Oral Once Dhara Choi PA-C   potassium chloride 40 mEq Oral Once Dhara Choi PA-C   QUEtiapine 12 5 mg Oral HS Curtis ShaperLION   senna-docusate sodium 1 tablet Oral HS Eloise Tinsley MD        Today, Patient Was Seen By: Kt Magaña MD    ** Please Note: Dictation voice to text software may have been used in the creation of this document   **

## 2018-05-05 NOTE — ASSESSMENT & PLAN NOTE
Most likely secondary to delirium  Patient slept well last night  She did not have any more episodes of agitation or confusion  This morning she was alert and oriented x3  As per family patient's mental status worsens after  leaves  Patient's  was with her last night  Geriatrics following  Appreciate recommendations  Continue Seroquel 12 5 at bedtime if needed can increase the dose    Continue to monitor

## 2018-05-05 NOTE — PROGRESS NOTES
Heart Failure Service Progress Note - Claudia Mathias 80 y o  female MRN: [id number]    Unit/Bed#: [facility] 506-01 Encounter: [id number]      Assessment:    Principal Problem:    Acute on chronic diastolic congestive heart failure ([address] )  Active Problems: Aortic stenosis    Benign essential HTN    Controlled diabetes mellitus type II without complication (HCC)    Atrial fibrillation with RVR (HCC)    CAD (coronary artery disease)    Acute encephalopathy    Cognitive impairment    Depression with anxiety    Esophageal reflux    Hyperlipidemia    Hypertension    Osteoarthritis    Nausea    Transaminitis    Moderate protein-calorie malnutrition (HCC)    Goals of care, counseling/discussion    Impaired mobility and ADLs    Delirium    Dementia    Ambulatory dysfunction    Physical deconditioning    Type 2 myocardial infarction without ST elevation (HCC)    # Acute on chronic biventricular HF (LVEF~45% w/ mild cLVH and mild RV dilation and mild reduction in RVSF), NYHA III, ACC/AHA Stage C: Most likely 2/2 to AS  Will likely have steady worsening of LVEF in the setting of progressive AS  --Continue conservative mgmt  --Continue lasix 40 mg PO daily  --Patient would likely benefit with comfort based care/hospice evaluation if patient and family agreeable  [address] discussion per primary team, geriatrics, and SLIM  --Transition metoprolol tartrate to succinate prior to D/C    # Severe AS: Poor candidate for TAVR given dementia  # CAD  # AFib w/RVR: Rates now controlled on BB and cardizem  Continue Eliquis  # HLD  # HTN  # Dementia  # NSTEMI type II: 2/2 to HF above  # GOC: Per primary team    Subjective:   Patient seen and examined  No significant events overnight  Objective:       [employer] (day, reason): Wang catheter (day, reason):    Vitals: Blood pressure 111/57, pulse 78, temperature 98 3 °F (36 8 °C), temperature source Oral, resp  rate 18, height 5' 3" (1 6 m), weight 53 1 kg (117 lb 1 oz), SpO2 99 %  , Body mass index is 20 74 kg/m² , I/O last 3 completed shifts: In: 900 [P O :900]  Out: 854 [Urine:854]  I/O this shift: In: 893 [P O :893]  Out: 300 [Urine:300]  Wt Readings from Last 3 Encounters:   05/05/18 53 1 kg (117 lb 1 oz)   04/24/18 51 5 kg (113 lb 8 6 oz)   03/15/18 51 3 kg (113 lb 2 oz)       Intake/Output Summary (Last 24 hours) at 05/05/18 1215  Last data filed at 05/05/18 1211   Gross per 24 hour   Intake             1553 ml   Output             1154 ml   Net              399 ml     I/O last 3 completed shifts: In: 900 [P O :900]  Out: 854 [Urine:854]    No significant arrhythmias seen on telemetry review        Physical Exam:  Vitals:    05/04/18 2153 05/04/18 2315 05/05/18 0600 05/05/18 0714   BP: 140/70 125/58  111/57   BP Location:  Left arm  Left arm   Pulse: 105 76  78   Resp:  16  18   Temp:  97 9 °F (36 6 °C)  98 3 °F (36 8 °C)   TempSrc:  Oral  Oral   SpO2:  95%  99%   Weight:   53 1 kg (117 lb 1 oz)    Height:           GEN: Arsh Iyer appears well, alert and oriented x 3, pleasant and cooperative   HEENT: pupils equal, round, and reactive to light; extraocular muscles intact  NECK: supple, no carotid bruits   HEART: irregular rhythm, normal S1 and S2, no murmurs, clicks, gallops or rubs, JVD is flat    LUNGS: clear to auscultation bilaterally; no wheezes, rales, or rhonchi   ABDOMEN: normal bowel sounds, soft, no tenderness, no distention  EXTREMITIES: peripheral pulses normal; no clubbing, cyanosis, or edema  NEURO: no focal findings   SKIN: normal without suspicious lesions on exposed skin      Current Facility-Administered Medications:     acetaminophen (TYLENOL) tablet 650 mg, 650 mg, Oral, Q8H NBA, Harleen Kelley PA-C, 650 mg at 05/05/18 0617    apixaban (ELIQUIS) tablet 2 5 mg, 2 5 mg, Oral, BID, Estrella Cleary MD, 2 5 mg at 05/05/18 0822    aspirin chewable tablet 81 mg, 81 mg, Oral, Daily, Estrella Cleary MD, 81 mg at 05/05/18 0821    calcitonin (salmon) (MIACALCIN) 200 units/act nasal spray 1 spray, 1 spray, Alternating Nares, Daily, Yadira Delarosa MD, 1 spray at 05/05/18 0823    diltiazem (CARDIZEM CD) 24 hr capsule 120 mg, 120 mg, Oral, Daily, Genaro Wilcox MD, 120 mg at 05/05/18 0822    docusate sodium (COLACE) capsule 100 mg, 100 mg, Oral, BID, Chioma Alan MD, 100 mg at 05/05/18 0822    famotidine (PEPCID) tablet 20 mg, 20 mg, Oral, Daily, Anahi Reason, DO, 20 mg at 05/05/18 0821    furosemide (LASIX) tablet 40 mg, 40 mg, Oral, Daily, Moses Valladares MD, 40 mg at 05/05/18 0822    insulin glargine (LANTUS) subcutaneous injection 5 Units 0 05 mL, 5 Units, Subcutaneous, HS, Chioma Alan MD, 5 Units at 05/04/18 2155    insulin lispro (HumaLOG) 100 units/mL subcutaneous injection 1-6 Units, 1-6 Units, Subcutaneous, TID AC, 2 Units at 05/04/18 1705 **AND** Fingerstick Glucose (POCT), , , TID AC, Yadira Delarosa MD    lactulose 20 g/30 mL oral solution 10 g, 10 g, Oral, Daily PRN, Anahi Reason, DO, 10 g at 04/30/18 1149    metoprolol tartrate (LOPRESSOR) tablet 50 mg, 50 mg, Oral, Q12H [address], Yadira Delarosa MD, 50 mg at 05/05/18 0822    ondansetron (ZOFRAN) injection 4 mg, 4 mg, Intravenous, Q6H PRN, Anahi Reason, DO, 4 mg at 04/30/18 0939    polyethylene glycol (MIRALAX) packet 17 g, 17 g, Oral, Daily, Chioma Alan MD, 17 g at 05/05/18 0821    potassium chloride (K-DUR,KLOR-CON) CR tablet 40 mEq, 40 mEq, Oral, Once, Rand Araiza PA-C    potassium chloride (K-DUR,KLOR-CON) CR tablet 40 mEq, 40 mEq, Oral, Once, Evert Ellsworth PA-C    QUEtiapine (SEROquel) tablet 12 5 mg, 12 5 mg, Oral, HS, Arie Mathews PA-C, 12 5 mg at 05/04/18 2156    senna-docusate sodium (SENOKOT S) 8 6-50 mg per tablet 1 tablet, 1 tablet, Oral, HS, Yadira Delarosa MD, 1 tablet at 05/04/18 2200      Labs & Results:      Results from last 7 days  Lab Units 04/29/18  1246   TROPONIN I ng/mL 1 45*     Results from last 7 days  Lab Units 05/05/18  1230 05/02/18  0450 04/30/18  0428   WBC Thousand/uL 4 92 5 06 6 74   HEMOGLOBIN g/dL 12 8 12 4 13 3   HEMATOCRIT % 41 4 39 0 40 6   PLATELETS Thousands/uL 160 168 190           Results from last 7 days  Lab Units 05/05/18  0449 05/03/18  0926 05/02/18  1803 05/02/18  0450 05/01/18  0508 04/30/18  0428 04/29/18  1247   SODIUM mmol/L 143 138  --  144 142 139 135*   POTASSIUM mmol/L 4 2 4 1 4 8 3 0* 3 1* 4 1 4 2   CHLORIDE mmol/L 104 102  --  104 102 102 101   CO2 mmol/L 33* 29  --  33* 30 26 22   BUN mg/dL 47* 48*  --  59* 56* 53* 48*   CREATININE mg/dL 1 27 1 08  --  1 04 1 15 1 42* 1 40*   CALCIUM mg/dL 8 9 9 6  --  9 1 9 6 9 3 9 6   TOTAL PROTEIN g/dL  --   --   --   --  6 8 7 2 7 7   BILIRUBIN TOTAL mg/dL  --   --   --   --  0 96 0 68 0 98   ALK PHOS U/L  --   --   --   --  89 99 101   ALT U/L  --   --   --   --  70 74 71   AST U/L  --   --   --   --  60* 76* 74*   GLUCOSE RANDOM mg/dL 109 193*  --  119 122 261* 305*     Results from last 7 days  Lab Units 04/29/18  1247   INR  1 60*     EKG personally reviewed by Peace Rosen MD      Counseling / Coordination of Care  Total floor / unit time spent today 40 minutes  Greater than 50% of total time was spent with the patient and / or family counseling and / or coordination of care  A description of the counseling / coordination of care: 20 min  Thank you for the opportunity to participate in the care of this patient      Peace Rosen MD, PhD   [place]

## 2018-05-05 NOTE — ASSESSMENT & PLAN NOTE
Supplements    Malnutrition Findings:   Malnutrition type: Chronic illness  Degree of Malnutrition: Malnutrition of moderate degree (malnutrition related to dementia vs  abdominal pain as evidenced by <75% energy intake for >1 month, mild depletion of orbital body fat, and mild depletion of muscle mass seen at clavicle to be treated with liberalized diet and nutrition supplements )    BMI Findings: Body mass index is 20 74 kg/m²

## 2018-05-05 NOTE — ASSESSMENT & PLAN NOTE
Patient and  refusing rehab/assisted living  Patient and  not able to take care of themselves at home  Their children her also not happy about this  Patient had very high risk of readmission if she does not take her medications properly as advised and follow diet  Palliative Care following  Appreciate help  Had a lengthy discussion with patient, , son and daughter-in-law at bedside on 5/2/2018  Patient and her  still very reluctant to leave the house that they are living in right now  I explained them the importance of taking medications regularly and if that does not happen she might have to be admitted to hospital again  Patient understood the risks and understood the importance of taking medications regularly  She is agreeable to home health care arranged  Patient was initially refusing to go to rehab but then she agreed after explaining current having lengthy discussion with her and her family    Patient was initially accepted at [facility] but discharge was held because of acute delirium  They will review the patient's case again on Monday

## 2018-05-06 LAB
GLUCOSE SERPL-MCNC: 113 MG/DL (ref 65–140)
GLUCOSE SERPL-MCNC: 225 MG/DL (ref 65–140)
GLUCOSE SERPL-MCNC: 229 MG/DL (ref 65–140)
GLUCOSE SERPL-MCNC: 75 MG/DL (ref 65–140)

## 2018-05-06 PROCEDURE — 82948 REAGENT STRIP/BLOOD GLUCOSE: CPT

## 2018-05-06 PROCEDURE — 99231 SBSQ HOSP IP/OBS SF/LOW 25: CPT | Performed by: INTERNAL MEDICINE

## 2018-05-06 PROCEDURE — 99239 HOSP IP/OBS DSCHRG MGMT >30: CPT | Performed by: INTERNAL MEDICINE

## 2018-05-06 PROCEDURE — 99232 SBSQ HOSP IP/OBS MODERATE 35: CPT | Performed by: INTERNAL MEDICINE

## 2018-05-06 RX ORDER — ATORVASTATIN CALCIUM 40 MG/1
40 TABLET, FILM COATED ORAL
Status: DISCONTINUED | OUTPATIENT
Start: 2018-05-06 | End: 2018-05-08 | Stop reason: HOSPADM

## 2018-05-06 RX ORDER — METOPROLOL SUCCINATE 50 MG/1
50 TABLET, EXTENDED RELEASE ORAL DAILY
Status: DISCONTINUED | OUTPATIENT
Start: 2018-05-07 | End: 2018-05-08 | Stop reason: HOSPADM

## 2018-05-06 RX ADMIN — DOCUSATE SODIUM 100 MG: 100 CAPSULE, LIQUID FILLED ORAL at 17:14

## 2018-05-06 RX ADMIN — FAMOTIDINE 20 MG: 20 TABLET ORAL at 08:30

## 2018-05-06 RX ADMIN — DILTIAZEM HYDROCHLORIDE 120 MG: 120 CAPSULE, COATED, EXTENDED RELEASE ORAL at 08:30

## 2018-05-06 RX ADMIN — FUROSEMIDE 40 MG: 40 TABLET ORAL at 08:31

## 2018-05-06 RX ADMIN — ASPIRIN 81 MG 81 MG: 81 TABLET ORAL at 08:30

## 2018-05-06 RX ADMIN — APIXABAN 2.5 MG: 2.5 TABLET, FILM COATED ORAL at 17:14

## 2018-05-06 RX ADMIN — ACETAMINOPHEN 650 MG: 325 TABLET ORAL at 12:46

## 2018-05-06 RX ADMIN — ACETAMINOPHEN 650 MG: 325 TABLET ORAL at 06:30

## 2018-05-06 RX ADMIN — ATORVASTATIN CALCIUM 40 MG: 40 TABLET, FILM COATED ORAL at 22:01

## 2018-05-06 RX ADMIN — INSULIN LISPRO 2 UNITS: 100 INJECTION, SOLUTION INTRAVENOUS; SUBCUTANEOUS at 17:14

## 2018-05-06 RX ADMIN — ACETAMINOPHEN 650 MG: 325 TABLET ORAL at 22:02

## 2018-05-06 RX ADMIN — QUETIAPINE FUMARATE 12.5 MG: 25 TABLET ORAL at 22:01

## 2018-05-06 RX ADMIN — CALCITONIN SALMON 1 SPRAY: 200 SPRAY, METERED NASAL at 08:32

## 2018-05-06 RX ADMIN — INSULIN LISPRO 2 UNITS: 100 INJECTION, SOLUTION INTRAVENOUS; SUBCUTANEOUS at 12:46

## 2018-05-06 RX ADMIN — APIXABAN 2.5 MG: 2.5 TABLET, FILM COATED ORAL at 08:31

## 2018-05-06 RX ADMIN — DOCUSATE SODIUM,SENNOSIDES 1 TABLET: 50; 8.6 TABLET, FILM COATED ORAL at 22:01

## 2018-05-06 RX ADMIN — METOPROLOL TARTRATE 50 MG: 50 TABLET ORAL at 22:02

## 2018-05-06 RX ADMIN — METOPROLOL TARTRATE 50 MG: 50 TABLET ORAL at 08:30

## 2018-05-06 NOTE — ASSESSMENT & PLAN NOTE
Patient and  refusing rehab/assisted living  Patient and  not able to take care of themselves at home  Their children her also not happy about this  Patient had very high risk of readmission if she does not take her medications properly as advised and follow diet  Palliative Care following  Appreciate help  Had a lengthy discussion with patient, , son and daughter-in-law at bedside on 5/2/2018  Patient and her  still very reluctant to leave the house that they are living in right now  I explained them the importance of taking medications regularly and if that does not happen she might have to be admitted to hospital again  Patient understood the risks and understood the importance of taking medications regularly  She is agreeable to home health care arranged  Patient was initially refusing to go to rehab but then she agreed after explaining current having lengthy discussion with her and her family    Patient was initially accepted at [facility] but discharge was held because of acute delirium  They will review the patient's case again on Monday

## 2018-05-06 NOTE — ASSESSMENT & PLAN NOTE
Echo - 4/30/17 - LVEF 44%, moderate diffuse hypokinesis, mild LVH, mildly dilated RV, mildly reduced RV function, moderately dilated LA & RA, moderate MR, mild-moderate aortic stenosis, FELTON 1 1  Possibly tachycardia induced because of AFib  Will try to wean off oxygen today  Continue PO lasix 40 mg daily  Metoprolol tartrate switch to succinate from tomorrow

## 2018-05-06 NOTE — ASSESSMENT & PLAN NOTE
Most likely secondary to delirium  Multifactorial likely from CHF, Afib and specially underlying dementia  Patient slept well last night  She did not have any more episodes of agitation or confusion  This morning she was alert and oriented x3  Delirium appears to be resolved  Geriatrics following  Appreciate recommendations    Continue Seroquel 12 5 at bedtime  Continue to monitor

## 2018-05-06 NOTE — ASSESSMENT & PLAN NOTE
Stable - 12/2017 shows moderate AS  Cardiology following  Appreciate recommendations      Pt not a candidate of TAVR as per cardio

## 2018-05-06 NOTE — PROGRESS NOTES
Heart Failure Service Progress Note - Micheline Valdovinos 80 y o  female MRN: [id number]    Unit/Bed#: [facility] 506-01 Encounter: [id number]      Assessment:    Principal Problem:    Acute on chronic diastolic congestive heart failure ([address] )  Active Problems: Aortic stenosis    Benign essential HTN    Controlled diabetes mellitus type II without complication (HCC)    Atrial fibrillation with RVR (HCC)    CAD (coronary artery disease)    Acute encephalopathy    Cognitive impairment    Depression with anxiety    Esophageal reflux    Hyperlipidemia    Hypertension    Osteoarthritis    Nausea    Transaminitis    Moderate protein-calorie malnutrition (HCC)    Goals of care, counseling/discussion    Impaired mobility and ADLs    Delirium    Dementia    Ambulatory dysfunction    Physical deconditioning    Type 2 myocardial infarction without ST elevation (HCC)    # Acute on chronic biventricular HF (LVEF~45% w/ mild cLVH and mild RV dilation and mild reduction in RVSF), NYHA III, ACC/AHA Stage C: Most likely 2/2 to AS  Will likely have steady worsening of LVEF in the setting of progressive AS  Now euvolemic    --Continue conservative mgmt  --Continue lasix 40 mg PO daily  --Patient would likely benefit with comfort based care/hospice evaluation if patient and family agreeable  [address] discussion per primary team, geriatrics, and SLIM  --Transition metoprolol tartrate to succinate prior to D/C    # Severe AS: Poor candidate for TAVR given dementia  # CAD  # AFib w/RVR: Rates now controlled on BB and cardizem  Continue Eliquis  # HLD  # HTN  # Dementia  # NSTEMI type II: 2/2 to HF above  # GOC: Per primary team    D/C pending placement    Subjective:   Patient seen and examined  No significant events overnight  Objective:       [employer] (day, reason): Wang catheter (day, reason):    Vitals: Blood pressure 143/66, pulse 74, temperature 97 6 °F (36 4 °C), temperature source Oral, resp   rate 18, height 5' 3" (1 6 m), weight 53 3 kg (117 lb 8 1 oz), SpO2 96 %  , Body mass index is 20 82 kg/m² , I/O last 3 completed shifts: In: 6102 [P O :1163]  Out: 1486 [WXFDX:6029]  I/O this shift: In: 420 [P O :420]  Out: 352 [Urine:352]  Wt Readings from Last 3 Encounters:   05/06/18 53 3 kg (117 lb 8 1 oz)   04/24/18 51 5 kg (113 lb 8 6 oz)   03/15/18 51 3 kg (113 lb 2 oz)       Intake/Output Summary (Last 24 hours) at 05/06/18 1114  Last data filed at 05/06/18 0800   Gross per 24 hour   Intake             1092 ml   Output              951 ml   Net              141 ml     I/O last 3 completed shifts: In: 2931 [P O :1163]  Out: 8144 [TOTSM:5007]    No significant arrhythmias seen on telemetry review      Physical Exam:  Vitals:    05/05/18 2121 05/05/18 2300 05/06/18 0536 05/06/18 0719   BP: 127/73 146/66  143/66   BP Location:       Pulse: 94 56  74   Resp:    18   Temp:  98 5 °F (36 9 °C)  97 6 °F (36 4 °C)   TempSrc:  Oral  Oral   SpO2:  94%  96%   Weight:   53 3 kg (117 lb 8 1 oz)    Height:           GEN: Earnest Mangken appears well, alert and oriented x 3, pleasant and cooperative   HEENT: pupils equal, round, and reactive to light; extraocular muscles intact  NECK: supple, no carotid bruits   HEART: regular rhythm, normal S1 and S2, no murmurs, clicks, gallops or rubs, JVP is    LUNGS: clear to auscultation bilaterally; no wheezes, rales, or rhonchi   ABDOMEN: normal bowel sounds, soft, no tenderness, no distention  EXTREMITIES: peripheral pulses normal; no clubbing, cyanosis, or edema  NEURO: no focal findings   SKIN: normal without suspicious lesions on exposed skin      Current Facility-Administered Medications:     acetaminophen (TYLENOL) tablet 650 mg, 650 mg, Oral, Q8H Harleen ARANGO PA-C, 650 mg at 05/06/18 0630    apixaban (ELIQUIS) tablet 2 5 mg, 2 5 mg, Oral, BID, Annette Valera MD, 2 5 mg at 05/06/18 0831    aspirin chewable tablet 81 mg, 81 mg, Oral, Daily, Annette Valera MD, 81 mg at 05/06/18 0830   calcitonin (salmon) (MIACALCIN) 200 units/act nasal spray 1 spray, 1 spray, Alternating Nares, Daily, Destin Catalan MD, 1 spray at 05/06/18 0832    diltiazem (CARDIZEM CD) 24 hr capsule 120 mg, 120 mg, Oral, Daily, Ady Butler MD, 120 mg at 05/06/18 0830    docusate sodium (COLACE) capsule 100 mg, 100 mg, Oral, BID, Rickey Cardoso MD, 100 mg at 05/05/18 1707    famotidine (PEPCID) tablet 20 mg, 20 mg, Oral, Daily, Dana Benson DO, 20 mg at 05/06/18 0830    furosemide (LASIX) tablet 40 mg, 40 mg, Oral, Daily, Pasha Cox MD, 40 mg at 05/06/18 0831    insulin glargine (LANTUS) subcutaneous injection 5 Units 0 05 mL, 5 Units, Subcutaneous, HS, Rickey Cardoso MD, 5 Units at 05/05/18 2113    insulin lispro (HumaLOG) 100 units/mL subcutaneous injection 1-6 Units, 1-6 Units, Subcutaneous, TID AC, 3 Units at 05/05/18 1241 **AND** Fingerstick Glucose (POCT), , , TID AC, Destin Catalan MD    lactulose 20 g/30 mL oral solution 10 g, 10 g, Oral, Daily PRN, Dana Benson DO, 10 g at 04/30/18 1149    [START ON 5/7/2018] metoprolol succinate (TOPROL-XL) 24 hr tablet 50 mg, 50 mg, Oral, Daily, Rickey Cardoso MD    metoprolol tartrate (LOPRESSOR) tablet 50 mg, 50 mg, Oral, Q12H [address], Rickey Cardoso MD, 50 mg at 05/06/18 0830    ondansetron (ZOFRAN) injection 4 mg, 4 mg, Intravenous, Q6H PRN, Dana Benson DO, 4 mg at 04/30/18 0939    polyethylene glycol (MIRALAX) packet 17 g, 17 g, Oral, Daily, Rickey Cardoso MD, 17 g at 05/05/18 0821    potassium chloride (K-DUR,KLOR-CON) CR tablet 40 mEq, 40 mEq, Oral, Once, Moriah Williamson PA-C    potassium chloride (K-DUR,KLOR-CON) CR tablet 40 mEq, 40 mEq, Oral, Once, Evert Ellsworth PA-C    QUEtiapine (SEROquel) tablet 12 5 mg, 12 5 mg, Oral, HS, Arie Mathews PA-C, 12 5 mg at 05/05/18 2115    senna-docusate sodium (SENOKOT S) 8 6-50 mg per tablet 1 tablet, 1 tablet, Oral, HS, Destin Catalan MD, 1 tablet at 05/05/18 2115      Labs & Results:      Results from last 7 days  Lab Units 04/29/18  1246   TROPONIN I ng/mL 1 45*       Results from last 7 days  Lab Units 05/05/18  0449 05/02/18  0450 04/30/18  0428   WBC Thousand/uL 4 92 5 06 6 74   HEMOGLOBIN g/dL 12 8 12 4 13 3   HEMATOCRIT % 41 4 39 0 40 6   PLATELETS Thousands/uL 160 168 190           Results from last 7 days  Lab Units 05/05/18  0449 05/03/18  0926 05/02/18  1803 05/02/18  0450 05/01/18  0508 04/30/18  0428 04/29/18  1247   SODIUM mmol/L 143 138  --  144 142 139 135*   POTASSIUM mmol/L 4 2 4 1 4 8 3 0* 3 1* 4 1 4 2   CHLORIDE mmol/L 104 102  --  104 102 102 101   CO2 mmol/L 33* 29  --  33* 30 26 22   BUN mg/dL 47* 48*  --  59* 56* 53* 48*   CREATININE mg/dL 1 27 1 08  --  1 04 1 15 1 42* 1 40*   CALCIUM mg/dL 8 9 9 6  --  9 1 9 6 9 3 9 6   TOTAL PROTEIN g/dL  --   --   --   --  6 8 7 2 7 7   BILIRUBIN TOTAL mg/dL  --   --   --   --  0 96 0 68 0 98   ALK PHOS U/L  --   --   --   --  89 99 101   ALT U/L  --   --   --   --  70 74 71   AST U/L  --   --   --   --  60* 76* 74*   GLUCOSE RANDOM mg/dL 109 193*  --  119 122 261* 305*       Results from last 7 days  Lab Units 04/29/18  1247   INR  1 60*     EKG personally reviewed by Clive Garcia MD      Counseling / Coordination of Care  Total floor / unit time spent today 40 minutes  Greater than 50% of total time was spent with the patient and / or family counseling and / or coordination of care  A description of the counseling / coordination of care: 20 min  Thank you for the opportunity to participate in the care of this patient      Clive Garcia MD, PhD   Erica Schneider

## 2018-05-06 NOTE — ASSESSMENT & PLAN NOTE
Echo - 4/30/17 - LVEF 44%, moderate diffuse hypokinesis, mild LVH, mildly dilated RV, mildly reduced RV function, moderately dilated LA & RA, moderate MR, mild-moderate aortic stenosis, FELTON 1 1  Possibly tachycardia induced because of AFib  Will try to wean off oxygen today  Continue PO lasix 40 mg daily  Metoprolol tartrate switched to succinate  Patient was again feeling short of breath this morning with crackles in her lower lobes  She required 1 dose of IV Lasix 20 mg  His symptoms improved after that  Will hold off on discharge today, continue to monitor today and depending on how she is doing tomorrow plan for possible discharge tomorrow

## 2018-05-06 NOTE — DISCHARGE SUMMARY
Discharge- Parth Novak 4/4/1927, 80 y o  female MRN: [id number]    Unit/Bed#: [facility] 506-01 Encounter: [id number]    Primary Care Provider: Brisa Kerns MD   Date and time admitted to hospital: 4/29/2018 12:02 PM      Transition of Care Discharge Summary - [facility]    Patient Information: Parth Novak 80 y o  female MRN: [id number]  Unit/Bed#: [facility] 506-01 Encounter: [id number]    Discharging Physician / Practitioner: Myrtle Mitchell MD  PCP: Brisa Kerns MD  Admission Date: 4/29/2018  Discharge Date: 5/8/18    Disposition:      [facility] [address] SNF:   · Not Applicable to this Patient - Not Applicable to this Patient    Reason for Admission:  Fatigue    Discharge Diagnoses:     Principal Problem:    Acute on chronic diastolic congestive heart failure ([facility] )  Active Problems: Aortic stenosis    Benign essential HTN    Controlled diabetes mellitus type II without complication (HCC)    Atrial fibrillation with RVR (HCC)    CAD (coronary artery disease)    Cognitive impairment    Depression with anxiety    Esophageal reflux    Hyperlipidemia    Hypertension    Osteoarthritis    Moderate protein-calorie malnutrition (HCC)    Goals of care, counseling/discussion    Impaired mobility and ADLs    Dementia    Ambulatory dysfunction    Physical deconditioning    Type 2 myocardial infarction without ST elevation ([facility] )  Resolved Problems:    Encephalopathy    Nausea    Transaminitis    Delirium      Consultations During Hospital Stay:  · Cardiology/Heart failure, Geriatrics, Palliative care, Psychiatry    Procedures Performed:     · none    Medication Adjustments and Discharge Medications:  · Summary of Medication Adjustments made as a result of this hospitalization: started lasix, statin and Seroquel  metoprolol tartrate changed to succinate    · Medication Dosing Tapers - Please refer to Discharge Medication List for details on any medication dosing tapers (if applicable to patient)  · Medications being temporarily held (include recommended restart time):   · Discharge Medication List: See after visit summary for reconciled discharge medications  Wound Care Recommendations:  When applicable, please see wound care section of After Visit Summary  Diet Recommendations at Discharge:  Diet -        Diet Orders            Start     Ordered    05/07/18 1154  Dietary nutrition supplements  Once     Comments:  Vary chocolate and strawberry flavors of glucerna   Question Answer Comment   Select Supplement: Glucerna-Chocolate    Frequency Breakfast, Lunch, Dinner        05/07/18 1154    04/30/18 1507  Diet Dewey/CHO Controlled; Consistent Carbohydrate Diet Level 1 (4 carb servings/60 grams CHO/meal); Sodium 4 GM (MEENAKSHI)  Diet effective now     Question Answer Comment   Diet Type Dewey/CHO Controlled    Dewey/CHO Controlled Consistent Carbohydrate Diet Level 1 (4 carb servings/60 grams CHO/meal)    Other Restriction(s): Sodium 4 GM (MEENAKSHI)    RD to adjust diet per protocol? Yes        04/30/18 1508    04/30/18 1258  Dietary nutrition supplements  Once     Comments:  Allow orange or fruit punch flavor of gelatein   Question Answer Comment   Select Supplement: Carlene Lefort - PRO/90 KCALS    Frequency Lunch, Dinner        04/30/18 1257        Fluid Restriction - 2000 ml/day    Instructions for any Catheters / Lines Present at Discharge (including removal date, if applicable):     Significant Findings / Test Results:     XR chest 2 views   ED Interpretation by Summer Evans DO (04/29 0661)   Abnormal   Pulmonary vascular congestion with questionable effusion versus consolidation, though more likely effusion as interpreted by me independently         Final Result by Mary Hoffmann MD (04/30 1313)      Small bilateral pleural effusions, larger on the left  Atelectasis or consolidation in the base of the left lower lobe        Workstation performed: [id number]         CT chest without contrast   Final Result by Liliam Rico MD (04/29 1442)      The patient has debris in the left lower lobe bronchial branches with peripheral consolidation suggesting aspiration pneumonia at the left base  There is also evidence of congestive heart failure with pulmonary edema and pleural effusions and cardiomegaly  There is 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 and there are aortic valvular calcifications noted  Patient may benefit from repeat echocardiography  Workstation performed: [id number]             ECHO:  LEFT VENTRICLE:  The ventricle was mildly dilated  Systolic function was moderately reduced  Ejection fraction was estimated to be 44 %  There was moderate diffuse hypokinesis with regional variations  Wall thickness was mildly increased  The changes were consistent with eccentric hypertrophy      RIGHT VENTRICLE:  The ventricle was mildly dilated  Systolic function was mildly reduced      LEFT ATRIUM:  The atrium was moderately dilated      RIGHT ATRIUM:  The atrium was moderately dilated      MITRAL VALVE:  There was mild annular calcification  There was moderate regurgitation      AORTIC VALVE:  There was mild to moderate stenosis  There was mild regurgitation      TRICUSPID VALVE:  There was mild regurgitation  Estimated peak PA pressure was 56 mmHg  The findings suggest moderate pulmonary hypertension      PULMONIC VALVE:  There was mild regurgitation      PERICARDIUM:  There was a small left pleural effusion  Incidental Findings:   · none     Test Results Pending at Discharge (will require follow up):   · none     Outpatient Tests Requested:  · BMP in 1 week    Complications:  Delirium during hospitalization that resolved  Hospital Course: Lesley Cruz is a 80 y o  female patient who originally presented to the hospital on 4/29/2018 due to worsening fatigue and loss of appetite    She was not able to eat or drink because of severe generalized weakness  She also complained of mild shortness of breath  Family called her primary care physician prescribed oxygen which did not really help  Patient appeared to be volume overloaded on exam and was tachycardic in atrial fibrillation  She was evaluated by Cardiology  Her symptoms were thought to be because of volume overload or due to congestive heart failure  She was started on IV diuresis  Prior to this admission she was recently admitted to [place] [address] for similar complaints and was discharged on 4/22/2018  Echocardiogram was done which showed decrease in the ejection fraction compared to her previous echo  This was thought to be possibly related to atrial fibrillation and tachycardia mediated cardiomyopathy  She was also noted to have moderate aortic stenosis which was also contributing to her symptoms  Patient's symptoms improved significantly after starting IV diuresis and then later she was switched to p o  Lasix  She will be discharged on p o  Lasix  She should get repeat BMP in 1 week  Also during hospitalization 3 days ago patient had an episode of acute onset of confusion and agitation overnight  This resolved in the morning  This was most likely thought to be due to delirium  She was evaluated by Geriatrics  She was started on Seroquel  After starting Seroquel her symptoms improved significantly  She was getting good sleep at night and did not have any more episodes of agitation or delirium for last 3 days  Patient was evaluated by PT and OT and they initially recommended rehab but patient and  were refusing initially but then later they agreed to it  Also there was concern by patient's family that patient and her  were not able to take care of themselves at home  They live by themselves    Patient's children have been trying to convince him to move to an assisted living but patient and  have been reluctant and refusing to leave the current house  Palliative care team was also consulted  But for now patient has been stable for last 3 days so she will be discharged to rehab today  If up on discharge from rehabilitation patient continues to refuse assisted living, may need a formal neuropsychiatry evaluation for competency  She should follow up with her primary care physician within 1 week of discharge and with her cardiologist Dr Everett Hwang and to 3 weeks  Detail assessment and plan as above for other problems  Condition at Discharge: stable     Discharge Day Visit / Exam:     Subjective:  Patient denies any acute events in the last 24 hours  Denies any chest pain or shortness of breath  Family at bedside  Vitals: Blood Pressure: 134/63 (05/08/18 0700)  Pulse: 104 (05/08/18 0700)  Temperature: 97 7 °F (36 5 °C) (05/08/18 0700)  Temp Source: Oral (05/08/18 0700)  Respirations: 18 (05/08/18 0700)  Height: 5' 3" (160 cm) (04/29/18 1734)  Weight - Scale: 52 4 kg (115 lb 8 3 oz) (05/08/18 0600)  SpO2: 96 % (05/08/18 0900)  Exam:   Physical Exam   Constitutional: No distress  HENT:   Head: Atraumatic  Eyes: Pupils are equal, round, and reactive to light  Neck: Normal range of motion  Neck supple  Cardiovascular: Normal rate  Irregular rhythm   Pulmonary/Chest: Effort normal    Abdominal: Soft  Bowel sounds are normal    Musculoskeletal: She exhibits no edema  Neurological: She is alert  Alert oriented to self   Skin: Skin is warm  She is not diaphoretic  Discussion with Family:  Discussed with daughter and patient's  at length at bedside      Goals of Care Discussions:  · Code Status at Discharge: Level 3 - DNAR and DNI  · Were there any Goals of Care Discussions during Hospitalization?: Yes  · Results of any General Goals of Care Discussions:  Patient and family were not willing to consider palliative care or hospice during this admission   · POLST Completed: No   · If POLST Completed, Summary of POLST Agreement Provided Here: NA   · OK to Rehospitalize if Needed? Yes    Discharge instructions/Information to patient and family:   See after visit summary section titled Discharge Instructions for information provided to patient and family  Planned Readmission: No      Discharge Statement:  I spent 40 minutes discharging the patient  This time was spent on the day of discharge  I had direct contact with the patient on the day of discharge  Greater than 50% of the total time was spent examining patient, answering all patient questions, arranging and discussing plan of care with patient as well as directly providing post-discharge instructions  Additional time then spent on discharge activities      ** Please Note: This note has been constructed using a voice recognition system **

## 2018-05-06 NOTE — PROGRESS NOTES
Progress Note - Charity Smith 4/4/1927, 80 y o  female MRN: [id number]    Unit/Bed#: [facility] 506-01 Encounter: [id number]    Primary Care Provider: Amelie De La Garza MD   Date and time admitted to hospital: 4/29/2018 12:02 PM        * Acute on chronic diastolic congestive heart failure [facility])   Assessment & Plan    Echo - 4/30/17 - LVEF 44%, moderate diffuse hypokinesis, mild LVH, mildly dilated RV, mildly reduced RV function, moderately dilated LA & RA, moderate MR, mild-moderate aortic stenosis, FELTON 1 1  Possibly tachycardia induced because of AFib  Will try to wean off oxygen today  Continue PO lasix 40 mg daily  Metoprolol tartrate switch to succinate from tomorrow  Atrial fibrillation with RVR ([facility], located [facility])   Assessment & Plan    On BB and Cardizem  Rate is better controlled after starting Cardizem  AC w/ Elliquis  Continue to monitor  Cardiology following  Appreciate recommendations  Acute encephalopathy   Assessment & Plan    Most likely secondary to delirium  Patient slept well last night  She did not have any more episodes of agitation or confusion  This morning she was alert and oriented x3  Delirium appears to be resolved  Geriatrics following  Appreciate recommendations  Continue Seroquel 12 5 at bedtime  Continue to monitor        Aortic stenosis   Assessment & Plan    Stable - 12/2017 shows moderate AS  Cardiology following  Appreciate recommendations  Pt not a candidate of TAVR as per cardio        Goals of care, counseling/discussion   Assessment & Plan    Patient and  refusing rehab/assisted living  Patient and  not able to take care of themselves at home  Their children her also not happy about this  Patient had very high risk of readmission if she does not take her medications properly as advised and follow diet  Palliative Care following  Appreciate help  Had a lengthy discussion with patient, , son and daughter-in-law at bedside on 5/2/2018  Patient and her  still very reluctant to leave the house that they are living in right now  I explained them the importance of taking medications regularly and if that does not happen she might have to be admitted to hospital again  Patient understood the risks and understood the importance of taking medications regularly  She is agreeable to home health care arranged  Patient was initially refusing to go to rehab but then she agreed after explaining current having lengthy discussion with her and her family    Patient was initially accepted at [facility] but discharge was held because of acute delirium  They will review the patient's case again on Monday  Controlled diabetes mellitus type II without complication (HCC)   Assessment & Plan    A1c 7 4%  Cont Lantus to 5 units  Continue low-dose sliding scale  Blood glucose monitoring  Carb controlled diet            VTE Pharmacologic Prophylaxis:   Pharmacologic: Apixaban (Eliquis)  Mechanical VTE Prophylaxis in Place: Yes  Patient Centered Rounds: I have performed bedside rounds with nursing staff today  Discussions with Specialists or Other Care Team Provider: heart failure  Education and Discussions with Family / Patient: daughter-in-law at bedside  Time Spent for Care: 30 minutes   More than 50% of total time spent on counseling and coordination of care as described above  Current Length of Stay: 7 day(s)  Current Patient Status: Inpatient   Certification Statement: The patient will continue to require additional inpatient hospital stay due to above  Discharge Plan: possibly Monday   Code Status: Level 3 - DNAR and DNI    Subjective:   Pt seen and examined by me this morning  Pt denied any complaints this morning  No shortness of breath or chest pain  She slept well last night  No episodes of agitation or confusion      Objective:     Vitals:   Temp (24hrs), Av °F (36 7 °C), Min:97 6 °F (36 4 °C), Max:98 5 °F (36 9 °C)    HR:  [56-94] 74  Resp:  [18] 18  BP: (127-146)/(66-74) 143/66  SpO2:  [94 %-99 %] 96 %  Body mass index is 20 82 kg/m²  Input and Output Summary (last 24 hours): Intake/Output Summary (Last 24 hours) at 05/06/18 1312  Last data filed at 05/06/18 1246   Gross per 24 hour   Intake              890 ml   Output              951 ml   Net              -61 ml       Physical Exam:     Physical Exam    Constitutional: Pt appears poorly developed and nourished  Not in any acute distress  Cardiovascular: Normal rate, irregularly irregular rhythm, normal heart sounds and intact distal pulses   Exam reveals no gallop and no friction rub   No murmur heard  Pulmonary/Chest: Effort normal and breath sounds normal  No respiratory distress  Pt has no wheezes or rales   patient has no crackles  Abdominal: Soft  Non-distended, Non-tender  Bowel sounds are normal    Musculoskeletal: Normal range of motion  Neurological: alert and oriented x 3  Normal strength and sensations  Psychiatric: normal mood and affect  Additional Data:     Labs:      Results from last 7 days  Lab Units 05/05/18  0449   WBC Thousand/uL 4 92   HEMOGLOBIN g/dL 12 8   HEMATOCRIT % 41 4   PLATELETS Thousands/uL 160       Results from last 7 days  Lab Units 05/05/18  0449  05/01/18  0508   SODIUM mmol/L 143  < > 142   POTASSIUM mmol/L 4 2  < > 3 1*   CHLORIDE mmol/L 104  < > 102   CO2 mmol/L 33*  < > 30   BUN mg/dL 47*  < > 56*   CREATININE mg/dL 1 27  < > 1 15   CALCIUM mg/dL 8 9  < > 9 6   TOTAL PROTEIN g/dL  --   --  6 8   BILIRUBIN TOTAL mg/dL  --   --  0 96   ALK PHOS U/L  --   --  89   ALT U/L  --   --  70   AST U/L  --   --  60*   GLUCOSE RANDOM mg/dL 109  < > 122   < > = values in this interval not displayed          Results from last 7 days  Lab Units 05/06/18  1130 05/06/18  8173 05/05/18  2044 05/05/18  1527 05/05/18  1117 05/05/18  6168 05/04/18  2128 05/04/18  1630 05/04/18  1100 05/04/18  0646 05/03/18 2050 05/03/18  1642   POC GLUCOSE mg/dl 225* 75 250* 112 248* 115 167* 208* 195* 85 153* 257*             * I Have Reviewed All Lab Data Listed Above  * Additional Pertinent Lab Tests Reviewed: Magaly 66 Admission Reviewed    Imaging:    Imaging Reports Reviewed Today Include:   Imaging Personally Reviewed by Myself Includes:      Recent Cultures (last 7 days):           Last 24 Hours Medication List:     Current Facility-Administered Medications:  acetaminophen 650 mg Oral Q8H [address] Timmy Almendarez PA-C   apixaban 2 5 mg Oral BID Estrella Ling MD   aspirin 81 mg Oral Daily Estrella Ling MD   calcitonin (salmon) 1 spray Alternating Nares Daily Estrella Ling MD   diltiazem 120 mg Oral Daily Ernie Jose MD   docusate sodium 100 mg Oral BID Gustavo Kamara MD   famotidine 20 mg Oral Daily Colan Lacey, DO   furosemide 40 mg Oral Daily Dylan Alvarez MD   insulin glargine 5 Units Subcutaneous HS Gustavo Kamara MD   insulin lispro 1-6 Units Subcutaneous TID AC Estrella Ling MD   lactulose 10 g Oral Daily PRN Colan Lacey, DO   [START ON 5/7/2018] metoprolol succinate 50 mg Oral Daily Gustavo Kamara MD   metoprolol tartrate 50 mg Oral Q12H [address] Gustavo Kamara MD   ondansetron 4 mg Intravenous Q6H PRN Colan Lacey, DO   polyethylene glycol 17 g Oral Daily Gustvao Kamara MD   potassium chloride 40 mEq Oral Once Chris MareLION   potassium chloride 40 mEq Oral Once Chris MareLION   QUEtiapine 12 5 mg Oral HS Geeta FonLION bales   senna-docusate sodium 1 tablet Oral HS Estrella Ling MD        Today, Patient Was Seen By: Gustavo Kamara MD    ** Please Note: Dictation voice to text software may have been used in the creation of this document   **

## 2018-05-06 NOTE — ASSESSMENT & PLAN NOTE
Most likely secondary to delirium  Patient slept well last night  She did not have any more episodes of agitation or confusion  This morning she was alert and oriented x3  Delirium appears to be resolved  Geriatrics following  Appreciate recommendations    Continue Seroquel 12 5 at bedtime  Continue to monitor

## 2018-05-07 LAB
ATRIAL RATE: 81 BPM
GLUCOSE SERPL-MCNC: 132 MG/DL (ref 65–140)
GLUCOSE SERPL-MCNC: 163 MG/DL (ref 65–140)
GLUCOSE SERPL-MCNC: 182 MG/DL (ref 65–140)
GLUCOSE SERPL-MCNC: 281 MG/DL (ref 65–140)
P AXIS: 63 DEGREES
PR INTERVAL: 196 MS
QRS AXIS: -73 DEGREES
QRSD INTERVAL: 116 MS
QT INTERVAL: 410 MS
QTC INTERVAL: 476 MS
T WAVE AXIS: 119 DEGREES
VENTRICULAR RATE: 81 BPM

## 2018-05-07 PROCEDURE — 97530 THERAPEUTIC ACTIVITIES: CPT

## 2018-05-07 PROCEDURE — 99232 SBSQ HOSP IP/OBS MODERATE 35: CPT | Performed by: INTERNAL MEDICINE

## 2018-05-07 PROCEDURE — 93010 ELECTROCARDIOGRAM REPORT: CPT | Performed by: INTERNAL MEDICINE

## 2018-05-07 PROCEDURE — 97535 SELF CARE MNGMENT TRAINING: CPT

## 2018-05-07 PROCEDURE — 93005 ELECTROCARDIOGRAM TRACING: CPT

## 2018-05-07 PROCEDURE — 82948 REAGENT STRIP/BLOOD GLUCOSE: CPT

## 2018-05-07 PROCEDURE — 97110 THERAPEUTIC EXERCISES: CPT

## 2018-05-07 PROCEDURE — 97116 GAIT TRAINING THERAPY: CPT

## 2018-05-07 RX ORDER — FUROSEMIDE 10 MG/ML
20 INJECTION INTRAMUSCULAR; INTRAVENOUS ONCE
Status: COMPLETED | OUTPATIENT
Start: 2018-05-07 | End: 2018-05-07

## 2018-05-07 RX ORDER — MAGNESIUM HYDROXIDE/ALUMINUM HYDROXICE/SIMETHICONE 120; 1200; 1200 MG/30ML; MG/30ML; MG/30ML
15 SUSPENSION ORAL ONCE
Status: COMPLETED | OUTPATIENT
Start: 2018-05-07 | End: 2018-05-07

## 2018-05-07 RX ADMIN — DOCUSATE SODIUM,SENNOSIDES 1 TABLET: 50; 8.6 TABLET, FILM COATED ORAL at 21:13

## 2018-05-07 RX ADMIN — ACETAMINOPHEN 650 MG: 325 TABLET ORAL at 13:15

## 2018-05-07 RX ADMIN — INSULIN LISPRO 1 UNITS: 100 INJECTION, SOLUTION INTRAVENOUS; SUBCUTANEOUS at 17:23

## 2018-05-07 RX ADMIN — QUETIAPINE FUMARATE 12.5 MG: 25 TABLET ORAL at 21:13

## 2018-05-07 RX ADMIN — ATORVASTATIN CALCIUM 40 MG: 40 TABLET, FILM COATED ORAL at 17:23

## 2018-05-07 RX ADMIN — FUROSEMIDE 20 MG: 10 INJECTION, SOLUTION INTRAVENOUS at 11:28

## 2018-05-07 RX ADMIN — LIDOCAINE HYDROCHLORIDE 15 ML: 20 SOLUTION ORAL; TOPICAL at 02:05

## 2018-05-07 RX ADMIN — ASPIRIN 81 MG 81 MG: 81 TABLET ORAL at 10:33

## 2018-05-07 RX ADMIN — ACETAMINOPHEN 650 MG: 325 TABLET ORAL at 21:12

## 2018-05-07 RX ADMIN — INSULIN LISPRO 4 UNITS: 100 INJECTION, SOLUTION INTRAVENOUS; SUBCUTANEOUS at 13:11

## 2018-05-07 RX ADMIN — DILTIAZEM HYDROCHLORIDE 120 MG: 120 CAPSULE, COATED, EXTENDED RELEASE ORAL at 10:33

## 2018-05-07 RX ADMIN — METOPROLOL SUCCINATE 50 MG: 50 TABLET, FILM COATED, EXTENDED RELEASE ORAL at 10:36

## 2018-05-07 RX ADMIN — INSULIN GLARGINE 5 UNITS: 100 INJECTION, SOLUTION SUBCUTANEOUS at 21:13

## 2018-05-07 RX ADMIN — APIXABAN 2.5 MG: 2.5 TABLET, FILM COATED ORAL at 10:33

## 2018-05-07 RX ADMIN — CALCITONIN SALMON 1 SPRAY: 200 SPRAY, METERED NASAL at 11:28

## 2018-05-07 RX ADMIN — FUROSEMIDE 40 MG: 40 TABLET ORAL at 10:33

## 2018-05-07 RX ADMIN — ALUMINUM HYDROXIDE, MAGNESIUM HYDROXIDE, AND SIMETHICONE 15 ML: 200; 200; 20 SUSPENSION ORAL at 02:05

## 2018-05-07 RX ADMIN — DOCUSATE SODIUM 100 MG: 100 CAPSULE, LIQUID FILLED ORAL at 17:23

## 2018-05-07 RX ADMIN — FAMOTIDINE 20 MG: 20 TABLET ORAL at 10:33

## 2018-05-07 RX ADMIN — APIXABAN 2.5 MG: 2.5 TABLET, FILM COATED ORAL at 17:23

## 2018-05-07 NOTE — PROGRESS NOTES
Pt  c/o increased sob, slightly tachypnic, rales noted in bases  Pulse ox on r/a 95%  Placed on 2 l for comfort  Evaluated by slim   New orders noted

## 2018-05-07 NOTE — RESTORATIVE TECHNICIAN NOTE
Restorative Specialist Mobility Note       Activity: Chair, Dangle, Stand at bedside, Turn, Ambulate in room, Ambulate in dunaway     Assistive Device: Front wheel walker     Ambulation Response:  Tolerated fairly well  Repositioned: Sitting, Up in chair (Chair alarm on)

## 2018-05-07 NOTE — PLAN OF CARE
Problem: OCCUPATIONAL THERAPY ADULT  Goal: Performs self-care activities at highest level of function for planned discharge setting  See evaluation for individualized goals  Treatment Interventions: ADL retraining, Functional transfer training, Endurance training, Patient/family training, Equipment evaluation/education, Compensatory technique education, Continued evaluation, Activityengagement (cog assess and tx  as needed)          See flowsheet documentation for full assessment, interventions and recommendations  Outcome: Progressing  Limitation: Decreased ADL status, Decreased cognition, Decreased endurance, Decreased self-care trans, Decreased high-level ADLs  Prognosis: Good  Assessment: Pt  seen for OT treatment session to address UB/LB bathing and dressing, grooming, toileting, functional mobility, transfers, functional activity tolerance  Pt  pleasant and agreeable to participate and overall, pt  tolerated session fair  Of note, pt  experienced RAMIREZ with minimal exertion  After ~ 2 min of activity, pt  Requires 3-5 min  Of rest  SpO2 remained between 94*-97* t/o session  Pt  functioning at a min A level for functional mobility and transfers with the use of RW d/t: decreased functional activity tolerance, generalized weakness, deconditioning, decreased balance, + SOB/RAMIREZ  Min A was also required for UB dressing/bathing to wash back and A with fasteners  Min A was also needed to steady pt  When washing LB in stand and A was given to reach B/L feet  During LB dressing, min A was needed as pt  Was able to don pants with A for steadying in stand  She was also able to doff socks however she was unable to raleigh d/t decreased dynamic seated balance  During grooming, pt  Attempted to stand to brush teeth however fatigued quickly and A was given for steadying  Once seated, pt  Was able to continue to with all grooming tasks without A at a s/u level  Mod A was given for thoughness for rear hygiene during toileting  Pt  Activity tolerance is poor  Pt  Tolerated a few minutes of activity before requiring extended rest  Edu  Pt  On pursed lip breathing however v c  Were needed to initiate  Pt  will continue to benefit from OT services to address noted deficits and maximize functional gains  Continue to rec  STR s/p d c      OT Discharge Recommendation: Short Term Rehab  OT - OK to Discharge:  Yes

## 2018-05-07 NOTE — RESTORATIVE TECHNICIAN NOTE
Restorative Specialist Mobility Note       Ambulation attempt canceled due to pt being SOB  Spoke with RN and ambulation will be held for now  Will continue to follow up daily       Diana Manzo Restorative Technician

## 2018-05-07 NOTE — SOCIAL WORK
Call received from [facility]- this morning requesting to review current therapy notes  Message was sent to PT/OT  Discussed patient with Dr Shine Oconnor who reports patient is not cleared for dc  There may be need for family meeting to discuss palliative care

## 2018-05-07 NOTE — PLAN OF CARE
Problem: PHYSICAL THERAPY ADULT  Goal: Performs mobility at highest level of function for planned discharge setting  See evaluation for individualized goals  Treatment/Interventions: Functional transfer training, Endurance training, Patient/family training, Equipment eval/education, Gait training, Spoke to nursing, Spoke to case management, Family  Equipment Recommended: Hassan Shone (SUSI)       See flowsheet documentation for full assessment, interventions and recommendations  Outcome: Progressing  Prognosis: Good  Problem List: Decreased strength, Decreased endurance, Decreased mobility, Decreased safety awareness, Impaired balance  Assessment: Pt tolerated treatment well and is progressing with functional mobility and balance  Continues to have limited endurance and requires verbal cues for appropriate pacing, standing rest breaks, and pursed lip breathing  Pt is easily distracted in dunaway  SpO2 = 94% and HR = 143 during ambulation  Able to tolerate standing/sitting LE exercises and balance activities well with demonstration and seated rest breaks between all sets  x1 significant LOB during standing exercises  Will continue to benefit from ongoing skilled PT to maximize her functional mobility and increase her level of independence  Recommending rehab at time of discharge when medically appropriate  Barriers to Discharge: Inaccessible home environment, Decreased caregiver support     Recommendation: Post acute IP rehab          See flowsheet documentation for full assessment

## 2018-05-07 NOTE — OCCUPATIONAL THERAPY NOTE
OccupationalTherapy Progress Note     Patient Name: Horacio Schmid  AXLQL'I Date: 5/7/2018  Problem List  Patient Active Problem List   Diagnosis    Acute on chronic diastolic congestive heart failure ([place] [address] )    Aortic stenosis    Benign essential HTN    [facility]rolled [facility] II [facility] complication [facility]    Atrial fibrillation with RVR ([place] [address] )    Acute ischemic right MCA stroke ([place] [address] )    CAD (coronary artery disease)    Acute encephalopathy    Cognitive impairment    Depression with anxiety    Esophageal reflux    Hyperlipidemia    Hypertension    Osteoarthritis    Squamous cell carcinoma of skin    Tinnitus    Severe protein-calorie malnutrition Eric Ax: less than 60% of standard weight) ([facility])    Nausea    Transaminitis    Moderate protein-calorie malnutrition ([facility])    Goals of care, counseling/discussion    Impaired mobility and ADLs    Delirium    Dementia    Ambulatory dysfunction    Physical deconditioning    Type 2 myocardial infarction without ST elevation ([place] [address] )         05/07/18 1238   Restrictions/Precautions   Weight Bearing Precautions Per Order No   Other Precautions Chair Alarm; Bed Alarm;Cognitive; Impulsive; Fall Risk  (chair alarm active at end of session)   Pain Assessment   Pain Assessment No/denies pain   Pain Score No Pain   ADL   Where Assessed (at sink)   Grooming Assistance 4  Minimal Assistance   Grooming Deficit Setup;Verbal cueing;Supervision/safety; Increased time to complete;Standing with assistive device; Wash/dry hands; Wash/dry face; Teeth care;Brushing hair  (min A standing (S) seated)   UB Bathing Assistance 4  Minimal Assistance   UB Bathing Deficit Increased time to complete;Verbal cueing;Setup  (back)   LB Bathing Assistance 4  Minimal Assistance   LB Bathing Deficit Setup;Steadying;Verbal cueing;Supervision/safety; Increased time to complete  (B/L feet)   UB Dressing Assistance 4  Minimal Assistance   UB Dressing Deficit Fasteners   LB Dressing Assistance 4  Minimal Assistance   LB Dressing Deficit Setup;Steadying;Supervision/safety; Increased time to complete; Don/doff R sock; Don/doff L sock   Toileting Assistance  3  Moderate Assistance   Toileting Deficit Setup;Steadying;Supervison/safety; Increased time to complete;Perineal hygiene   Transfers   Sit to Stand 4  Minimal assistance   Additional items Assist x 1; Increased time required;Verbal cues   Stand to Sit 4  Minimal assistance   Additional items Assist x 1; Increased time required;Verbal cues   Toilet transfer 4  Minimal assistance   Additional items Assist x 1; Increased time required;Verbal cues;Standard toilet  (+GB)   Functional Mobility   Functional Mobility 4  Minimal assistance   Additional Comments Ax1 t/o room and bathroom   Cognition   Arousal/Participation Alert; Cooperative   Attention Attends with cues to redirect   Memory Decreased short term memory   Following Commands Follows one step commands with increased time or repetition   Activity Tolerance   Activity Tolerance Patient limited by fatigue   Medical Staff Made Aware yes, RN Levi Bush cleared for session   Assessment   Assessment Pt  seen for OT treatment session to address UB/LB bathing and dressing, grooming, toileting, functional mobility, transfers, functional activity tolerance  Pt  pleasant and agreeable to participate and overall, pt  tolerated session fair  Of note, pt  experienced RAMIREZ with minimal exertion  After ~ 2 min of activity, pt  Requires 3-5 min  Of rest  SpO2 remained between 94*-97* t/o session  Pt  functioning at a min A level for functional mobility and transfers with the use of RW d/t: decreased functional activity tolerance, generalized weakness, deconditioning, decreased balance, + SOB/RAMIREZ  Min A was also required for UB dressing/bathing to wash back and A with fasteners  Min A was also needed to steady pt  When washing LB in stand and A was given to reach B/L feet  During LB dressing, min A was needed as pt   Was able to don pants with A for steadying in stand  She was also able to doff socks however she was unable to raleigh d/t decreased dynamic seated balance  During grooming, pt  Attempted to stand to brush teeth however fatigued quickly and A was given for steadying  Once seated, pt  Was able to continue to with all grooming tasks without A at a s/u level  Mod A was given for thoughness for rear hygiene during toileting  Pt  Activity tolerance is poor  Pt  Tolerated a few minutes of activity before requiring extended rest  Edu  Pt  On pursed lip breathing however v c  Were needed to initiate  Pt  will continue to benefit from OT services to address noted deficits and maximize functional gains  Continue to rec  STR s/p d c    Plan   Treatment Interventions ADL retraining;Functional transfer training; Endurance training;Patient/family training;Equipment evaluation/education; Compensatory technique education;Continued evaluation; Activityengagement  (cog assess and tx  as needed)   Goal Expiration Date 05/14/18   OT Frequency 3-5x/wk   Recommendation   OT Discharge Recommendation Short Term Rehab   OT - OK to Discharge Yes   Barthel Index   Feeding 10   Bathing 0   Grooming Score 5  (seated)   Dressing Score 5   Bladder Score 10   Bowels Score 10   Toilet Use Score 5   Transfers (Bed/Chair) Score 10   Mobility (Level Surface) Score 0   Stairs Score 0   Barthel Index Score 55   Modified Crowley Scale   Modified Tad Scale 4    GINA Delgadillo, OTR/L

## 2018-05-07 NOTE — PROGRESS NOTES
Progress Note - Radha Haddad 4/4/1927, 80 y o  female MRN: [id number]    Unit/Bed#: [facility] 506-01 Encounter: [id number]    Primary Care Provider: Pastor Neftali MD   Date and time admitted to hospital: 4/29/2018 12:02 PM        * Acute on chronic diastolic congestive heart failure [facility])   Assessment & Plan    Echo - 4/30/17 - LVEF 44%, moderate diffuse hypokinesis, mild LVH, mildly dilated RV, mildly reduced RV function, moderately dilated LA & RA, moderate MR, mild-moderate aortic stenosis, FELTON 1 1  Possibly tachycardia induced because of AFib  Will try to wean off oxygen today  Continue PO lasix 40 mg daily  Metoprolol tartrate switched to succinate  Patient was again feeling short of breath this morning with crackles in her lower lobes  She required 1 dose of IV Lasix 20 mg  His symptoms improved after that  Will hold off on discharge today, continue to monitor today and depending on how she is doing tomorrow plan for possible discharge tomorrow  Atrial fibrillation with RVR (HCC)   Assessment & Plan    On BB and Cardizem  Rate is better controlled after starting Cardizem  AC w/ Elliquis  Continue to monitor  Cardiology following  Appreciate recommendations  Encephalopathy   Assessment & Plan    Most likely secondary to delirium  Multifactorial likely from CHF, Afib and specially underlying dementia  Patient slept well last night  She did not have any more episodes of agitation or confusion  This morning she was alert and oriented x3  Delirium appears to be resolved  Geriatrics following  Appreciate recommendations  Continue Seroquel 12 5 at bedtime  Continue to monitor        Aortic stenosis   Assessment & Plan    Stable - 12/2017 shows moderate AS  Cardiology following  Appreciate recommendations      Pt not a candidate of TAVR as per cardio        Type 2 myocardial infarction without ST elevation ([address] )   Assessment & Plan    Most likely due to CHF and afib with RVR  Pt denies any chest pain  Cont to monitor  Goals of care, counseling/discussion   Assessment & Plan    Patient and  refusing rehab/assisted living  Patient and  not able to take care of themselves at home  Their children her also not happy about this  Patient had very high risk of readmission if she does not take her medications properly as advised and follow diet  Palliative Care following  Appreciate help  Had a lengthy discussion with patient, , son and daughter-in-law at bedside on 5/2/2018  Patient and her  still very reluctant to leave the house that they are living in right now  I explained them the importance of taking medications regularly and if that does not happen she might have to be admitted to hospital again  Patient understood the risks and understood the importance of taking medications regularly  She is agreeable to home health care arranged  Patient was initially refusing to go to rehab but then she agreed after explaining current having lengthy discussion with her and her family    Patient was initially accepted at [facility] but discharge was held because of acute delirium  Plan was to discharge her today but again this morning patient had worsening shortness of breath requiring IV Lasix  So will hold off on discharge today  Moderate protein-calorie malnutrition (HCC)   Assessment & Plan    Supplements    Malnutrition Findings:   Malnutrition type: Chronic illness  Degree of Malnutrition: Malnutrition of moderate degree (malnutrition related to dementia vs  abdominal pain as evidenced by <75% energy intake for >1 month, mild depletion of orbital body fat, and mild depletion of muscle mass seen at clavicle to be treated with liberalized diet and nutrition supplements )    BMI Findings: Body mass index is 20 62 kg/m²             CAD (coronary artery disease)   Assessment & Plan    Stable  Continue ASA, statin and BB Controlled diabetes mellitus type II without complication (HCC)   Assessment & Plan    A1c 7 4%  Cont Lantus to 5 units  Continue low-dose sliding scale  Blood glucose monitoring  Carb controlled diet            VTE Pharmacologic Prophylaxis:   Pharmacologic: Apixaban (Eliquis)  Mechanical VTE Prophylaxis in Place: Yes  Patient Centered Rounds: I have performed bedside rounds with nursing staff today  Discussions with Specialists or Other Care Team Provider:   Education and Discussions with Family / Patient: son,  and daughter-in-law at bedside  Time Spent for Care: 30 minutes   More than 50% of total time spent on counseling and coordination of care as described above  Current Length of Stay: 8 day(s)  Current Patient Status: Inpatient   Certification Statement: The patient will continue to require additional inpatient hospital stay due to above  Discharge Plan: possibly tomorrow  Code Status: Level 3 - DNAR and DNI    Subjective:   Pt seen and examined by me this morning  Pt complained of  feeling short of breath this morning  Objective:     Vitals:   Temp (24hrs), Av °F (36 7 °C), Min:97 6 °F (36 4 °C), Max:98 5 °F (36 9 °C)    HR:  [84-99] 99  Resp:  [17-18] 18  BP: (117-166)/(64-77) 129/64  SpO2:  [94 %-99 %] 99 %  Body mass index is 20 62 kg/m²  Input and Output Summary (last 24 hours): Intake/Output Summary (Last 24 hours) at 18 1501  Last data filed at 18 1149   Gross per 24 hour   Intake              820 ml   Output             1021 ml   Net             -201 ml       Physical Exam:     Physical Exam    Constitutional: Pt appears poorly developed and nourished  Not in any acute distress  Cardiovascular: Normal rate, irregularly irregular rhythm, normal heart sounds and intact distal pulses   Exam reveals no gallop and no friction rub   No murmur heard  Pulmonary/Chest: Effort normal and breath sounds normal  No respiratory distress  Pt has no wheezes or rales   patient has b/l LL crackles  Abdominal: Soft  Non-distended, Non-tender  Bowel sounds are normal    Musculoskeletal: Normal range of motion  Neurological: alert and oriented x 3  Normal strength and sensations  Psychiatric: normal mood and affect  Additional Data:     Labs:      Results from last 7 days  Lab Units 05/05/18  0449   WBC Thousand/uL 4 92   HEMOGLOBIN g/dL 12 8   HEMATOCRIT % 41 4   PLATELETS Thousands/uL 160       Results from last 7 days  Lab Units 05/05/18  0449  05/01/18  0508   SODIUM mmol/L 143  < > 142   POTASSIUM mmol/L 4 2  < > 3 1*   CHLORIDE mmol/L 104  < > 102   CO2 mmol/L 33*  < > 30   BUN mg/dL 47*  < > 56*   CREATININE mg/dL 1 27  < > 1 15   CALCIUM mg/dL 8 9  < > 9 6   TOTAL PROTEIN g/dL  --   --  6 8   BILIRUBIN TOTAL mg/dL  --   --  0 96   ALK PHOS U/L  --   --  89   ALT U/L  --   --  70   AST U/L  --   --  60*   GLUCOSE RANDOM mg/dL 109  < > 122   < > = values in this interval not displayed  Results from last 7 days  Lab Units 05/07/18  1122 05/07/18  3942 05/06/18  2045 05/06/18  1531 05/06/18  1130 05/06/18  4414 05/05/18  2044 05/05/18  1527 05/05/18  1117 05/05/18  0649 05/04/18  2128 05/04/18  1630   POC GLUCOSE mg/dl 281* 132 113 229* 225* 75 250* 112 248* 115 167* 208*             * I Have Reviewed All Lab Data Listed Above  * Additional Pertinent Lab Tests Reviewed:  [facility]    Imaging:    Imaging Reports Reviewed Today Include:   Imaging Personally Reviewed by Myself Includes:      Recent Cultures (last 7 days):           Last 24 Hours Medication List:     Current Facility-Administered Medications:  acetaminophen 650 mg Oral Q8H [address] Kelly Mercury, PA-C   apixaban 2 5 mg Oral BID Peterson Baugh MD   aspirin 81 mg Oral Daily Peterson Baugh MD   atorvastatin 40 mg Oral Daily With Linda Chang MD   calcitonin (salmon) 1 spray Alternating Nares Daily Peterson Baugh MD   diltiazem 120 mg Oral Daily Ady Luiz Styles MD   docusate sodium 100 mg Oral BID Ines Perea MD   famotidine 20 mg Oral Daily oNrman Arroyo, DO   furosemide 40 mg Oral Daily Mansoor Nava MD   insulin glargine 5 Units Subcutaneous HS Ines Perea MD   insulin lispro 1-6 Units Subcutaneous TID AC Lolis Meehan MD   lactulose 10 g Oral Daily PRN Norman Arroyo, DO   metoprolol succinate 50 mg Oral Daily Ines Perea MD   ondansetron 4 mg Intravenous Q6H PRN Norman Arroyo, DO   polyethylene glycol 17 g Oral Daily Ines Perea MD   potassium chloride 40 mEq Oral Once [place], PA-C   potassium chloride 40 mEq Oral Once [place], PA-C   QUEtiapine 12 5 mg Oral HS Steve Moulton, PA-C   senna-docusate sodium 1 tablet Oral HS Lolis Meehan MD        Today, Patient Was Seen By: Ines Perea MD    ** Please Note: Dictation voice to text software may have been used in the creation of this document   **

## 2018-05-07 NOTE — ASSESSMENT & PLAN NOTE
Supplements    Malnutrition Findings:   Malnutrition type: Chronic illness  Degree of Malnutrition: Malnutrition of moderate degree (malnutrition related to dementia vs  abdominal pain as evidenced by <75% energy intake for >1 month, mild depletion of orbital body fat, and mild depletion of muscle mass seen at clavicle to be treated with liberalized diet and nutrition supplements )    BMI Findings: Body mass index is 20 62 kg/m²

## 2018-05-07 NOTE — PHYSICAL THERAPY NOTE
PHYSICAL THERAPY TREATMENT NOTE    Patient Name: Micheline Valdovinos  MDZOZ'L Date: 5/7/2018 05/07/18 1339   Pain Assessment   Pain Assessment No/denies pain   Pain Score No Pain   Hospital Pain Intervention(s) Ambulation/increased activity;Repositioned   Response to Interventions tolerated   Restrictions/Precautions   Weight Bearing Precautions Per Order No   Other Precautions Chair Alarm; Bed Alarm;Cognitive; Impulsive; Fall Risk   General   Chart Reviewed Yes   Response to Previous Treatment Patient reporting fatigue but able to participate  Family/Caregiver Present Yes  (multiple family members in room)   Cognition   Overall Cognitive Status Impaired   Arousal/Participation Alert; Cooperative  (pleasant throughout session)   Attention Within functional limits   Subjective   Subjective Pt agrees to PT treatment  Bed Mobility   Supine to Sit Unable to assess  (OOB in chair pre/post treatment with alarm intact)   Transfers   Sit to Stand 4  Minimal assistance   Additional items Assist x 1; Increased time required; Impulsive;Verbal cues   Stand to Sit 4  Minimal assistance   Additional items Assist x 1; Increased time required; Impulsive;Verbal cues   Stand pivot 4  Minimal assistance   Additional items Assist x 1; Increased time required; Impulsive;Verbal cues  (with RW)   Ambulation/Elevation   Gait pattern Narrow NITHIN; Forward Flexion;Decreased foot clearance; Short stride; Excessively slow; Inconsistent oxana   Gait Assistance 4  Minimal assist   Additional items Assist x 1;Verbal cues   Assistive Device Rolling walker   Distance 50` x4 and 30` x4  (multiple standing rest breaks with verbal cues)   Balance   Static Sitting Good   Dynamic Sitting Fair +   Static Standing Fair +   Dynamic Standing Fair   Ambulatory Fair -   Endurance Deficit   Endurance Deficit Yes   Endurance Deficit Description limited ambulation distance, continued SOB, HR= 145 during ambulation   Activity Tolerance   Activity Tolerance Patient limited by fatigue   Nurse Made Aware Per RN, pt appropriate to treat   Exercises   Knee AROM Long Arc Quad Sitting;10 reps;AROM; Bilateral   Ankle Pumps Standing;20 reps;AROM; Bilateral  (heel raises)   Squat Standing;10 reps;AROM; Bilateral  (x2)   Balance training  slow standing marches for SLS/balance  (2x20)   Assessment   Prognosis Good   Problem List Decreased strength;Decreased endurance;Decreased mobility; Decreased safety awareness; Impaired balance   Assessment Pt tolerated treatment well and is progressing with functional mobility and balance  Continues to have limited endurance and requires verbal cues for appropriate pacing, standing rest breaks, and pursed lip breathing  Pt is easily distracted in dunaway  SpO2 = 94% and HR = 143 during ambulation  Able to tolerate standing/sitting LE exercises and balance activities well with demonstration and seated rest breaks between all sets  x1 significant LOB during standing exercises  Will continue to benefit from ongoing skilled PT to maximize her functional mobility and increase her level of independence  Recommending rehab at time of discharge when medically appropriate  Goals   Patient Goals none stated   STG Expiration Date 05/10/18   Treatment Day 5   Plan   Treatment/Interventions Functional transfer training;LE strengthening/ROM; Therapeutic exercise; Endurance training;Patient/family training;Equipment eval/education; Bed mobility;Gait training   Progress Progressing toward goals   PT Frequency 5x/wk   Recommendation   Recommendation Post acute IP rehab   Equipment Recommended George Henson, PT,DPT

## 2018-05-07 NOTE — ASSESSMENT & PLAN NOTE
Patient and  refusing rehab/assisted living  Patient and  not able to take care of themselves at home  Their children her also not happy about this  Patient had very high risk of readmission if she does not take her medications properly as advised and follow diet  Palliative Care following  Appreciate help  Had a lengthy discussion with patient, , son and daughter-in-law at bedside on 5/2/2018  Patient and her  still very reluctant to leave the house that they are living in right now  I explained them the importance of taking medications regularly and if that does not happen she might have to be admitted to hospital again  Patient understood the risks and understood the importance of taking medications regularly  She is agreeable to home health care arranged  Patient was initially refusing to go to rehab but then she agreed after explaining current having lengthy discussion with her and her family    Patient was initially accepted at [facility] but discharge was held because of acute delirium  Plan was to discharge her today but again this morning patient had worsening shortness of breath requiring IV Lasix  So will hold off on discharge today

## 2018-05-08 VITALS
HEIGHT: 63 IN | WEIGHT: 115.52 LBS | SYSTOLIC BLOOD PRESSURE: 158 MMHG | RESPIRATION RATE: 18 BRPM | HEART RATE: 90 BPM | BODY MASS INDEX: 20.47 KG/M2 | DIASTOLIC BLOOD PRESSURE: 70 MMHG | OXYGEN SATURATION: 100 % | TEMPERATURE: 97.7 F

## 2018-05-08 PROBLEM — G93.40 ENCEPHALOPATHY: Status: RESOLVED | Noted: 2017-08-06 | Resolved: 2018-05-08

## 2018-05-08 PROBLEM — R11.0 NAUSEA: Status: RESOLVED | Noted: 2018-04-30 | Resolved: 2018-05-08

## 2018-05-08 PROBLEM — R74.01 TRANSAMINITIS: Status: RESOLVED | Noted: 2018-04-30 | Resolved: 2018-05-08

## 2018-05-08 PROBLEM — R41.0 DELIRIUM: Status: RESOLVED | Noted: 2018-05-03 | Resolved: 2018-05-08

## 2018-05-08 LAB
GLUCOSE SERPL-MCNC: 122 MG/DL (ref 65–140)
GLUCOSE SERPL-MCNC: 197 MG/DL (ref 65–140)
GLUCOSE SERPL-MCNC: 236 MG/DL (ref 65–140)

## 2018-05-08 PROCEDURE — 97530 THERAPEUTIC ACTIVITIES: CPT

## 2018-05-08 PROCEDURE — 82948 REAGENT STRIP/BLOOD GLUCOSE: CPT

## 2018-05-08 PROCEDURE — 99232 SBSQ HOSP IP/OBS MODERATE 35: CPT | Performed by: FAMILY MEDICINE

## 2018-05-08 RX ORDER — FAMOTIDINE 20 MG/1
20 TABLET, FILM COATED ORAL DAILY
Qty: 30 TABLET | Refills: 0 | Status: SHIPPED | OUTPATIENT
Start: 2018-05-09 | End: 2018-07-12 | Stop reason: SDUPTHER

## 2018-05-08 RX ORDER — ATORVASTATIN CALCIUM 40 MG/1
40 TABLET, FILM COATED ORAL
Qty: 30 TABLET | Refills: 0 | Status: SHIPPED | OUTPATIENT
Start: 2018-05-08 | End: 2018-05-14 | Stop reason: HOSPADM

## 2018-05-08 RX ORDER — LACTULOSE 20 G/30ML
10 SOLUTION ORAL DAILY PRN
Qty: 1 BOTTLE | Refills: 0 | Status: SHIPPED | OUTPATIENT
Start: 2018-05-08 | End: 2019-01-01 | Stop reason: CLARIF

## 2018-05-08 RX ORDER — DILTIAZEM HYDROCHLORIDE 120 MG/1
120 CAPSULE, COATED, EXTENDED RELEASE ORAL DAILY
Qty: 30 CAPSULE | Refills: 0 | Status: SHIPPED | OUTPATIENT
Start: 2018-05-09 | End: 2018-05-14 | Stop reason: HOSPADM

## 2018-05-08 RX ORDER — FUROSEMIDE 20 MG/1
40 TABLET ORAL DAILY
Qty: 30 TABLET | Refills: 0 | Status: SHIPPED | OUTPATIENT
Start: 2018-05-08 | End: 2018-05-14 | Stop reason: HOSPADM

## 2018-05-08 RX ORDER — METOPROLOL SUCCINATE 50 MG/1
50 TABLET, EXTENDED RELEASE ORAL DAILY
Qty: 30 TABLET | Refills: 0 | Status: SHIPPED | OUTPATIENT
Start: 2018-05-09 | End: 2018-07-05 | Stop reason: SDUPTHER

## 2018-05-08 RX ORDER — INSULIN GLARGINE 100 [IU]/ML
5 INJECTION, SOLUTION SUBCUTANEOUS
Qty: 10 ML | Refills: 0 | Status: SHIPPED | OUTPATIENT
Start: 2018-05-08 | End: 2018-06-14 | Stop reason: CLARIF

## 2018-05-08 RX ORDER — QUETIAPINE FUMARATE 25 MG/1
12.5 TABLET, FILM COATED ORAL
Qty: 30 TABLET | Refills: 0 | Status: SHIPPED | OUTPATIENT
Start: 2018-05-08 | End: 2018-06-01 | Stop reason: SDUPTHER

## 2018-05-08 RX ORDER — ACETAMINOPHEN 325 MG/1
325 TABLET ORAL EVERY 6 HOURS PRN
Qty: 30 TABLET | Refills: 0 | Status: SHIPPED | OUTPATIENT
Start: 2018-05-08 | End: 2020-01-01 | Stop reason: HOSPADM

## 2018-05-08 RX ADMIN — METOPROLOL SUCCINATE 50 MG: 50 TABLET, FILM COATED, EXTENDED RELEASE ORAL at 08:03

## 2018-05-08 RX ADMIN — DOCUSATE SODIUM 100 MG: 100 CAPSULE, LIQUID FILLED ORAL at 16:30

## 2018-05-08 RX ADMIN — ACETAMINOPHEN 650 MG: 325 TABLET ORAL at 06:15

## 2018-05-08 RX ADMIN — ASPIRIN 81 MG 81 MG: 81 TABLET ORAL at 08:02

## 2018-05-08 RX ADMIN — CALCITONIN SALMON 1 SPRAY: 200 SPRAY, METERED NASAL at 08:03

## 2018-05-08 RX ADMIN — DOCUSATE SODIUM 100 MG: 100 CAPSULE, LIQUID FILLED ORAL at 08:03

## 2018-05-08 RX ADMIN — FAMOTIDINE 20 MG: 20 TABLET ORAL at 08:03

## 2018-05-08 RX ADMIN — DILTIAZEM HYDROCHLORIDE 120 MG: 120 CAPSULE, COATED, EXTENDED RELEASE ORAL at 08:03

## 2018-05-08 RX ADMIN — FUROSEMIDE 40 MG: 40 TABLET ORAL at 08:03

## 2018-05-08 RX ADMIN — APIXABAN 2.5 MG: 2.5 TABLET, FILM COATED ORAL at 16:30

## 2018-05-08 RX ADMIN — INSULIN LISPRO 2 UNITS: 100 INJECTION, SOLUTION INTRAVENOUS; SUBCUTANEOUS at 12:49

## 2018-05-08 RX ADMIN — INSULIN LISPRO 3 UNITS: 100 INJECTION, SOLUTION INTRAVENOUS; SUBCUTANEOUS at 16:29

## 2018-05-08 RX ADMIN — ATORVASTATIN CALCIUM 40 MG: 40 TABLET, FILM COATED ORAL at 16:30

## 2018-05-08 RX ADMIN — ACETAMINOPHEN 650 MG: 325 TABLET ORAL at 13:42

## 2018-05-08 RX ADMIN — APIXABAN 2.5 MG: 2.5 TABLET, FILM COATED ORAL at 08:02

## 2018-05-08 RX ADMIN — POLYETHYLENE GLYCOL 3350 17 G: 17 POWDER, FOR SOLUTION ORAL at 08:04

## 2018-05-08 NOTE — SOCIAL WORK
Discussed patient with SLIM during rounds and she is chleared for discharge today  Spoke to diana Zaragoza, [facility] and patient is approved and bed available today  Scheduled [facility] transport for 1700 today and completed CMN  Met with patient and family to review dc plan  Informed RN, MD and [facility]

## 2018-05-08 NOTE — PHYSICAL THERAPY NOTE
Physical Therapy Progress Note     05/08/18 1222   Pain Assessment   Pain Assessment No/denies pain   Pain Score No Pain   Restrictions/Precautions   Weight Bearing Precautions Per Order No   Other Precautions Cognitive; Chair Alarm; Fall Risk   General   Chart Reviewed Yes   Family/Caregiver Present Yes   Cognition   Overall Cognitive Status Impaired   Arousal/Participation Alert; Cooperative   Subjective   Subjective Pt is sitting in recliner  Agrees to participate  Transfers   Sit to Stand 4  Minimal assistance   Additional items Assist x 1  (contact guard)   Stand to Sit 4  Minimal assistance  (contact guard)   Additional items Assist x 1;Verbal cues   Stand pivot 4  Minimal assistance   Additional items Assist x 1;Verbal cues   Ambulation/Elevation   Gait pattern Narrow NITHIN; Decreased foot clearance; Foward flexed; Short stride  (slow)   Gait Assistance 4  Minimal assist  (contact guard)   Additional items Assist x 1   Assistive Device Rolling walker   Distance 150 feet   Stair Management Assistance Not tested   Balance   Static Sitting Good   Dynamic Sitting Fair +   Static Standing Fair +   Dynamic Standing Fair   Ambulatory Fair -   Endurance Deficit   Endurance Deficit No   Activity Tolerance   Activity Tolerance Patient limited by fatigue   Nurse [address] to see per Maggie Randolph   Exercises   TKR Sitting;20 reps;AROM; Bilateral   Assessment   Prognosis Good   Problem List Decreased strength;Decreased endurance; Impaired balance;Decreased mobility; Decreased cognition   Assessment Pt is progressing well with rolling walker  Family present  Cues required for safety with transfers  Improved endurance today  Steady with rolling walker without loss of balance  Pt would benefit from continued physical therapy to maximize functional mobility and safety     Barriers to Discharge Inaccessible home environment   Goals   Patient Goals None stated   STG Expiration Date 05/10/18   Treatment Day 6   Plan Treatment/Interventions Functional transfer training;LE strengthening/ROM; Therapeutic exercise; Endurance training;Patient/family training;Bed mobility;Gait training   Progress Progressing toward goals   PT Frequency 5x/wk   Recommendation   Recommendation Post acute IP rehab   Equipment Recommended Ghada Pope PTA

## 2018-05-08 NOTE — PLAN OF CARE
Problem: PHYSICAL THERAPY ADULT  Goal: Performs mobility at highest level of function for planned discharge setting  See evaluation for individualized goals  Treatment/Interventions: Functional transfer training, Endurance training, Patient/family training, Equipment eval/education, Gait training, Spoke to nursing, Spoke to case management, Family  Equipment Recommended: Lesa Crawford (SUSI)       See flowsheet documentation for full assessment, interventions and recommendations  Outcome: Progressing  Prognosis: Good  Problem List: Decreased strength, Decreased endurance, Impaired balance, Decreased mobility, Decreased cognition  Assessment: Pt is progressing well with rolling walker  Family present  Cues required for safety with transfers  Improved endurance today  Steady with rolling walker without loss of balance  Pt would benefit from continued physical therapy to maximize functional mobility and safety  Barriers to Discharge: Inaccessible home environment     Recommendation: Post acute IP rehab          See flowsheet documentation for full assessment

## 2018-05-08 NOTE — PROGRESS NOTES
Progress Note - Estefania De La Cruz 80 y o  female MRN: [id number]    Unit/Bed#: [facility] 506-01 Encounter: [id number]      Assessment/Plan  1  Impaired mobility and ADLS  Recommendations remain the same, [facility]  Patient lives at home with her elderly   Apparently they are resistant to placement in an assisted living facility, but often call the family daily for assistance and urgent needs  Family at bedside during my exam feel that patient and  both do not understand their situation, they report patient has dementia,  is not quite as bad as her but "is not far off"  Plan is for patient to go to [place] [address] with family about speaking with  AAA after discharge from [facility] if patient and  still decline placement  Understand we cannot force patient to be placed, but ultimately discharging home with  is not a safe discharge  Patient is likely not competent to make decisions given her underlying dementia, today is alert and oriented to self and that is it  May need to consider neuropsych eval for capacity/competency      2  Delirium  Patient tolerating Seroquel 12 5 mg q h s , recommend continuing at discharge  Patient at baseline, alert and oriented to person, place, month, thinks it is 2008  Continue with Tylenol 650 mg Q 8 as pain can precipitate delirium  Refer to geriatric pain med order set for further pain med recommendations  Monitor for urinary retention, bladder scan and straight cath as needed  Patient has not had a bowel movement since admission, will add Senokot S daily, as constipation can cause delirium  Redirect unwanted patient behaviors as first line tx  Reorient patient frequently    Avoid deliriogenic meds including tramadol, benzodiazepines, benadryl  Good sleep hygiene important, limit night time interruptions  Encourage patient to stay awake during the day  Ensure adequate hydration/nutrition  Mobilize often      3  Dementia  Patient is alert oriented x3, at baseline  Family reports previously diagnosed, mild  Is currently complicated by 2  Will defer B12, folate checked at this time  Patient likely not competent, if this is needed legally to assist with safe placement for the patient and her , would recommend neuropsych eval  See 2      4  Ambulatory dysfunction  Patient uses assistive devices at baseline  Continue with PT, OT  [facility] is planned for discharge, agree     5  Deconditioning  Chronic  Mobilize patient frequently to prevent further decline  PT, OT     6   Depression with anxiety  Patient seen by Psychiatry, she refused SSRI     7  Acute on chronic diastolic congestive heart failure  Per Cards, poor outlook, patient will likely continue to decline  Patient likely to be readmitted in the future  Family not interested in pursuing hospice at this time per      8  Malnutrition  Recommend adding Glucerna t i d  as recommended by dietitian     Subjective:   Patient tolerating Seroquel  Spoke to slim about continuing at discharge  Spoke with CM about discharge plan, plan is for [facility] though they will have to re eval  PROS cannot be gathered secondary to patient's underlying dementia  No signs of agitation or delirium upon exam   Family at bedside, questions answered  Objective:     Vitals: Blood pressure 134/63, pulse 104, temperature 97 7 °F (36 5 °C), temperature source Oral, resp  rate 18, height 5' 3" (1 6 m), weight 52 4 kg (115 lb 8 3 oz), SpO2 96 %  ,Body mass index is 20 46 kg/m²  Intake/Output Summary (Last 24 hours) at 05/08/18 1143  Last data filed at 05/08/18 0810   Gross per 24 hour   Intake              480 ml   Output              500 ml   Net              -20 ml       Physical Exam: General : WD in NAD  HEENT : MMM no erythema or exudates   EOMI, sclera anicteric  Heart : Normal rate  Lungs : CTA  Abdomen : Soft, NT/ND, BS auscultated in all 4 quads  No organomegaly  Ext :  Pulses +2/4 B/L  Neg edema B/L  Neg calf swelling B/L  Skin : Pink, warm, dry, age appropriate turgor and mobility  Neuro : Nonfocal  Psych : A * O x 3, no delirium        Invasive Devices     Peripheral Intravenous Line            Peripheral IV 05/04/18 Right Hand 3 days                Lab, Imaging and other studies: I have personally reviewed pertinent reports      VTE Pharmacologic Prophylaxis: Sequential compression device (Venodyne)   VTE Mechanical Prophylaxis: sequential compression device

## 2018-05-09 ENCOUNTER — TELEPHONE (OUTPATIENT)
Dept: FAMILY MEDICINE CLINIC | Facility: CLINIC | Age: 83
End: 2018-05-09

## 2018-05-09 ENCOUNTER — APPOINTMENT (EMERGENCY)
Dept: RADIOLOGY | Facility: HOSPITAL | Age: 83
DRG: 280 | End: 2018-05-09
Payer: MEDICARE

## 2018-05-09 ENCOUNTER — HOSPITAL ENCOUNTER (INPATIENT)
Facility: HOSPITAL | Age: 83
LOS: 5 days | DRG: 280 | End: 2018-05-14
Attending: EMERGENCY MEDICINE | Admitting: INTERNAL MEDICINE
Payer: MEDICARE

## 2018-05-09 DIAGNOSIS — I48.91 ATRIAL FIBRILLATION WITH RVR (HCC): ICD-10-CM

## 2018-05-09 DIAGNOSIS — J96.01 ACUTE HYPOXEMIC RESPIRATORY FAILURE (HCC): Primary | ICD-10-CM

## 2018-05-09 DIAGNOSIS — R73.9 HYPERGLYCEMIA: ICD-10-CM

## 2018-05-09 DIAGNOSIS — I50.9 ACUTE CONGESTIVE HEART FAILURE (HCC): ICD-10-CM

## 2018-05-09 DIAGNOSIS — R79.89 ELEVATED LACTIC ACID LEVEL: ICD-10-CM

## 2018-05-09 DIAGNOSIS — N17.9 AKI (ACUTE KIDNEY INJURY) (HCC): ICD-10-CM

## 2018-05-09 DIAGNOSIS — I50.43 ACUTE ON CHRONIC COMBINED SYSTOLIC AND DIASTOLIC CONGESTIVE HEART FAILURE (HCC): ICD-10-CM

## 2018-05-09 DIAGNOSIS — E43 SEVERE PROTEIN-CALORIE MALNUTRITION (GOMEZ: LESS THAN 60% OF STANDARD WEIGHT) (HCC): Chronic | ICD-10-CM

## 2018-05-09 DIAGNOSIS — I48.21 PERMANENT ATRIAL FIBRILLATION (HCC): ICD-10-CM

## 2018-05-09 DIAGNOSIS — I21.4 NSTEMI (NON-ST ELEVATED MYOCARDIAL INFARCTION) (HCC): ICD-10-CM

## 2018-05-09 DIAGNOSIS — R00.0 SINUS TACHYCARDIA: ICD-10-CM

## 2018-05-09 LAB
ALBUMIN SERPL BCP-MCNC: 3.7 G/DL (ref 3.5–5)
ALP SERPL-CCNC: 107 U/L (ref 46–116)
ALT SERPL W P-5'-P-CCNC: 43 U/L (ref 12–78)
ANION GAP SERPL CALCULATED.3IONS-SCNC: 10 MMOL/L (ref 4–13)
APTT PPP: 32 SECONDS (ref 23–35)
AST SERPL W P-5'-P-CCNC: 44 U/L (ref 5–45)
BACTERIA UR QL AUTO: ABNORMAL /HPF
BASE EX.OXY STD BLDV CALC-SCNC: 78.4 % (ref 60–80)
BASE EXCESS BLDV CALC-SCNC: -4.3 MMOL/L
BASOPHILS # BLD AUTO: 0.03 THOUSANDS/ΜL (ref 0–0.1)
BASOPHILS NFR BLD AUTO: 0 % (ref 0–1)
BILIRUB SERPL-MCNC: 0.63 MG/DL (ref 0.2–1)
BILIRUB UR QL STRIP: NEGATIVE
BUN SERPL-MCNC: 31 MG/DL (ref 5–25)
CALCIUM SERPL-MCNC: 9.6 MG/DL (ref 8.3–10.1)
CHLORIDE SERPL-SCNC: 101 MMOL/L (ref 100–108)
CLARITY UR: CLEAR
CO2 SERPL-SCNC: 28 MMOL/L (ref 21–32)
COLOR UR: YELLOW
CREAT SERPL-MCNC: 1.52 MG/DL (ref 0.6–1.3)
EOSINOPHIL # BLD AUTO: 0.1 THOUSAND/ΜL (ref 0–0.61)
EOSINOPHIL NFR BLD AUTO: 1 % (ref 0–6)
ERYTHROCYTE [DISTWIDTH] IN BLOOD BY AUTOMATED COUNT: 14.6 % (ref 11.6–15.1)
GFR SERPL CREATININE-BSD FRML MDRD: 30 ML/MIN/1.73SQ M
GLUCOSE SERPL-MCNC: 444 MG/DL (ref 65–140)
GLUCOSE UR STRIP-MCNC: ABNORMAL MG/DL
HCO3 BLDV-SCNC: 22.8 MMOL/L (ref 24–30)
HCT VFR BLD AUTO: 42.8 % (ref 34.8–46.1)
HGB BLD-MCNC: 13.9 G/DL (ref 11.5–15.4)
HGB UR QL STRIP.AUTO: ABNORMAL
HYALINE CASTS #/AREA URNS LPF: ABNORMAL /LPF
INR PPP: 1.22 (ref 0.86–1.16)
KETONES UR STRIP-MCNC: NEGATIVE MG/DL
LACTATE SERPL-SCNC: 3.1 MMOL/L (ref 0.5–2)
LEUKOCYTE ESTERASE UR QL STRIP: NEGATIVE
LIPASE SERPL-CCNC: 225 U/L (ref 73–393)
LYMPHOCYTES # BLD AUTO: 1.56 THOUSANDS/ΜL (ref 0.6–4.47)
LYMPHOCYTES NFR BLD AUTO: 21 % (ref 14–44)
MCH RBC QN AUTO: 31.4 PG (ref 26.8–34.3)
MCHC RBC AUTO-ENTMCNC: 32.5 G/DL (ref 31.4–37.4)
MCV RBC AUTO: 97 FL (ref 82–98)
MONOCYTES # BLD AUTO: 0.42 THOUSAND/ΜL (ref 0.17–1.22)
MONOCYTES NFR BLD AUTO: 6 % (ref 4–12)
MUCOUS THREADS UR QL AUTO: ABNORMAL
NEUTROPHILS # BLD AUTO: 5.48 THOUSANDS/ΜL (ref 1.85–7.62)
NEUTS SEG NFR BLD AUTO: 72 % (ref 43–75)
NITRITE UR QL STRIP: NEGATIVE
NON-SQ EPI CELLS URNS QL MICRO: ABNORMAL /HPF
NRBC BLD AUTO-RTO: 0 /100 WBCS
NT-PROBNP SERPL-MCNC: 5901 PG/ML
O2 CT BLDV-SCNC: 16.3 ML/DL
PCO2 BLDV: 49.5 MM HG (ref 42–50)
PH BLDV: 7.28 [PH] (ref 7.3–7.4)
PH UR STRIP.AUTO: 6 [PH] (ref 4.5–8)
PLATELET # BLD AUTO: 182 THOUSANDS/UL (ref 149–390)
PMV BLD AUTO: 13 FL (ref 8.9–12.7)
PO2 BLDV: 51.8 MM HG (ref 35–45)
POTASSIUM SERPL-SCNC: 5 MMOL/L (ref 3.5–5.3)
PROT SERPL-MCNC: 8.5 G/DL (ref 6.4–8.2)
PROT UR STRIP-MCNC: ABNORMAL MG/DL
PROTHROMBIN TIME: 15.5 SECONDS (ref 12.1–14.4)
RBC # BLD AUTO: 4.43 MILLION/UL (ref 3.81–5.12)
RBC #/AREA URNS AUTO: ABNORMAL /HPF
SODIUM SERPL-SCNC: 139 MMOL/L (ref 136–145)
SP GR UR STRIP.AUTO: 1.02 (ref 1–1.03)
TROPONIN I SERPL-MCNC: 1.22 NG/ML
UROBILINOGEN UR QL STRIP.AUTO: 0.2 E.U./DL
WBC # BLD AUTO: 7.59 THOUSAND/UL (ref 4.31–10.16)
WBC #/AREA URNS AUTO: ABNORMAL /HPF

## 2018-05-09 PROCEDURE — 83605 ASSAY OF LACTIC ACID: CPT | Performed by: EMERGENCY MEDICINE

## 2018-05-09 PROCEDURE — 96375 TX/PRO/DX INJ NEW DRUG ADDON: CPT

## 2018-05-09 PROCEDURE — 84484 ASSAY OF TROPONIN QUANT: CPT | Performed by: EMERGENCY MEDICINE

## 2018-05-09 PROCEDURE — 83880 ASSAY OF NATRIURETIC PEPTIDE: CPT | Performed by: EMERGENCY MEDICINE

## 2018-05-09 PROCEDURE — 94660 CPAP INITIATION&MGMT: CPT

## 2018-05-09 PROCEDURE — 96365 THER/PROPH/DIAG IV INF INIT: CPT

## 2018-05-09 PROCEDURE — 85730 THROMBOPLASTIN TIME PARTIAL: CPT | Performed by: EMERGENCY MEDICINE

## 2018-05-09 PROCEDURE — 96374 THER/PROPH/DIAG INJ IV PUSH: CPT

## 2018-05-09 PROCEDURE — 85610 PROTHROMBIN TIME: CPT | Performed by: EMERGENCY MEDICINE

## 2018-05-09 PROCEDURE — 81001 URINALYSIS AUTO W/SCOPE: CPT | Performed by: EMERGENCY MEDICINE

## 2018-05-09 PROCEDURE — 85025 COMPLETE CBC W/AUTO DIFF WBC: CPT | Performed by: EMERGENCY MEDICINE

## 2018-05-09 PROCEDURE — 93005 ELECTROCARDIOGRAM TRACING: CPT

## 2018-05-09 PROCEDURE — 80053 COMPREHEN METABOLIC PANEL: CPT | Performed by: EMERGENCY MEDICINE

## 2018-05-09 PROCEDURE — 82805 BLOOD GASES W/O2 SATURATION: CPT | Performed by: EMERGENCY MEDICINE

## 2018-05-09 PROCEDURE — 87040 BLOOD CULTURE FOR BACTERIA: CPT | Performed by: EMERGENCY MEDICINE

## 2018-05-09 PROCEDURE — 36415 COLL VENOUS BLD VENIPUNCTURE: CPT | Performed by: EMERGENCY MEDICINE

## 2018-05-09 PROCEDURE — 71045 X-RAY EXAM CHEST 1 VIEW: CPT

## 2018-05-09 PROCEDURE — 83690 ASSAY OF LIPASE: CPT | Performed by: EMERGENCY MEDICINE

## 2018-05-09 RX ORDER — FUROSEMIDE 10 MG/ML
20 INJECTION INTRAMUSCULAR; INTRAVENOUS ONCE
Status: COMPLETED | OUTPATIENT
Start: 2018-05-09 | End: 2018-05-09

## 2018-05-09 RX ORDER — ASPIRIN 300 MG/1
300 SUPPOSITORY RECTAL ONCE
Status: COMPLETED | OUTPATIENT
Start: 2018-05-09 | End: 2018-05-09

## 2018-05-09 RX ORDER — VANCOMYCIN HYDROCHLORIDE 1 G/200ML
15 INJECTION, SOLUTION INTRAVENOUS ONCE
Status: COMPLETED | OUTPATIENT
Start: 2018-05-09 | End: 2018-05-10

## 2018-05-09 RX ADMIN — CEFEPIME HYDROCHLORIDE 2000 MG: 2 INJECTION, POWDER, FOR SOLUTION INTRAVENOUS at 22:50

## 2018-05-09 RX ADMIN — FUROSEMIDE 20 MG: 10 INJECTION, SOLUTION INTRAMUSCULAR; INTRAVENOUS at 23:12

## 2018-05-09 RX ADMIN — VANCOMYCIN HYDROCHLORIDE 1000 MG: 1 INJECTION, SOLUTION INTRAVENOUS at 23:25

## 2018-05-09 RX ADMIN — ASPIRIN 300 MG: 300 SUPPOSITORY RECTAL at 22:53

## 2018-05-10 PROBLEM — I48.21 PERMANENT ATRIAL FIBRILLATION (HCC): Status: ACTIVE | Noted: 2018-05-10

## 2018-05-10 LAB
ANION GAP SERPL CALCULATED.3IONS-SCNC: 7 MMOL/L (ref 4–13)
ARTERIAL PATENCY WRIST A: YES
ATRIAL RATE: 133 BPM
BASE EXCESS BLDA CALC-SCNC: 1.4 MMOL/L
BUN SERPL-MCNC: 33 MG/DL (ref 5–25)
CALCIUM SERPL-MCNC: 9.4 MG/DL (ref 8.3–10.1)
CHLORIDE SERPL-SCNC: 104 MMOL/L (ref 100–108)
CO2 SERPL-SCNC: 30 MMOL/L (ref 21–32)
CREAT SERPL-MCNC: 1.32 MG/DL (ref 0.6–1.3)
ERYTHROCYTE [DISTWIDTH] IN BLOOD BY AUTOMATED COUNT: 14.5 % (ref 11.6–15.1)
GFR SERPL CREATININE-BSD FRML MDRD: 35 ML/MIN/1.73SQ M
GLUCOSE SERPL-MCNC: 127 MG/DL (ref 65–140)
GLUCOSE SERPL-MCNC: 181 MG/DL (ref 65–140)
GLUCOSE SERPL-MCNC: 192 MG/DL (ref 65–140)
GLUCOSE SERPL-MCNC: 241 MG/DL (ref 65–140)
GLUCOSE SERPL-MCNC: 264 MG/DL (ref 65–140)
GLUCOSE SERPL-MCNC: 370 MG/DL (ref 65–140)
HCO3 BLDA-SCNC: 26.3 MMOL/L (ref 22–28)
HCT VFR BLD AUTO: 38.4 % (ref 34.8–46.1)
HGB BLD-MCNC: 12.2 G/DL (ref 11.5–15.4)
IPAP: 10
LACTATE SERPL-SCNC: 2.2 MMOL/L (ref 0.5–2)
MCH RBC QN AUTO: 30 PG (ref 26.8–34.3)
MCHC RBC AUTO-ENTMCNC: 31.8 G/DL (ref 31.4–37.4)
MCV RBC AUTO: 95 FL (ref 82–98)
NON VENT- BIPAP: ABNORMAL
O2 CT BLDA-SCNC: 17.7 ML/DL (ref 16–23)
OXYHGB MFR BLDA: 98.6 % (ref 94–97)
P AXIS: 62 DEGREES
PCO2 BLDA: 42.6 MM HG (ref 36–44)
PEEP MAX SETTING VENT: 5 CM[H2O]
PH BLDA: 7.41 [PH] (ref 7.35–7.45)
PLATELET # BLD AUTO: 137 THOUSANDS/UL (ref 149–390)
PMV BLD AUTO: 12.1 FL (ref 8.9–12.7)
PO2 BLDA: 151.4 MM HG (ref 75–129)
POTASSIUM SERPL-SCNC: 4.4 MMOL/L (ref 3.5–5.3)
QRS AXIS: 117 DEGREES
QRSD INTERVAL: 94 MS
QT INTERVAL: 294 MS
QTC INTERVAL: 437 MS
RBC # BLD AUTO: 4.06 MILLION/UL (ref 3.81–5.12)
SODIUM SERPL-SCNC: 141 MMOL/L (ref 136–145)
SPECIMEN SOURCE: ABNORMAL
T WAVE AXIS: 83 DEGREES
VENT BIPAP FIO2: 50 %
VENTRICULAR RATE: 133 BPM
WBC # BLD AUTO: 6.01 THOUSAND/UL (ref 4.31–10.16)

## 2018-05-10 PROCEDURE — 99285 EMERGENCY DEPT VISIT HI MDM: CPT

## 2018-05-10 PROCEDURE — 85027 COMPLETE CBC AUTOMATED: CPT | Performed by: INTERNAL MEDICINE

## 2018-05-10 PROCEDURE — 80048 BASIC METABOLIC PNL TOTAL CA: CPT | Performed by: INTERNAL MEDICINE

## 2018-05-10 PROCEDURE — 36600 WITHDRAWAL OF ARTERIAL BLOOD: CPT

## 2018-05-10 PROCEDURE — 94660 CPAP INITIATION&MGMT: CPT

## 2018-05-10 PROCEDURE — 99222 1ST HOSP IP/OBS MODERATE 55: CPT | Performed by: INTERNAL MEDICINE

## 2018-05-10 PROCEDURE — 99221 1ST HOSP IP/OBS SF/LOW 40: CPT | Performed by: INTERNAL MEDICINE

## 2018-05-10 PROCEDURE — 82948 REAGENT STRIP/BLOOD GLUCOSE: CPT

## 2018-05-10 PROCEDURE — 82805 BLOOD GASES W/O2 SATURATION: CPT | Performed by: INTERNAL MEDICINE

## 2018-05-10 PROCEDURE — 99223 1ST HOSP IP/OBS HIGH 75: CPT | Performed by: INTERNAL MEDICINE

## 2018-05-10 PROCEDURE — 93010 ELECTROCARDIOGRAM REPORT: CPT | Performed by: INTERNAL MEDICINE

## 2018-05-10 PROCEDURE — 99232 SBSQ HOSP IP/OBS MODERATE 35: CPT | Performed by: INTERNAL MEDICINE

## 2018-05-10 RX ORDER — POLYETHYLENE GLYCOL 3350 17 G/17G
17 POWDER, FOR SOLUTION ORAL DAILY
Status: DISCONTINUED | OUTPATIENT
Start: 2018-05-10 | End: 2018-05-14 | Stop reason: HOSPADM

## 2018-05-10 RX ORDER — INSULIN GLARGINE 100 [IU]/ML
5 INJECTION, SOLUTION SUBCUTANEOUS
Status: DISCONTINUED | OUTPATIENT
Start: 2018-05-10 | End: 2018-05-11

## 2018-05-10 RX ORDER — AMOXICILLIN 250 MG
1 CAPSULE ORAL
Status: DISCONTINUED | OUTPATIENT
Start: 2018-05-10 | End: 2018-05-14 | Stop reason: HOSPADM

## 2018-05-10 RX ORDER — ONDANSETRON 2 MG/ML
4 INJECTION INTRAMUSCULAR; INTRAVENOUS EVERY 4 HOURS PRN
Status: DISCONTINUED | OUTPATIENT
Start: 2018-05-10 | End: 2018-05-14 | Stop reason: HOSPADM

## 2018-05-10 RX ORDER — QUETIAPINE FUMARATE 25 MG/1
12.5 TABLET, FILM COATED ORAL
Status: DISCONTINUED | OUTPATIENT
Start: 2018-05-10 | End: 2018-05-14 | Stop reason: HOSPADM

## 2018-05-10 RX ORDER — SPIRONOLACTONE 25 MG/1
12.5 TABLET ORAL DAILY
Status: DISCONTINUED | OUTPATIENT
Start: 2018-05-10 | End: 2018-05-14 | Stop reason: HOSPADM

## 2018-05-10 RX ORDER — FAMOTIDINE 20 MG/1
20 TABLET, FILM COATED ORAL DAILY
Status: DISCONTINUED | OUTPATIENT
Start: 2018-05-10 | End: 2018-05-14 | Stop reason: HOSPADM

## 2018-05-10 RX ORDER — FUROSEMIDE 10 MG/ML
40 INJECTION INTRAMUSCULAR; INTRAVENOUS
Status: DISCONTINUED | OUTPATIENT
Start: 2018-05-10 | End: 2018-05-11

## 2018-05-10 RX ORDER — ATORVASTATIN CALCIUM 40 MG/1
40 TABLET, FILM COATED ORAL
Status: DISCONTINUED | OUTPATIENT
Start: 2018-05-10 | End: 2018-05-10

## 2018-05-10 RX ORDER — POTASSIUM CHLORIDE 750 MG/1
10 TABLET, EXTENDED RELEASE ORAL DAILY
Status: DISCONTINUED | OUTPATIENT
Start: 2018-05-10 | End: 2018-05-14 | Stop reason: HOSPADM

## 2018-05-10 RX ORDER — ACETAMINOPHEN 325 MG/1
650 TABLET ORAL EVERY 6 HOURS PRN
Status: DISCONTINUED | OUTPATIENT
Start: 2018-05-10 | End: 2018-05-14 | Stop reason: HOSPADM

## 2018-05-10 RX ORDER — ASPIRIN 81 MG/1
81 TABLET, CHEWABLE ORAL DAILY
Status: DISCONTINUED | OUTPATIENT
Start: 2018-05-10 | End: 2018-05-14 | Stop reason: HOSPADM

## 2018-05-10 RX ORDER — METOPROLOL SUCCINATE 50 MG/1
50 TABLET, EXTENDED RELEASE ORAL DAILY
Status: DISCONTINUED | OUTPATIENT
Start: 2018-05-10 | End: 2018-05-14 | Stop reason: HOSPADM

## 2018-05-10 RX ORDER — ATORVASTATIN CALCIUM 10 MG/1
20 TABLET, FILM COATED ORAL
Status: DISCONTINUED | OUTPATIENT
Start: 2018-05-10 | End: 2018-05-14 | Stop reason: HOSPADM

## 2018-05-10 RX ORDER — CALCITONIN SALMON 200 [IU]/.09ML
1 SPRAY, METERED NASAL DAILY
Status: DISCONTINUED | OUTPATIENT
Start: 2018-05-10 | End: 2018-05-14 | Stop reason: HOSPADM

## 2018-05-10 RX ORDER — DILTIAZEM HYDROCHLORIDE 120 MG/1
120 CAPSULE, COATED, EXTENDED RELEASE ORAL DAILY
Status: DISCONTINUED | OUTPATIENT
Start: 2018-05-10 | End: 2018-05-14

## 2018-05-10 RX ADMIN — INSULIN GLARGINE 5 UNITS: 100 INJECTION, SOLUTION SUBCUTANEOUS at 21:23

## 2018-05-10 RX ADMIN — QUETIAPINE FUMARATE 12.5 MG: 25 TABLET ORAL at 19:48

## 2018-05-10 RX ADMIN — FAMOTIDINE 20 MG: 20 TABLET ORAL at 09:37

## 2018-05-10 RX ADMIN — FUROSEMIDE 40 MG: 10 INJECTION, SOLUTION INTRAMUSCULAR; INTRAVENOUS at 17:20

## 2018-05-10 RX ADMIN — INSULIN GLARGINE 5 UNITS: 100 INJECTION, SOLUTION SUBCUTANEOUS at 01:19

## 2018-05-10 RX ADMIN — FUROSEMIDE 40 MG: 10 INJECTION, SOLUTION INTRAMUSCULAR; INTRAVENOUS at 11:26

## 2018-05-10 RX ADMIN — SPIRONOLACTONE 12.5 MG: 25 TABLET ORAL at 09:37

## 2018-05-10 RX ADMIN — APIXABAN 2.5 MG: 2.5 TABLET, FILM COATED ORAL at 17:20

## 2018-05-10 RX ADMIN — POLYETHYLENE GLYCOL 3350 17 G: 17 POWDER, FOR SOLUTION ORAL at 09:37

## 2018-05-10 RX ADMIN — APIXABAN 2.5 MG: 2.5 TABLET, FILM COATED ORAL at 09:37

## 2018-05-10 RX ADMIN — METOPROLOL SUCCINATE 50 MG: 50 TABLET, EXTENDED RELEASE ORAL at 09:37

## 2018-05-10 RX ADMIN — INSULIN LISPRO 2 UNITS: 100 INJECTION, SOLUTION INTRAVENOUS; SUBCUTANEOUS at 11:25

## 2018-05-10 RX ADMIN — DILTIAZEM HYDROCHLORIDE 120 MG: 120 CAPSULE, COATED, EXTENDED RELEASE ORAL at 09:37

## 2018-05-10 RX ADMIN — POTASSIUM CHLORIDE 10 MEQ: 750 TABLET, EXTENDED RELEASE ORAL at 09:37

## 2018-05-10 RX ADMIN — Medication 1 TABLET: at 19:48

## 2018-05-10 RX ADMIN — CALCITONIN SALMON 1 SPRAY: 200 SPRAY, METERED NASAL at 09:38

## 2018-05-10 RX ADMIN — FUROSEMIDE 40 MG: 10 INJECTION, SOLUTION INTRAMUSCULAR; INTRAVENOUS at 05:27

## 2018-05-10 RX ADMIN — ASPIRIN 81 MG 81 MG: 81 TABLET ORAL at 09:37

## 2018-05-10 RX ADMIN — ATORVASTATIN CALCIUM 20 MG: 20 TABLET, FILM COATED ORAL at 17:20

## 2018-05-10 NOTE — TELEPHONE ENCOUNTER
Pt  called in and stated wife is back in [facility] since yesterday due to fast heart rate he will call the office once she is home to make a tcm appt

## 2018-05-10 NOTE — H&P
History and Physical - [facility] Internal Medicine    Patient Information: Claudia Mathias 80 y o  female MRN: [id number]  Unit/Bed#: ICU 11 Encounter: [id number]  Admitting Physician: Wen Navas MD  PCP: Rico Ashton MD  Date of Admission:  05/10/18        Chief Complaint:   Shortness of breath    History of Present Illness: Claudia Mathias is a 80 y o  female with extensive cardiac history including aortic stenosis, diastolic CHF, atrial fibrillation, hypertension  Patient was just discharged from [facility] on May 6th  Patient was discharged to [facility]  Today, patient got very short of breath, when arrived to ER, she was in acute CHF, respiratory acidosis requiring BiPAP for stabilization of her cardiopulmonary status patient was found to be as well in acute renal failure, with elevated troponin and lactic acid  Patient will be admitted to step-down for further management  Patient is obtunded, currently unable to give any history or review of systems    Past Medical and Surgical History:     Past Medical History:   Diagnosis Date    Acute ischemic right MCA stroke [facility] )     Anticoagulant long-term use     last assessed: 8/17/17    Aortic stenosis     Atrial fibrillation ([facility] )     Blurred vision     last assessed: 10/25/13    CHF (congestive heart failure) ([facility])     Coronary artery disease     Dementia     Diabetes mellitus ([facility] )     Dysuria     last assessed: 8/3/15    Hyperlipidemia     Hypertension     Nonrheumatic aortic (valve) insufficiency     Old myocardial infarction     Rectal carcinoma (HCC)        Past Surgical History:   Procedure Laterality Date    BALLOON ANGIOPLASTY, ARTERY      CORONARY ARTERY BYPASS GRAFT      RECTAL SURGERY         Meds/Allergies:    Prior to Admission medications    Medication Sig Start Date End Date Taking?  Authorizing Provider   acetaminophen (TYLENOL) 325 mg tablet Take 1 tablet (325 mg total) by mouth every 6 (six) hours as needed for mild pain 5/8/18  Yes Azell Lundborg, MD   apixaban (ELIQUIS) 2 5 mg Take 1 tablet (2 5 mg total) by mouth 2 (two) times a day 4/24/18  Yes Traci Crawford MD   aspirin 81 mg chewable tablet Chew 1 tablet daily 8/7/17  Yes Clinton Gee DO   atorvastatin (LIPITOR) 40 mg tablet Take 1 tablet (40 mg total) by mouth daily with dinner 5/8/18  Yes Azell Lundborg, MD   calcitonin, salmon, (MIACALCIN) 200 units/act nasal spray USE 1 SPRAY IN ONE NOSTRIL DAILY--ALTERNATE NOSTRILS 3/15/18  Yes Hillary Moyer MD   diltiazem (CARDIZEM CD) 120 mg 24 hr capsule Take 1 capsule (120 mg total) by mouth daily 5/9/18  Yes Azell Lundborg, MD   famotidine (PEPCID) 20 mg tablet Take 1 tablet (20 mg total) by mouth daily 5/9/18  Yes Azell Lundborg, MD   furosemide (LASIX) 20 mg tablet Take 2 tablets (40 mg total) by mouth daily 5/8/18  Yes Azell Lundborg, MD   insulin glargine (LANTUS) 100 units/mL subcutaneous injection Inject 5 Units under the skin daily at bedtime 5/8/18  Yes Azell Lundborg, MD   insulin lispro (HumaLOG) 100 units/mL injection Inject 1-6 Units under the skin 3 (three) times a day before meals 5/8/18  Yes Azell Lundborg, MD   lactulose 20 g/30 mL Take 15 mL (10 g total) by mouth daily as needed (constipation) 5/8/18  Yes Azell Lundborg, MD   metoprolol succinate (TOPROL-XL) 50 mg 24 hr tablet Take 1 tablet (50 mg total) by mouth daily 5/9/18  Yes Azell Lundborg, MD   potassium chloride (K-DUR,KLOR-CON) 10 mEq tablet Take 1 tablet (10 mEq total) by mouth daily 2/21/18  Yes Shelley Matt MD   QUEtiapine (SEROquel) 25 mg tablet Take 0 5 tablets (12 5 mg total) by mouth daily at bedtime 5/8/18  Yes Azell Lundborg, MD   senna-docusate sodium (SENOKOT S) 8 6-50 mg per tablet Take 1 tablet by mouth daily at bedtime 4/24/18  Yes Traci Crawford MD     I have reveiwed home medications using records provided by [facility]  Allergies:    Allergies   Allergen Reactions    Heparin        Social History:     Marital Status: /Civil Union     History   Alcohol Use    Yes     Comment: occasional     History   Smoking Status    Former Smoker    Packs/day: 0 00    Quit date: 2002   Smokeless Tobacco    Never Used     Comment: 1 pack a week; No secondhand smoke exposure     History   Drug Use No       Family History:  Asked and noncontributory    Physical Exam:     Vitals:   Blood Pressure: 101/60 (05/10/18 0100)  Pulse: 96 (05/10/18 0100)  Temperature: 98 4 °F (36 9 °C) (05/10/18 0100)  Temp Source: Temporal (05/09/18 2214)  Respirations: 15 (05/10/18 0100)  Height: 5' (152 4 cm) (05/10/18 0100)  Weight - Scale: 54 kg (119 lb 0 8 oz) (05/10/18 0100)  SpO2: 100 % (05/10/18 0100)    General:  Obtunded, could not test her orientation  Head: Normocephalic atraumatic  BiPAP mask on  Eyes: EUNICE  Anicteric sclera  H&N: Supple neck  No palpable lymphadenopathy  Chest bilateral basal crackles  Heart: S1, S2 tachycardia, systolic murmur  Abd: soft, Non tender, non distended  Extremities: No oedema  No clubbing or cyanosis  Skin: Intact, no rash  Neuro: Moving all 4 extremities  Additional Data:     Lab Results: I have personally reviewed pertinent reports  Results from last 7 days  Lab Units 05/09/18  2211   WBC Thousand/uL 7 59   HEMOGLOBIN g/dL 13 9   HEMATOCRIT % 42 8   PLATELETS Thousands/uL 182   NEUTROS PCT % 72   LYMPHS PCT % 21   MONOS PCT % 6   EOS PCT % 1       Results from last 7 days  Lab Units 05/09/18  2211   SODIUM mmol/L 139   POTASSIUM mmol/L 5 0   CHLORIDE mmol/L 101   CO2 mmol/L 28   BUN mg/dL 31*   CREATININE mg/dL 1 52*   CALCIUM mg/dL 9 6   TOTAL PROTEIN g/dL 8 5*   BILIRUBIN TOTAL mg/dL 0 63   ALK PHOS U/L 107   ALT U/L 43   AST U/L 44   GLUCOSE RANDOM mg/dL 444*       Results from last 7 days  Lab Units 05/09/18  2211   INR  1 22*       Imaging: I have personally reviewed pertinent reports  Xr Chest 1 View Portable    Result Date: 4/23/2018  Narrative: CHEST INDICATION:   epigastric pain  Shortness of breath COMPARISON:  3/19/2018 EXAM PERFORMED/VIEWS:  XR CHEST PORTABLE FINDINGS:  There are median sternotomy wires indicating prior cardiac surgery  There is enlargement of the cardiac silhouette There is evidence of interstitial pulmonary edema and small bilateral effusions  No pneumothorax Osseous structures appear within normal limits for patient age  Impression: Interstitial pulmonary edema with small bilateral effusions  Workstation performed: [id number]     Xr Chest 2 Views    Result Date: 4/30/2018  Narrative: CHEST INDICATION:   weakness/ sob  COMPARISON:  April 22, 2018 and March 19, 2018  EXAM PERFORMED/VIEWS:  XR CHEST PA & LATERAL  The frontal view was performed utilizing dual energy radiographic technique  FINDINGS: Heart enlarged  Prior open heart surgery  Pulmonary vessels unremarkable  Bilateral pleural effusions, larger on the left  Lungs hyperinflated  Atelectasis or consolidation in the base of the left lower lobe  Diffuse demineralization of the imaged portions of the skeleton  Chronic mild compression deformity of T10  Impression: Small bilateral pleural effusions, larger on the left  Atelectasis or consolidation in the base of the left lower lobe  Workstation performed: [id number]     Xr Abdomen Obstruction Series    Result Date: 4/23/2018  Narrative: OBSTRUCTION SERIES INDICATION:   constipation  COMPARISON:  None EXAM PERFORMED/VIEWS:  XR ABDOMEN OBSTRUCTION SERIES FINDINGS: There is moderate amount of stool in the colon  There do not appear to be any abnormally dilated small bowel loops  No free air beneath the hemidiaphragms  No pathologic calcifications or soft tissue masses evident  There is a mild S-shaped thoracolumbar scoliosis  There is degenerative arthritis at the right hip joint  There are surgical clips over the left side of the abdomen and pelvis  There is severe atherosclerotic disease   Examination of the chest reveals a normal cardiomediastinal silhouette  There is small left pleural effusion and minimal right pleural effusion  There are some interstitial prominence in the lungs  There is no pneumothorax  There is probably mild bibasilar atelectasis  Impression: Small pleural effusions and interstitial prominence  Correlate for clinical evidence of failure  No obstruction seen  Moderate stool Workstation performed: [id number]     Ct Chest Without Contrast    Result Date: 4/29/2018  Narrative: CT CHEST WITHOUT IV CONTRAST INDICATION:   Shortness of breath  COMPARISON: 11/22/2008 TECHNIQUE: CT examination of the chest was performed without intravenous contrast   Axial, sagittal, and coronal 2D reformatted images were created from the source data and submitted for interpretation  Radiation dose length product (DLP) for this visit:  278 11 mGy-cm   This examination, like all CT scans performed in the [facility], was performed utilizing techniques to minimize radiation dose exposure, including the use of iterative  reconstruction and automated exposure control  FINDINGS: LUNGS:  There is interstitial edema and some hazy groundglass pulmonary edema bilaterally  A small focal density in the subpleural area of the right upper lobe anterolaterally on image 34 appears to have been present on the previous exam in keeping with  a benign finding  There are no airway obstructions seen on the right  There does appear to be some material within the left lower lobe bronchial branches peripherally and there is some left lower lobe consolidation in that area  This would suggest possible aspiration pneumonia  PLEURA:  There are moderate right and small left pleural effusions  There is no pneumothorax  HEART/GREAT VESSELS:  There is moderate cardiomegaly  There is calcification of the aortic root and aortic valve leaflets  There is heavy coronary artery calcification  There is no pericardial effusion  Patient is status post CABG    The ascending thoracic aorta is aneurysmally dilated measuring 4 3 cm  The pulmonary veins appear distended  MEDIASTINUM AND HOLLY:  There are numerous small lymph nodes  CHEST WALL AND LOWER NECK:   Unremarkable  VISUALIZED STRUCTURES IN THE UPPER ABDOMEN:  Unremarkable  OSSEOUS STRUCTURES:  There is a compression fracture at the superior endplate of Q60 which was present on prior chest x-ray from 3/19/2018  There is some osteopenia suggested diffusely  No new bony abnormality seen  Impression: The patient has debris in the left lower lobe bronchial branches with peripheral consolidation suggesting aspiration pneumonia at the left base  There is also evidence of congestive heart failure with pulmonary edema and pleural effusions and cardiomegaly  There is 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 and there are aortic valvular calcifications noted  Patient may benefit from repeat echocardiography  Workstation performed: [id number]       EKG, Pathology, and Other Studies Reviewed on Admission:   · EKG: sinus tachy      Assessment/Plan:    Hospital Problem List:     Principal Problem:    NSTEMI (non-ST elevated myocardial infarction) ([facility]  Active Problems:    Acute on chronic diastolic congestive heart failure ([facility] )    Controlled diabetes mellitus type II without complication (HCC)    CAD (coronary artery disease)      Plan for the Primary Problem(s):  Non STEMI  Likely type 2   - First troponin was 1 2 , will continue to trend  - Will check 2D echo to rule out cardiomyopathy and assess ejection fraction  - Will keep patient tele monitored  - continue anticoagulation with Eliquis  Continue beta-blocker and statin therapy  - Continue aspirin    - Cardiology consultation for further recommendations    Acute on chronic diastolic CHF  Acute combined hypoxic/hypercapnic respiratory failure  Will start patient on IV diuresis with Lasix  Daily weights  Strict Is&Os    ABG shows respiratory acidosis, continue BiPAP, DuoNeb round the clock  repeat ABGs, once acidosis improved, will discontinue BiPAP  Acute on chronic stage III renal failure  Baseline creatinine of 1 08  Presenting creatinine of 1 5  Likely cardiorenal   Should improve with IV diuresis  Will monitor BUN and creatinine daily  Elevated lactic acid  No signs of sepsis  Lactic acid had improved down from 3 1 to 2 2  History of atrial fibrillation  Patient is currently in sinus tachycardia continue Cardizem, beta-blockers  Continue anticoagulation with Eliquis    Diabetes  Continue Lantus insulin, pre meal insulin  Insulin sliding scale  Will keep patient on carbohydrate restricted diet    GERD  Continue Pepcid    Depression/dementia  Continue Seroquel    History of stroke  Continue aspirin    VTE Prophylaxis: Apixaban (Eliquis)  / sequential compression device   Code Status: DNR  POLST: POLST form is not discussed and not completed at this time  Anticipated Length of Stay:  Patient will be admitted on an Inpatient basis with an anticipated length of stay of  > 2 midnights  Justification for Hospital Stay:  Need for ICU stay    Total Time for Visit, including Counseling / Coordination of Care: 45 minutes  Greater than 50% of this total time spent on direct patient counseling and coordination of care  ** Please Note: Dragon 360 Dictation voice to text software may have been used in the creation of this document   **

## 2018-05-10 NOTE — PROGRESS NOTES
[facility] Progress Note  Patient: Nelda Styles 80 y o  female   MRN: [id number]  PCP: Maureen Reyes MD  Unit/Bed#: ICU 11 Encounter: [id number]  Date Of Visit: 05/11/18    Assessment:  51-year-old female with a history of combined congestive heart failure and [place] [facility] [place] with presentation of CHF she was being treated at rehab and developed shortness of breath without chest pain  The echocardiogram done and on previous admission did show evidence of a aortic stenosis with a valve area of 1 0 centimeter squared consistent with moderate to severe aortic stenosis  She is a rather poor historian due to cognitive dysfunction  Also has a history of  atrial fibrillation on Eliquis and rate control borderline at time of presentation  Initial presentation did show elevation and troponin of 1 2 and also manifested elevated lactic acid of 3 1 with no overt signs of sepsis or infection      Principal Problem:    NSTEMI (non-ST elevated myocardial infarction) ([facility] [address] )  Active Problems:    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 failure ([facility] [address] )    Nonrheumatic aortic valve stenosis    Controlled diabetes mellitus type II without complication (HCC)    Coronary artery disease involving native coronary artery of native heart without angina pectoris    Permanent atrial fibrillation (HCC)      Plan:    · NSTEMI with elevated troponin consistent with type 2 transmural myocardial infarct per Cardiology continue beta-blocker, aspirin and statin  · Acute on chronic combined congestive heart failure systolic and diastolic will increase IV diuresis as has failed a outpatient course in last 4 days cardiology is added spironolactone will need follow renal function closely, entrance BNP 5900  · Aortic stenosis described as moderate to severe based on most recent echo and valve area per Cardiology not a candidate for TAVR  · Essential hypertension continue present dosing of diltiazem and metoprolol  · Hyperlipidemia continue on statin has been reduced by Cardiology  · Coronary artery disease, most recent stress evaluation only showing mild ischemia will continue on beta-blocker and aspirin therapy and statin  · Bilateral pleural effusions will treat his congestive heart failure with increase IV diuresis/doubt pneumonia or infectious etiology will obtain procalcitonin but will hold further antibiotics  · Acute on chronic kidney disease creatinine above her baseline will continue to monitor with increasing IV diuresis may be on cardiorenal basis and should improve if so and now creatinine down to 1 04 from 1 52  · Type 2 diabetes mellitus  On Lantus q h s  5 units but hypoglycemic on last 2 measurements and will discontinue for now and will continue on sliding scale here with meals  · Acute on chronic hypoxic respiratory failure in relation to combined CHF and NSTEMI secondary CHF and hypoxia will titrate oxygen continue diuresis      VTE Pharmacologic Prophylaxis:   Pharmacologic: Apixaban (Eliquis)  Mechanical VTE Prophylaxis in Place: Yes    Discussions with Specialists or Other Care Team Provider: * spoke to Cardiology and pulmonology    Time Spent for Care: 45 minutes  More than 50% of total time spent on counseling and coordination of care as described above  Subjective:   Breathing easier off BiPAP packed required on admission now on low-flow cannula a denies any present chest pain no air hunger or accessory muscle usage  She is very anxious to return to [facility] I did tell her we would be transfer to the [facility] floor today would plate day by day based on success of her treatment for heart failure  Objective:     Vitals:   Temp (24hrs), Av °F (36 7 °C), Min:97 1 °F (36 2 °C), Max:98 7 °F (37 1 °C)    HR:  [] 90  Resp:  [15-46] 31  BP: (101-160)/(51-98) 107/57  SpO2:  [94 %-100 %] 96 %  Body mass index is 23 25 kg/m²  Input and Output Summary (last 24 hours): Intake/Output Summary (Last 24 hours) at 05/10/18 1440  Last data filed at 05/10/18 1216   Gross per 24 hour   Intake           528 33 ml   Output             1248 ml   Net          -719 67 ml       Physical Exam:     Physical Exam:   General appearance: alert, appears stated age and cooperative  Head: Normocephalic, without obvious abnormality, atraumatic  Lungs: crackles  Heart: irregularly irregular rhythm  Abdomen: soft, non-tender; bowel sounds normal; no masses,  no organomegaly  Back: negative  Extremities: extremities normal, atraumatic, no cyanosis or edema  Neurologic: Grossly normal      Additional Data:     Labs:      Results from last 7 days  Lab Units 05/10/18  0523 05/09/18  2211   WBC Thousand/uL 6 01 7 59   HEMOGLOBIN g/dL 12 2 13 9   HEMATOCRIT % 38 4 42 8   PLATELETS Thousands/uL 137* 182   NEUTROS PCT %  --  72   LYMPHS PCT %  --  21   MONOS PCT %  --  6   EOS PCT %  --  1       Results from last 7 days  Lab Units 05/10/18  0523 05/09/18  2211   SODIUM mmol/L 141 139   POTASSIUM mmol/L 4 4 5 0   CHLORIDE mmol/L 104 101   CO2 mmol/L 30 28   BUN mg/dL 33* 31*   CREATININE mg/dL 1 32* 1 52*   CALCIUM mg/dL 9 4 9 6   TOTAL PROTEIN g/dL  --  8 5*   BILIRUBIN TOTAL mg/dL  --  0 63   ALK PHOS U/L  --  107   ALT U/L  --  43   AST U/L  --  44   GLUCOSE RANDOM mg/dL 264* 444*       Results from last 7 days  Lab Units 05/09/18  2211   INR  1 22*       * I Have Reviewed All Lab Data Listed Above  * Additional Pertinent Lab Tests Reviewed: All Labs For Current Hospital Admission Reviewed    Imaging:  Xr Chest Portable - 1 View    Result Date: 5/10/2018  Narrative: CHEST INDICATION:   resp distress  COMPARISON:  4/29/2018 EXAM PERFORMED/VIEWS:  XR CHEST PORTABLE FINDINGS: Heart shadow is enlarged but unchanged from prior exam   Atherosclerotic aorta  Increased congestive changes with bibasilar opacities and small pleural effusions   Osseous structures appear within normal limits for patient age  Impression: Increased congestive changes with bibasilar opacities and small pleural effusions  Workstation performed: [id number]     Xr Chest 1 View Portable    Result Date: 4/23/2018  Narrative: CHEST INDICATION:   epigastric pain  Shortness of breath COMPARISON:  3/19/2018 EXAM PERFORMED/VIEWS:  XR CHEST PORTABLE FINDINGS:  There are median sternotomy wires indicating prior cardiac surgery  There is enlargement of the cardiac silhouette There is evidence of interstitial pulmonary edema and small bilateral effusions  No pneumothorax Osseous structures appear within normal limits for patient age  Impression: Interstitial pulmonary edema with small bilateral effusions  Workstation performed: [id number]     Xr Chest 2 Views    Result Date: 4/30/2018  Narrative: CHEST INDICATION:   weakness/ sob  COMPARISON:  April 22, 2018 and March 19, 2018  EXAM PERFORMED/VIEWS:  XR CHEST PA & LATERAL  The frontal view was performed utilizing dual energy radiographic technique  FINDINGS: Heart enlarged  Prior open heart surgery  Pulmonary vessels unremarkable  Bilateral pleural effusions, larger on the left  Lungs hyperinflated  Atelectasis or consolidation in the base of the left lower lobe  Diffuse demineralization of the imaged portions of the skeleton  Chronic mild compression deformity of T10  Impression: Small bilateral pleural effusions, larger on the left  Atelectasis or consolidation in the base of the left lower lobe  Workstation performed: [id number]     Xr Abdomen Obstruction Series    Result Date: 4/23/2018  Narrative: OBSTRUCTION SERIES INDICATION:   constipation  COMPARISON:  None EXAM PERFORMED/VIEWS:  XR ABDOMEN OBSTRUCTION SERIES FINDINGS: There is moderate amount of stool in the colon  There do not appear to be any abnormally dilated small bowel loops  No free air beneath the hemidiaphragms  No pathologic calcifications or soft tissue masses evident  There is a mild S-shaped thoracolumbar scoliosis  There is degenerative arthritis at the right hip joint  There are surgical clips over the left side of the abdomen and pelvis  There is severe atherosclerotic disease  Examination of the chest reveals a normal cardiomediastinal silhouette  There is small left pleural effusion and minimal right pleural effusion  There are some interstitial prominence in the lungs  There is no pneumothorax  There is probably mild bibasilar atelectasis  Impression: Small pleural effusions and interstitial prominence  Correlate for clinical evidence of failure  No obstruction seen  Moderate stool Workstation performed: [id number]     Ct Chest Without Contrast    Result Date: 4/29/2018  Narrative: CT CHEST WITHOUT IV CONTRAST INDICATION:   Shortness of breath  COMPARISON: 11/22/2008 TECHNIQUE: CT examination of the chest was performed without intravenous contrast   Axial, sagittal, and coronal 2D reformatted images were created from the source data and submitted for interpretation  Radiation dose length product (DLP) for this visit:  278 11 mGy-cm   This examination, like all CT scans performed in the [facility], was performed utilizing techniques to minimize radiation dose exposure, including the use of iterative  reconstruction and automated exposure control  FINDINGS: LUNGS:  There is interstitial edema and some hazy groundglass pulmonary edema bilaterally  A small focal density in the subpleural area of the right upper lobe anterolaterally on image 34 appears to have been present on the previous exam in keeping with  a benign finding  There are no airway obstructions seen on the right  There does appear to be some material within the left lower lobe bronchial branches peripherally and there is some left lower lobe consolidation in that area  This would suggest possible aspiration pneumonia   PLEURA:  There are moderate right and small left pleural effusions  There is no pneumothorax  HEART/GREAT VESSELS:  There is moderate cardiomegaly  There is calcification of the aortic root and aortic valve leaflets  There is heavy coronary artery calcification  There is no pericardial effusion  Patient is status post CABG  The ascending thoracic aorta is aneurysmally dilated measuring 4 3 cm  The pulmonary veins appear distended  MEDIASTINUM AND HOLLY:  There are numerous small lymph nodes  CHEST WALL AND LOWER NECK:   Unremarkable  VISUALIZED STRUCTURES IN THE UPPER ABDOMEN:  Unremarkable  OSSEOUS STRUCTURES:  There is a compression fracture at the superior endplate of P56 which was present on prior chest x-ray from 3/19/2018  There is some osteopenia suggested diffusely  No new bony abnormality seen  Impression: The patient has debris in the left lower lobe bronchial branches with peripheral consolidation suggesting aspiration pneumonia at the left base  There is also evidence of congestive heart failure with pulmonary edema and pleural effusions and cardiomegaly  There is 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 and there are aortic valvular calcifications noted  Patient may benefit from repeat echocardiography  Workstation performed: [id number]     Imaging Reports Reviewed Today Include:  Reviewed chest x-ray and CT imaging  Imaging Personally Reviewed by Myself Includes:  Now  Procedure: Xr Chest Portable - 1 View    Result Date: 5/10/2018  Narrative: CHEST INDICATION:   resp distress  COMPARISON:  4/29/2018 EXAM PERFORMED/VIEWS:  XR CHEST PORTABLE FINDINGS: Heart shadow is enlarged but unchanged from prior exam   Atherosclerotic aorta  Increased congestive changes with bibasilar opacities and small pleural effusions  Osseous structures appear within normal limits for patient age  Impression: Increased congestive changes with bibasilar opacities and small pleural effusions   Workstation performed: [id number]        Recent Cultures (last 7 days):           Last 24 Hours Medication List:     Current Facility-Administered Medications:  acetaminophen 650 mg Oral Q6H PRN Julio Devlin MD   apixaban 2 5 mg Oral BID Julio Devlin MD   aspirin 81 mg Oral Daily Julio Devlin MD   atorvastatin 20 mg Oral Daily With Harleen Powell MD   calcitonin (salmon) 1 spray Alternating Nares Daily Julio Devlin MD   diltiazem 120 mg Oral Daily Julio Devlin MD   famotidine 20 mg Oral Daily Julio Devlin MD   furosemide 40 mg Intravenous TID (diuretic) Salinas Gutierrez MD   insulin glargine 5 Units Subcutaneous HS Julio Devlni MD   insulin lispro 1-5 Units Subcutaneous TID AC Julio Devlin MD   metoprolol succinate 50 mg Oral Daily Julio Devlin MD   ondansetron 4 mg Intravenous Q4H PRN Julio Devlin MD   polyethylene glycol 17 g Oral Daily Julio Devlin MD   potassium chloride 10 mEq Oral Daily Julio Devlin MD   QUEtiapine 12 5 mg Oral HS Julio Devlin MD   senna-docusate sodium 1 tablet Oral HS Julio Devlin MD   spironolactone 12 5 mg Oral Daily Lydia Wallace MD        Today, Patient Was Seen By: Mary Rogers MD    ** Please Note: Dragon 360 Dictation voice to text software may have been used in the creation of this document   **

## 2018-05-10 NOTE — CONSULTS
Consult - Cardiology   Horacio Schmid 80 y o  female MRN: [id number]  Unit/Bed#: ICU 11 Encounter: [id number]          Reason For Consult:  CHF    History Of Present Illness: The patient is a 27-year-old year old female with a history of combined congestive heart failure  Notes refer to severe aortic stenosis however her echo from April 30th suggested aortic valve area of 1 0 centimeter squared consistent with moderate to severe aortic stenosis  She was just discharged from [facility] 4 days ago with congestive heart failure  She was at rehab she presented to our hospital yesterday with increasing dyspnea without chest pain  She denies edema  She denies palpitations or lightheadedness  Past Medical History: Aortic stenosis-moderate to severe  Chronic combined congestive heart failure with estimated ejection fraction of 45%  Atrial fibrillation-on Eliquis  Dementia-appears modest by exam  Hypertension  History of depression and anxiety  Hypertension  Hyperlipidemia  Osteoarthritis  CAD with a small amount of anterior apical ischemia seen on Myoview testing in August of last year with prior catheter based intervention  History of skin cancer  GERD  Prior history of stroke  History of cellulitis  History of rectal carcinoma with surgery     Allergy:  Allergies   Allergen Reactions    Heparin        Medications:  Aspirin 81 mg daily  Eliquis 2 5 mg b i d  Atorvastatin 40 mg daily  Cardizem  20 mg daily  Famotidine  Lasix 40 mg daily  Insulin  Metoprolol XL 50 mg daily  Potassium 10 mEq daily  Calcitonin  Seroquel  Diabetes mellitus    Family History:  Stroke    Social History:  Remote smoking  No alcohol use  Lives with her  in [address]:  Fatigue  No TIAs or claudication    Exam:  113/60  Pulse 72  General:   Frail elderl female no acute distress  Head: Normocephalic, atraumatic  Eyes:   No Icterus    Normal Conjunctiva  Oropharynx: normal-appearing mucosa and no pharyngitis, no exudate  Neck:  Supple without JVD or thyromegaly  Carotids +1 with transmitted murmurs   Heart: irregularly irregular rhythm,, controlled rate, grade 2 systolic murmur at the base  No diastolic murmur rub or gallop  Lungs:  Decreased breath sounds especially at the bases without audible rales rhonchi or wheezes   Abdomen: flat, normal findings:   Soft nontender without mass organomegaly  Lower Limbs:  No edema  Dorsalis pedal pulses 1+    EKG:  Not available  Chest x-ray:  Bilateral effusions with congestive heart failure changes noted  Hemoglobin 12 2, white blood count 6 01, platelets a [id number]  BUN 33, creatinine 1 32, potassium 4 4  ProBNP 5901  Troponin 1 22    ASSESSMENT AND PLAN:   1  Congestive heart failure  Combined systolic and diastolic heart failure with estimated ejection fraction of 45%  Certainly atrial fibrillation is contributing to this  There are notes that refer to her aortic stenosis is severe but her aortic valve area and gradients do not support this  She has been deemed a poor candidate for TAVR due to [address] although she knew her cardiologist and described her house to me fairly clearly  Agree with aggressive IV diuresis  Patient clearly needs more than 40 mg of furosemide daily to stay out of congestive heart failure  Will add spironolactone at this point as well  Follow renal function closely  2  Elevated troponin  Type 2 nontransmural myocardial infarction  Patient does have a history of remote intervention and more recent stress testing which showed only discrete ischemia  Continue beta-blocker  Continue aspirin  3   Atrial fibrillation  Rate well controlled on diltiazem and metoprolol  Continue same  Patient on appropriate dosage of Eliquis and continue this going forward  4  Aortic stenosis  Appears moderate to severe  Feel severity is not bad enough to warrant tab are and she has a questionable candidate for this anyway  5  HLP-on statin  Shelli Alavrez Rather robust dose for a patient of this stature  Will reduced dosage in half to avoid side effects  6  Hypertension    Well controlled on current dosage of diltiazem and metoprolol    Will follow with you and advise further    Velvet Osorio MD

## 2018-05-10 NOTE — ED NOTES
Modesto Mcdonnell notified of patient dx and admission status        Rashaun Rosen RN  05/10/18 0000

## 2018-05-10 NOTE — CASE MANAGEMENT
Initial Clinical Review    Admission: Date/Time/Statement: 5/9/18 @ 2338     Orders Placed This Encounter   Procedures    Inpatient Admission (expected length of stay for this patient is greater than two midnights)     Standing Status:   Standing     Number of Occurrences:   1     Order Specific Question:   Admitting Physician     Answer:   Cathryn Zarate [[id number]]     Order Specific Question:   Level of Care     Answer:   Level 2 Stepdown / HOT [14]     Order Specific Question:   Estimated length of stay     Answer:   More than 2 Midnights     Order Specific Question:   Certification     Answer:   I certify that inpatient services are medically necessary for this patient for a duration of greater than two midnights  See H&P and MD Progress Notes for additional information about the patient's course of treatment  ED: Date/Time/Mode of Arrival:   ED Arrival Information     Expected Arrival Acuity Means of Arrival Escorted By Service Admission Type    - 5/9/2018 21:54 Emergent Ambulance [facility] General Medicine Emergency    Arrival Complaint    Shortness of Breath          Chief Complaint:   Chief Complaint   Patient presents with    Shortness of Breath     pt arrives from [facility], has had SOB ongoing for five hours increasing to resp distress 30min PTA, EMS gave 10 mg cardizem for HR in 150s, pt arrives on CPAP with relief        History of Illness: Natali Anaya is a 80 y o  female with extensive cardiac history including aortic stenosis, diastolic CHF, atrial fibrillation, hypertension  Patient was just discharged from [facility] on May 6th  Patient was discharged to [place] [address]  Today, patient got very short of breath, when arrived to ER, she was in acute CHF, respiratory acidosis requiring BiPAP for stabilization of her cardiopulmonary status patient was found to be as well in acute renal failure, with elevated troponin and lactic acid    Patient will be admitted to step-down for further management      Patient is obtunded, currently unable to give any history or review of systems       ED Vital Signs:   ED Triage Vitals   Temperature Pulse Respirations Blood Pressure SpO2   05/09/18 2214 05/09/18 2201 05/09/18 2201 05/09/18 2201 05/09/18 2201   98 7 °F (37 1 °C) (!) 128 (!) 26 160/98 100 %      Temp Source Heart Rate Source Patient Position - Orthostatic VS BP Location FiO2 (%)   05/09/18 2214 05/09/18 2201 05/09/18 2201 05/09/18 2201 --   Temporal Monitor Sitting Right arm       Pain Score       05/09/18 2345       No Pain        Wt Readings from Last 1 Encounters:   05/10/18 54 kg (119 lb 0 8 oz)       Vital Signs (abnormal):    above    Abnormal Labs/Diagnostic Test Results:   u/A   2+ protein      Tr  Blood       Lactic  Acid    3 1  BNP     5,901  Troponin   1 22  BUN/Creat    31/1 52  CXR:      Increased congestive changes with bibasilar opacities and small pleural effusions      ED Treatment:   Medication Administration from 05/09/2018 2154 to 05/10/2018 1480       Date/Time Order Dose Route Action Action by Comments     05/09/2018 2209  EMS REPLENISHMENT MED 0  Does not apply Given to EMS Zoraida Salinas RN      05/09/2018 2224  EMS REPLENISHMENT MED 0  Does not apply Given to EMS Zoraida Salinas RN      05/09/2018 2253 aspirin rectal suppository 300 mg 300 mg Rectal Given Zoraida Salinas RN      05/09/2018 2323 cefepime (MAXIPIME) 2 g/50 mL dextrose IVPB 0 mg Intravenous Stopped Zoraida Salinas RN      05/09/2018 2250 cefepime (MAXIPIME) 2 g/50 mL dextrose IVPB 2,000 mg Intravenous [address] [facility]      05/10/2018 0035 vancomycin (VANCOCIN) IVPB (premix) 1,000 mg 0 mg/kg Intravenous Stopped Zoraida Salinas RN      05/09/2018 2325 vancomycin (VANCOCIN) IVPB (premix) 1,000 mg 1,000 mg Intravenous [address] Zoraida Salinas [facility]      05/09/2018 2312 furosemide (LASIX) injection 20 mg 20 mg Intravenous Given Elise Balding Jc Bautista RN           Past Medical/Surgical History:    Active Ambulatory Problems     Diagnosis Date Noted    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 failure ([facility] ) 08/03/2017    Nonrheumatic aortic valve stenosis 08/03/2017    Benign essential HTN 08/03/2017    Controlled diabetes mellitus type II without complication ([facility] ) 66/02/5079    Atrial fibrillation with RVR ([facility] ) 08/03/2017    Acute ischemic right MCA stroke ([facility] ) 08/03/2017    Coronary artery disease involving native coronary artery of native heart without angina pectoris 08/03/2017    Cognitive impairment 08/28/2017    Depression with anxiety 02/27/2013    Esophageal reflux 09/21/2012    Mixed hyperlipidemia 06/12/2012    Essential hypertension 09/12/2012    Osteoarthritis 09/21/2012    Squamous cell carcinoma of skin 12/12/2014    Tinnitus 11/24/2015    Severe protein-calorie malnutrition Rayna Swayzee: less than 60% of standard weight) ([facility] ) 04/29/2018    Moderate protein-calorie malnutrition ([facility] ) 04/30/2018    Goals of care, counseling/discussion 05/01/2018    Impaired mobility and ADLs 05/03/2018    Dementia 05/03/2018    Ambulatory dysfunction 05/03/2018    Physical deconditioning 05/03/2018    Type 2 myocardial infarction without ST elevation ([facility] ) 05/03/2018     Resolved Ambulatory Problems     Diagnosis Date Noted    Encephalopathy 08/06/2017    Type 2 myocardial infarction ([facility] ) 04/22/2018    Epigastric pain 04/22/2018    Nausea 04/30/2018    Transaminitis 04/30/2018    Delirium 05/03/2018     Past Medical History:   Diagnosis Date    Acute ischemic right MCA stroke ([facility] )     Anticoagulant long-term use     Aortic stenosis     Atrial fibrillation (HCC)     Blurred vision     CHF (congestive heart failure) (HCC)     Coronary artery disease     Dementia     Diabetes mellitus ([facility] )     Dysuria     Hyperlipidemia     Hypertension     Nonrheumatic aortic (valve) insufficiency     Old myocardial infarction     Rectal carcinoma (HCC)        Admitting Diagnosis: Acute congestive heart failure ([facility]) [I50 9]  Sinus tachycardia [R00 0]  SOB (shortness of breath) [R06 02]  Hyperglycemia [R73 9]  NSTEMI (non-ST elevated myocardial infarction) ([facility]) [I21 4]  REHANA (acute kidney injury) ([facility] ) [N17 9]  Elevated lactic acid level [R79 89]  Acute hypoxemic respiratory failure ([facility] ) [J96 01]    Age/Sex: 80 y o  female    Assessment/Plan:    Non STEMI  Likely type 2   - First troponin was 1 2 , will continue to trend  - Will check 2D echo to rule out cardiomyopathy and assess ejection fraction  - Will keep patient tele monitored  - continue anticoagulation with Eliquis  Continue beta-blocker and statin therapy  - Continue aspirin    - Cardiology consultation for further recommendations     Acute on chronic diastolic CHF  Acute combined hypoxic/hypercapnic respiratory failure  Will start patient on IV diuresis with Lasix  Daily weights  Strict Is&Os  ABG shows respiratory acidosis, continue BiPAP, DuoNeb round the clock  repeat ABGs, once acidosis improved, will discontinue BiPAP      Acute on chronic stage III renal failure  Baseline creatinine of 1 08  Presenting creatinine of 1 5  Likely cardiorenal   Should improve with IV diuresis  Will monitor BUN and creatinine daily      Elevated lactic acid  No signs of sepsis  Lactic acid had improved down from 3 1 to 2 2  Anticipated Length of Stay:  Patient will be admitted on an Inpatient basis with an anticipated length of stay of  > 2 midnights     Justification for Hospital Stay:  Need for ICU stay    Admission Orders:   IP      5/9  @    1841  Scheduled Meds:   Current Facility-Administered Medications:  acetaminophen 650 mg Oral Q6H PRN Julio Devlin MD   apixaban 2 5 mg Oral BID Julio Devlin MD   aspirin 81 mg Oral Daily Julio Devlin MD   atorvastatin 20 mg Oral Daily With Olaf Whitlock MD   calcitonin (salmon) 1 spray Alternating Nares Daily Julio Devlin MD   diltiazem 120 mg Oral Daily Julio Devlin MD   famotidine 20 mg Oral Daily Julio Devlin MD   furosemide 40 mg Intravenous TID (diuretic) Julio Devlin MD   insulin glargine 5 Units Subcutaneous HS Julio Devlin MD   insulin lispro 1-5 Units Subcutaneous TID AC Julio Devlin MD   metoprolol succinate 50 mg Oral Daily Julio Devlin MD   ondansetron 4 mg Intravenous Q4H PRN Julio Devlin MD   polyethylene glycol 17 g Oral Daily Julio Devlin MD   potassium chloride 10 mEq Oral Daily Julio Devlin MD   QUEtiapine 12 5 mg Oral HS Julio Devlin MD   senna-docusate sodium 1 tablet Oral HS Julio Devlin MD   spironolactone 12 5 mg Oral Daily Kori Atkins MD     Continuous Infusions:    PRN Meds:   acetaminophen    ondansetron     CCD  Diet  Cons  Cardiology  Cons  Pulmonary  BiPap    Per  Cardiology  Consult:     Congestive heart failure  Combined systolic and diastolic heart failure with estimated ejection fraction of 45%  Certainly atrial fibrillation is contributing to this  There are notes that refer to her aortic stenosis is severe but her aortic valve area and gradients do not support this  She has been deemed a poor candidate for TAVR due to [address] although she knew her cardiologist and described her house to me fairly clearly  Agree with aggressive IV diuresis  Patient clearly needs more than 40 mg of furosemide daily to stay out of congestive heart failure  Will add spironolactone at this point as well  Follow renal function closely  2  Elevated troponin  Type 2 nontransmural myocardial infarction  Patient does have a history of remote intervention and more recent stress testing which showed only discrete ischemia  Continue beta-blocker  Continue aspirin  3   Atrial fibrillation  Rate well controlled on diltiazem and metoprolol  Continue same  Patient on appropriate dosage of Eliquis and continue this going forward  4  Aortic stenosis    Appears moderate to severe  Feel severity is not bad enough to warrant tab are and she has a questionable candidate for this anyway  5  HLP-on statin     Rather robust dose for a patient of this stature  Will reduced dosage in half to avoid side effects  6  Hypertension    Well controlled on current dosage of diltiazem and metoprolol

## 2018-05-10 NOTE — PLAN OF CARE
GENITOURINARY - ADULT     Maintains or returns to baseline urinary function Not Progressing     Absence of urinary retention Not Progressing        HEMATOLOGIC - ADULT     Maintains hematologic stability Not Progressing        METABOLIC, FLUID AND ELECTROLYTES - ADULT     Electrolytes maintained within normal limits Not Progressing     Fluid balance maintained Not Progressing     Glucose maintained within target range Not Progressing        MUSCULOSKELETAL - ADULT     Maintain or return mobility to safest level of function Not Progressing     Maintain proper alignment of affected body part Not Progressing        Nutrition/Hydration-ADULT     Nutrient/Hydration intake appropriate for improving, restoring or maintaining nutritional needs Not Progressing        Potential for Falls     Patient will remain free of falls Not Progressing        Prexisting or High Potential for Compromised Skin Integrity     Skin integrity is maintained or improved Not Progressing        RESPIRATORY - ADULT     Achieves optimal ventilation and oxygenation Not Progressing        SKIN/TISSUE INTEGRITY - ADULT     Skin integrity remains intact Not Progressing     Incision(s), wounds(s) or drain site(s) healing without S/S of infection Not Progressing     Oral mucous membranes remain intact Not Progressing

## 2018-05-10 NOTE — ED PROVIDER NOTES
History  Chief Complaint   Patient presents with    Shortness of Breath     pt arrives from [place] [address], has had SOB ongoing for five hours increasing to resp distress 30min PTA, EMS gave 10 mg cardizem for HR in 150s, pt arrives on CPAP with relief      72-year-old female with history of atrial fibrillation on Eliquis, congestive heart failure, recent hospitalization due to acute CHF exacerbation discharge 3 days ago presents for respiratory distress from the rehab facility  She reported shortness of breath that began earlier in the day but acutely became worse  The patient was provided with a nitro patch, her scheduled dose of Cardizem as well as metoprolol with no relief of symptoms EMS was called  Upon arrival patient is in acute respiratory distress and hypoxic placed on CPAP  Patient continued to be tachycardic and rhythm appeared irregular and was given 10 of Cardizem prior to arrival   Upon arrival patient had conversational dyspnea, increased work of breathing this CT accessory muscle usage and was tachypneic  She denies any chest pain or abdominal or back pain  No recent fever chills  History is limited secondary to severity of symptoms  On exam she had crackles abdomen soft and nontender  There is no lower extremity swelling or calf tenderness  Patient is DNR DNI as confirmed by the nursing home            Prior to Admission Medications   Prescriptions Last Dose Informant Patient Reported? Taking?    QUEtiapine (SEROquel) 25 mg tablet   No Yes   Sig: Take 0 5 tablets (12 5 mg total) by mouth daily at bedtime   acetaminophen (TYLENOL) 325 mg tablet   No Yes   Sig: Take 1 tablet (325 mg total) by mouth every 6 (six) hours as needed for mild pain   apixaban (ELIQUIS) 2 5 mg   No Yes   Sig: Take 1 tablet (2 5 mg total) by mouth 2 (two) times a day   aspirin 81 mg chewable tablet  Self No Yes   Sig: Chew 1 tablet daily   atorvastatin (LIPITOR) 40 mg tablet   No Yes   Sig: Take 1 tablet (40 mg total) by mouth daily with dinner   calcitonin, salmon, (MIACALCIN) 200 units/act nasal spray   No Yes   Sig: USE 1 SPRAY IN ONE NOSTRIL DAILY--ALTERNATE NOSTRILS   diltiazem (CARDIZEM CD) 120 mg 24 hr capsule   No Yes   Sig: Take 1 capsule (120 mg total) by mouth daily   famotidine (PEPCID) 20 mg tablet   No Yes   Sig: Take 1 tablet (20 mg total) by mouth daily   furosemide (LASIX) 20 mg tablet   No Yes   Sig: Take 2 tablets (40 mg total) by mouth daily   insulin glargine (LANTUS) 100 units/mL subcutaneous injection   No Yes   Sig: Inject 5 Units under the skin daily at bedtime   insulin lispro (HumaLOG) 100 units/mL injection   No Yes   Sig: Inject 1-6 Units under the skin 3 (three) times a day before meals   lactulose 20 g/30 mL   No Yes   Sig: Take 15 mL (10 g total) by mouth daily as needed (constipation)   metoprolol succinate (TOPROL-XL) 50 mg 24 hr tablet   No Yes   Sig: Take 1 tablet (50 mg total) by mouth daily   potassium chloride (K-DUR,KLOR-CON) 10 mEq tablet   No Yes   Sig: Take 1 tablet (10 mEq total) by mouth daily   senna-docusate sodium (SENOKOT S) 8 6-50 mg per tablet   No Yes   Sig: Take 1 tablet by mouth daily at bedtime      Facility-Administered Medications: None       Past Medical History:   Diagnosis Date    Acute ischemic right MCA stroke ([facility])     Anticoagulant long-term use     last assessed: 8/17/17    Aortic stenosis     Atrial fibrillation ([facility] )     Blurred vision     last assessed: 10/25/13    CHF (congestive heart failure) ([facility])     Coronary artery disease     Dementia     [facility]     Dysuria     last assessed: 8/3/15    Hyperlipidemia     Hypertension     Nonrheumatic aortic (valve) insufficiency     Old myocardial infarction     Rectal carcinoma (HCC)        Past Surgical History:   Procedure Laterality Date    BALLOON ANGIOPLASTY, ARTERY      CORONARY ARTERY BYPASS GRAFT      RECTAL SURGERY         Family History   Problem Relation Age of Onset    Stroke Mother     Stroke Father      I have reviewed and agree with the history as documented  Social History   Substance Use Topics    Smoking status: Former Smoker     Packs/day: 1 00     Years: 20 00     Quit date: 2002    Smokeless tobacco: Never Used      Comment: 1 pack a week; No secondhand smoke exposure    Alcohol use Yes      Comment: occasional        Review of Systems   Unable to perform ROS: Severe respiratory distress       Physical Exam  ED Triage Vitals   Temperature Pulse Respirations Blood Pressure SpO2   05/09/18 2214 05/09/18 2201 05/09/18 2201 05/09/18 2201 05/09/18 2201   98 7 °F (37 1 °C) (!) 128 (!) 26 160/98 100 %      Temp Source Heart Rate Source Patient Position - Orthostatic VS BP Location FiO2 (%)   05/09/18 2214 05/09/18 2201 05/09/18 2201 05/09/18 2201 --   Temporal Monitor Sitting Right arm       Pain Score       05/09/18 2345       No Pain           Orthostatic Vital Signs  Vitals:    05/13/18 1037 05/13/18 1143 05/13/18 1521 05/13/18 1900   BP: 136/65 129/72 141/74 156/71   Pulse: (!) 46 (!) 45 88 85   Patient Position - Orthostatic VS: Sitting  Lying Lying       Physical Exam   Constitutional: She is oriented to person, place, and time  Vital signs are normal  She appears well-developed and well-nourished  She does not appear ill  She appears distressed  HENT:   Head: Normocephalic and atraumatic  Head is without abrasion and without contusion  Right Ear: Tympanic membrane normal    Left Ear: Tympanic membrane normal    Nose: Nose normal    Mouth/Throat: Uvula is midline, oropharynx is clear and moist and mucous membranes are normal    Eyes: Conjunctivae and EOM are normal  Pupils are equal, round, and reactive to light  Neck: Trachea normal, normal range of motion, full passive range of motion without pain and phonation normal  Neck supple  No JVD present  No spinous process tenderness and no muscular tenderness present  Carotid bruit is not present   Normal range of motion present  No thyromegaly present  Cardiovascular: Normal rate, regular rhythm and intact distal pulses  Exam reveals no friction rub  No murmur heard  Pulmonary/Chest: Accessory muscle usage present  No stridor  Tachypnea noted  She is in respiratory distress  She has no decreased breath sounds  She has wheezes  She has no rhonchi  She has rales  She exhibits no crepitus, no edema and no retraction  Talking in 2-3 word sentences while on BiPAP   Abdominal: Soft  Normal appearance and bowel sounds are normal  She exhibits no distension  There is no tenderness  There is no rigidity, no rebound, no guarding and no CVA tenderness  Musculoskeletal: Normal range of motion  Lymphadenopathy:     She has no cervical adenopathy  Neurological: She is alert and oriented to person, place, and time  She has normal strength  She displays no tremor  No cranial nerve deficit or sensory deficit  She exhibits normal muscle tone  GCS eye subscore is 4  GCS verbal subscore is 5  GCS motor subscore is 6  Skin: Skin is warm, dry and intact  No rash noted  She is not diaphoretic  Psychiatric: She has a normal mood and affect  Nursing note and vitals reviewed        ED Medications  Medications   apixaban (ELIQUIS) tablet 2 5 mg (2 5 mg Oral Given 5/13/18 1706)   aspirin chewable tablet 81 mg (81 mg Oral Given 5/13/18 0855)   calcitonin (salmon) (MIACALCIN) 200 units/act nasal spray 1 spray (1 spray Alternating Nares Given 5/13/18 0856)   diltiazem (CARDIZEM CD) 24 hr capsule 120 mg (120 mg Oral Given 5/13/18 0855)   famotidine (PEPCID) tablet 20 mg (20 mg Oral Given 5/13/18 0853)   metoprolol succinate (TOPROL-XL) 24 hr tablet 50 mg (50 mg Oral Given 5/13/18 0853)   potassium chloride (K-DUR,KLOR-CON) CR tablet 10 mEq (10 mEq Oral Given 5/13/18 0854)   QUEtiapine (SEROquel) tablet 12 5 mg (12 5 mg Oral Given 5/13/18 2103)   senna-docusate sodium (SENOKOT S) 8 6-50 mg per tablet 1 tablet (1 tablet Oral Not Given 5/13/18 2102)   polyethylene glycol (MIRALAX) packet 17 g (17 g Oral Given 5/13/18 0854)   ondansetron (ZOFRAN) injection 4 mg ( Intravenous MAR Unhold 5/11/18 1337)   acetaminophen (TYLENOL) tablet 650 mg ( Oral MAR Unhold 5/11/18 1337)   insulin lispro (HumaLOG) 100 units/mL subcutaneous injection 1-5 Units (2 Units Subcutaneous Given 5/13/18 1707)   spironolactone (ALDACTONE) tablet 12 5 mg (12 5 mg Oral Given 5/13/18 0855)   atorvastatin (LIPITOR) tablet 20 mg (20 mg Oral Given 5/13/18 1705)   furosemide (LASIX) injection 40 mg (40 mg Intravenous Given 5/13/18 1706)    EMS REPLENISHMENT MED ( Does not apply Given to EMS 5/9/18 2209)    EMS REPLENISHMENT MED ( Does not apply Given to EMS 5/9/18 2224)   aspirin rectal suppository 300 mg (300 mg Rectal Given 5/9/18 2253)   cefepime (MAXIPIME) 2 g/50 mL dextrose IVPB (0 mg Intravenous Stopped 5/9/18 2323)   vancomycin (VANCOCIN) IVPB (premix) 1,000 mg (0 mg/kg × 60 8 kg Intravenous Stopped 5/10/18 0035)   furosemide (LASIX) injection 20 mg (20 mg Intravenous Given 5/9/18 2312)       Diagnostic Studies  Results Reviewed     Procedure Component Value Units Date/Time    Blood culture #1 [[id number]] Collected:  05/09/18 2211    Lab Status:  Preliminary result Specimen:  Blood from Arm, Right Updated:  05/13/18 0701     Blood Culture No Growth at 72 hrs  Blood culture #2 [[id number]] Collected:  05/09/18 2211    Lab Status:  Preliminary result Specimen:  Blood from Line, Venous Updated:  05/13/18 0701     Blood Culture No Growth at 72 hrs  Lactic acid x2 [[id number]]  (Abnormal) Collected:  05/09/18 2352    Lab Status:  Final result Specimen:  Blood from Arm, Right Updated:  05/10/18 0022     LACTIC ACID 2 2 (HH) mmol/L     Narrative:         Result may be elevated if tourniquet was used during collection      Urine Microscopic [[id number]]  (Abnormal) Collected:  05/09/18 2300    Lab Status:  Final result Specimen:  Urine from Urine, Straight Cath Updated:  05/09/18 2348     RBC, UA 1-2 (A) /hpf      WBC, UA 4-10 (A) /hpf      Epithelial Cells Innumerable (A) /hpf      Bacteria, UA Occasional /hpf      Hyaline Casts, UA 4-10 (A) /lpf      MUCOUS THREADS Moderate (A)    UA w Reflex to Microscopic w Reflex to Culture [[id number]]  (Abnormal) Collected:  05/09/18 2300    Lab Status:  Final result Specimen:  Urine from Urine, Straight Cath Updated:  05/09/18 2317     Color, UA Yellow     Clarity, UA Clear     Specific Gravity, UA 1 025     pH, UA 6 0     Leukocytes, UA Negative     Nitrite, UA Negative     Protein,  (2+) (A) mg/dl      Glucose,  (1/4%) (A) mg/dl      Ketones, UA Negative mg/dl      Urobilinogen, UA 0 2 E U /dl      Bilirubin, UA Negative     Blood, UA Trace-lysed (A)    Lactic acid x2 [[id number]]  (Abnormal) Collected:  05/09/18 2211    Lab Status:  Final result Specimen:  Blood from Arm, Right Updated:  05/09/18 2244     LACTIC ACID 3 1 (HH) mmol/L     Narrative:         Result may be elevated if tourniquet was used during collection  NT-BNP PRO [[id number]]  (Abnormal) Collected:  05/09/18 2211    Lab Status:  Final result Specimen:  Blood from Arm, Right Updated:  05/09/18 2243     NT-proBNP 5,901 (H) pg/mL     Lipase [[id number]]  (Normal) Collected:  05/09/18 2211    Lab Status:  Final result Specimen:  Blood from Arm, Right Updated:  05/09/18 2243     Lipase 225 u/L     Troponin I [[id number]]  (Abnormal) Collected:  05/09/18 2211    Lab Status:  Final result Specimen:  Blood from Arm, Right Updated:  05/09/18 2242     Troponin I 1 22 (H) ng/mL     Narrative:         [employer] Chemistry analyzer 99% cutoff is > 0 04 ng/mL in network labs    o cTnI 99% cutoff is useful only when applied to patients in the clinical setting of myocardial ischemia  o cTnI 99% cutoff should be interpreted in the context of clinical history, ECG findings and possibly cardiac imaging to establish correct diagnosis    o cTnI 99% cutoff may be suggestive but clearly not indicative of a coronary event without the clinical setting of myocardial ischemia  Comprehensive metabolic panel [[id number]]  (Abnormal) Collected:  05/09/18 2211    Lab Status:  Final result Specimen:  Blood from Arm, Right Updated:  05/09/18 2237     Sodium 139 mmol/L      Potassium 5 0 mmol/L      Chloride 101 mmol/L      CO2 28 mmol/L      Anion Gap 10 mmol/L      BUN 31 (H) mg/dL      Creatinine 1 52 (H) mg/dL      Glucose 444 (H) mg/dL      Calcium 9 6 mg/dL      AST 44 U/L      ALT 43 U/L      Alkaline Phosphatase 107 U/L      Total Protein 8 5 (H) g/dL      Albumin 3 7 g/dL      Total Bilirubin 0 63 mg/dL      eGFR 30 ml/min/1 73sq m     Narrative:         National Kidney Disease Education Program recommendations are as follows:  GFR calculation is accurate only with a steady state creatinine  Chronic Kidney disease less than 60 ml/min/1 73 sq  meters  Kidney failure less than 15 ml/min/1 73 sq  meters      Protime-INR [[id number]]  (Abnormal) Collected:  05/09/18 2211    Lab Status:  Final result Specimen:  Blood from Arm, Right Updated:  05/09/18 2231     Protime 15 5 (H) seconds      INR 1 22 (H)    APTT [[id number]]  (Normal) Collected:  05/09/18 2211    Lab Status:  Final result Specimen:  Blood from Arm, Right Updated:  05/09/18 2231     PTT 32 seconds     Blood gas, Venous [[id number]]  (Abnormal) Collected:  05/09/18 2211    Lab Status:  Final result Specimen:  Blood from Arm, Right Updated:  05/09/18 2225     pH, Allan 7 282 (L)     pCO2, Allan 49 5 mm Hg      pO2, Allan 51 8 (H) mm Hg      HCO3, Allan 22 8 (L) mmol/L      Base Excess, Allan -4 3 mmol/L      O2 Content, Allan 16 3 ml/dL      O2 HGB, VENOUS 78 4 %     CBC and differential [[id number]]  (Abnormal) Collected:  05/09/18 2211    Lab Status:  Final result Specimen:  Blood from Arm, Right Updated:  05/09/18 2221     WBC 7 59 Thousand/uL      RBC 4 43 Million/uL      Hemoglobin 13 9 g/dL      Hematocrit 42 8 %      MCV 97 fL      MCH 31 4 pg      MCHC 32 5 g/dL      RDW 14 6 % MPV 13 0 (H) fL      Platelets 626 Thousands/uL      nRBC 0 /100 WBCs      Neutrophils Relative 72 %      Lymphocytes Relative 21 %      Monocytes Relative 6 %      Eosinophils Relative 1 %      Basophils Relative 0 %      Neutrophils Absolute 5 48 Thousands/µL      Lymphocytes Absolute 1 56 Thousands/µL      Monocytes Absolute 0 42 Thousand/µL      Eosinophils Absolute 0 10 Thousand/µL      Basophils Absolute 0 03 Thousands/µL                  XR chest portable - 1 view   ED Interpretation by Amos Harrison DO (05/09 2240)   CHF      Final Result by Ap Price MD (05/10 3803)      Increased congestive changes with bibasilar opacities and small pleural effusions  Workstation performed: [id number]               Procedures  Procedures      Phone Consults  ED Phone Contact    ED Course  ED Course as of May 13 2140   Wed May 09, 2018   2212 Pt dnr/dni per nursing home    2245 Reassessment:  Improved work of breathing  Able to speak full sentences through the mask  Continues to be tachypneic and rate of low 30s, no accessory muscle usage  Heart rate decreased to 106, pressure maintain 150     2309 EKG:  Sinus tachycardia rate 133  T-wave flattening 2 3 AVF  Nonspecific ST-T changes  Normal intervals      [address] Time    Disposition  Final diagnoses:   NSTEMI (non-ST elevated myocardial infarction) ([facility] )   Sinus tachycardia   Elevated lactic acid level   Hyperglycemia   Acute hypoxemic respiratory failure (HCC)   Acute congestive heart failure (HCC)   [facility]HANA (acute kidney injury) ([facility] )     Time reflects when diagnosis was documented in both MDM as applicable and the Disposition within this note     Time User Action Codes Description Comment    5/9/2018 10:56 PM Isaac Kelley Add [I21 4] NSTEMI (non-ST elevated myocardial infarction) ([facility] )     5/9/2018 10:56 PM Sofia Kelley Add [R00 0] Sinus tachycardia     5/9/2018 10:56 PM Annalee ROBERTS Add [R79 89] Elevated lactic acid level     5/9/2018 10:56 PM Bashor, Marthena Billow Add [R73 9] Hyperglycemia     5/9/2018 10:56 PM Bashor, Elmon [place] S Add [J96 01] Acute hypoxemic respiratory failure ([facility] )     5/9/2018 10:56 PM Bashor, Marthena Billow Modify [I21 4] NSTEMI (non-ST elevated myocardial infarction) ([facility] )     5/9/2018 10:56 PM Araceli Golds S Modify [J96 01] Acute hypoxemic respiratory failure ([facility] )     5/9/2018 10:56 PM Bashor, Marthena Billow Add [I50 9] Acute congestive heart failure ([facility] )     5/9/2018 10:57 PM Bashor, Elmon [place] S Add [N17 9] REHANA (acute kidney injury) ([place] [address] )     5/12/2018 11:23 AM Devora Lucila Add [I50 43] 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 failure ([facility] )     5/12/2018 11:23 AM Devora Lucila Add [I48 91] Atrial fibrillation with RVR ([place] [address] )     5/12/2018 11:23 AM Jessie Ayoub Add [E43] Severe protein-calorie malnutrition Wilhelmena Herman: less than 60% of standard weight) [facility])       ED Disposition     ED Disposition Condition Comment    Admit  Case was discussed with RADHA and the patient's admission status was agreed to be Admission Status: inpatient status to the service of Dr Hollis Santana   Follow-up Information    None       Current Discharge Medication List      START taking these medications    Details   spironolactone (ALDACTONE) 25 mg tablet Take 0 5 tablets (12 5 mg total) by mouth daily  Qty: 30 tablet, Refills: 0    Associated Diagnoses: 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 failure ([facility] ); Atrial fibrillation with RVR ([facility] ); Severe protein-calorie malnutrition Wilhelmena Herman: less than 60% of standard weight) ([facility] )         CONTINUE these medications which have CHANGED    Details   furosemide (LASIX) 40 mg tablet Take 1 tablet (40 mg total) by mouth 2 (two) times a day  Qty: 60 tablet, Refills: 0    Associated Diagnoses: 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 failure ([address] ); Atrial fibrillation with RVR ([address] );  Severe protein-calorie malnutrition Eric Ax: less than 60% of standard weight) ([facility] )         CONTINUE these medications which have NOT CHANGED    Details   acetaminophen (TYLENOL) 325 mg tablet Take 1 tablet (325 mg total) by mouth every 6 (six) hours as needed for mild pain  Qty: 30 tablet, Refills: 0    Associated Diagnoses: Acute on chronic diastolic congestive heart failure (HCC)      apixaban (ELIQUIS) 2 5 mg Take 1 tablet (2 5 mg total) by mouth 2 (two) times a day  Qty: 60 tablet, Refills: 0    Associated Diagnoses: Atrial fibrillation with rapid ventricular response (HCC)      aspirin 81 mg chewable tablet Chew 1 tablet daily  Refills: 0      atorvastatin (LIPITOR) 40 mg tablet Take 1 tablet (40 mg total) by mouth daily with dinner  Qty: 30 tablet, Refills: 0    Associated Diagnoses: Acute on chronic diastolic congestive heart failure (HCC)      calcitonin, salmon, (MIACALCIN) 200 units/act nasal spray USE 1 SPRAY IN ONE NOSTRIL DAILY--ALTERNATE NOSTRILS  Qty: 11 1 mL, Refills: 0    Comments: **Patient requests 90 days supply**  Associated Diagnoses: Chronic bilateral low back pain without sciatica      diltiazem (CARDIZEM CD) 120 mg 24 hr capsule Take 1 capsule (120 mg total) by mouth daily  Qty: 30 capsule, Refills: 0    Associated Diagnoses: Acute on chronic diastolic congestive heart failure (HCC)      famotidine (PEPCID) 20 mg tablet Take 1 tablet (20 mg total) by mouth daily  Qty: 30 tablet, Refills: 0    Associated Diagnoses: Acute on chronic diastolic congestive heart failure (HCC)      insulin glargine (LANTUS) 100 units/mL subcutaneous injection Inject 5 Units under the skin daily at bedtime  Qty: 10 mL, Refills: 0    Associated Diagnoses: Acute on chronic diastolic congestive heart failure (HCC)      insulin lispro (HumaLOG) 100 units/mL injection Inject 1-6 Units under the skin 3 (three) times a day before meals  Qty: 100 mL, Refills: 0    Associated Diagnoses: Acute on chronic diastolic congestive heart failure (HCC)      lactulose 20 g/30 mL Take 15 mL (10 g total) by mouth daily as needed (constipation)  Qty: 1 Bottle, Refills: 0    Associated Diagnoses: Acute on chronic diastolic congestive heart failure (HCC)      metoprolol succinate (TOPROL-XL) 50 mg 24 hr tablet Take 1 tablet (50 mg total) by mouth daily  Qty: 30 tablet, Refills: 0    Associated Diagnoses: Acute on chronic diastolic congestive heart failure (HCC)      potassium chloride (K-DUR,KLOR-CON) 10 mEq tablet Take 1 tablet (10 mEq total) by mouth daily  Qty: 90 tablet, Refills: 3    Associated Diagnoses: Benign essential HTN; Coronary artery disease involving native heart without angina pectoris, unspecified vessel or lesion type      QUEtiapine (SEROquel) 25 mg tablet Take 0 5 tablets (12 5 mg total) by mouth daily at bedtime  Qty: 30 tablet, Refills: 0    Associated Diagnoses: Acute on chronic diastolic congestive heart failure (HCC)      senna-docusate sodium (SENOKOT S) 8 6-50 mg per tablet Take 1 tablet by mouth daily at bedtime  Qty: 15 tablet, Refills: 0    Associated Diagnoses: Acute on chronic diastolic congestive heart failure [facility])             Outpatient Discharge Orders  Discharge Diet     Activity as tolerated         ED Provider  Attending physically available and evaluated Helena Aponte I managed the patient along with the ED Attending      Electronically Signed by         Ez Yip DO  05/13/18 2213

## 2018-05-10 NOTE — CONSULTS
Pulmonary Consultation   Yaw Wharton 80 y o  female MRN: [id number]  Unit/Bed#: ICU 11 Encounter: [id number]      Reason for consultation: acute hypoxic respiratory failure    Requesting physician: Dr Hayder Vicente    Impressions/Recommendations:    1  Chronic hypoxic respiratory failure-wears 2 L NC at all times  1  Titrate oxygen therapy to maintain SpO2 >88%  2  Pulmonary toilet: Incentive spirometry, OOB with increase in ambulation as tolerated  2  Acute on chronic combined systolic and diastolic heart failure  1  Cardiology following  2  CXR consistent with vascular congestion  3  Continue with aggressive diuresis  4  Daily weights  5  Strict I&O  3  NSTEMI type 2  1  On telemetry  2  Cardiology following  4  Atrial fibrillation  1  Continue medical management per cardiology    No further pulmonary interventions, will sign off, please call with concerns    History of Present Illness   HPI:  Yaw Wharton is a 80 y o  female who presenting to [facility]  after experiencing palpitations and syncope  She has a PMH significant for:  Rectal carcinoma, myocardial infarction, moderate aortic stenosis, hypertension, hyperlipidemia, atrial fibrillation, diabetes, dementia,, systolic and diastolic CHF, and right MCA CVA  She was recently hospitalized at [place] [address] and discharged on May 6 secondary to acute CHF exacerbation  Upon admission to the ED she was not noted to be hypoxic, and oxygenating will on her baseline of 2 L nasal cannula  Most recent echocardiogram 4/30/2018 noted EF 44%,  With moderate AS and PA peak of 56mmHg  She was noted to be obtunded with elevated lactic acid and required BiPAP therapy for short period of time and transferred to the ICU for step-down monitoring  Chest x-ray demonstrated vascular congestion, aggressive IV diuresis was started  She was also diagnosed with an NSTEMI type 2  She denies any recent exposures or sick contacts      The time of today's assessment she has now been transitioned off BiPAP and is currently oxygenating well on 2 L nasal cannula which is her baseline  She has a history of dementia, but is currently alert and oriented  The thing she remembers about the vent prior to hospitalization was palpitations and dizziness  She currently feels shortness of breath that is mildly above baseline  She does report a nonproductive cough  She currently denies chest pain, pain with inspiration, fevers, chills, night sweats, nausea, vomiting, diarrhea, headache, dizziness, bronchospasm, hemoptysis, or myalgias    From pulmonary standpoint she does not have a formal lung disease or history follow the pulmonologist   She has a distant history of 20 pack year smoking  She denies using BiPAP or CPAP at home, but does wear 2 L nasal cannula at all times  She does not have a daily in nebulizer inhaler regimen  She denies any GERD symptoms  She also denies any coughing or choking while eating or drinking  She does report mild rhinorrhea at this time of year, but denies sore throat, postnasal drip or sinus congestion  Review of systems:  12 point review of systems was completed and was otherwise negative except as listed in HPI        Historical Information   Past Medical History:   Diagnosis Date    [facility]     Anticoagulant long-term use     last assessed: 8/17/17    Aortic stenosis     Atrial fibrillation [facility]     Blurred vision     last assessed: 10/25/13    CHF [facility]congestive heart failure) [facility]     Coronary artery disease     Dementia     Diabetes mellitus ([facility]     Dysuria     last assessed: 8/3/15    Hyperlipidemia     Hypertension     Nonrheumatic aortic (valve) insufficiency     Old myocardial infarction     Rectal carcinoma (HCC)      Past Surgical History:   Procedure Laterality Date    BALLOON ANGIOPLASTY, ARTERY      CORONARY ARTERY BYPASS GRAFT      RECTAL SURGERY       Family History Problem Relation Age of Onset    Stroke Mother     Stroke Father        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    Tobacco history: 20 pack year smoking history with quit date estimated 2002    Meds/Allergies   Current Facility-Administered Medications   Medication Dose Route Frequency    acetaminophen (TYLENOL) tablet 650 mg  650 mg Oral Q6H PRN    apixaban (ELIQUIS) tablet 2 5 mg  2 5 mg Oral BID    aspirin chewable tablet 81 mg  81 mg Oral Daily    atorvastatin (LIPITOR) tablet 20 mg  20 mg Oral Daily With Dinner    calcitonin (salmon) (MIACALCIN) 200 units/act nasal spray 1 spray  1 spray Alternating Nares Daily    diltiazem (CARDIZEM CD) 24 hr capsule 120 mg  120 mg Oral Daily    famotidine (PEPCID) tablet 20 mg  20 mg Oral Daily    furosemide (LASIX) injection 40 mg  40 mg Intravenous TID (diuretic)    insulin glargine (LANTUS) subcutaneous injection 5 Units 0 05 mL  5 Units Subcutaneous HS    insulin lispro (HumaLOG) 100 units/mL subcutaneous injection 1-5 Units  1-5 Units Subcutaneous TID AC    metoprolol succinate (TOPROL-XL) 24 hr tablet 50 mg  50 mg Oral Daily    ondansetron (ZOFRAN) injection 4 mg  4 mg Intravenous Q4H PRN    polyethylene glycol (MIRALAX) packet 17 g  17 g Oral Daily    potassium chloride (K-DUR,KLOR-CON) CR tablet 10 mEq  10 mEq Oral Daily    QUEtiapine (SEROquel) tablet 12 5 mg  12 5 mg Oral HS    senna-docusate sodium (SENOKOT S) 8 6-50 mg per tablet 1 tablet  1 tablet Oral HS    spironolactone (ALDACTONE) tablet 12 5 mg  12 5 mg Oral Daily     Prescriptions Prior to Admission   Medication    acetaminophen (TYLENOL) 325 mg tablet    apixaban (ELIQUIS) 2 5 mg    aspirin 81 mg chewable tablet    atorvastatin (LIPITOR) 40 mg tablet    calcitonin, salmon, (MIACALCIN) 200 units/act nasal spray    diltiazem (CARDIZEM CD) 120 mg 24 hr capsule    famotidine (PEPCID) 20 mg tablet    furosemide (LASIX) 20 mg tablet    insulin glargine (LANTUS) 100 units/mL subcutaneous injection    insulin lispro (HumaLOG) 100 units/mL injection    lactulose 20 g/30 mL    metoprolol succinate (TOPROL-XL) 50 mg 24 hr tablet    potassium chloride (K-DUR,KLOR-CON) 10 mEq tablet    QUEtiapine (SEROquel) 25 mg tablet    senna-docusate sodium (SENOKOT S) 8 6-50 mg per tablet     Allergies   Allergen Reactions    Heparin        Vitals: Blood pressure 140/61, pulse 72, temperature 97 6 °F (36 4 °C), temperature source Temporal, resp  rate (!) 46, height 5' (1 524 m), weight 54 kg (119 lb 0 8 oz), SpO2 97 %  , 2LNC, Body mass index is 23 25 kg/m²  Intake/Output Summary (Last 24 hours) at 05/10/18 1018  Last data filed at 05/10/18 0908   Gross per 24 hour   Intake           288 33 ml   Output              926 ml   Net          -637 67 ml       Physical exam:    General Appearance:    Alert, cooperative, no conversational dyspnea or accessory     muscle use       Head/eyes:    Normocephalic, without obvious abnormality, atraumatic,         PERRL, extraocular muscles intact, no scleral icterus    Nose:   Nares normal, septum midline, mucosa normal, no drainage    or sinus tenderness, oxygen via NC   Throat:   Moist mucous membranes, no thrush   Neck:   Supple, trachea midline, no adenopathy; no carotid    bruit or JVD   Lungs:     Bibasilar rales noted, no wheezes or rhonchi   Chest Wall:    No tenderness or deformity    Heart:    Irregularly irregular rhysthm S1 and S2 normal, + murmur, no rub   or gallop   Abdomen:     Soft, non-tender, bowel sounds active all four quadrants,     no masses, no organomegaly   Extremities:   Extremities normal, atraumatic, no cyanosis or edema   Skin:   Warm, dry, turgor normal, no rashes or lesions   Lymph nodes:   Cervical and supraclavicular nodes normal   Neurologic:    non-focal         Labs: I have personally reviewed pertinent lab results  , ABG:   Lab Results   Component Value Date    PHART 7 408 05/10/2018    GTK5TND 42 6 05/10/2018    PO2ART 151 4 (H) 05/10/2018    VBQ1TYJ 26 3 05/10/2018    BEART 1 4 05/10/2018    SOURCE Radial, Right 05/10/2018   , BNP: No results found for: BNP, CBC:   Lab Results   Component Value Date    WBC 6 01 05/10/2018    HGB 12 2 05/10/2018    HCT 38 4 05/10/2018    MCV 95 05/10/2018     (L) 05/10/2018    MCH 30 0 05/10/2018    MCHC 31 8 05/10/2018    RDW 14 5 05/10/2018    MPV 12 1 05/10/2018    NRBC 0 05/09/2018   , CMP:   Lab Results   Component Value Date     05/10/2018    K 4 4 05/10/2018     05/10/2018    CO2 30 05/10/2018    ANIONGAP 7 05/10/2018    BUN 33 (H) 05/10/2018    CREATININE 1 32 (H) 05/10/2018    GLUCOSE 264 (H) 05/10/2018    CALCIUM 9 4 05/10/2018    AST 44 05/09/2018    ALT 43 05/09/2018    ALKPHOS 107 05/09/2018    PROT 8 5 (H) 05/09/2018    BILITOT 0 63 05/09/2018    EGFR 35 05/10/2018       Imaging and other studies: I have personally reviewed pertinent reports  and I have personally reviewed pertinent films in PACS    Portable CXR 5/9/2018: Increased congestive changes with bibasilar opacities and small pleural effusions      Pulmonary function testing: none    EKG, Pathology, and Other Studies: I have personally reviewed pertinent reports     and I have personally reviewed pertinent films in PACS  Telemetry: currently in Atrial fibrillation with HR 72    Code Status: Level 3 - DNAR and DNI      SALVADOR Brown

## 2018-05-10 NOTE — PLAN OF CARE
GENITOURINARY - ADULT     Maintains or returns to baseline urinary function Progressing     Absence of urinary retention Progressing        METABOLIC, FLUID AND ELECTROLYTES - ADULT     Electrolytes maintained within normal limits Progressing     Fluid balance maintained Progressing     Glucose maintained within target range Progressing        Nutrition/Hydration-ADULT     Nutrient/Hydration intake appropriate for improving, restoring or maintaining nutritional needs Progressing        Potential for Falls     Patient will remain free of falls Progressing        Prexisting or High Potential for Compromised Skin Integrity     Skin integrity is maintained or improved Progressing        RESPIRATORY - ADULT     Achieves optimal ventilation and oxygenation Progressing        SKIN/TISSUE INTEGRITY - ADULT     Skin integrity remains intact Progressing     Incision(s), wounds(s) or drain site(s) healing without S/S of infection Progressing     Oral mucous membranes remain intact Progressing

## 2018-05-10 NOTE — PHYSICIAN ADVISOR
Current patient class: Inpatient  The patient is currently on Hospital Day: 2      The patient was admitted to the hospital  on 5/9/18 at 2338 for the following diagnosis:  Acute congestive heart failure (HCC) [I50 9]  Sinus tachycardia [R00 0]  SOB (shortness of breath) [R06 02]  Hyperglycemia [R73 9]  NSTEMI (non-ST elevated myocardial infarction) ([facility] ) [I21 4]  REHANA (acute kidney injury) ([facility] ) [N17 9]  Elevated lactic acid level [R79 89]  Ac[facility] hypoxemic [facility] ([facility] ) [J96 01]       There is documentation in the medical record of an expected length of stay of at least 2 midnights  The patient is therefore expected to satisfy the 2 midnight benchmark and given the 2 midnight presumption is appropriate for INPATIENT ADMISSION  Given this expectation of a satisfying stay, CMS instructs us that the patient is most often appropriate for inpatient admission under part A provided medical necessity is documented in the chart  After review of the relevant documentation, labs, vital signs and test results, the patient is appropriate for INPATIENT ADMISSION  Admission to the hospital as an inpatient is a complex decision making process which requires the practitioner to consider the patients presenting complaint, history and physical examination and all relevant testing  With this in mind, in this case, the patient was deemed appropriate for INPATIENT ADMISSION  After review of the documentation and testing available at the time of the admission I concur with this clinical determination of medical necessity  The patient does have an inpatient admission within the previous 30 days  The patient was admitted on 4/29/18 and discharged on 5/8/18 as an inpatient  The patient therefore required readmission review  The patient was admitted at that time secondary to acute exacerbation of congestive heart failure requiring IV diuresis   In this case the patient should be considered a RELATED INPATIENT ADMISSION as she returned to the hospital in less than 48 hours with return of shortness of breath and tachycardia  Rationale is as follows: The patient is a 80 yrs y/o female with multiple medical problems including CHF, atrial fibrillation and CAD who presented [facility] the ED from SNF with progressive shortness of breath and tachycardia requiring BiPAP for respiratory distress and cardizem IV  Upon arrival, pt continued to have tachycardia and tachypnea  She was given additional IV lasix for respiratory sxms and continued on BiPAP  Blood work and imaging were obtained in the ED and the patient was found to have a lactic acidosis of 3 1 as well as an elevated NT-proBNP of 5,901, troponin of 1 22, REHANA with a BUN/Creatinine of 31/1 52 and hyperglycemia at 444  A pH obtained also showed acidosis at 7 28  CXR consistent with CHF  The patient was admitted for further treatment and management of NSTEMI with CHF exacerbation including serial troponin as well as 2D echo and cardiology consultation  She will also be continued on telemetry monitoring with IV diuresis with lasix, additional of spironolactone  She also requires monitoring of strict I&Os and BMP given REHANA from baseline Creatinine of 1 08  Given respiratory distress and evidence of respiratory acidosis, she will also be continued on BiPAP  She also has consultations to pulmonology with recommendations to continue BiPAP with titration of supplemental oxygen to maintain pulse ox greater than 88%, addition of pulmonary toilet and increased ambulation  Given the patient's respiratory distress secondary to acute exacerbation of CHF with atrial fibrillation and NSTEMI, she is appropriate for INPATIENT ADMISSION  Furthermore, given the patient was discharged for similar complaints 48 hours prior to presentation, the current admission should be considered RELATED       The patients vitals on arrival were ED Triage Vitals   Temperature Pulse Respirations Blood Pressure SpO2   05/09/18 2214 05/09/18 2201 05/09/18 2201 05/09/18 2201 05/09/18 2201   98 7 °F (37 1 °C) (!) 128 (!) 26 160/98 100 %      Temp Source Heart Rate Source Patient Position - Orthostatic VS BP Location FiO2 (%)   05/09/18 2214 05/09/18 2201 05/09/18 2201 05/09/18 2201 --   Temporal Monitor Sitting Right arm       Pain Score       05/09/18 2345       No Pain           Past Medical History:   Diagnosis Date    [facility]     Anticoagulant long-term use     last assessed: 8/17/17    Aortic stenosis     [facility] fibrillation ([facility])     Blurred vision     last assessed: 10/25/13    CHF (congestive heart failure) ([facility])     Coronary artery disease     Dementia     Diabetes mellitus [facility]     Dysuria     last assessed: 8/3/15    Hyperlipidemia     Hypertension     Nonrheumatic aortic (valve) insufficiency     Old myocardial infarction     Rectal carcinoma ([facility])      Past Surgical History:   Procedure Laterality Date    BALLOON ANGIOPLASTY, ARTERY      CORONARY ARTERY BYPASS GRAFT      RECTAL SURGERY             Consults have been placed to:   IP CONSULT TO CARDIOLOGY  IP CONSULT TO PULMONOLOGY    Vitals:    05/10/18 0800 05/10/18 0937 05/10/18 1000 05/10/18 1105   BP: 140/61 140/61 135/95    BP Location:       Pulse: 78  (!) 108    Resp: (!) 25  (!) 29    Temp:    98 3 °F (36 8 °C)   TempSrc:    Temporal   SpO2: 97%  94%    Weight:       Height:           Most recent labs:    Recent Labs      05/09/18   2211  05/10/18   0523   WBC  7 59  6 01   HGB  13 9  12 2   HCT  42 8  38 4   PLT  182  137*   K  5 0  4 4   NA  139  141   CALCIUM  9 6  9 4   BUN  31*  33*   CREATININE  1 52*  1 32*   LIPASE  225   --    INR  1 22*   --    TROPONINI  1 22*   --    AST  44   --    ALT  43   --    ALKPHOS  107   --    BILITOT  0 63   --        Scheduled Meds:  Current Facility-Administered Medications:  acetaminophen 650 mg Oral Q6H PRN Julio Devlin MD   apixaban 2 5 mg Oral BID Julio MD Claus   aspirin 81 mg Oral Daily Julio Devlin MD   atorvastatin 20 mg Oral Daily With Gunner Warner MD   calcitonin (salmon) 1 spray Alternating Nares Daily Julio Devlin MD   diltiazem 120 mg Oral Daily Julio Devlin MD   famotidine 20 mg Oral Daily Julio Devlin MD   furosemide 40 mg Intravenous TID (diuretic) Anahi Núñez MD   insulin glargine 5 Units Subcutaneous HS Julio Devlin MD   insulin lispro 1-5 Units Subcutaneous TID AC Julio Devlin MD   metoprolol succinate 50 mg Oral Daily Julio Devlin MD   ondansetron 4 mg Intravenous Q4H PRN Julio Devlin MD   polyethylene glycol 17 g Oral Daily Julio Devlin MD   potassium chloride 10 mEq Oral Daily Julio Devlin MD   QUEtiapine 12 5 mg Oral HS Julio Devlin MD   senna-docusate sodium 1 tablet Oral HS Julio Devlin MD   spironolactone 12 5 mg Oral Daily Cash Snyder MD     Continuous Infusions:   PRN Meds:   acetaminophen    ondansetron    Surgical procedures (if appropriate):

## 2018-05-11 LAB
ANION GAP SERPL CALCULATED.3IONS-SCNC: 7 MMOL/L (ref 4–13)
BUN SERPL-MCNC: 28 MG/DL (ref 5–25)
CALCIUM SERPL-MCNC: 9.4 MG/DL (ref 8.3–10.1)
CHLORIDE SERPL-SCNC: 103 MMOL/L (ref 100–108)
CO2 SERPL-SCNC: 32 MMOL/L (ref 21–32)
CREAT SERPL-MCNC: 1.04 MG/DL (ref 0.6–1.3)
ERYTHROCYTE [DISTWIDTH] IN BLOOD BY AUTOMATED COUNT: 14.6 % (ref 11.6–15.1)
GFR SERPL CREATININE-BSD FRML MDRD: 47 ML/MIN/1.73SQ M
GLUCOSE SERPL-MCNC: 120 MG/DL (ref 65–140)
GLUCOSE SERPL-MCNC: 165 MG/DL (ref 65–140)
GLUCOSE SERPL-MCNC: 205 MG/DL (ref 65–140)
GLUCOSE SERPL-MCNC: 348 MG/DL (ref 65–140)
GLUCOSE SERPL-MCNC: 54 MG/DL (ref 65–140)
GLUCOSE SERPL-MCNC: 55 MG/DL (ref 65–140)
GLUCOSE SERPL-MCNC: 56 MG/DL (ref 65–140)
HCT VFR BLD AUTO: 42.9 % (ref 34.8–46.1)
HGB BLD-MCNC: 13.7 G/DL (ref 11.5–15.4)
MCH RBC QN AUTO: 30.9 PG (ref 26.8–34.3)
MCHC RBC AUTO-ENTMCNC: 31.9 G/DL (ref 31.4–37.4)
MCV RBC AUTO: 97 FL (ref 82–98)
PLATELET # BLD AUTO: 122 THOUSANDS/UL (ref 149–390)
PMV BLD AUTO: 12.6 FL (ref 8.9–12.7)
POTASSIUM SERPL-SCNC: 3.8 MMOL/L (ref 3.5–5.3)
RBC # BLD AUTO: 4.44 MILLION/UL (ref 3.81–5.12)
SODIUM SERPL-SCNC: 142 MMOL/L (ref 136–145)
WBC # BLD AUTO: 5.7 THOUSAND/UL (ref 4.31–10.16)

## 2018-05-11 PROCEDURE — 82948 REAGENT STRIP/BLOOD GLUCOSE: CPT

## 2018-05-11 PROCEDURE — 85027 COMPLETE CBC AUTOMATED: CPT | Performed by: INTERNAL MEDICINE

## 2018-05-11 PROCEDURE — 99232 SBSQ HOSP IP/OBS MODERATE 35: CPT | Performed by: INTERNAL MEDICINE

## 2018-05-11 PROCEDURE — 80048 BASIC METABOLIC PNL TOTAL CA: CPT | Performed by: INTERNAL MEDICINE

## 2018-05-11 RX ORDER — FUROSEMIDE 10 MG/ML
40 INJECTION INTRAMUSCULAR; INTRAVENOUS
Status: DISCONTINUED | OUTPATIENT
Start: 2018-05-11 | End: 2018-05-12

## 2018-05-11 RX ADMIN — APIXABAN 2.5 MG: 2.5 TABLET, FILM COATED ORAL at 17:50

## 2018-05-11 RX ADMIN — QUETIAPINE FUMARATE 12.5 MG: 25 TABLET ORAL at 22:00

## 2018-05-11 RX ADMIN — SPIRONOLACTONE 12.5 MG: 25 TABLET ORAL at 08:34

## 2018-05-11 RX ADMIN — INSULIN LISPRO 1 UNITS: 100 INJECTION, SOLUTION INTRAVENOUS; SUBCUTANEOUS at 12:14

## 2018-05-11 RX ADMIN — ASPIRIN 81 MG 81 MG: 81 TABLET ORAL at 08:34

## 2018-05-11 RX ADMIN — METOPROLOL SUCCINATE 50 MG: 50 TABLET, EXTENDED RELEASE ORAL at 08:35

## 2018-05-11 RX ADMIN — CALCITONIN SALMON 1 SPRAY: 200 SPRAY, METERED NASAL at 08:35

## 2018-05-11 RX ADMIN — POTASSIUM CHLORIDE 10 MEQ: 750 TABLET, EXTENDED RELEASE ORAL at 08:35

## 2018-05-11 RX ADMIN — INSULIN LISPRO 1 UNITS: 100 INJECTION, SOLUTION INTRAVENOUS; SUBCUTANEOUS at 16:22

## 2018-05-11 RX ADMIN — FUROSEMIDE 40 MG: 10 INJECTION, SOLUTION INTRAMUSCULAR; INTRAVENOUS at 05:01

## 2018-05-11 RX ADMIN — APIXABAN 2.5 MG: 2.5 TABLET, FILM COATED ORAL at 08:35

## 2018-05-11 RX ADMIN — ATORVASTATIN CALCIUM 20 MG: 20 TABLET, FILM COATED ORAL at 16:23

## 2018-05-11 RX ADMIN — DILTIAZEM HYDROCHLORIDE 120 MG: 120 CAPSULE, COATED, EXTENDED RELEASE ORAL at 08:35

## 2018-05-11 RX ADMIN — Medication 1 TABLET: at 22:00

## 2018-05-11 RX ADMIN — FUROSEMIDE 40 MG: 10 INJECTION, SOLUTION INTRAMUSCULAR; INTRAVENOUS at 16:23

## 2018-05-11 RX ADMIN — FAMOTIDINE 20 MG: 20 TABLET ORAL at 08:35

## 2018-05-11 RX ADMIN — POLYETHYLENE GLYCOL 3350 17 G: 17 POWDER, FOR SOLUTION ORAL at 08:35

## 2018-05-11 NOTE — SOCIAL WORK
HEART FAILURE CARE MANAGER: Met with patient and  on 5/10 at bedside to establish relationship and explain role  The patient was recently discharged from [facility] to [place] [address]  We discussed her thoughts on returning to the hospital and presently she stated if she needed to she would want to return to the hospital  I will continue to follow while and inpatient

## 2018-05-11 NOTE — PROGRESS NOTES
Progress Note - Claudia Mathias 80 y o  female MRN: [id number]    Unit/Bed#: ICU 11 Encounter: [id number]  Subjective:   Breathing easier  Diuresing  No chest pain or Palpitations  No edema  No dizziness    Objective:   Vitals: Blood pressure (!) 177/82, pulse 102, temperature 98 8 °F (37 1 °C), temperature source Temporal, resp  rate (!) 27, height 5' (1 524 m), weight 51 8 kg (114 lb 3 2 oz), SpO2 98 %  ,Body mass index is 22 3 kg/m²  CBC with diff:   Results from last 7 days  Lab Units 05/11/18  0448   WBC Thousand/uL 5 70   RBC Million/uL 4 44   HEMOGLOBIN g/dL 13 7   HEMATOCRIT % 42 9   MCV fL 97   MCH pg 30 9   MCHC g/dL 31 9   RDW % 14 6   MPV fL 12 6   PLATELETS Thousands/uL 122*     CMP:   Results from last 7 days  Lab Units 05/11/18  0448  05/09/18  2211   SODIUM mmol/L 142  < > 139   POTASSIUM mmol/L 3 8  < > 5 0   CHLORIDE mmol/L 103  < > 101   CO2 mmol/L 32  < > 28   ANION GAP mmol/L 7  < > 10   BUN mg/dL 28*  < > 31*   CREATININE mg/dL 1 04  < > 1 52*   GLUCOSE RANDOM mg/dL 55*  < > 444*   CALCIUM mg/dL 9 4  < > 9 6   AST U/L  --   --  44   ALT U/L  --   --  43   ALK PHOS U/L  --   --  107   TOTAL PROTEIN g/dL  --   --  8 5*   BILIRUBIN TOTAL mg/dL  --   --  0 63   EGFR ml/min/1 73sq m 47  < > 30   < > = values in this interval not displayed  Physical exam:  Lungs= decreased breath sounds at the bases however better aeration from yesterday  No rales, rhonchi or wheezes  Heart-irregular and mildly tachycardic  Grade 2 systolic murmur at the base  No diastolic murmur rub or gallop  Abdomen-soft nontender without mass organomegaly  Extremities-no edema  Dorsalis pedal pulses 1+      Assessment:  1/ acute combined systolic and diastolic heart failure  Ejection fraction 45% in patient with chronic atrial fibrillation  Improved on IV diuresis  Spironolactone added as well  Renal function improved today  2  Elevated troponin    Type 2 nontransmural myocardial infarction due to congestive heart failure patient with known underlying CAD  Patient on beta-blocker and aspirin  3  Atrial fibrillation  Rate somewhat elevated today on diltiazem and metoprolol  No adjustment as of yet but may have to titrate medications for better heart rate control  Patient on Eliquis  4  Aortic stenosis  Moderate to severe  5   Hyperlipidemia-on statin  6   Hypertension  Somewhat elevated today but is labile and will not over react to isolated readings  Plan:   Okay to transfer to [facility] telemetry  Will reduce IV Lasix to 40 mg b i d  Continue spironolactone, metoprolol and diltiazem at the current dosages  Continue statin  Continue aspirin and Eliquis  Hopefully convert patient to p o  diuretics tomorrow    Will need more than 40 mg of furosemide to stay out of congestive heart failure    Kyle Escamilla MD

## 2018-05-11 NOTE — SOCIAL WORK
Referral sent to [facility] for [facility]  Per liaison, will evaluate on Monday for potential return

## 2018-05-12 LAB
ANION GAP SERPL CALCULATED.3IONS-SCNC: 9 MMOL/L (ref 4–13)
BUN SERPL-MCNC: 35 MG/DL (ref 5–25)
CALCIUM SERPL-MCNC: 9.4 MG/DL (ref 8.3–10.1)
CHLORIDE SERPL-SCNC: 102 MMOL/L (ref 100–108)
CO2 SERPL-SCNC: 26 MMOL/L (ref 21–32)
CREAT SERPL-MCNC: 0.98 MG/DL (ref 0.6–1.3)
ERYTHROCYTE [DISTWIDTH] IN BLOOD BY AUTOMATED COUNT: 14.4 % (ref 11.6–15.1)
GFR SERPL CREATININE-BSD FRML MDRD: 51 ML/MIN/1.73SQ M
GLUCOSE SERPL-MCNC: 113 MG/DL (ref 65–140)
GLUCOSE SERPL-MCNC: 142 MG/DL (ref 65–140)
GLUCOSE SERPL-MCNC: 144 MG/DL (ref 65–140)
GLUCOSE SERPL-MCNC: 236 MG/DL (ref 65–140)
GLUCOSE SERPL-MCNC: 237 MG/DL (ref 65–140)
HCT VFR BLD AUTO: 40.2 % (ref 34.8–46.1)
HGB BLD-MCNC: 13.1 G/DL (ref 11.5–15.4)
MCH RBC QN AUTO: 30.6 PG (ref 26.8–34.3)
MCHC RBC AUTO-ENTMCNC: 32.6 G/DL (ref 31.4–37.4)
MCV RBC AUTO: 94 FL (ref 82–98)
PLATELET # BLD AUTO: 142 THOUSANDS/UL (ref 149–390)
PMV BLD AUTO: 12.5 FL (ref 8.9–12.7)
POTASSIUM SERPL-SCNC: 3.9 MMOL/L (ref 3.5–5.3)
RBC # BLD AUTO: 4.28 MILLION/UL (ref 3.81–5.12)
SODIUM SERPL-SCNC: 137 MMOL/L (ref 136–145)
WBC # BLD AUTO: 5.1 THOUSAND/UL (ref 4.31–10.16)

## 2018-05-12 PROCEDURE — 99232 SBSQ HOSP IP/OBS MODERATE 35: CPT | Performed by: INTERNAL MEDICINE

## 2018-05-12 PROCEDURE — 80048 BASIC METABOLIC PNL TOTAL CA: CPT | Performed by: INTERNAL MEDICINE

## 2018-05-12 PROCEDURE — 82948 REAGENT STRIP/BLOOD GLUCOSE: CPT

## 2018-05-12 PROCEDURE — 85027 COMPLETE CBC AUTOMATED: CPT | Performed by: INTERNAL MEDICINE

## 2018-05-12 RX ORDER — FUROSEMIDE 40 MG/1
40 TABLET ORAL
Status: DISCONTINUED | OUTPATIENT
Start: 2018-05-12 | End: 2018-05-13

## 2018-05-12 RX ORDER — SPIRONOLACTONE 25 MG/1
12.5 TABLET ORAL DAILY
Qty: 30 TABLET | Refills: 0
Start: 2018-05-13 | End: 2018-06-01 | Stop reason: SDUPTHER

## 2018-05-12 RX ORDER — FUROSEMIDE 40 MG/1
40 TABLET ORAL 2 TIMES DAILY
Qty: 60 TABLET | Refills: 0
Start: 2018-05-12 | End: 2018-07-05 | Stop reason: SDUPTHER

## 2018-05-12 RX ADMIN — ATORVASTATIN CALCIUM 20 MG: 20 TABLET, FILM COATED ORAL at 17:26

## 2018-05-12 RX ADMIN — INSULIN LISPRO 2 UNITS: 100 INJECTION, SOLUTION INTRAVENOUS; SUBCUTANEOUS at 11:52

## 2018-05-12 RX ADMIN — FUROSEMIDE 40 MG: 40 TABLET ORAL at 17:26

## 2018-05-12 RX ADMIN — METOPROLOL SUCCINATE 50 MG: 50 TABLET, EXTENDED RELEASE ORAL at 09:25

## 2018-05-12 RX ADMIN — QUETIAPINE FUMARATE 12.5 MG: 25 TABLET ORAL at 21:07

## 2018-05-12 RX ADMIN — ASPIRIN 81 MG 81 MG: 81 TABLET ORAL at 09:25

## 2018-05-12 RX ADMIN — POLYETHYLENE GLYCOL 3350 17 G: 17 POWDER, FOR SOLUTION ORAL at 09:25

## 2018-05-12 RX ADMIN — APIXABAN 2.5 MG: 2.5 TABLET, FILM COATED ORAL at 09:25

## 2018-05-12 RX ADMIN — FAMOTIDINE 20 MG: 20 TABLET ORAL at 09:25

## 2018-05-12 RX ADMIN — Medication 1 TABLET: at 21:07

## 2018-05-12 RX ADMIN — DILTIAZEM HYDROCHLORIDE 120 MG: 120 CAPSULE, COATED, EXTENDED RELEASE ORAL at 09:25

## 2018-05-12 RX ADMIN — CALCITONIN SALMON 1 SPRAY: 200 SPRAY, METERED NASAL at 09:25

## 2018-05-12 RX ADMIN — SPIRONOLACTONE 12.5 MG: 25 TABLET ORAL at 09:25

## 2018-05-12 RX ADMIN — APIXABAN 2.5 MG: 2.5 TABLET, FILM COATED ORAL at 17:26

## 2018-05-12 RX ADMIN — FUROSEMIDE 40 MG: 10 INJECTION, SOLUTION INTRAMUSCULAR; INTRAVENOUS at 09:25

## 2018-05-12 RX ADMIN — POTASSIUM CHLORIDE 10 MEQ: 750 TABLET, EXTENDED RELEASE ORAL at 09:25

## 2018-05-12 NOTE — PROGRESS NOTES
[facility] Progress Note  Patient: Annalee Valero 80 y o  female   MRN: [id number]  PCP: Mackenzie Anderson MD  Unit/Bed#: E4 -36 Encounter: [id number]  Date Of Visit: 05/12/18    Assessment:  27-year-old female with a history of combined congestive heart failure and only recently discharged from [facility] 4 days ago with presentation of CHF she was being treated at rehab and developed shortness of breath without chest pain  The echocardiogram done and on previous admission did show evidence of a aortic stenosis with a valve area of 1 0 centimeter squared consistent with moderate to severe aortic stenosis  She is a rather poor historian due to cognitive dysfunction  Also has a history of  atrial fibrillation on Eliquis and rate control borderline at time of presentation  Initial presentation did show elevation and troponin of 1 2 and also manifested elevated lactic acid of 3 1 with no overt signs of sepsis or infection      Principal Problem:    NSTEMI (non-ST elevated myocardial infarction) ([facility] )  Active Problems:    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 failure ([facility] )    Nonrheumatic aortic valve stenosis    Controlled diabetes mellitus type II without complication (HCC)    Coronary artery disease involving native coronary artery of native heart without angina pectoris    Permanent atrial fibrillation (HCC)      Plan:    · NSTEMI with elevated troponin consistent with type 2 transmural myocardial infarct per Cardiology continue beta-blocker, aspirin and statin  · Acute on chronic combined congestive heart failure systolic and diastolic will increase IV diuresis as has failed a outpatient course in last 4 days cardiology is added spironolactone will need follow renal function closely, entrance BNP 5900/repeat BMP  · Aortic stenosis described as moderate to severe based on most recent echo and valve area per Cardiology not a candidate for TAVR  · Essential hypertension continue present dosing of diltiazem and metoprolol  · Hyperlipidemia continue on statin has been reduced by Cardiology  · Coronary artery disease, most recent stress evaluation only showing mild ischemia will continue on beta-blocker and aspirin therapy and statin  · Bilateral pleural effusions will treat his congestive heart failure with increase IV diuresis/doubt pneumonia or infectious etiology will obtain procalcitonin but will hold further antibiotics/will switch to p o  Lasix 40 b i d  · Acute on chronic kidney disease creatinine above her baseline will continue to monitor with increasing IV diuresis may be on cardiorenal basis and should improve if so and now creatinine down to   98 from 1 52  · Type 2 diabetes mellitus  On Lantus q h s  5 units but hypoglycemic on last 2 measurements and will discontinue for now and will continue on sliding scale here with meals  · Acute on chronic hypoxic respiratory failure in relation to combined CHF and NSTEMI secondary CHF and hypoxia will titrate oxygen continue diuresis      VTE Pharmacologic Prophylaxis:   Pharmacologic: Apixaban (Eliquis)  Mechanical VTE Prophylaxis in Place: Yes    Discussions with Specialists or Other Care Team Provider: * spoke to Cardiology and pulmonology    Time Spent for Care: 45 minutes  More than 50% of total time spent on counseling and coordination of care as described above  Subjective:   Breathing easier off BiPAP packed required on admission now on low-flow cannula a denies any present chest pain no air hunger or accessory muscle usage  She is very anxious to return to [facility] I did tell her we would be transfer to the [facility] floor today would plate day by day based on success of her treatment for heart failure    She was ambulating with a walker throughout the hallway today but now is coughing with some shortness of breath remains on oxygen/reviewed note from case management and bed will not be available at [place] [id number] until Monday so will postpone discharge till then    Objective:     Vitals:   Temp (24hrs), Av 7 °F (36 5 °C), Min:97 2 °F (36 2 °C), Max:98 7 °F (37 1 °C)    HR:  [] 93  Resp:  [18-21] 18  BP: (116-144)/(52-82) 144/52  SpO2:  [97 %-100 %] 98 %  Body mass index is 22 3 kg/m²  Input and Output Summary (last 24 hours): Intake/Output Summary (Last 24 hours) at 18 1128  Last data filed at 18 0200   Gross per 24 hour   Intake              250 ml   Output              805 ml   Net             -555 ml       Physical Exam:     Physical Exam:   General appearance: alert, appears stated age and cooperative  Head: Normocephalic, without obvious abnormality, atraumatic  Lungs: crackles  Heart: irregularly irregular rhythm systolic ejection murmur  Abdomen: soft, non-tender; bowel sounds normal; no masses,  no organomegaly  Back: negative  Extremities: extremities normal, atraumatic, no cyanosis or edema  Neurologic: Grossly normal      Additional Data:     Labs:      Results from last 7 days  Lab Units 18  0622  18  2211   WBC Thousand/uL 5 10  < > 7 59   HEMOGLOBIN g/dL 13 1  < > 13 9   HEMATOCRIT % 40 2  < > 42 8   PLATELETS Thousands/uL 142*  < > 182   NEUTROS PCT %  --   --  72   LYMPHS PCT %  --   --  21   MONOS PCT %  --   --  6   EOS PCT %  --   --  1   < > = values in this interval not displayed  Results from last 7 days  Lab Units 18  0622  18  2211   SODIUM mmol/L 137  < > 139   POTASSIUM mmol/L 3 9  < > 5 0   CHLORIDE mmol/L 102  < > 101   CO2 mmol/L 26  < > 28   BUN mg/dL 35*  < > 31*   CREATININE mg/dL 0 98  < > 1 52*   CALCIUM mg/dL 9 4  < > 9 6   TOTAL PROTEIN g/dL  --   --  8 5*   BILIRUBIN TOTAL mg/dL  --   --  0 63   ALK PHOS U/L  --   --  107   ALT U/L  --   --  43   AST U/L  --   --  44   GLUCOSE RANDOM mg/dL 142*  < > 444*   < > = values in this interval not displayed      Results from last 7 days  Lab Units 05/09/18  2211   INR  1 22*       * I Have Reviewed All Lab Data Listed Above  * Additional Pertinent Lab Tests Reviewed: All Labs For Current Hospital Admission Reviewed    Imaging:  Xr Chest Portable - 1 View    Result Date: 5/10/2018  Narrative: CHEST INDICATION:   resp distress  COMPARISON:  4/29/2018 EXAM PERFORMED/VIEWS:  XR CHEST PORTABLE FINDINGS: Heart shadow is enlarged but unchanged from prior exam   Atherosclerotic aorta  Increased congestive changes with bibasilar opacities and small pleural effusions  Osseous structures appear within normal limits for patient age  Impression: Increased congestive changes with bibasilar opacities and small pleural effusions  Workstation performed: [id number]     Xr Chest 1 View Portable    Result Date: 4/23/2018  Narrative: CHEST INDICATION:   epigastric pain  Shortness of breath COMPARISON:  3/19/2018 EXAM PERFORMED/VIEWS:  XR CHEST PORTABLE FINDINGS:  There are median sternotomy wires indicating prior cardiac surgery  There is enlargement of the cardiac silhouette There is evidence of interstitial pulmonary edema and small bilateral effusions  No pneumothorax Osseous structures appear within normal limits for patient age  Impression: Interstitial pulmonary edema with small bilateral effusions  Workstation performed: [id number]     Xr Chest 2 Views    Result Date: 4/30/2018  Narrative: CHEST INDICATION:   weakness/ sob  COMPARISON:  April 22, 2018 and March 19, 2018  EXAM PERFORMED/VIEWS:  XR CHEST PA & LATERAL  The frontal view was performed utilizing dual energy radiographic technique  FINDINGS: Heart enlarged  Prior open heart surgery  Pulmonary vessels unremarkable  Bilateral pleural effusions, larger on the left  Lungs hyperinflated  Atelectasis or consolidation in the base of the left lower lobe  Diffuse demineralization of the imaged portions of the skeleton  Chronic mild compression deformity of T10       Impression: Small bilateral pleural effusions, larger on the left  Atelectasis or consolidation in the base of the left lower lobe  Workstation performed: [id number]     Xr Abdomen Obstruction Series    Result Date: 4/23/2018  Narrative: OBSTRUCTION SERIES INDICATION:   constipation  COMPARISON:  None EXAM PERFORMED/VIEWS:  XR ABDOMEN OBSTRUCTION SERIES FINDINGS: There is moderate amount of stool in the colon  There do not appear to be any abnormally dilated small bowel loops  No free air beneath the hemidiaphragms  No pathologic calcifications or soft tissue masses evident  There is a mild S-shaped thoracolumbar scoliosis  There is degenerative arthritis at the right hip joint  There are surgical clips over the left side of the abdomen and pelvis  There is severe atherosclerotic disease  Examination of the chest reveals a normal cardiomediastinal silhouette  There is small left pleural effusion and minimal right pleural effusion  There are some interstitial prominence in the lungs  There is no pneumothorax  There is probably mild bibasilar atelectasis  Impression: Small pleural effusions and interstitial prominence  Correlate for clinical evidence of failure  No obstruction seen  Moderate stool Workstation performed: [id number]     Ct Chest Without Contrast    Result Date: 4/29/2018  Narrative: CT CHEST WITHOUT IV CONTRAST INDICATION:   Shortness of breath  COMPARISON: 11/22/2008 TECHNIQUE: CT examination of the chest was performed without intravenous contrast   Axial, sagittal, and coronal 2D reformatted images were created from the source data and submitted for interpretation  Radiation dose length product (DLP) for this visit:  278 11 mGy-cm   This examination, like all CT scans performed in the [facility], was performed utilizing techniques to minimize radiation dose exposure, including the use of iterative  reconstruction and automated exposure control   FINDINGS: LUNGS:  There is interstitial edema and some hazy groundglass pulmonary edema bilaterally  A small focal density in the subpleural area of the right upper lobe anterolaterally on image 34 appears to have been present on the previous exam in keeping with  a benign finding  There are no airway obstructions seen on the right  There does appear to be some material within the left lower lobe bronchial branches peripherally and there is some left lower lobe consolidation in that area  This would suggest possible aspiration pneumonia  PLEURA:  There are moderate right and small left pleural effusions  There is no pneumothorax  HEART/GREAT VESSELS:  There is moderate cardiomegaly  There is calcification of the aortic root and aortic valve leaflets  There is heavy coronary artery calcification  There is no pericardial effusion  Patient is status post CABG  The ascending thoracic aorta is aneurysmally dilated measuring 4 3 cm  The pulmonary veins appear distended  MEDIASTINUM AND HOLLY:  There are numerous small lymph nodes  CHEST WALL AND LOWER NECK:   Unremarkable  VISUALIZED STRUCTURES IN THE UPPER ABDOMEN:  Unremarkable  OSSEOUS STRUCTURES:  There is a compression fracture at the superior endplate of Z32 which was present on prior chest x-ray from 3/19/2018  There is some osteopenia suggested diffusely  No new bony abnormality seen  Impression: The patient has debris in the left lower lobe bronchial branches with peripheral consolidation suggesting aspiration pneumonia at the left base  There is also evidence of congestive heart failure with pulmonary edema and pleural effusions and cardiomegaly  There is 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 and there are aortic valvular calcifications noted  Patient may benefit from repeat echocardiography   Workstation performed: [id number]     Imaging Reports Reviewed Today Include:  Reviewed chest x-ray and CT imaging  Imaging Personally Reviewed by Myself Includes:  Now  Procedure: Xr Chest Portable - 1 View    Result Date: 5/10/2018  Narrative: CHEST INDICATION:   resp distress  COMPARISON:  4/29/2018 EXAM PERFORMED/VIEWS:  XR CHEST PORTABLE FINDINGS: Heart shadow is enlarged but unchanged from prior exam   Atherosclerotic aorta  Increased congestive changes with bibasilar opacities and small pleural effusions  Osseous structures appear within normal limits for patient age  Impression: Increased congestive changes with bibasilar opacities and small pleural effusions  Workstation performed: [id number]        Recent Cultures (last 7 days):       Results from last 7 days  Lab Units 05/09/18  2211   BLOOD CULTURE  No Growth at 48 hrs  No Growth at 48 hrs  Last 24 Hours Medication List:     Current Facility-Administered Medications:  acetaminophen 650 mg Oral Q6H PRN Julio Devlin MD   apixaban 2 5 mg Oral BID Julio Devlin MD   aspirin 81 mg Oral Daily Julio Devlin MD   atorvastatin 20 mg Oral Daily With Thad Garcia MD   calcitonin (salmon) 1 spray Alternating Nares Daily Julio Devlin MD   diltiazem 120 mg Oral Daily Julio Devlin MD   famotidine 20 mg Oral Daily Julio Devlin MD   furosemide 40 mg Oral BID (diuretic) Vesta Chavez MD   insulin lispro 1-5 Units Subcutaneous TID AC Julio Devlin MD   metoprolol succinate 50 mg Oral Daily Julio Devlin MD   ondansetron 4 mg Intravenous Q4H PRN Julio Devlin MD   polyethylene glycol 17 g Oral Daily Julio Devlin MD   potassium chloride 10 mEq Oral Daily Julio Devlin MD   QUEtiapine 12 5 mg Oral HS Julio Devlin MD   senna-docusate sodium 1 tablet Oral HS Julio Devlin MD   spironolactone 12 5 mg Oral Daily Ubaldo Tafoya MD        Today, Patient Was Seen By: Vesta Chavez MD    ** Please Note: Dragon 360 Dictation voice to text software may have been used in the creation of this document   **

## 2018-05-12 NOTE — PROGRESS NOTES
Progress Note - Shagufta Erickson 80 y o  female MRN: [id number]    Unit/Bed#: E4 -01 Encounter: [id number]  Subjective:   Breathing better  Starting walk around  No chest pain  No palpitations, edema or lightheadedness  Objective:   Vitals: Blood pressure 144/52, pulse 93, temperature (!) 97 2 °F (36 2 °C), temperature source Tympanic, resp  rate 18, height 5' (1 524 m), weight 51 8 kg (114 lb 3 2 oz), SpO2 98 %  ,Body mass index is 22 3 kg/m²  CBC with diff:   Results from last 7 days  Lab Units 05/12/18  0622   WBC Thousand/uL 5 10   RBC Million/uL 4 28   HEMOGLOBIN g/dL 13 1   HEMATOCRIT % 40 2   MCV fL 94   MCH pg 30 6   MCHC g/dL 32 6   RDW % 14 4   MPV fL 12 5   PLATELETS Thousands/uL 142*     CMP:   Results from last 7 days  Lab Units 05/12/18  0622  05/09/18  2211   SODIUM mmol/L 137  < > 139   POTASSIUM mmol/L 3 9  < > 5 0   CHLORIDE mmol/L 102  < > 101   CO2 mmol/L 26  < > 28   ANION GAP mmol/L 9  < > 10   BUN mg/dL 35*  < > 31*   CREATININE mg/dL 0 98  < > 1 52*   GLUCOSE RANDOM mg/dL 142*  < > 444*   CALCIUM mg/dL 9 4  < > 9 6   AST U/L  --   --  44   ALT U/L  --   --  43   ALK PHOS U/L  --   --  107   TOTAL PROTEIN g/dL  --   --  8 5*   BILIRUBIN TOTAL mg/dL  --   --  0 63   EGFR ml/min/1 73sq m 51  < > 30   < > = values in this interval not displayed  Physical exam:  Lungs-bibasilar rales noted  Overall better aeration since admitted  No rhonchi or wheezes  Heart-irregular with grade 2 systolic murmur at the base  No diastolic murmur rub or gallop  Abdomen-soft nontender without mass organomegaly  Extremities-no edema  Assessment:  1  Acute combined systolic and diastolic heart failure  Ejection fraction 45% inpatient chronic atrial fibrillation  Improved on IV diuresis  Still has some findings suggesting ongoing heart failure  Now on spironolactone  Creatinine is now normal  2  Elevated troponin    Type 2 non transmural myocardial infarction due to congestive heart failure in patient with underlying CAD  Patient on beta-blocker and aspirin  3  Atrial fibrillation  Rate fairly well controlled on diltiazem and metoprolol  Patient on low-dose Eliquis  4  Moderate to severe aortic stenosis  5  Hyperlipidemia-on statin  6  Hypertension  BP well controlled on diltiazem and metoprolol    Plan: Will continue furosemide IV  Will order pro BMP for a m  to assess whether she is out of heart failure completely or not    I suspect she has ongoing CHF  Continue metoprolol, diltiazem, Eliquis and spironolactone  Continue statin for hyperlipidemia and CAD    Tracy Sullivan MD

## 2018-05-13 LAB
ANION GAP SERPL CALCULATED.3IONS-SCNC: 6 MMOL/L (ref 4–13)
BUN SERPL-MCNC: 36 MG/DL (ref 5–25)
CALCIUM SERPL-MCNC: 9.2 MG/DL (ref 8.3–10.1)
CHLORIDE SERPL-SCNC: 103 MMOL/L (ref 100–108)
CO2 SERPL-SCNC: 34 MMOL/L (ref 21–32)
CREAT SERPL-MCNC: 1.19 MG/DL (ref 0.6–1.3)
GFR SERPL CREATININE-BSD FRML MDRD: 40 ML/MIN/1.73SQ M
GLUCOSE SERPL-MCNC: 120 MG/DL (ref 65–140)
GLUCOSE SERPL-MCNC: 136 MG/DL (ref 65–140)
GLUCOSE SERPL-MCNC: 231 MG/DL (ref 65–140)
GLUCOSE SERPL-MCNC: 319 MG/DL (ref 65–140)
NT-PROBNP SERPL-MCNC: 2386 PG/ML
POTASSIUM SERPL-SCNC: 3.9 MMOL/L (ref 3.5–5.3)
SODIUM SERPL-SCNC: 143 MMOL/L (ref 136–145)

## 2018-05-13 PROCEDURE — 99232 SBSQ HOSP IP/OBS MODERATE 35: CPT | Performed by: INTERNAL MEDICINE

## 2018-05-13 PROCEDURE — 80048 BASIC METABOLIC PNL TOTAL CA: CPT | Performed by: INTERNAL MEDICINE

## 2018-05-13 PROCEDURE — 83880 ASSAY OF NATRIURETIC PEPTIDE: CPT | Performed by: INTERNAL MEDICINE

## 2018-05-13 PROCEDURE — 82948 REAGENT STRIP/BLOOD GLUCOSE: CPT

## 2018-05-13 RX ORDER — FUROSEMIDE 10 MG/ML
40 INJECTION INTRAMUSCULAR; INTRAVENOUS
Status: DISCONTINUED | OUTPATIENT
Start: 2018-05-13 | End: 2018-05-14 | Stop reason: HOSPADM

## 2018-05-13 RX ADMIN — FUROSEMIDE 40 MG: 10 INJECTION, SOLUTION INTRAMUSCULAR; INTRAVENOUS at 17:06

## 2018-05-13 RX ADMIN — INSULIN LISPRO 2 UNITS: 100 INJECTION, SOLUTION INTRAVENOUS; SUBCUTANEOUS at 17:07

## 2018-05-13 RX ADMIN — POLYETHYLENE GLYCOL 3350 17 G: 17 POWDER, FOR SOLUTION ORAL at 08:54

## 2018-05-13 RX ADMIN — APIXABAN 2.5 MG: 2.5 TABLET, FILM COATED ORAL at 17:06

## 2018-05-13 RX ADMIN — DILTIAZEM HYDROCHLORIDE 120 MG: 120 CAPSULE, COATED, EXTENDED RELEASE ORAL at 08:55

## 2018-05-13 RX ADMIN — QUETIAPINE FUMARATE 12.5 MG: 25 TABLET ORAL at 21:03

## 2018-05-13 RX ADMIN — FUROSEMIDE 40 MG: 40 TABLET ORAL at 08:56

## 2018-05-13 RX ADMIN — APIXABAN 2.5 MG: 2.5 TABLET, FILM COATED ORAL at 08:55

## 2018-05-13 RX ADMIN — METOPROLOL SUCCINATE 50 MG: 50 TABLET, EXTENDED RELEASE ORAL at 08:53

## 2018-05-13 RX ADMIN — INSULIN LISPRO 3 UNITS: 100 INJECTION, SOLUTION INTRAVENOUS; SUBCUTANEOUS at 11:42

## 2018-05-13 RX ADMIN — CALCITONIN SALMON 1 SPRAY: 200 SPRAY, METERED NASAL at 08:56

## 2018-05-13 RX ADMIN — FAMOTIDINE 20 MG: 20 TABLET ORAL at 08:53

## 2018-05-13 RX ADMIN — POTASSIUM CHLORIDE 10 MEQ: 750 TABLET, EXTENDED RELEASE ORAL at 08:54

## 2018-05-13 RX ADMIN — ASPIRIN 81 MG 81 MG: 81 TABLET ORAL at 08:55

## 2018-05-13 RX ADMIN — SPIRONOLACTONE 12.5 MG: 25 TABLET ORAL at 08:55

## 2018-05-13 RX ADMIN — FUROSEMIDE 40 MG: 10 INJECTION, SOLUTION INTRAMUSCULAR; INTRAVENOUS at 11:43

## 2018-05-13 RX ADMIN — ATORVASTATIN CALCIUM 20 MG: 20 TABLET, FILM COATED ORAL at 17:05

## 2018-05-13 NOTE — PROGRESS NOTES
[facility] Progress Note  Patient: Horacio Schmid 80 y o  female   MRN: [id number]  PCP: Keyona Jolly MD  Unit/Bed#: E4 -29 Encounter: [id number]  Date Of Visit: 05/13/18    Assessment:  51-year-old female with a history of combined congestive heart failure and only recently discharged from [facility] 4 days ago with presentation of CHF she was being treated at rehab and developed shortness of breath without chest pain  The echocardiogram done and on previous admission did show evidence of a aortic stenosis with a valve area of 1 0 centimeter squared consistent with moderate to severe aortic stenosis  She is a rather poor historian due to cognitive dysfunction  Also has a history of  atrial fibrillation on Eliquis and rate control borderline at time of presentation  Initial presentation did show elevation and troponin of 1 2 and also manifested elevated lactic acid of 3 1 with no overt signs of sepsis or infection      Principal Problem:    NSTEMI (non-ST elevated myocardial infarction) ([facility] [address] )  Active Problems:    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 failure ([facility] )    Nonrheumatic aortic valve stenosis    Controlled diabetes mellitus type II without complication (HCC)    Coronary artery disease involving native coronary artery of native heart without angina pectoris    Permanent atrial fibrillation (HCC)      Plan:    · NSTEMI with elevated troponin consistent with type 2 transmural myocardial infarct per Cardiology continue beta-blocker, aspirin and statin  · Acute on chronic combined congestive heart failure systolic and diastolic will increase IV diuresis as has failed a outpatient course in last 4 days cardiology is added spironolactone will need follow renal function closely, entrance BNP 5900/repeat BMP now notes 2900  · Aortic stenosis described as moderate to severe based on most recent echo and valve area per Cardiology not a candidate for TAVR  · Essential hypertension continue present dosing of diltiazem and metoprolol  · Hyperlipidemia continue on statin has been reduced by Cardiology  · Coronary artery disease, most recent stress evaluation only showing mild ischemia will continue on beta-blocker and aspirin therapy and statin  · Bilateral pleural effusions will treat his congestive heart failure with increase IV diuresis/doubt pneumonia or infectious etiology will obtain procalcitonin but will hold further antibiotics/will switch to p o  Lasix 40 b i d  when cleared by Cardiology  · Acute on chronic kidney disease creatinine above her baseline will continue to monitor with increasing IV diuresis may be on cardiorenal basis and should improve if so and now creatinine down to 1 19  · Type 2 diabetes mellitus  On Lantus q h s  5 units but hypoglycemic on last 2 measurements and will discontinue for now and will continue on sliding scale here with meals  · Acute on chronic hypoxic respiratory failure in relation to combined CHF and NSTEMI secondary CHF and hypoxia will titrate oxygen continue diuresis      VTE Pharmacologic Prophylaxis:   Pharmacologic: Apixaban (Eliquis)  Mechanical VTE Prophylaxis in Place: Yes    Discussions with Specialists or Other Care Team Provider: * spoke to Cardiology and pulmonology    Time Spent for Care: 45 minutes  More than 50% of total time spent on counseling and coordination of care as described above  Subjective:   Breathing easier off BiPAP after ICU admission now on low-flow cannula a denies any present chest pain no air hunger or accessory muscle usage  Nandini Rm She was ambulating with a walker throughout the hallway today but now is coughing with some shortness of breath remains on oxygen/reviewed note from case management and bed will not be available at [place] [address] until Monday       Objective:     Vitals:   Temp (24hrs), Av 7 °F (36 5 °C), Min:97 3 °F (36 3 °C), Max:98 3 °F (36 8 °C)    HR: [] 110  Resp:  [16-18] 17  BP: (110-144)/(59-73) 144/73  SpO2:  [97 %-99 %] 98 %  Body mass index is 21 53 kg/m²  Input and Output Summary (last 24 hours): Intake/Output Summary (Last 24 hours) at 05/13/18 0959  Last data filed at 05/13/18 0900   Gross per 24 hour   Intake              180 ml   Output                0 ml   Net              180 ml       Physical Exam:     Physical Exam:   General appearance: alert, appears stated age and cooperative  Head: Normocephalic, without obvious abnormality, atraumatic  Lungs: crackles at both bases  Heart: irregularly irregular rhythm systolic ejection murmur  Abdomen: soft, non-tender; bowel sounds normal; no masses,  no organomegaly  Back: negative  Extremities: extremities normal, atraumatic, no cyanosis or edema  Neurologic: Grossly normal      Additional Data:     Labs:      Results from last 7 days  Lab Units 05/12/18  0622  05/09/18  2211   WBC Thousand/uL 5 10  < > 7 59   HEMOGLOBIN g/dL 13 1  < > 13 9   HEMATOCRIT % 40 2  < > 42 8   PLATELETS Thousands/uL 142*  < > 182   NEUTROS PCT %  --   --  72   LYMPHS PCT %  --   --  21   MONOS PCT %  --   --  6   EOS PCT %  --   --  1   < > = values in this interval not displayed  Results from last 7 days  Lab Units 05/13/18  0433  05/09/18  2211   SODIUM mmol/L 143  < > 139   POTASSIUM mmol/L 3 9  < > 5 0   CHLORIDE mmol/L 103  < > 101   CO2 mmol/L 34*  < > 28   BUN mg/dL 36*  < > 31*   CREATININE mg/dL 1 19  < > 1 52*   CALCIUM mg/dL 9 2  < > 9 6   TOTAL PROTEIN g/dL  --   --  8 5*   BILIRUBIN TOTAL mg/dL  --   --  0 63   ALK PHOS U/L  --   --  107   ALT U/L  --   --  43   AST U/L  --   --  44   GLUCOSE RANDOM mg/dL 120  < > 444*   < > = values in this interval not displayed  Results from last 7 days  Lab Units 05/09/18  2211   INR  1 22*       * I Have Reviewed All Lab Data Listed Above  * Additional Pertinent Lab Tests Reviewed:  All Labs For Current Hospital Admission Reviewed    Imaging:  Xr Chest Portable - 1 View    Result Date: 5/10/2018  Narrative: CHEST INDICATION:   resp distress  COMPARISON:  4/29/2018 EXAM PERFORMED/VIEWS:  XR CHEST PORTABLE FINDINGS: Heart shadow is enlarged but unchanged from prior exam   Atherosclerotic aorta  Increased congestive changes with bibasilar opacities and small pleural effusions  Osseous structures appear within normal limits for patient age  Impression: Increased congestive changes with bibasilar opacities and small pleural effusions  Workstation performed: [id number]     Xr Chest 1 View Portable    Result Date: 4/23/2018  Narrative: CHEST INDICATION:   epigastric pain  Shortness of breath COMPARISON:  3/19/2018 EXAM PERFORMED/VIEWS:  XR CHEST PORTABLE FINDINGS:  There are median sternotomy wires indicating prior cardiac surgery  There is enlargement of the cardiac silhouette There is evidence of interstitial pulmonary edema and small bilateral effusions  No pneumothorax Osseous structures appear within normal limits for patient age  Impression: Interstitial pulmonary edema with small bilateral effusions  Workstation performed: [id number]     Xr Chest 2 Views    Result Date: 4/30/2018  Narrative: CHEST INDICATION:   weakness/ sob  COMPARISON:  April 22, 2018 and March 19, 2018  EXAM PERFORMED/VIEWS:  XR CHEST PA & LATERAL  The frontal view was performed utilizing dual energy radiographic technique  FINDINGS: Heart enlarged  Prior open heart surgery  Pulmonary vessels unremarkable  Bilateral pleural effusions, larger on the left  Lungs hyperinflated  Atelectasis or consolidation in the base of the left lower lobe  Diffuse demineralization of the imaged portions of the skeleton  Chronic mild compression deformity of T10  Impression: Small bilateral pleural effusions, larger on the left  Atelectasis or consolidation in the base of the left lower lobe   Workstation performed: [id number]     Xr Abdomen Obstruction Series    Result Date: 4/23/2018  Narrative: OBSTRUCTION SERIES INDICATION:   constipation  COMPARISON:  None EXAM PERFORMED/VIEWS:  XR ABDOMEN OBSTRUCTION SERIES FINDINGS: There is moderate amount of stool in the colon  There do not appear to be any abnormally dilated small bowel loops  No free air beneath the hemidiaphragms  No pathologic calcifications or soft tissue masses evident  There is a mild S-shaped thoracolumbar scoliosis  There is degenerative arthritis at the right hip joint  There are surgical clips over the left side of the abdomen and pelvis  There is severe atherosclerotic disease  Examination of the chest reveals a normal cardiomediastinal silhouette  There is small left pleural effusion and minimal right pleural effusion  There are some interstitial prominence in the lungs  There is no pneumothorax  There is probably mild bibasilar atelectasis  Impression: Small pleural effusions and interstitial prominence  Correlate for clinical evidence of failure  No obstruction seen  Moderate stool Workstation performed: [id number]     Ct Chest Without Contrast    Result Date: 4/29/2018  Narrative: CT CHEST WITHOUT IV CONTRAST INDICATION:   Shortness of breath  COMPARISON: 11/22/2008 TECHNIQUE: CT examination of the chest was performed without intravenous contrast   Axial, sagittal, and coronal 2D reformatted images were created from the source data and submitted for interpretation  Radiation dose length product (DLP) for this visit:  278 11 mGy-cm   This examination, like all CT scans performed in the [facility], was performed utilizing techniques to minimize radiation dose exposure, including the use of iterative  reconstruction and automated exposure control  FINDINGS: LUNGS:  There is interstitial edema and some hazy groundglass pulmonary edema bilaterally    A small focal density in the subpleural area of the right upper lobe anterolaterally on image 34 appears to have been present on the previous exam in keeping with  a benign finding  There are no airway obstructions seen on the right  There does appear to be some material within the left lower lobe bronchial branches peripherally and there is some left lower lobe consolidation in that area  This would suggest possible aspiration pneumonia  PLEURA:  There are moderate right and small left pleural effusions  There is no pneumothorax  HEART/GREAT VESSELS:  There is moderate cardiomegaly  There is calcification of the aortic root and aortic valve leaflets  There is heavy coronary artery calcification  There is no pericardial effusion  Patient is status post CABG  The ascending thoracic aorta is aneurysmally dilated measuring 4 3 cm  The pulmonary veins appear distended  MEDIASTINUM AND HOLLY:  There are numerous small lymph nodes  CHEST WALL AND LOWER NECK:   Unremarkable  VISUALIZED STRUCTURES IN THE UPPER ABDOMEN:  Unremarkable  OSSEOUS STRUCTURES:  There is a compression fracture at the superior endplate of N05 which was present on prior chest x-ray from 3/19/2018  There is some osteopenia suggested diffusely  No new bony abnormality seen  Impression: The patient has debris in the left lower lobe bronchial branches with peripheral consolidation suggesting aspiration pneumonia at the left base  There is also evidence of congestive heart failure with pulmonary edema and pleural effusions and cardiomegaly  There is 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 and there are aortic valvular calcifications noted  Patient may benefit from repeat echocardiography  Workstation performed: [id number]     Imaging Reports Reviewed Today Include:  Reviewed chest x-ray and CT imaging  Imaging Personally Reviewed by Myself Includes:  Now  Procedure: Xr Chest Portable - 1 View    Result Date: 5/10/2018  Narrative: CHEST INDICATION:   resp distress   COMPARISON:  4/29/2018 EXAM PERFORMED/VIEWS:  XR CHEST PORTABLE FINDINGS: Heart shadow is enlarged but unchanged from prior exam   Atherosclerotic aorta  Increased congestive changes with bibasilar opacities and small pleural effusions  Osseous structures appear within normal limits for patient age  Impression: Increased congestive changes with bibasilar opacities and small pleural effusions  Workstation performed: [id number]        Recent Cultures (last 7 days):       Results from last 7 days  Lab Units 05/09/18  2211   BLOOD CULTURE  No Growth at 72 hrs  No Growth at 72 hrs  Last 24 Hours Medication List:     Current Facility-Administered Medications:  acetaminophen 650 mg Oral Q6H PRN Julio Devlin MD   apixaban 2 5 mg Oral BID Julio Devlin MD   aspirin 81 mg Oral Daily Julio Devlin MD   atorvastatin 20 mg Oral Daily With Javed Keller MD   calcitonin (salmon) 1 spray Alternating Nares Daily Julio Devlin MD   diltiazem 120 mg Oral Daily Julio Devlin MD   famotidine 20 mg Oral Daily Julio Devlin MD   furosemide 40 mg Oral BID (diuretic) Gabby Shah MD   insulin lispro 1-5 Units Subcutaneous TID AC Julio Devlin MD   metoprolol succinate 50 mg Oral Daily Julio Devlin MD   ondansetron 4 mg Intravenous Q4H PRN Julio Devlin MD   polyethylene glycol 17 g Oral Daily Julio Devlin MD   potassium chloride 10 mEq Oral Daily Julio Devlin MD   QUEtiapine 12 5 mg Oral HS Julio Devlin MD   senna-docusate sodium 1 tablet Oral HS Julio Devlin MD   spironolactone 12 5 mg Oral Daily Akshat Marx MD        Today, Patient Was Seen By: Gabby Shah MD    ** Please Note: Dragon 360 Dictation voice to text software may have been used in the creation of this document   **

## 2018-05-13 NOTE — PLAN OF CARE

## 2018-05-13 NOTE — SOCIAL WORK
CM met with patient and family at bedside to address discharge needs  Patient is a 30-day readmission with 2 previous admissions on 4/22/18-4/22/18 and 4/29/18-5/8/18; after last admission patient was discharges to [facility] and [facility] on 5/10/18  Patient and family wish for patient to return to [facility] at discharge; referral submitted for evaluation  The patient lives with her elderly spouse in a ranch style home with one [place]  Her spouse is elderly, uses a cane, and is limited in ability to help the patient  Patient and family reports she uses a RW at all times and has a shower chair and safety bars in bathroom  Patient reports she recently received home oxygen from [facility] and uses it as needed  Per patient's family, patient and her spouse have medical alerts which they do not wear despite repeated falls in the home  Patient also has services with [facility]  PCP identified as Dr Fabiana Nance  Patient uses [facility] for Rx needs  POA identified as patient's son Ronna Coronel and daughter Tomás Chapman  Limited help/support reported as patient's spouse is limited in his ability to help  Patient and family made aware of CM's name and role  CM also made family aware of assigned CM  No other needs at present  CM to follow as needed

## 2018-05-13 NOTE — PROGRESS NOTES
Progress Note - Vernon Dorsey 80 y o  female MRN: [id number]    Unit/Bed#: E4 -01 Encounter: [id number]  Subjective:   Breathing better  Still somewhat short of breath but definitely improved  No chest pain  No palpitations  No edema or lightheadedness    Objective:   Vitals: Blood pressure 129/72, pulse (!) 45, temperature 98 °F (36 7 °C), temperature source Tympanic, resp  rate 17, height 5' (1 524 m), weight 50 kg (110 lb 3 7 oz), SpO2 95 %  ,Body mass index is 21 53 kg/m²  HR is 90 and irregular NOT 45    CMP:   Results from last 7 days  Lab Units 05/13/18  0433  05/09/18  2211   SODIUM mmol/L 143  < > 139   POTASSIUM mmol/L 3 9  < > 5 0   CHLORIDE mmol/L 103  < > 101   CO2 mmol/L 34*  < > 28   ANION GAP mmol/L 6  < > 10   BUN mg/dL 36*  < > 31*   CREATININE mg/dL 1 19  < > 1 52*   GLUCOSE RANDOM mg/dL 120  < > 444*   CALCIUM mg/dL 9 2  < > 9 6   AST U/L  --   --  44   ALT U/L  --   --  43   ALK PHOS U/L  --   --  107   TOTAL PROTEIN g/dL  --   --  8 5*   BILIRUBIN TOTAL mg/dL  --   --  0 63   EGFR ml/min/1 73sq m 40  < > 30   < > = values in this interval not displayed  probnp [id number]    Physical exam:  Lungs-bibasilar rales noted  Clearly better aeration since admitted  No rhonchi or wheezes  Heart-irregular with grade 2 systolic murmur at the base  No diastolic murmur rub or gallop  Abdomen-soft nontender without mass organomegaly  Extremities-no edema    Assessment:  1  Acute combined systolic and diastolic heart failure  Ejection fraction 45% with contribution of atrial fibrillation and valvular heart disease  Significantly improved on IV diuresis  ProBNP down in the low 2000 range and had been as high as 18,000  On furosemide 40 mg IV b i d  and spironolactone  2  Elevated troponin/type 2 nontransmural myocardial infarction due to congestive heart failure in patient with underlying CAD  Patient on beta-blocker and aspirin  3  Atrial fibrillation    Rate reasonably controlled on diltiazem and metoprolol  Patient is on low-dose Eliquis  4  Moderate to severe aortic stenosis  5  Hyperlipidemia-on statin  6  Hypertension  BP well controlled on diltiazem, metoprolol and spironolactone      Plan:  Continue IV furosemide until the a m  when we will convert her to 40 mg b i d  on discharge which will be higher than her 40 mg dose on her last discharge  Repeat BMP in a m    Continue metoprolol, diltiazem, Eliquis and spironolactone  Continue statin    Hopefully for discharge to rehab tomorrow    Ryley Salinas MD

## 2018-05-14 VITALS
DIASTOLIC BLOOD PRESSURE: 77 MMHG | HEIGHT: 60 IN | TEMPERATURE: 96.7 F | OXYGEN SATURATION: 97 % | BODY MASS INDEX: 21.21 KG/M2 | RESPIRATION RATE: 18 BRPM | HEART RATE: 74 BPM | SYSTOLIC BLOOD PRESSURE: 135 MMHG | WEIGHT: 108.03 LBS

## 2018-05-14 PROBLEM — I21.4 NSTEMI (NON-ST ELEVATED MYOCARDIAL INFARCTION) (HCC): Status: RESOLVED | Noted: 2018-05-09 | Resolved: 2018-05-14

## 2018-05-14 LAB
ANION GAP SERPL CALCULATED.3IONS-SCNC: 9 MMOL/L (ref 4–13)
BUN SERPL-MCNC: 36 MG/DL (ref 5–25)
CALCIUM SERPL-MCNC: 9.5 MG/DL (ref 8.3–10.1)
CHLORIDE SERPL-SCNC: 103 MMOL/L (ref 100–108)
CO2 SERPL-SCNC: 32 MMOL/L (ref 21–32)
CREAT SERPL-MCNC: 1.12 MG/DL (ref 0.6–1.3)
GFR SERPL CREATININE-BSD FRML MDRD: 43 ML/MIN/1.73SQ M
GLUCOSE SERPL-MCNC: 152 MG/DL (ref 65–140)
GLUCOSE SERPL-MCNC: 205 MG/DL (ref 65–140)
GLUCOSE SERPL-MCNC: 225 MG/DL (ref 65–140)
POTASSIUM SERPL-SCNC: 4.2 MMOL/L (ref 3.5–5.3)
SODIUM SERPL-SCNC: 144 MMOL/L (ref 136–145)

## 2018-05-14 PROCEDURE — G8988 SELF CARE GOAL STATUS: HCPCS

## 2018-05-14 PROCEDURE — 99232 SBSQ HOSP IP/OBS MODERATE 35: CPT | Performed by: INTERNAL MEDICINE

## 2018-05-14 PROCEDURE — 99239 HOSP IP/OBS DSCHRG MGMT >30: CPT | Performed by: HOSPITALIST

## 2018-05-14 PROCEDURE — 82948 REAGENT STRIP/BLOOD GLUCOSE: CPT

## 2018-05-14 PROCEDURE — 97163 PT EVAL HIGH COMPLEX 45 MIN: CPT

## 2018-05-14 PROCEDURE — 80048 BASIC METABOLIC PNL TOTAL CA: CPT | Performed by: INTERNAL MEDICINE

## 2018-05-14 PROCEDURE — G8987 SELF CARE CURRENT STATUS: HCPCS

## 2018-05-14 PROCEDURE — G8979 MOBILITY GOAL STATUS: HCPCS

## 2018-05-14 PROCEDURE — 97166 OT EVAL MOD COMPLEX 45 MIN: CPT

## 2018-05-14 PROCEDURE — G8978 MOBILITY CURRENT STATUS: HCPCS

## 2018-05-14 RX ORDER — FUROSEMIDE 10 MG/ML
40 INJECTION INTRAMUSCULAR; INTRAVENOUS
Qty: 4 ML | Refills: 0 | Status: SHIPPED | OUTPATIENT
Start: 2018-05-14 | End: 2018-05-14 | Stop reason: HOSPADM

## 2018-05-14 RX ORDER — DILTIAZEM HYDROCHLORIDE 120 MG/1
240 CAPSULE, COATED, EXTENDED RELEASE ORAL DAILY
Status: DISCONTINUED | OUTPATIENT
Start: 2018-05-14 | End: 2018-05-14 | Stop reason: HOSPADM

## 2018-05-14 RX ORDER — DILTIAZEM HYDROCHLORIDE 240 MG/1
240 CAPSULE, COATED, EXTENDED RELEASE ORAL DAILY
Qty: 30 CAPSULE | Refills: 0 | Status: SHIPPED | OUTPATIENT
Start: 2018-05-15 | End: 2018-06-26 | Stop reason: SDUPTHER

## 2018-05-14 RX ORDER — ATORVASTATIN CALCIUM 20 MG/1
20 TABLET, FILM COATED ORAL
Qty: 30 TABLET | Refills: 0 | Status: SHIPPED | OUTPATIENT
Start: 2018-05-14 | End: 2018-06-01 | Stop reason: SDUPTHER

## 2018-05-14 RX ADMIN — SPIRONOLACTONE 12.5 MG: 25 TABLET ORAL at 08:33

## 2018-05-14 RX ADMIN — METOPROLOL SUCCINATE 50 MG: 50 TABLET, EXTENDED RELEASE ORAL at 08:33

## 2018-05-14 RX ADMIN — APIXABAN 2.5 MG: 2.5 TABLET, FILM COATED ORAL at 08:32

## 2018-05-14 RX ADMIN — POTASSIUM CHLORIDE 10 MEQ: 750 TABLET, EXTENDED RELEASE ORAL at 08:33

## 2018-05-14 RX ADMIN — INSULIN LISPRO 1 UNITS: 100 INJECTION, SOLUTION INTRAVENOUS; SUBCUTANEOUS at 07:53

## 2018-05-14 RX ADMIN — DILTIAZEM HYDROCHLORIDE 240 MG: 120 CAPSULE, COATED, EXTENDED RELEASE ORAL at 08:39

## 2018-05-14 RX ADMIN — INSULIN LISPRO 2 UNITS: 100 INJECTION, SOLUTION INTRAVENOUS; SUBCUTANEOUS at 11:34

## 2018-05-14 RX ADMIN — ASPIRIN 81 MG 81 MG: 81 TABLET ORAL at 08:33

## 2018-05-14 RX ADMIN — FAMOTIDINE 20 MG: 20 TABLET ORAL at 08:32

## 2018-05-14 RX ADMIN — POLYETHYLENE GLYCOL 3350 17 G: 17 POWDER, FOR SOLUTION ORAL at 08:33

## 2018-05-14 RX ADMIN — CALCITONIN SALMON 1 SPRAY: 200 SPRAY, METERED NASAL at 08:32

## 2018-05-14 RX ADMIN — FUROSEMIDE 40 MG: 10 INJECTION, SOLUTION INTRAMUSCULAR; INTRAVENOUS at 07:53

## 2018-05-14 NOTE — PROGRESS NOTES
Progress Note - Claudia Mathias 80 y o  female MRN: [id number]    Unit/Bed#: E4 -01 Encounter: [id number]  Subjective:   Breathing better  Denies palpitations  No edema  No chest pain or lightheadedness  Objective:   Vitals: Blood pressure 148/89, pulse 56, temperature (!) 96 6 °F (35 9 °C), temperature source Tympanic, resp  rate 16, height 5' (1 524 m), weight 49 kg (108 lb 0 4 oz), SpO2 95 %  ,Body mass index is 21 1 kg/m²  CMP:   Results from last 7 days  Lab Units 05/14/18  0441  05/09/18  2211   SODIUM mmol/L 144  < > 139   POTASSIUM mmol/L 4 2  < > 5 0   CHLORIDE mmol/L 103  < > 101   CO2 mmol/L 32  < > 28   ANION GAP mmol/L 9  < > 10   BUN mg/dL 36*  < > 31*   CREATININE mg/dL 1 12  < > 1 52*   GLUCOSE RANDOM mg/dL 152*  < > 444*   CALCIUM mg/dL 9 5  < > 9 6   AST U/L  --   --  44   ALT U/L  --   --  43   ALK PHOS U/L  --   --  107   TOTAL PROTEIN g/dL  --   --  8 5*   BILIRUBIN TOTAL mg/dL  --   --  0 63   EGFR ml/min/1 73sq m 43  < > 30   < > = values in this interval not displayed  Physical exam:  Lungs-rales at the right base  Otherwise fairly clear  Much better aeration since admission  No rhonchi or wheezes  Heart-irregular and tachycardic with grade 2 systolic murmur at the base  No diastolic murmur rub or gallop  Abdomen-soft nontender without mass organomegaly  Extremities-no edema    Assessment:  1  Acute combined systolic and diastolic heart failure  Ejection fraction 45%  Atrial fibrillation contributing to heart failure as well as contribution from valvular heart disease  Significantly improved on IV diuresis  On furosemide 40 mg IV b i d  and spironolactone  2  Elevated troponin  Type 2 nontransmural myocardial infarction due to congestive heart failure in patient with underlying CAD  Patient on beta-blocker and aspirin  3  Atrial fibrillation  Patient appears to be in atrial flutter today with 2-1 conduction and is somewhat tachycardic    Patient on diltiazem and metoprolol for rate control  Patient on low-dose Eliquis for stroke prophylaxis  4  Moderate to severe aortic stenosis  5  Hyperlipidemia-on statin  6  Hypertension  Borderline control on diltiazem, metoprolol and spironolactone    Plan:  Change furosemide to 40 mg p o  b i d  Heart rate was well controlled until now  Patient had just gotten back from bathroom    Feel if heart rate settles down she can be discharged to rehab today discharge cardiac medications as follows  Furosemide 40 mg b i d  increased from last discharge  Aspirin 81 mg daily-Same  Atorvastatin 20 mg daily-reduced from 40 mg daily due to frailty  Diltiazem  20 mg daily-same  Metoprolol XL 50 mg daily-same  Spironolactone 12 5 mg daily-new  KCl 10 mEq daily-same  If HR remains elevated will increase diltiazem to 120 mg BID  Will arrange follow-up with Dr Kiara Hatfield after discharge from rehabilitation    Cash Snyder MD

## 2018-05-14 NOTE — SOCIAL WORK
CM received call from patient's daughter Paco Carlos  She is requesting updates be given to patient's Spouse, Aliza Harrington or Daughter, Paco Carlos, but not to the Daughter in [place]

## 2018-05-14 NOTE — PLAN OF CARE
Problem: OCCUPATIONAL THERAPY ADULT  Goal: Performs self-care activities at highest level of function for planned discharge setting  See evaluation for individualized goals  Treatment Interventions: ADL retraining, Functional transfer training, UE strengthening/ROM, Cognitive reorientation, Endurance training, Patient/family training, Equipment evaluation/education, Compensatory technique education, Energy conservation, Activityengagement, Cardiac education          See flowsheet documentation for full assessment, interventions and recommendations  Limitation: Decreased ADL status, Decreased Safe judgement during ADL, Decreased UE strength, Decreased cognition, Decreased endurance, Decreased self-care trans, Decreased high-level ADLs  Prognosis: Good  Assessment: Pt is a 80 y o  female seen for OT evaluation s/p admit to [facility] on 5/9/2018 w/ NSTEMI (non-ST elevated myocardial infarction) ([facility] [address] )  Comorbidities affecting pt's functional performance at time of assessment include: CHF, HTN, CAD, OA, CKD, Dementia, a-fib, b/l pleural effusions  Personal factors affecting pt at time of IE include: impaired STM  Prior to admission, pt was living w/ spouse and reports independent w/ ADLS, independent w/ functional transfers and mobility w/ RW, assist for medication management, independent w/ IADLs, family provides transportation  Upon evaluation: Pt requires MIN assist sit<>stand w/ VCs for positioning, MIN assist functional mobility w/ RW and cues to complete safely and slowly, MIN assist UB ADLS, MIN assist LB ADLS, MIN assist toileting 2* the following deficits impacting occupational performance: decreased strength and endurance, impaired balance, impaired activity tolerance, impaired balance, dyspnea on exertion (91-95%)  Pt to benefit from continued skilled OT tx while in the hospital to address deficits as defined above and maximize level of functional independence w ADL's and functional mobility  Occupational Performance areas to address include: grooming, bathing/shower, toilet hygiene, dressing, functional mobility, clothing management, cleaning and meal prep, energy conservation education, CHF education  Pt reported that she usually does weigh herself daily  From OT standpoint, recommendation at time of d/c would be short term rehab       OT Discharge Recommendation: Short Term Rehab  OT - OK to Discharge:  (when medically stable to rehab)      Comments: Danita Majano MS, OTR/L

## 2018-05-14 NOTE — NURSING NOTE
Patient and spouse leaving unit with son and daughter in law  Patient's IV removed and dressed in personal clothing from home  Patient and  wheeled to vehicle in wheelchairs  Report given to receiving facility  Scripts sent along with chart copy

## 2018-05-14 NOTE — DISCHARGE SUMMARY
Discharge Summary - Medical Shaquille Pope 80 y o  female MRN: [id number]    [facility] [place] [address] MED SURG Room / Bed: [address] [place] [address] 434/E4 -* Encounter: [id number]    BRIEF OVERVIEW      Admission Date: 5/9/2018       Discharge Date:  05/14/2018      Admitting Diagnosis:  Acute on chronic combined heart failure  Primary Discharge Diagnosis  Principal Problem (Resolved):    NSTEMI (non-ST elevated myocardial infarction) [facility])  Active Problems:    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 failure ([facility] [address] )    Nonrheumatic aortic valve stenosis    Controlled diabetes mellitus type II without complication ([facility] )    Coronary artery disease involving native coronary artery of native heart without angina pectoris    Permanent atrial fibrillation [facility])      Service:  [address] Cardiology Dr Kee Johnson and plan on date of discharge  · Acute on chronic combined congestive heart failure systolic and diastolic  Combination of valvular heart disease and atrial fibrillation  Much improved on IV diuresis  Change to furosemide 40 mg twice a day p o  which is higher than her 40 mg daily do home dose  EF of 45%  ProBNP down to the low 2000 level  Spironolactone added      · Aortic stenosis  moderate to severe based on most recent echo and valve area per Cardiology not a candidate for TAVR     · NSTEMI with elevated troponin consistent with type 2 transmural myocardial infarct per Cardiology continue beta-blocker, aspirin and statin     · Essential hypertension-blood pressure currently stable    on diltiazem and metoprolol     · Hyperlipidemia continue on statin has been reduced by Cardiology to 40 mg daily atorvastatin     · Coronary artery disease, most recent stress evaluation only showing mild ischemia will continue on beta-blocker and aspirin therapy and statin     · Bilateral pleural effusions -secondary to CHF  No acute issues currently      · Acute on chronic kidney disease 3- creatinine at her baseline of 1  12        · Type 2 diabetes mellitus   On Lantus q h s  5 units but hypoglycemic on last 2 measurements and will discontinue for now and will continue on sliding scale here with meals     · Acute on chronic hypoxic respiratory failure in relation to combined CHF and NSTEMI secondary CHF and hypoxia will titrate oxygen continue diuresis     Plan:  Medication changes on discharge     Change furosemide to 40 mg p o  b i d  Heart rate was well controlled until now   Patient had just gotten back from bathroom   Feel if heart rate settles down she can be discharged to rehab today discharge cardiac medications as follows     Furosemide 40 mg b i d  increased from last discharge  Aspirin 81 mg daily-Same  Atorvastatin 20 mg daily-reduced from 40 mg daily due to frailty  Diltiazem  mg daily-increased from 120  Metoprolol XL 50 mg daily-same  Spironolactone 12 5 mg daily-new  KCl 10 mEq daily-same     Cardiology will arrange follow-up with Dr Annalee Cardona after discharge from rehabilitation     Stable for discharge to [facility] Rehab    Discharge Condition: Improved    [facility]charge Disposition: [facility]    Discharge summary:   Jam Gambino is a 80-year-old female with a history of combined congestive heart failure, moderate to severe aortic stenosis who was just discharged from [facility] 4 days prior to this admission with congestive heart failure  She was at [facility] when she developed worsening shortness of breath hence presented back to the ED and was admitted  She was noted to be in atrial fibrillation with rapid ventricular rate which was felt to be contributing to her heart failure  The patient has been evaluated for TAVR and not felt to be an ideal candidate secondary to a cognitive decline    The patient was discharged on 40 mg daily of furosemide-cardiology increase the furosemide to 40 mg twice a day and added spironolactone  The patient's atrial fibrillation was controlled with increase in diltiazem to 240 mg daily in addition to metoprolol  She was continued on her Eliquis  Patient currently is euvolemic and her breathing has improved markedly since admission  She is stable for discharge and will follow up with Cardiology on discharge        Discharge Medications   Please see Medical Reconciliation Discharge Form    Discharge Follow Up Appointments:   PCP  Cardiology    Discharge  Statement   Total Time Spent today including physical exam, discussion with patient and family, and discharge arrangements/care 42 minutes

## 2018-05-14 NOTE — PROGRESS NOTES
Progress Note - Usama Wilhelm 80 y o  female MRN: [id number]    Unit/Bed#: E4 -01 Encounter: [id number]    Principal Problem:    NSTEMI (non-ST elevated myocardial infarction) [facility])  Active Problems:    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 failure ([facility] [address] )    Nonrheumatic aortic valve stenosis    Controlled diabetes mellitus type II without complication ([facility] )    Coronary artery disease involving native coronary artery of native heart without angina pectoris    Permanent atrial fibrillation ([facility])      Assessment/Plan:  · Acute on chronic combined congestive heart failure systolic and diastolic  Combination of valvular heart disease and atrial fibrillation  Much improved on IV diuresis  Change to furosemide 40 mg twice a day p o  which is higher than her 40 mg daily do home dose  EF of 45%  ProBNP down to the low 2000 level  Spironolactone added  · Aortic stenosis  moderate to severe based on most recent echo and valve area per Cardiology not a candidate for TAVR    · NSTEMI with elevated troponin consistent with type 2 transmural myocardial infarct per Cardiology continue beta-blocker, aspirin and statin    · Essential hypertension-blood pressure currently stable  on diltiazem and metoprolol    · Hyperlipidemia continue on statin has been reduced by Cardiology to 40 mg daily atorvastatin    · Coronary artery disease, most recent stress evaluation only showing mild ischemia will continue on beta-blocker and aspirin therapy and statin    · Bilateral pleural effusions -secondary to CHF  No acute issues currently  · Acute on chronic kidney disease 3- creatinine at her baseline of 1 12       · Type 2 diabetes mellitus    On Lantus q h s  5 units but hypoglycemic on last 2 measurements and will discontinue for now and will continue on sliding scale here with meals    · Acute on chronic hypoxic respiratory failure in relation to combined CHF and NSTEMI secondary CHF and hypoxia will titrate oxygen continue diuresis     Plan:  Medication changes on discharge    Change furosemide to 40 mg p o  b i d  Heart rate was well controlled until now  Patient had just gotten back from bathroom  Feel if heart rate settles down she can be discharged to rehab today discharge cardiac medications as follows    Furosemide 40 mg b i d  increased from last discharge  Aspirin 81 mg daily-Same  Atorvastatin 20 mg daily-reduced from 40 mg daily due to frailty  Diltiazem  mg daily-increased from 120  Metoprolol XL 50 mg daily-same  Spironolactone 12 5 mg daily-new  KCl 10 mEq daily-same    Cardiology will arrange follow-up with Dr Elvin Mg after discharge from rehabilitation    Stable for discharge to [facility]    Subjective:  Had a brief run of atrial flutter with soon trended down  Diltiazem has been increased to 240 mg by Cardiology  Patient denies any chest pain or palpitations  She is eager to be discharged back to [facility] from where she wants to go home soon  Cleared for discharge by Cardiology  Physical Exam:   Vitals: Blood pressure 148/89, pulse 56, temperature (!) 96 6 °F (35 9 °C), temperature source Tympanic, resp  rate 16, height 5' (1 524 m), weight 49 kg (108 lb 0 4 oz), SpO2 95 %  ,Body mass index is 21 1 kg/m²  Gen: non-tachypnic, non-dyspnic  Conversant  Heart: irregular rate and rhythm, 2/6 systolic murmur at the right base  No S3   Lungs:  Bibasilar rales  Abd: soft, non-tender, non-distended  NABS, no guarding, rebound or peritoneal signs  Extremities: no clubbing, cyanosis or edema  2+pedal pulses bilaterally  Full range of motion  Neuro: awake, alert and oriented  Cranial nerves 2-12 intact  Strength and sensation grossly intact  Skin: warm and dry: no petechiae, purpura and rash      LABS:     Results from last 7 days  Lab Units 05/12/18  0622 05/11/18  0448 05/10/18  0523   WBC Thousand/uL 5 10 5 70 6 01   HEMOGLOBIN g/dL 13 1 13 7 12 2   HEMATOCRIT % 40 2 42 9 38 4   PLATELETS Thousands/uL 142* 122* 137*       Results from last 7 days  Lab Units 05/14/18  0441 05/13/18  0433 05/12/18  0622   SODIUM mmol/L 144 143 137   POTASSIUM mmol/L 4 2 3 9 3 9   CHLORIDE mmol/L 103 103 102   CO2 mmol/L 32 34* 26   BUN mg/dL 36* 36* 35*   CREATININE mg/dL 1 12 1 19 0 98   GLUCOSE RANDOM mg/dL 152* 120 142*   CALCIUM mg/dL 9 5 9 2 9 4       Intake/Output Summary (Last 24 hours) at 05/14/18 8200  Last data filed at 05/14/18 0801   Gross per 24 hour   Intake              700 ml   Output              300 ml   Net              400 ml           Current Facility-Administered Medications:  acetaminophen 650 mg Oral Q6H PRN Julio Devlin MD   apixaban 2 5 mg Oral BID Julio Devlin MD   aspirin 81 mg Oral Daily Julio Devlin MD   atorvastatin 20 mg Oral Daily With Javed Keller MD   calcitonin (salmon) 1 spray Alternating Nares Daily Julio Devlin MD   diltiazem 240 mg Oral Daily Akshat Marx MD   famotidine 20 mg Oral Daily Julio Devlin MD   furosemide 40 mg Intravenous BID (diuretic) Gabby Shah MD   insulin lispro 1-5 Units Subcutaneous TID AC Julio Devlin MD   metoprolol succinate 50 mg Oral Daily Julio Devlin MD   ondansetron 4 mg Intravenous Q4H PRN Julio Devlin MD   polyethylene glycol 17 g Oral Daily Julio Devlin MD   potassium chloride 10 mEq Oral Daily Julio Devlin MD   QUEtiapine 12 5 mg Oral HS Julio Devlin MD   senna-docusate sodium 1 tablet Oral HS Julio Devlin MD   spironolactone 12 5 mg Oral Daily Akshat Marx MD       Family update- tried calling -no answer

## 2018-05-14 NOTE — OCCUPATIONAL THERAPY NOTE
[address]  Evaluation     Patient Name: Konstantin Lawrence  [id number] Date: 5/14/2018  Problem List  Patient Active Problem List   Diagnosis    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 failure ([facility] [address] )    Nonrheumatic aortic valve stenosis    Benign essential HTN    Controlled diabetes mellitus type II without complication (HCC)    Atrial fibrillation with RVR (HCC)    Acute ischemic right MCA stroke ([facility] [address] )    Coronary artery disease involving native coronary artery of native heart without angina pectoris    Cognitive impairment    Depression with anxiety    Esophageal reflux    Mixed hyperlipidemia    Essential hypertension    Osteoarthritis    Squamous cell carcinoma of skin    Tinnitus    Severe protein-calorie malnutrition Analijuana Herman: less than 60% of standard weight) (HCC)    Moderate protein-calorie malnutrition (HCC)    Goals of care, counseling/discussion    Impaired mobility and ADLs    Dementia    Ambulatory dysfunction    Physical deconditioning    Type 2 myocardial infarction without ST elevation (HCC)    NSTEMI (non-ST elevated myocardial infarction) ([facility] [address] )    Permanent atrial fibrillation ([facility] [address] )     Past Medical History  Past Medical History:   Diagnosis Date    Acute ischemic right MCA stroke ([facility] [address] )     Anticoagulant long-term use     last assessed: 8/17/17    Aortic stenosis     Atrial fibrillation ([facility] [address] )     Blurred vision     last assessed: 10/25/13    CHF (congestive heart failure) ([facility] )     Coronary artery disease     Dementia     Diabetes mellitus ([facility] [address] )     Dysuria     last assessed: 8/3/15    Hyperlipidemia     Hypertension     Nonrheumatic aortic (valve) insufficiency     Old myocardial infarction     Rectal carcinoma (HCC)      Past Surgical History  Past Surgical History:   Procedure Laterality Date    BALLOON ANGIOPLASTY, ARTERY      CORONARY ARTERY BYPASS GRAFT      RECTAL SURGERY             05/14/18 1242   Note Type   Note type Eval/Treat   Restrictions/Precautions   Weight Bearing Precautions Per Order No   Other Precautions Telemetry; Fall Risk;Multiple lines;Cognitive   Pain Assessment   Pain Assessment No/denies pain   Pain Score No Pain   Home Living   Type of [facility] One level; Able to live on main level with bedroom/bathroom  (1+2 MURPHY, spouse to basement for laundry)   Bathroom Shower/Tub Tub/shower unit   [employer] Grab bars in shower; Shower chair;Grab bars around toilet;Commode   [address] Walker;Cane;Grab bars   Additional Comments pt family transports, family completes medications   Prior Function   Level of [place] Independent with ADLs and functional mobility   Lives With Spouse   Receives Help From Family   ADL Assistance Independent   IADLs Independent  (assist IADLs w/ med management)   Falls in the last 6 months 1 to 4   Vocational Retired   Comments pt reports family able to assist as needed   Lifestyle   Autonomy per pt independent w/ ADLs, independent w/ functional transfers and mobility w/ RW, assist IADLs of medication management   Reciprocal Relationships spouse, son and daughter in law   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   Intrinsic Gratification walking and cleaning   ADL   Where Assessed Chair   Eating Assistance 6  Modified independent   [address] 4  Minimal Assistance   Grooming Deficit Setup;Verbal cueing;Supervision/safety; Increased time to complete;Wash/dry hands   UB Bathing Assistance 5  Supervision/Setup   LB Bathing Assistance 4  Minimal Assistance   [facility]  4  Minimal Assistance   Bed Mobility   Supine to Sit Unable to assess   Additional Comments pt seated EOB upon arrival   Transfers   Sit to Stand 4  Minimal assistance   Additional items Assist x 1; Increased time required;Verbal cues;Armrests   Stand to Sit 4  Minimal assistance   Additional items Assist x 1; Increased time required;Verbal cues;Armrests   Toilet transfer 4  Minimal assistance   Additional items Assist x 1; Increased time required;Verbal cues;Standard toilet  (grab bars)   Additional Comments Vcs for hand placement   Functional Mobility   Functional Mobility 4  Minimal assistance   Additional Comments assist x1 w/ VCs for safety and to slow down   Additional items Rolling walker   Balance   Static Sitting Fair +   Dynamic Sitting Fair   Static Standing Fair   Dynamic Standing Fair -   Ambulatory Fair -   Activity Tolerance   Activity Tolerance Patient limited by fatigue;Treatment limited secondary to medical complications (Comment)   Nurse Made Aware appropriate to see per Haris PEDERSEN   RUALAYNA Assessment   RUE Assessment WFL  (4/5)   LUE Assessment   LUE Assessment WFL  (4/5)   Hand Function   Gross Motor Coordination Functional   Fine Motor Coordination Functional   Sensation   Light Touch No apparent deficits   Sharp/Dull No apparent deficits   Proprioception   Proprioception No apparent deficits   Vision-Basic Assessment   Current Vision Wears glasses only for reading   Vision - Complex Assessment   Ocular Range of Motion [facility] Able to read clock/calendar on wall without difficulty   Perception   Inattention/Neglect Appears intact   Cognition   Overall Cognitive Status Impaired   Arousal/Participation Alert; Cooperative   Attention Within functional limits   Orientation Level Oriented to person;Oriented to place;Oriented to situation  (reported may 12th, reported year 36)   Memory Decreased short term memory;Decreased recall of precautions   Following Commands Follows one step commands with increased time or repetition   Comments pt w/ impaired STM and orientation to year   Assessment   Limitation Decreased ADL status; Decreased Safe judgement during ADL;Decreased UE strength;Decreased cognition;Decreased endurance;Decreased self-care trans;Decreased high-level ADLs   Prognosis Good   Assessment Pt is a 80 y o  female seen for OT evaluation s/p admit to [facility] 5/9/2018 w/ NSTEMI (non-ST elevated myocardial infarction) ([facility] [address] )  Comorbidities affecting pt's functional performance at time of assessment include: CHF, HTN, CAD, OA, CKD, Dementia, a-fib, b/l pleural effusions  Personal factors affecting pt at time of IE include: impaired STM  Prior to admission, pt was living w/ spouse and reports independent w/ ADLS, independent w/ functional transfers and mobility w/ RW, assist for medication management, independent w/ IADLs, family provides transportation  Upon evaluation: Pt requires MIN assist sit<>stand w/ VCs for positioning, MIN assist functional mobility w/ RW and cues to complete safely and slowly, MIN assist UB ADLS, MIN assist LB ADLS, MIN assist toileting 2* the following deficits impacting occupational performance: decreased strength and endurance, impaired balance, impaired activity tolerance, impaired balance, dyspnea on exertion (91-95%)  Pt to benefit from continued skilled OT tx while in the hospital to address deficits as defined above and maximize level of functional independence w ADL's and functional mobility  Occupational Performance areas to address include: grooming, bathing/shower, toilet hygiene, dressing, functional mobility, clothing management, cleaning and meal prep, energy conservation education, CHF education  Pt reported that she usually does weigh herself daily  From OT standpoint, recommendation at time of d/c would be short term rehab  Goals   Patient Goals "go to [facility]"   LT Time Frame 7-10   Long Term Goal please see below goals   Plan   Treatment Interventions ADL retraining;Functional transfer training;UE strengthening/ROM; Cognitive reorientation; Endurance training;Patient/family training;Equipment evaluation/education; Compensatory technique education; Energy conservation; Activityengagement;Cardiac education   Goal Expiration Date 05/24/18   OT Frequency 3-5x/wk   Recommendation   OT Discharge Recommendation Short Term Rehab   OT - OK to Discharge (when medically stable to rehab)   Barthel Index   Feeding 10   Bathing 0   Grooming Score 5   Dressing Score 5   Bladder Score 10   Bowels Score 10   Toilet Use Score 5   Transfers (Bed/Chair) Score 10   Mobility (Level Surface) Score 10   Stairs Score 0   Barthel Index Score 65   Modified Orion Scale   Modified Orion Scale 4     Occupational Therapy Goals to be met in 7-10 days:  1) Pt will improve activity tolerance to G for min 30 min txment sessions  2) Pt will complete ADLs/self care w/ mod I   3) Pt will complete toileting w/ mod I w/ G hygiene/thoroughness using DME PRN  4) Pt will improve functional transfers on/off all surfaces using DME PRN w/ G balance/safety including toileting w/ mod I  5) Pt will improve fx'l mobility during I/ADl/leisure tasks using DME PRN w/ g balance/safety w/ mod I  6) Pt will engage in ongoing cognitive assessment w/ G participation to A w/ safe d/c planning/recommendations  7) Pt will demonstrate G carryover of pt/caregiver education and training as appropriate w/ mod I  w/ G tolerance  8) Pt will engage in depression screen/leisure interest checklist w/ G participation to monitor s/s depression and ID 3 positive coping strategies to A w/ emotional regulation and management  9) Pt will demonstrate 100% carryover of E C  techniques w/ mod I t/o fx'l I/ADL/leisure tasks w/o cues s/p skilled education  10) Pt will demonstrate improved bed mobility to MOD I  11) Pt will demonstrate improved standing tolerance to 3-5 minutes during functional tasks w/ no LOB to enhance ADL performance  12) Pt will demonstrate improved b/l UE strength by 1 MMT grade to enhance ADLS and functional transfers  13) Pt will demonstrate and recall self-monitoring techniques for CHF (such as daily weights on scale, reading scale, modified diets) at MOD I level s/p education and training to enhance health maintenance routines at home        Documentation completed by: Maximiliano Perrin, MS, OTR/L

## 2018-05-14 NOTE — PLAN OF CARE
Problem: PHYSICAL THERAPY ADULT  Goal: Performs mobility at highest level of function for planned discharge setting  See evaluation for individualized goals  Treatment/Interventions: Functional transfer training, LE strengthening/ROM, Therapeutic exercise, Endurance training, Patient/family training, Cognitive reorientation, Bed mobility, Gait training, Spoke to nursing, OT  Equipment Recommended: Liliane Leary       See flowsheet documentation for full assessment, interventions and recommendations  Prognosis: Good  Problem List: Decreased strength, Decreased endurance, Decreased mobility, Impaired balance, Decreased safety awareness  Assessment: Pt is 81 yo readmission from [place] [address] for NSTEMI  Upon arrival patient c/o increased SOB and requiring bipap  At baseline pt is (I) w/ functional mobility and ADLS using RW, family assists w/ medications and transportation,  does laundry in basement  Pt demonstrates 4/5 BLE MMT, no complaints of pain, sit<>stand Sukumar w/ RW, gait training minAx1 w/ RW  Pt requires verbal cueing for safety and optimal techniques w/ mobility  Pt would benefit from continued skilled PT for deficits in endurance, mobility, balance, functional strength, and safety w/ mobility  Plan is to [facility]  Recommendation:  (acute short term rehab)     PT - OK to Discharge: Yes (to rehab once medically stable)    See flowsheet documentation for full assessment

## 2018-05-14 NOTE — PHYSICAL THERAPY NOTE
PT EVALUATION  Time-In:1240  Time-Out:1255  Total Time:15 minutes    80 y o     [id number]    Acute congestive heart failure (HCC) [I50 9]  Sinus tachycardia [R00 0]  SOB (shortness of breath) [R06 02]  Hyperglycemia [R73 9]  NSTEMI (non-ST elevated myocardial infarction) ([facility]) [I21 4]  REHANA (acute kidney injury) [facility] ) [N17 9]  Elevated lactic acid level [R79 89]  Acute hypoxemic respiratory failure ([facility]) [J96 01]    Past Medical History:   Diagnosis Date    Acute ischemic right MCA stroke ([facility] )     Anticoagulant long-term use     last assessed: 8/17/17    Aortic stenosis     Atrial fibrillation ([facility])     Blurred vision     last assessed: 10/25/13    CHF (congestive heart failure) ([facility] )     Coronary artery disease     Dementia     Diabetes mellitus ([facility] )     Dysuria     last assessed: 8/3/15    Hyperlipidemia     Hypertension     Nonrheumatic aortic (valve) insufficiency     Old myocardial infarction     Rectal carcinoma ([facility])          Past Surgical History:   Procedure Laterality Date    BALLOON ANGIOPLASTY, ARTERY      CORONARY ARTERY BYPASS GRAFT      RECTAL SURGERY        05/14/18 1255   Note Type   Note type Eval only   Pain Assessment   Pain Assessment No/denies pain   Pain Score No Pain   Home Living   Type of [address] One level; Laundry in basement  (1+2 MURPHY)   Bathroom Shower/Tub Tub/shower unit   Bathroom Toilet Standard   Bathroom Equipment Grab bars in shower; Shower chair;Grab bars around toilet;Commode   [address] Walker;Cane;Grab bars   Prior Function   Level of [place] Independent with ADLs and functional mobility   Lives With Spouse   Receives Help From Family   ADL Assistance Independent   IADLs Independent   Falls in the last 6 months 1 to 4   Vocational Retired   Comments Pt ambulates w/ RW at baseline  Spouse does laundry in basement   Family provides transportation and assistance w/ medications Restrictions/Precautions   Weight Bearing Precautions Per Order No   Other Precautions Telemetry; Fall Risk   General   Additional Pertinent History orders for activity as tolerated; Recent admission 4/29-5/8 at [facility] and 4/22-4/24 at [facility]   Family/Caregiver Present Yes  (son, daughter in law, )   Cognition   Overall Cognitive Status Impaired   Arousal/Participation Alert   Attention Within functional limits   Orientation Level Oriented to person;Oriented to place;Oriented to situation   Memory Decreased short term memory;Decreased recall of precautions   Following Commands Follows one step commands with increased time or repetition   Comments Pt able to oriented to year w/ cueing   RUE Assessment   RUE Assessment WFL   LUE Assessment   LUE Assessment WFL   RLE Assessment   RLE Assessment WFL   Strength RLE   RLE Overall Strength 4/5   LLE Assessment   LLE Assessment WFL   Strength LLE   LLE Overall Strength 4/5   Coordination   Movements are Fluid and Coordinated 1   Sensation WFL   Light Touch   RLE Light Touch Grossly intact   LLE Light Touch Grossly intact   Sharp/Dull   RLE Sharp/Dull Not tested   LLE Sharp/Dull Not tested   Proprioception   RLE Proprioception Grossly intact   LLE Proprioception Grossly Intact   Bed Mobility   Additional Comments Bed mobility unable to assess  Pt received sitting in bedside recliner upon arrival    Transfers   Sit to Stand 4  Minimal assistance   Additional items Assist x 1; Armrests; Increased time required;Verbal cues; Impulsive   Stand to Sit 4  Minimal assistance   Additional items Assist x 1; Increased time required;Armrests; Verbal cues   Additional Comments VC for safe technique and hand placement   Ambulation/Elevation   Gait pattern Shuffling; Short stride  (quick oxana)   Gait Assistance 4  Minimal assist   Additional items Assist x 1;Verbal cues   Assistive Device Rolling walker   Distance 40ftx2 limitd distance d/t fatigue most likely attributed to just finished working with OT prior to PT arrival   Balance   Static Sitting Fair +   Dynamic Sitting Fair   Static Standing Fair   Dynamic Standing Fair -   Ambulatory Fair -   Endurance Deficit   Endurance Deficit Yes   Endurance Deficit Description general fatigue   Activity Tolerance   Activity Tolerance Patient limited by fatigue   Nurse Made Aware NUVIA Castelan   Assessment   Prognosis Good   Problem List Decreased strength;Decreased endurance;Decreased mobility; Impaired balance;Decreased safety awareness   Assessment Pt is 79 yo readmission from [facility] for NSTEMI  Upon arrival patient c/o increased SOB and requiring bipap  At baseline pt is (I) w/ functional mobility and ADLS using RW, family assists w/ medications and transportation,  does laundry in basement  Pt demonstrates 4/5 BLE MMT, no complaints of pain, sit<>stand Sukumar w/ RW, gait training minAx1 w/ RW  Pt requires verbal cueing for safety and optimal techniques w/ mobility  Pt would benefit from continued skilled PT for deficits in endurance, mobility, balance, functional strength, and safety w/ mobility  Plan is to return to [facility]  Goals   Patient Goals "to go to [facility]"   STG Expiration Date 05/24/18   Short Term Goal #1 In 10 days pt will demonstrate: bed mobility (I), sit<>stand mod (I) w/ RW, gait training 200 ft mod (I) w/RW, improve balance by 1 grade to decrease fall risk, improve strength by 1/2 grade for functional mobility   Plan   Treatment/Interventions Functional transfer training;LE strengthening/ROM; Therapeutic exercise; Endurance training;Patient/family training;Cognitive reorientation; Bed mobility;Gait training;Spoke to nursing;OT   PT Frequency (4-5x/wk)   Recommendation   Recommendation (acute short term rehab)   Equipment Recommended Walker   PT - OK to Discharge Yes  (to rehab once medically stable)   Modified Yolo Scale   Modified Tad Scale 4   Barthel Index   Feeding 10   Bathing 0   Grooming Score 5   Dressing Score 5 Bladder Score 10   Bowels Score 10   Toilet Use Score 5   Transfers (Bed/Chair) Score 10   Mobility (Level Surface) Score 0   Stairs Score 0   Barthel Index Score 55   History: co - morbidities, fall risk, use of assistive device, readmission, assisted w/ IADLS  Exam: impairments in locomotion, musculoskeletal, balance,posture, joint integrity, safety, functional mobility, endurance, strength, Barthel 55  Clinical: unstable/unpredictable; fall risk, continuous telemetry, use of AD, decreased safety awareness  Complexity:high    At end of PT session pt left sitting in bedside recliner chair with all needs in reach, call bell and phone in hand, family present, and RN aware of patient status  No further questions or concerns for PT at this time          Zach Luther, PT ,DPT

## 2018-05-14 NOTE — CASE MANAGEMENT
Continued Stay Review    Date:  5/13/2018    Vital Signs: 98 - 44  To 110  - 17   144/73    Medications:   Scheduled Meds:   Current Facility-Administered Medications:  acetaminophen 650 mg Oral Q6H PRN Julio Devlin MD   apixaban 2 5 mg Oral BID Julio Devlin MD   aspirin 81 mg Oral Daily Julio Devlin MD   atorvastatin 20 mg Oral Daily With Steffi Mcnulty MD   calcitonin (salmon) 1 spray Alternating Nares Daily Julio Devlin MD   diltiazem 240 mg Oral Daily Shellie Lowe MD   famotidine 20 mg Oral Daily Julio Devlin MD   furosemide 40 mg Intravenous BID (diuretic) Gerson Esteban MD   insulin lispro 1-5 Units Subcutaneous TID AC Julio Devlin MD   metoprolol succinate 50 mg Oral Daily Julio Devlin MD   ondansetron 4 mg Intravenous Q4H PRN Julio Devlin MD   polyethylene glycol 17 g Oral Daily Julio Devlin MD   potassium chloride 10 mEq Oral Daily Julio Devlin MD   QUEtiapine 12 5 mg Oral HS Riri Villasenor MD   senna-docusate sodium 1 tablet Oral HS Julio Devlin MD   spironolactone 12 5 mg Oral Daily Shellie Lowe MD     Continuous Infusions:    PRN Meds:   acetaminophen    ondansetron    Abnormal Labs/Diagnostic Results:  CO 2  34,   BUN 36    Age/Sex: 80 y o  female     Assessment/Plan:   Principal Problem:    NSTEMI (non-ST elevated [facility] infarction) ([facility] )  Active Problems:    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 failure ([facility] )    Nonrheumatic aortic valve stenosis    Controlled diabetes mellitus type II without complication (HCC)    Coronary artery disease involving native coronary artery of native heart without angina pectoris    Permanent atrial fibrillation (HCC)        Plan:     · NSTEMI with elevated troponin consistent with type 2 transmural myocardial infarct per Cardiology continue beta-blocker, aspirin and statin  · Acute on chronic combined congestive heart failure systolic and diastolic will increase IV diuresis as has failed a outpatient course in last 4 days cardiology is added spironolactone will need follow renal function closely, entrance BNP 5900/repeat BMP now notes 2900  · Aortic stenosis described as moderate to severe based on most recent echo and valve area per Cardiology not a candidate for TAVR  · Essential hypertension continue present dosing of diltiazem and metoprolol  · Hyperlipidemia continue on statin has been reduced by Cardiology  · Coronary artery disease, most recent stress evaluation only showing mild ischemia will continue on beta-blocker and aspirin therapy and statin  · Bilateral pleural effusions will treat his congestive heart failure with increase IV diuresis/doubt pneumonia or infectious etiology will obtain procalcitonin but will hold further antibiotics/will switch to p o  Lasix 40 b i d  when cleared by Cardiology  · Acute on chronic kidney disease creatinine above her baseline will continue to monitor with increasing IV diuresis may be on cardiorenal basis and should improve if so and now creatinine down to 1 19  · Type 2 diabetes mellitus    On Lantus q h s  5 units but hypoglycemic on last 2 measurements and will discontinue for now and will continue on sliding scale here with meals  · Acute on chronic hypoxic respiratory failure in relation to combined CHF and NSTEMI secondary CHF and hypoxia will titrate oxygen continue diuresis       Discharge Plan:  [facility]

## 2018-05-14 NOTE — SOCIAL WORK
CM received notification from [facility] that patient is accepted at their facility  Numbers for report entered into EPIC     Family will transport they will  b/w 330pm and 4pm   Andreas Jang RN

## 2018-05-14 NOTE — SOCIAL WORK
[facility] would like to [facility]  Per Dr Kandi Salvador patient could be discharged today  CM called Deedee Philip PT and asked for patient to be seen today as [facility] needs updated PT/OT notes and to review before acceptance at facility  CM will continue to follow

## 2018-05-14 NOTE — SOCIAL WORK
HEART FAILURE CARE MANAGER: Re-visit with patient, , son and DIL  The patient will be going to [facility] for continued rehab  Left the HF program business card with the DIL and will follow at rehab  The family is concerned because they feel the patient and  need a higher level of care such as an AL  Encouraged home health on discharge from facility and briefly discussed the [facility] HF program  Will follow as an outpatient

## 2018-05-15 LAB
BACTERIA BLD CULT: NORMAL
BACTERIA BLD CULT: NORMAL

## 2018-05-29 ENCOUNTER — TELEPHONE (OUTPATIENT)
Dept: FAMILY MEDICINE CLINIC | Facility: CLINIC | Age: 83
End: 2018-05-29

## 2018-05-29 NOTE — TELEPHONE ENCOUNTER
Please call patient to ask TCM  Questions she has an appointment for 06/01/17 with Marva Ravi but she is not being discharge until 05/30/18 to ask all the questions

## 2018-05-31 ENCOUNTER — PATIENT OUTREACH (OUTPATIENT)
Dept: CARDIOLOGY CLINIC | Facility: CLINIC | Age: 83
End: 2018-05-31

## 2018-05-31 ENCOUNTER — TRANSITIONAL CARE MANAGEMENT (OUTPATIENT)
Dept: FAMILY MEDICINE CLINIC | Facility: CLINIC | Age: 83
End: 2018-05-31

## 2018-05-31 NOTE — PROGRESS NOTES
Heart Failure Care Coordination Note  RE: Call to patient who was discharged from [address] [place] [address],[address] 5/30  She stated she feels well today  Her  and daughter were with her  She will have a resumption of care with [facility] on Saturday 6/2  I have emailed the Clinical Coordinator for her nurse team and requested and evaluation by a [facility] HF nurse for possible enrollment in the [facility] HF program  She did weigh herself today at 136 pounds and did really to me that her daughter is going grocery shopping and the patient does realized she needs to maintain a low sodium diet  The patient and family seem engaged in following strategies to prevent readmissions to the hospital  I will call again next week and collaborate with home health

## 2018-06-01 ENCOUNTER — OFFICE VISIT (OUTPATIENT)
Dept: FAMILY MEDICINE CLINIC | Facility: CLINIC | Age: 83
End: 2018-06-01
Payer: MEDICARE

## 2018-06-01 VITALS
RESPIRATION RATE: 18 BRPM | SYSTOLIC BLOOD PRESSURE: 132 MMHG | HEART RATE: 102 BPM | BODY MASS INDEX: 22.28 KG/M2 | WEIGHT: 113.5 LBS | DIASTOLIC BLOOD PRESSURE: 82 MMHG | HEIGHT: 60 IN

## 2018-06-01 DIAGNOSIS — E11.9 TYPE 2 DIABETES MELLITUS WITHOUT COMPLICATION, WITHOUT LONG-TERM CURRENT USE OF INSULIN (HCC): Primary | ICD-10-CM

## 2018-06-01 DIAGNOSIS — I50.33 ACUTE ON CHRONIC DIASTOLIC CONGESTIVE HEART FAILURE (HCC): ICD-10-CM

## 2018-06-01 DIAGNOSIS — I48.91 ATRIAL FIBRILLATION WITH RVR (HCC): ICD-10-CM

## 2018-06-01 DIAGNOSIS — I50.43 ACUTE ON CHRONIC COMBINED SYSTOLIC AND DIASTOLIC CONGESTIVE HEART FAILURE (HCC): ICD-10-CM

## 2018-06-01 PROCEDURE — G0439 PPPS, SUBSEQ VISIT: HCPCS | Performed by: FAMILY MEDICINE

## 2018-06-01 RX ORDER — GLIMEPIRIDE 1 MG/1
1 TABLET ORAL
Qty: 30 TABLET | Refills: 5 | Status: SHIPPED | OUTPATIENT
Start: 2018-06-01 | End: 2018-06-26 | Stop reason: SDUPTHER

## 2018-06-01 RX ORDER — QUETIAPINE FUMARATE 25 MG/1
12.5 TABLET, FILM COATED ORAL
Qty: 30 TABLET | Refills: 5 | Status: SHIPPED | OUTPATIENT
Start: 2018-06-01 | End: 2018-06-26 | Stop reason: SDUPTHER

## 2018-06-01 RX ORDER — GLIMEPIRIDE 1 MG/1
TABLET ORAL
Refills: 0 | COMMUNITY
Start: 2018-05-30 | End: 2018-06-01 | Stop reason: SDUPTHER

## 2018-06-01 RX ORDER — SPIRONOLACTONE 25 MG/1
12.5 TABLET ORAL DAILY
Qty: 30 TABLET | Refills: 5 | Status: SHIPPED | OUTPATIENT
Start: 2018-06-01 | End: 2018-06-01 | Stop reason: SDUPTHER

## 2018-06-01 RX ORDER — SPIRONOLACTONE 25 MG/1
12.5 TABLET ORAL DAILY
Qty: 30 TABLET | Refills: 0 | Status: SHIPPED | OUTPATIENT
Start: 2018-06-01 | End: 2019-05-15 | Stop reason: SDUPTHER

## 2018-06-01 RX ORDER — ATORVASTATIN CALCIUM 20 MG/1
20 TABLET, FILM COATED ORAL
Qty: 30 TABLET | Refills: 5 | Status: SHIPPED | OUTPATIENT
Start: 2018-06-01 | End: 2018-06-26 | Stop reason: SDUPTHER

## 2018-06-01 RX ORDER — LANCETS 28 GAUGE
EACH MISCELLANEOUS
Qty: 100 EACH | Refills: 2 | Status: SHIPPED | OUTPATIENT
Start: 2018-06-01

## 2018-06-01 RX ORDER — BLOOD-GLUCOSE METER
1 KIT MISCELLANEOUS DAILY
Qty: 1 EACH | Refills: 0 | Status: SHIPPED | OUTPATIENT
Start: 2018-06-01

## 2018-06-01 NOTE — PROGRESS NOTES
Date and time hospital follow up call was made:  5/31/2018  7:45 AM  Hospital care reviewed:  Records reviewed  Patient was hopsitalized at:   Other (comment)  Date of admission:  5/14/18  Date of discharge:  5/30/18  Diagnosis:  CHF   Disposition:  Nursing Home  Were the patients medicaitons reviewed and updated:  Yes  Current symptoms:  None  Post hospital issues:  None  Should patient be enrolled in anticoag monitoring?:  No  Scheduled for follow up?:  Yes  Did you obtain your prescribed medications:  Yes  Do you need help managing your perscriptions or medications:  No  Is transportation to your appointments needed:  No  I have advised the patient to call PCP with any new or worsening symptoms (please type in name along with any credentials):  Jennie Rahman MA  Living Arrangements:  Spouse or Significiant other  Support System:  Family, Spouse  The type of support provided:  Emotional, Financial, Physical  Do you have social support:  Yes, as much as I need  Are you recieving outpatient services:  No  Are you recieving home care services:  Yes  Types of home care services:  Nurse visit  Are you using any community resources:  No  Current waiver service:  No  Have you fallen in the last 12 months:  No  Interperter language line required?:  No  Counseling:  Family

## 2018-06-02 NOTE — ED ATTENDING ATTESTATION
Javid Dhillon MD, saw and evaluated the patient  I have discussed the patient with the resident/non-physician practitioner and agree with the resident's/non-physician practitioner's findings, Plan of Care, and MDM as documented in the resident's/non-physician practitioner's note, except where noted  All available labs and Radiology studies were reviewed  At this point I agree with the current assessment done in the Emergency Department  I have conducted an independent evaluation of this patient a history and physical is as follows: This is a 55-year-old female with history of atrial fibrillation and congestive heart failure currently on anticoagulation with Eliquis who presents for worsening respiratory distress at her rehab facility  Patient was recently hospitalized for a CHF exacerbation and discharged a few days ago  Per report from EMS the patient complained of shortness of breath earlier today that worsened throughout the day  She did not respond to her daily medications and so EMS was called  Upon arrival the patient was in acute respiratory distress and had difficulty providing a detailed history of present illness due to that respiratory distress  Per nursing home the patient is DNR, DNI but is okay with noninvasive positive pressure interventions such as CPAP or BiPAP  Physical exam:  Neuro exam is nonfocal, patient does have increased work of breathing and increased respiratory rate as well as bilateral crackles/rales  Heart rate is tachycardic and irregularly irregular  Atrial fibrillation with rapid ventricular response is noted on the monitor  ST segments are nonspecific on monitor  Abdomen is soft and nondistended  Extremities are nontender without edema  Impression plan:  55-year-old female with history of atrial fibrillation and congestive heart failure  She appears to be likely having another exacerbation of congestive heart failure    She is also and rapid atrial fibrillation  Work of breathing appears to be improving on BiPAP  Will treat with nitroglycerin, BiPAP, Lasix  Laboratory studies and imaging  Will require admission to the hospital     Critical Care Time  The patient presented with a condition in which there was a high probability of imminent or life-threatening deterioration, and critical care services (excluding separately billable procedures) totalled 30-74 minutes          Procedures

## 2018-06-04 ENCOUNTER — TELEPHONE (OUTPATIENT)
Dept: FAMILY MEDICINE CLINIC | Facility: CLINIC | Age: 83
End: 2018-06-04

## 2018-06-04 DIAGNOSIS — I10 BENIGN ESSENTIAL HTN: ICD-10-CM

## 2018-06-04 DIAGNOSIS — I25.10 CORONARY ARTERY DISEASE INVOLVING NATIVE HEART WITHOUT ANGINA PECTORIS, UNSPECIFIED VESSEL OR LESION TYPE: ICD-10-CM

## 2018-06-04 RX ORDER — POTASSIUM CHLORIDE 750 MG/1
10 TABLET, EXTENDED RELEASE ORAL DAILY
Qty: 90 TABLET | Refills: 1 | Status: SHIPPED | OUTPATIENT
Start: 2018-06-04 | End: 2018-07-12 | Stop reason: SDUPTHER

## 2018-06-04 NOTE — TELEPHONE ENCOUNTER
[facility] nurse sebastian Lan went out to see pt in home  Pt will be receiving home nursing, p t , o t , social work, and speech therapy  She had concerns about her insulins  In med list she is to take lantus and humalog  She does not have any in the home and concerned she will not be able to take it  Pt is on oral glimperide and sugars were 160-166 over weekend  There is no potassium chloride in the home  Please send to pharmacy  Also Seroquel 25mg tabs take 1/2 tab at bedtime  The pills are small and hard to cut w/ their pill cutter  She is wondering if you could order 12 5mg tabs if they make it or have pharmacy pre cut tabs  pls advise  There are a few medication concerns as well  Level 2 drug interaction between potassium and spironlactone  There is a duplicate therapy w/ furosemide and spironolactone that they are both diuretics  Level 2 interaction with aspirin and eliquix      is getting forgetful and they would like to use [facility] or [facility] where they do the individual dose packing  Is this something we have to call and request or you can put order in electornically for all her meds? pls advise  Cata Montenegro will only be working today  She will be off next 10 days  Pls contact [address] at [phone] if we cannot contact viky today

## 2018-06-06 ENCOUNTER — TELEPHONE (OUTPATIENT)
Dept: FAMILY MEDICINE CLINIC | Facility: CLINIC | Age: 83
End: 2018-06-06

## 2018-06-08 ENCOUNTER — DOCUMENTATION (OUTPATIENT)
Dept: CARDIOLOGY CLINIC | Facility: CLINIC | Age: 83
End: 2018-06-08

## 2018-06-08 NOTE — PROGRESS NOTES
Heart Failure Care Coordination Note  RE: Patient seen by [facility] HF nurse Mishel Garduno on 6/7 and will start on the [facility] HF Program today

## 2018-06-11 DIAGNOSIS — E11.9 TYPE 2 DIABETES MELLITUS WITHOUT COMPLICATION, WITHOUT LONG-TERM CURRENT USE OF INSULIN (HCC): ICD-10-CM

## 2018-06-11 NOTE — TELEPHONE ENCOUNTER
Patient called in for refill on  Free style [employer] test strips,   patient checks 1 time a day sent to [facility] on file

## 2018-06-12 ENCOUNTER — TELEPHONE (OUTPATIENT)
Dept: FAMILY MEDICINE CLINIC | Facility: CLINIC | Age: 83
End: 2018-06-12

## 2018-06-12 NOTE — TELEPHONE ENCOUNTER
Ria Fee from [address] called and needed last two office notes  Faxed both to Ria Fee at [phone]

## 2018-06-14 ENCOUNTER — OFFICE VISIT (OUTPATIENT)
Dept: FAMILY MEDICINE CLINIC | Facility: CLINIC | Age: 83
End: 2018-06-14
Payer: MEDICARE

## 2018-06-14 VITALS
WEIGHT: 109 LBS | BODY MASS INDEX: 21.97 KG/M2 | RESPIRATION RATE: 14 BRPM | HEIGHT: 59 IN | TEMPERATURE: 96.9 F | DIASTOLIC BLOOD PRESSURE: 70 MMHG | HEART RATE: 96 BPM | SYSTOLIC BLOOD PRESSURE: 108 MMHG

## 2018-06-14 DIAGNOSIS — E11.9 CONTROLLED TYPE 2 DIABETES MELLITUS WITHOUT COMPLICATION, WITHOUT LONG-TERM CURRENT USE OF INSULIN (HCC): Primary | ICD-10-CM

## 2018-06-14 DIAGNOSIS — F41.9 ANXIETY: ICD-10-CM

## 2018-06-14 PROBLEM — E43 SEVERE PROTEIN-CALORIE MALNUTRITION (GOMEZ: LESS THAN 60% OF STANDARD WEIGHT) (HCC): Chronic | Status: RESOLVED | Noted: 2018-04-29 | Resolved: 2018-06-14

## 2018-06-14 PROCEDURE — 99213 OFFICE O/P EST LOW 20 MIN: CPT | Performed by: FAMILY MEDICINE

## 2018-06-14 RX ORDER — LORAZEPAM 1 MG/1
1 TABLET ORAL
Qty: 30 TABLET | Refills: 5 | Status: SHIPPED | OUTPATIENT
Start: 2018-06-14 | End: 2018-07-09 | Stop reason: SDUPTHER

## 2018-06-14 NOTE — ASSESSMENT & PLAN NOTE
Lab Results   Component Value Date    HGBA1C 7 4 (H) 04/22/2018   await lab    No results for input(s): POCGLU in the last 72 hours      Blood Sugar Average: Last 72 hrs:

## 2018-06-14 NOTE — PROGRESS NOTES
Assessment/Plan:    Mixed hyperlipidemia  Await lab    Controlled diabetes mellitus type II without complication ([facility] )  Lab Results   Component Value Date    HGBA1C 7 4 (H) 04/22/2018   await lab    No results for input(s): POCGLU in the last 72 hours  Blood Sugar Average: Last 72 hrs:         Diagnoses and all orders for this visit:    Controlled type 2 diabetes mellitus without complication, without long-term current use of insulin (HCC)  -     Lipid Panel with Direct LDL reflex; Future  -     Hemoglobin A1C; Future  -     Comprehensive metabolic panel; Future  -     Microalbumin / creatinine urine ratio    Anxiety  -     LORazepam (ATIVAN) 1 mg tablet; Take 1 tablet (1 mg total) by mouth daily at bedtime          Subjective:      Patient ID: Sanaz Gregory is a 80 y o  female  Diabetes   She presents for her follow-up diabetic visit  She has type 2 diabetes mellitus  No MedicAlert identification noted  Her disease course has been stable  Pertinent negatives for hypoglycemia include no headaches  Pertinent negatives for diabetes include no blurred vision, no chest pain, no fatigue, no foot paresthesias and no weight loss  Symptoms are stable  Risk factors for coronary artery disease include dyslipidemia, post-menopausal and sedentary lifestyle  Current diabetic treatment includes oral agent (monotherapy)  She is compliant with treatment all of the time  Her weight is stable  She is following a generally healthy diet  When asked about meal planning, she reported none  She has not had a previous visit with a dietitian  She rarely participates in exercise  Her breakfast blood glucose range is generally 130-140 mg/dl  Her lunch blood glucose range is generally 130-140 mg/dl  Her dinner blood glucose range is generally 130-140 mg/dl  Her overall blood glucose range is 140-180 mg/dl  (High of 180) An ACE inhibitor/angiotensin II receptor blocker is not being taken   She does not see a podiatrist Eye exam is not current  The following portions of the patient's history were reviewed and updated as appropriate: allergies, current medications, past family history, past medical history, past social history, past surgical history and problem list       Review of Systems   Constitutional: Negative for fatigue and weight loss  Eyes: Negative for blurred vision  Respiratory: Negative for shortness of breath  Cardiovascular: Negative for chest pain  Neurological: Negative for headaches  Objective:      /70 (BP Location: Left arm, Patient Position: Sitting, Cuff Size: Adult)   Pulse 96   Temp (!) 96 9 °F (36 1 °C) (Temporal)   Resp 14   Ht 4' 11 05" (1 5 m)   Wt 49 4 kg (109 lb)   BMI 21 98 kg/m²          Physical Exam   Constitutional: She appears well-developed and well-nourished  Neck: No thyromegaly present  Cardiovascular: Normal rate, regular rhythm and normal heart sounds  Pulmonary/Chest: Breath sounds normal    Musculoskeletal: She exhibits no edema  Lymphadenopathy:     She has no cervical adenopathy  Vitals reviewed

## 2018-06-19 ENCOUNTER — TELEPHONE (OUTPATIENT)
Dept: FAMILY MEDICINE CLINIC | Facility: CLINIC | Age: 83
End: 2018-06-19

## 2018-06-19 DIAGNOSIS — E11.9 TYPE 2 DIABETES MELLITUS WITHOUT COMPLICATION, WITHOUT LONG-TERM CURRENT USE OF INSULIN (HCC): Primary | ICD-10-CM

## 2018-06-19 NOTE — TELEPHONE ENCOUNTER
Pt will need script for free style test strips as well  They are cheaper as a prescription with her medicare insurance  If she gets it over the counter they are expensive  Pls send to [facility] on file thank you

## 2018-06-21 ENCOUNTER — LAB (OUTPATIENT)
Dept: LAB | Facility: CLINIC | Age: 83
End: 2018-06-21
Payer: MEDICARE

## 2018-06-21 ENCOUNTER — OFFICE VISIT (OUTPATIENT)
Dept: CARDIOLOGY CLINIC | Facility: CLINIC | Age: 83
End: 2018-06-21
Payer: MEDICARE

## 2018-06-21 ENCOUNTER — TRANSCRIBE ORDERS (OUTPATIENT)
Dept: LAB | Facility: CLINIC | Age: 83
End: 2018-06-21

## 2018-06-21 VITALS
HEART RATE: 66 BPM | BODY MASS INDEX: 20.77 KG/M2 | DIASTOLIC BLOOD PRESSURE: 60 MMHG | WEIGHT: 110 LBS | HEIGHT: 61 IN | SYSTOLIC BLOOD PRESSURE: 118 MMHG

## 2018-06-21 DIAGNOSIS — I10 ESSENTIAL (PRIMARY) HYPERTENSION: Primary | ICD-10-CM

## 2018-06-21 DIAGNOSIS — I25.10 CORONARY ARTERIOSCLEROSIS: ICD-10-CM

## 2018-06-21 DIAGNOSIS — E78.00 PURE HYPERCHOLESTEROLEMIA: ICD-10-CM

## 2018-06-21 DIAGNOSIS — I35.0 NONRHEUMATIC AORTIC VALVE STENOSIS: ICD-10-CM

## 2018-06-21 DIAGNOSIS — I48.91 ATRIAL FIBRILLATION, UNSPECIFIED TYPE (HCC): ICD-10-CM

## 2018-06-21 DIAGNOSIS — E11.9 CONTROLLED TYPE 2 DIABETES MELLITUS WITHOUT COMPLICATION, WITHOUT LONG-TERM CURRENT USE OF INSULIN (HCC): ICD-10-CM

## 2018-06-21 LAB
ALBUMIN SERPL BCP-MCNC: 4.1 G/DL (ref 3.5–5)
ALP SERPL-CCNC: 96 U/L (ref 46–116)
ALT SERPL W P-5'-P-CCNC: 30 U/L (ref 12–78)
ANION GAP SERPL CALCULATED.3IONS-SCNC: 9 MMOL/L (ref 4–13)
AST SERPL W P-5'-P-CCNC: 36 U/L (ref 5–45)
BILIRUB SERPL-MCNC: 0.88 MG/DL (ref 0.2–1)
BUN SERPL-MCNC: 45 MG/DL (ref 5–25)
CALCIUM SERPL-MCNC: 9.2 MG/DL (ref 8.3–10.1)
CHLORIDE SERPL-SCNC: 101 MMOL/L (ref 100–108)
CHOLEST SERPL-MCNC: 148 MG/DL (ref 50–200)
CO2 SERPL-SCNC: 26 MMOL/L (ref 21–32)
CREAT SERPL-MCNC: 1.84 MG/DL (ref 0.6–1.3)
CREAT UR-MCNC: 56.8 MG/DL
EST. AVERAGE GLUCOSE BLD GHB EST-MCNC: 157 MG/DL
GFR SERPL CREATININE-BSD FRML MDRD: 24 ML/MIN/1.73SQ M
GLUCOSE P FAST SERPL-MCNC: 144 MG/DL (ref 65–99)
HBA1C MFR BLD: 7.1 % (ref 4.2–6.3)
HDLC SERPL-MCNC: 56 MG/DL (ref 40–60)
LDLC SERPL CALC-MCNC: 69 MG/DL (ref 0–100)
MICROALBUMIN UR-MCNC: 66.6 MG/L (ref 0–20)
MICROALBUMIN/CREAT 24H UR: 117 MG/G CREATININE (ref 0–30)
POTASSIUM SERPL-SCNC: 4.9 MMOL/L (ref 3.5–5.3)
PROT SERPL-MCNC: 8.1 G/DL (ref 6.4–8.2)
SODIUM SERPL-SCNC: 136 MMOL/L (ref 136–145)
TRIGL SERPL-MCNC: 114 MG/DL

## 2018-06-21 PROCEDURE — 82570 ASSAY OF URINE CREATININE: CPT | Performed by: FAMILY MEDICINE

## 2018-06-21 PROCEDURE — 80061 LIPID PANEL: CPT

## 2018-06-21 PROCEDURE — 99214 OFFICE O/P EST MOD 30 MIN: CPT | Performed by: INTERNAL MEDICINE

## 2018-06-21 PROCEDURE — 82043 UR ALBUMIN QUANTITATIVE: CPT | Performed by: FAMILY MEDICINE

## 2018-06-21 PROCEDURE — 80053 COMPREHEN METABOLIC PANEL: CPT

## 2018-06-21 PROCEDURE — 36415 COLL VENOUS BLD VENIPUNCTURE: CPT

## 2018-06-21 PROCEDURE — 83036 HEMOGLOBIN GLYCOSYLATED A1C: CPT

## 2018-06-21 NOTE — PROGRESS NOTES
Cardiology Follow Up    Ashley Elliott  4/4/1927  [id number]  [address] [facility] [address]    1  Essential (primary) hypertension     2  Pure hypercholesterolemia     3  Coronary arteriosclerosis     4  Atrial fibrillation, unspecified type ([address] )     5  Nonrheumatic aortic valve stenosis         Interval History:  Patient is here for a follow-up visit  She was most recently seen by me in February  Since that time she was admitted to the [facility] with acute on chronic systolic and diastolic heart failure  She went on to receive intravenous diuresis and the dose of her outpatient furosemide was titrated upward  She was noted intermittently to have atrial flutter and is on rate control therapy  She is on low-dose Eliquis  She is here today with her family  Her most recent echocardiogram was done April 2018 and demonstrated a mildly dilated left ventricle with an ejection fraction of 44%  She was noted to have moderate biatrial cavity enlargement, moderate mitral regurgitation and mild to moderate aortic valve stenosis  She has been feeling well  She has had no chest pain or significant dyspnea  She is here today with her family  Her  is also a patient of mine and they are to celebrating their 67nd wedding anniversary      Patient Active Problem List   Diagnosis    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 failure (HCC)    Nonrheumatic aortic valve stenosis    Benign essential HTN    Controlled diabetes mellitus type II without complication ([facility])    Atrial fibrillation with RVR (HCC)    Acute ischemic right MCA stroke [facility] )    Coronary artery disease involving native coronary artery of native heart without angina pectoris    Cognitive impairment    Depression with anxiety    Esophageal reflux    Mixed hyperlipidemia    Essential hypertension  Osteoarthritis    Squamous cell carcinoma of skin    Tinnitus    Moderate protein-calorie malnutrition (HCC)    Goals of care, counseling/discussion    Impaired mobility and ADLs    Dementia    Ambulatory dysfunction    Physical deconditioning    Type 2 myocardial infarction without ST elevation (HCC)    Permanent atrial fibrillation (HCC)     Past Medical History:   Diagnosis Date    Acute ischemic right MCA stroke ([facility] )     Anticoagulant long-term use     last assessed: 8/17/17    Aortic stenosis     Atrial fibrillation (HCC)     Blurred vision     last assessed: 10/25/13    CHF [facility]congestive heart failure) ([facility])     Coronary artery disease     Dementia     Diabetes mellitus ([facility] )     Dysuria     last assessed: 8/3/15    Hyperlipidemia     Hypertension     Nonrheumatic aortic (valve) insufficiency     Old myocardial infarction     Rectal carcinoma (HCC)      Social History     Social History    Marital status: /Civil Union     Spouse name: N/A    Number of children: N/A    Years of education: N/A     Occupational History    Retired      Social History Main Topics    Smoking status: Former Smoker     Packs/day: 1 00     Years: 20 00     Quit date: 2002    Smokeless tobacco: Never Used      Comment: 1 pack a week;  No secondhand smoke exposure    Alcohol use No    Drug use: No    Sexual activity: Not on file     Other Topics Concern    Not on file     Social History Narrative    Lives with     House    2 children,5 grandchildren    retired      Family History   Problem Relation Age of Onset    Stroke Mother     Stroke Father      Past Surgical History:   Procedure Laterality Date    BALLOON ANGIOPLASTY, ARTERY      CORONARY ARTERY BYPASS GRAFT      RECTAL SURGERY         Current Outpatient Prescriptions:     acetaminophen (TYLENOL) 325 mg tablet, Take 1 tablet (325 mg total) by mouth every 6 (six) hours as needed for mild pain, Disp: 30 tablet, Rfl: 0   apixaban (ELIQUIS) 2 5 mg, Take 1 tablet (2 5 mg total) by mouth 2 (two) times a day, Disp: 60 tablet, Rfl: 0    aspirin 81 mg chewable tablet, Chew 1 tablet daily, Disp: , Rfl: 0    atorvastatin (LIPITOR) 20 mg tablet, Take 1 tablet (20 mg total) by mouth daily with dinner, Disp: 30 tablet, Rfl: 5    calcitonin, salmon, (MIACALCIN) 200 units/act nasal spray, USE 1 SPRAY IN ONE NOSTRIL DAILY--ALTERNATE NOSTRILS, Disp: 11 1 mL, Rfl: 0    diltiazem (CARDIZEM CD) 240 mg 24 hr capsule, Take 1 capsule (240 mg total) by mouth daily, Disp: 30 capsule, Rfl: 0    famotidine (PEPCID) 20 mg tablet, Take 1 tablet (20 mg total) by mouth daily, Disp: 30 tablet, Rfl: 0    furosemide (LASIX) 40 mg tablet, Take 1 tablet (40 mg total) by mouth 2 (two) times a day, Disp: 60 tablet, Rfl: 0    glimepiride (AMARYL) 1 mg tablet, Take 1 tablet (1 mg total) by mouth daily with breakfast, Disp: 30 tablet, Rfl: 5    glucose blood (IGLUCOSE TEST STRIPS) test strip, Use as instructed-test bid, free style light, Disp: 100 each, Rfl: 3    glucose monitoring kit (FREESTYLE) monitoring kit, 1 each by Does not apply route daily, Disp: 1 each, Rfl: 0    lactulose 20 g/30 mL, Take 15 mL (10 g total) by mouth daily as needed (constipation), Disp: 1 Bottle, Rfl: 0    Lancets (FREESTYLE) lancets, Use as instructed, Disp: 100 each, Rfl: 2    LORazepam (ATIVAN) 1 mg tablet, Take 1 tablet (1 mg total) by mouth daily at bedtime, Disp: 30 tablet, Rfl: 5    metoprolol succinate (TOPROL-XL) 50 mg 24 hr tablet, Take 1 tablet (50 mg total) by mouth daily, Disp: 30 tablet, Rfl: 0    potassium chloride (K-DUR,KLOR-CON) 10 mEq tablet, Take 1 tablet (10 mEq total) by mouth daily, Disp: 90 tablet, Rfl: 1    QUEtiapine (SEROquel) 25 mg tablet, Take 0 5 tablets (12 5 mg total) by mouth daily at bedtime, Disp: 30 tablet, Rfl: 5    senna-docusate sodium (SENOKOT S) 8 6-50 mg per tablet, Take 1 tablet by mouth daily at bedtime, Disp: 15 tablet, Rfl: 0   spironolactone (ALDACTONE) 25 mg tablet, Take 0 5 tablets (12 5 mg total) by mouth daily, Disp: 30 tablet, Rfl: 0  Allergies   Allergen Reactions    Heparin        Labs:not applicable  Imaging: No results found  Review of Systems:  Review of Systems   All other systems reviewed and are negative  Physical Exam:  Physical Exam   Constitutional: She is oriented to person, place, and time  She appears well-developed and well-nourished  HENT:   Head: Normocephalic and atraumatic  Eyes: Conjunctivae and EOM are normal  Pupils are equal, round, and reactive to light  Neck: Normal range of motion  Neck supple  Cardiovascular: Normal rate  Murmur heard  Pulmonary/Chest: Effort normal and breath sounds normal    Neurological: She is alert and oriented to person, place, and time  Skin: Skin is warm and dry  Psychiatric: She has a normal mood and affect  Vitals reviewed  Discussion/Summary:I will continue the patient's present medical regimen  The patient appears well compensated  I have asked the patient to call if there is a problem in the interim otherwise I will see the patient in six months time

## 2018-06-22 ENCOUNTER — TELEPHONE (OUTPATIENT)
Dept: FAMILY MEDICINE CLINIC | Facility: CLINIC | Age: 83
End: 2018-06-22

## 2018-06-22 DIAGNOSIS — R80.9 PROTEINURIA, UNSPECIFIED TYPE: Primary | ICD-10-CM

## 2018-06-22 NOTE — TELEPHONE ENCOUNTER
----- Message from Conrad Magana MD sent at 6/22/2018 10:58 AM EDT -----  Lab all okya except decreased kidney function-may be due to water pills-needs to see cardiology to re-evaluate whether dose of water pills needs to be decreased due to decreased kidney function

## 2018-06-22 NOTE — TELEPHONE ENCOUNTER
Notified pt, she states she saw cardio yesterday  Per dr Smitha Pennington she will contact cardiologist with results, and then we will go from there

## 2018-06-22 NOTE — TELEPHONE ENCOUNTER
Gómez from [facility] called in to let Dr Carola Schumacher know Dustycarolina Lora will be discharged

## 2018-06-22 NOTE — TELEPHONE ENCOUNTER
----- Message from Barb Gomez MD sent at 6/22/2018 11:01 AM EDT -----  Too much protein in urine-rec nephrology eval

## 2018-06-26 DIAGNOSIS — E11.9 TYPE 2 DIABETES MELLITUS WITHOUT COMPLICATION, WITHOUT LONG-TERM CURRENT USE OF INSULIN (HCC): ICD-10-CM

## 2018-06-26 DIAGNOSIS — I50.33 ACUTE ON CHRONIC DIASTOLIC CONGESTIVE HEART FAILURE (HCC): ICD-10-CM

## 2018-06-26 DIAGNOSIS — I50.43 ACUTE ON CHRONIC COMBINED SYSTOLIC AND DIASTOLIC CONGESTIVE HEART FAILURE (HCC): ICD-10-CM

## 2018-06-26 DIAGNOSIS — I48.21 PERMANENT ATRIAL FIBRILLATION (HCC): ICD-10-CM

## 2018-06-26 RX ORDER — GLIMEPIRIDE 1 MG/1
1 TABLET ORAL
Qty: 30 TABLET | Refills: 0 | OUTPATIENT
Start: 2018-06-26

## 2018-06-26 RX ORDER — QUETIAPINE FUMARATE 25 MG/1
12.5 TABLET, FILM COATED ORAL
Qty: 30 TABLET | Refills: 3 | Status: SHIPPED | OUTPATIENT
Start: 2018-06-26 | End: 2018-06-26 | Stop reason: SDUPTHER

## 2018-06-26 RX ORDER — GLIMEPIRIDE 1 MG/1
TABLET ORAL
Qty: 90 TABLET | Refills: 2 | Status: SHIPPED | OUTPATIENT
Start: 2018-06-26 | End: 2018-11-29 | Stop reason: SDUPTHER

## 2018-06-26 RX ORDER — GLIMEPIRIDE 1 MG/1
1 TABLET ORAL
Qty: 30 TABLET | Refills: 2 | Status: SHIPPED | OUTPATIENT
Start: 2018-06-26 | End: 2018-06-26 | Stop reason: SDUPTHER

## 2018-06-26 RX ORDER — ATORVASTATIN CALCIUM 20 MG/1
20 TABLET, FILM COATED ORAL
Qty: 30 TABLET | Refills: 0 | OUTPATIENT
Start: 2018-06-26

## 2018-06-26 RX ORDER — ATORVASTATIN CALCIUM 20 MG/1
TABLET, FILM COATED ORAL
Qty: 90 TABLET | Refills: 2 | Status: SHIPPED | OUTPATIENT
Start: 2018-06-26 | End: 2018-11-29 | Stop reason: SDUPTHER

## 2018-06-26 RX ORDER — DILTIAZEM HYDROCHLORIDE 240 MG/1
240 CAPSULE, COATED, EXTENDED RELEASE ORAL DAILY
Qty: 30 CAPSULE | Refills: 3 | Status: SHIPPED | OUTPATIENT
Start: 2018-06-26 | End: 2018-07-02 | Stop reason: SDUPTHER

## 2018-06-26 RX ORDER — ATORVASTATIN CALCIUM 20 MG/1
20 TABLET, FILM COATED ORAL
Qty: 30 TABLET | Refills: 2 | Status: SHIPPED | OUTPATIENT
Start: 2018-06-26 | End: 2018-06-26 | Stop reason: SDUPTHER

## 2018-06-26 RX ORDER — QUETIAPINE FUMARATE 25 MG/1
TABLET, FILM COATED ORAL
Qty: 45 TABLET | Refills: 3 | Status: SHIPPED | OUTPATIENT
Start: 2018-06-26 | End: 2018-07-12 | Stop reason: SDUPTHER

## 2018-06-26 RX ORDER — QUETIAPINE FUMARATE 25 MG/1
12.5 TABLET, FILM COATED ORAL
Qty: 30 TABLET | Refills: 0 | OUTPATIENT
Start: 2018-06-26

## 2018-06-26 RX ORDER — DILTIAZEM HYDROCHLORIDE 240 MG/1
240 CAPSULE, COATED, EXTENDED RELEASE ORAL DAILY
Qty: 30 CAPSULE | Refills: 0 | OUTPATIENT
Start: 2018-06-26

## 2018-06-26 NOTE — TELEPHONE ENCOUNTER
Burgess Iqbal visiting nurse called in Rx line for pt med refill  Nurse states [facility] [phone] needs a copy of pt medication list in order to start service  The Rx refill can be sent to [facility]  Any questions pls feel free to contact pt daughter

## 2018-06-27 ENCOUNTER — TELEPHONE (OUTPATIENT)
Dept: FAMILY MEDICINE CLINIC | Facility: CLINIC | Age: 83
End: 2018-06-27

## 2018-06-27 NOTE — TELEPHONE ENCOUNTER
Daughter will let us know which meds she needs refills-will be changing to [facility]

## 2018-06-27 NOTE — TELEPHONE ENCOUNTER
Pt daughter Titi Mondragon called in asked If you can return her call please advise , she is going into a operation tomorrow and she will like you to return her call today if you could

## 2018-07-02 DIAGNOSIS — I48.21 PERMANENT ATRIAL FIBRILLATION (HCC): ICD-10-CM

## 2018-07-02 RX ORDER — DILTIAZEM HYDROCHLORIDE 240 MG/1
240 CAPSULE, COATED, EXTENDED RELEASE ORAL DAILY
Qty: 90 CAPSULE | Refills: 1 | Status: SHIPPED | OUTPATIENT
Start: 2018-07-02 | End: 2018-10-26 | Stop reason: SDUPTHER

## 2018-07-05 DIAGNOSIS — I48.91 ATRIAL FIBRILLATION WITH RAPID VENTRICULAR RESPONSE (HCC): ICD-10-CM

## 2018-07-05 DIAGNOSIS — I48.91 ATRIAL FIBRILLATION WITH RVR (HCC): ICD-10-CM

## 2018-07-05 DIAGNOSIS — E43 SEVERE PROTEIN-CALORIE MALNUTRITION (GOMEZ: LESS THAN 60% OF STANDARD WEIGHT) (HCC): Chronic | ICD-10-CM

## 2018-07-05 DIAGNOSIS — I50.33 ACUTE ON CHRONIC DIASTOLIC CONGESTIVE HEART FAILURE (HCC): ICD-10-CM

## 2018-07-05 DIAGNOSIS — I50.43 ACUTE ON CHRONIC COMBINED SYSTOLIC AND DIASTOLIC CONGESTIVE HEART FAILURE (HCC): ICD-10-CM

## 2018-07-05 RX ORDER — METOPROLOL SUCCINATE 50 MG/1
50 TABLET, EXTENDED RELEASE ORAL DAILY
Qty: 30 TABLET | Refills: 5 | Status: SHIPPED | OUTPATIENT
Start: 2018-07-05 | End: 2018-11-24 | Stop reason: SDUPTHER

## 2018-07-05 RX ORDER — FUROSEMIDE 40 MG/1
40 TABLET ORAL 2 TIMES DAILY
Qty: 60 TABLET | Refills: 5 | Status: SHIPPED | OUTPATIENT
Start: 2018-07-05 | End: 2018-07-12 | Stop reason: SDUPTHER

## 2018-07-09 DIAGNOSIS — F41.9 ANXIETY: ICD-10-CM

## 2018-07-09 RX ORDER — LORAZEPAM 1 MG/1
1 TABLET ORAL
Qty: 30 TABLET | Refills: 0 | Status: SHIPPED | OUTPATIENT
Start: 2018-07-09 | End: 2018-07-11 | Stop reason: SDUPTHER

## 2018-07-09 RX ORDER — LORAZEPAM 1 MG/1
1 TABLET ORAL
Qty: 30 TABLET | Refills: 2 | Status: SHIPPED | OUTPATIENT
Start: 2018-07-09 | End: 2018-07-09 | Stop reason: SDUPTHER

## 2018-07-10 NOTE — TELEPHONE ENCOUNTER
Visiting nurse called and needs a return call from you to clarify medication orders  Lasix you told the pt  To only take one a day but Rx that was just sent to pharmacy is for twice a day, potassium was stopped is that correct?, Lorazepam is supposed to be take 1/2 tab in the morning and 1/2 tab hs was sent as 1mg @ hs, seroquel 25 mg takes a total of 12 5mg at hs is this correct?, do you still want her on 81mg asa if so needs to be sent to pharmacy  Does she need to be on famotidine 20mg?  Please call Joao gee @ [phone]

## 2018-07-11 DIAGNOSIS — F41.9 ANXIETY: ICD-10-CM

## 2018-07-11 RX ORDER — LORAZEPAM 1 MG/1
1 TABLET ORAL 2 TIMES DAILY
Qty: 30 TABLET | Refills: 2 | Status: SHIPPED | OUTPATIENT
Start: 2018-07-11 | End: 2018-08-29 | Stop reason: SDUPTHER

## 2018-07-11 NOTE — TELEPHONE ENCOUNTER
Lm on gina's vm-lasix and potassium once/day, famotidine as needed, 1/2 bid for a total of 1 mg of lorazepam, no aspirin needed and seroquel 1/2 tab at night

## 2018-07-11 NOTE — TELEPHONE ENCOUNTER
Per patient's daughter they will prefer rx to be sent to [facility] pharmacy  Please advice

## 2018-07-12 DIAGNOSIS — I50.33 ACUTE ON CHRONIC DIASTOLIC CONGESTIVE HEART FAILURE (HCC): ICD-10-CM

## 2018-07-12 DIAGNOSIS — I48.91 ATRIAL FIBRILLATION WITH RVR (HCC): ICD-10-CM

## 2018-07-12 DIAGNOSIS — I25.10 CORONARY ARTERY DISEASE INVOLVING NATIVE HEART WITHOUT ANGINA PECTORIS, UNSPECIFIED VESSEL OR LESION TYPE: ICD-10-CM

## 2018-07-12 DIAGNOSIS — E43 SEVERE PROTEIN-CALORIE MALNUTRITION (GOMEZ: LESS THAN 60% OF STANDARD WEIGHT) (HCC): Chronic | ICD-10-CM

## 2018-07-12 DIAGNOSIS — I50.43 ACUTE ON CHRONIC COMBINED SYSTOLIC AND DIASTOLIC CONGESTIVE HEART FAILURE (HCC): ICD-10-CM

## 2018-07-12 DIAGNOSIS — I10 BENIGN ESSENTIAL HTN: ICD-10-CM

## 2018-07-12 RX ORDER — FAMOTIDINE 20 MG/1
20 TABLET, FILM COATED ORAL DAILY
Qty: 90 TABLET | Refills: 1 | Status: SHIPPED | OUTPATIENT
Start: 2018-07-12 | End: 2018-10-01 | Stop reason: SDUPTHER

## 2018-07-12 RX ORDER — FUROSEMIDE 40 MG/1
40 TABLET ORAL DAILY
Qty: 90 TABLET | Refills: 1 | Status: SHIPPED | OUTPATIENT
Start: 2018-07-12 | End: 2018-11-28 | Stop reason: SDUPTHER

## 2018-07-12 RX ORDER — POTASSIUM CHLORIDE 750 MG/1
10 TABLET, EXTENDED RELEASE ORAL DAILY
Qty: 90 TABLET | Refills: 1 | Status: SHIPPED | OUTPATIENT
Start: 2018-07-12 | End: 2019-03-02 | Stop reason: SDUPTHER

## 2018-07-12 RX ORDER — QUETIAPINE FUMARATE 25 MG/1
TABLET, FILM COATED ORAL
Qty: 45 TABLET | Refills: 1 | Status: SHIPPED | OUTPATIENT
Start: 2018-07-12 | End: 2019-03-20 | Stop reason: SDUPTHER

## 2018-07-12 NOTE — TELEPHONE ENCOUNTER
Warreny Marker the visiting nurse returned your call   She does not need a call back just needs all medications you want her to take sent to [facility] pharmacy with correct directions

## 2018-07-12 NOTE — TELEPHONE ENCOUNTER
Per daughter all meds need to be sent that I have listed in original message with the correct instructions

## 2018-07-12 NOTE — TELEPHONE ENCOUNTER
Some rxes have already been sent to [facility] told daughter a couple weeks ago I need list of exact meds pt needs at [facility] with exact dosing-please contact nurse or daughter to verify which meds need to be sent

## 2018-08-02 ENCOUNTER — TELEPHONE (OUTPATIENT)
Dept: FAMILY MEDICINE CLINIC | Facility: CLINIC | Age: 83
End: 2018-08-02

## 2018-08-02 NOTE — TELEPHONE ENCOUNTER
Pt called in following up on medication refill  Pt spouse was advise refill were done 7/12/18 and sent to [facility]

## 2018-08-07 DIAGNOSIS — F41.9 ANXIETY: ICD-10-CM

## 2018-08-07 RX ORDER — LORAZEPAM 1 MG/1
1 TABLET ORAL 2 TIMES DAILY
Qty: 30 TABLET | Refills: 0 | OUTPATIENT
Start: 2018-08-07

## 2018-08-29 DIAGNOSIS — F41.9 ANXIETY: ICD-10-CM

## 2018-08-29 NOTE — TELEPHONE ENCOUNTER
Pt called for med refill for her lorazepam 1mg to be sent to [address]   Pt lat ov 6/14/18 last bw 6/21/18

## 2018-09-07 RX ORDER — LORAZEPAM 1 MG/1
1 TABLET ORAL 2 TIMES DAILY
Qty: 30 TABLET | Refills: 0 | Status: SHIPPED | OUTPATIENT
Start: 2018-09-07 | End: 2018-11-30 | Stop reason: SDUPTHER

## 2018-10-01 ENCOUNTER — TELEPHONE (OUTPATIENT)
Dept: FAMILY MEDICINE CLINIC | Facility: CLINIC | Age: 83
End: 2018-10-01

## 2018-10-01 DIAGNOSIS — F41.9 ANXIETY: ICD-10-CM

## 2018-10-01 DIAGNOSIS — N30.00 ACUTE CYSTITIS WITHOUT HEMATURIA: Primary | ICD-10-CM

## 2018-10-01 DIAGNOSIS — I50.33 ACUTE ON CHRONIC DIASTOLIC CONGESTIVE HEART FAILURE (HCC): ICD-10-CM

## 2018-10-01 RX ORDER — NITROFURANTOIN 25; 75 MG/1; MG/1
100 CAPSULE ORAL 2 TIMES DAILY
Qty: 14 CAPSULE | Refills: 0 | Status: SHIPPED | OUTPATIENT
Start: 2018-10-01 | End: 2018-10-08

## 2018-10-01 NOTE — TELEPHONE ENCOUNTER
Pt called in and asked if you can give her a call back she doesn't drive and she is currently having sx of burning when urination and urinate frequently please advise

## 2018-10-02 DIAGNOSIS — F41.9 ANXIETY: ICD-10-CM

## 2018-10-02 NOTE — TELEPHONE ENCOUNTER
[facility] left a voice mail requesting medication refill on her Lorazepam 1 mg    Last ov was 6/14/2018

## 2018-10-03 RX ORDER — LORAZEPAM 1 MG/1
TABLET ORAL
Qty: 30 TABLET | Refills: 1 | Status: SHIPPED | OUTPATIENT
Start: 2018-10-03 | End: 2018-10-10 | Stop reason: SDUPTHER

## 2018-10-03 RX ORDER — LORAZEPAM 1 MG/1
1 TABLET ORAL 2 TIMES DAILY
Qty: 30 TABLET | Refills: 0 | OUTPATIENT
Start: 2018-10-03

## 2018-10-03 RX ORDER — FAMOTIDINE 20 MG/1
TABLET, FILM COATED ORAL
Qty: 90 TABLET | Refills: 0 | Status: SHIPPED | OUTPATIENT
Start: 2018-10-03 | End: 2019-03-19 | Stop reason: SDUPTHER

## 2018-10-10 ENCOUNTER — OFFICE VISIT (OUTPATIENT)
Dept: FAMILY MEDICINE CLINIC | Facility: CLINIC | Age: 83
End: 2018-10-10
Payer: MEDICARE

## 2018-10-10 VITALS
RESPIRATION RATE: 18 BRPM | BODY MASS INDEX: 22.11 KG/M2 | DIASTOLIC BLOOD PRESSURE: 60 MMHG | HEART RATE: 96 BPM | SYSTOLIC BLOOD PRESSURE: 128 MMHG | WEIGHT: 117 LBS

## 2018-10-10 DIAGNOSIS — E11.9 CONTROLLED TYPE 2 DIABETES MELLITUS WITHOUT COMPLICATION, WITHOUT LONG-TERM CURRENT USE OF INSULIN (HCC): ICD-10-CM

## 2018-10-10 DIAGNOSIS — R30.0 DYSURIA: ICD-10-CM

## 2018-10-10 DIAGNOSIS — E78.5 HYPERLIPIDEMIA, UNSPECIFIED HYPERLIPIDEMIA TYPE: Primary | ICD-10-CM

## 2018-10-10 LAB
SL AMB  POCT GLUCOSE, UA: NEGATIVE
SL AMB LEUKOCYTE ESTERASE,UA: 125
SL AMB POCT BILIRUBIN,UA: NEGATIVE
SL AMB POCT BLOOD,UA: ABNORMAL
SL AMB POCT CLARITY,UA: CLEAR
SL AMB POCT COLOR,UA: YELLOW
SL AMB POCT KETONES,UA: NEGATIVE
SL AMB POCT NITRITE,UA: NEGATIVE
SL AMB POCT PH,UA: 5
SL AMB POCT SPECIFIC GRAVITY,UA: 1
SL AMB POCT URINE PROTEIN: NEGATIVE
SL AMB POCT UROBILINOGEN: 0.2

## 2018-10-10 PROCEDURE — 87086 URINE CULTURE/COLONY COUNT: CPT | Performed by: FAMILY MEDICINE

## 2018-10-10 PROCEDURE — 81002 URINALYSIS NONAUTO W/O SCOPE: CPT | Performed by: FAMILY MEDICINE

## 2018-10-10 PROCEDURE — 99213 OFFICE O/P EST LOW 20 MIN: CPT | Performed by: FAMILY MEDICINE

## 2018-10-10 RX ORDER — CIPROFLOXACIN 500 MG/1
500 TABLET, FILM COATED ORAL EVERY 12 HOURS SCHEDULED
Qty: 10 TABLET | Refills: 0 | Status: SHIPPED | OUTPATIENT
Start: 2018-10-10 | End: 2018-10-15

## 2018-10-10 NOTE — PROGRESS NOTES
Assessment/Plan:    Controlled diabetes mellitus type II without complication (HCC)  Lab Results   Component Value Date    HGBA1C 7 1 (H) 06/21/2018   BS good at home    No results for input(s): POCGLU in the last 72 hours  Blood Sugar Average: Last 72 hrs: There are no diagnoses linked to this encounter  Subjective:      Patient ID: Aries العلي is a 80 y o  female  Here for f/u diabetes, still with urinary  sx        The following portions of the patient's history were reviewed and updated as appropriate: allergies, current medications, past family history, past medical history, past social history, past surgical history and problem list     Review of Systems   Constitutional: Negative for activity change, appetite change and unexpected weight change  Respiratory: Negative for shortness of breath  Cardiovascular: Positive for chest pain  Neurological: Negative for headaches  Psychiatric/Behavioral: Positive for sleep disturbance  The patient is nervous/anxious            Objective:      /60 (BP Location: Right arm, Patient Position: Sitting, Cuff Size: Standard)   Pulse 96   Resp 18   Wt 53 1 kg (117 lb)   BMI 22 11 kg/m²          Physical Exam

## 2018-10-11 ENCOUNTER — TELEPHONE (OUTPATIENT)
Dept: FAMILY MEDICINE CLINIC | Facility: CLINIC | Age: 83
End: 2018-10-11

## 2018-10-11 LAB — BACTERIA UR CULT: ABNORMAL

## 2018-10-11 NOTE — TELEPHONE ENCOUNTER
----- Message from Manish Atkinson MD sent at 10/11/2018  3:26 PM EDT -----  Urine negative-is pt better on med?

## 2018-10-12 NOTE — TELEPHONE ENCOUNTER
Patient notified of results  Per patient she is doing better on her meds and it is not burning anymore

## 2018-10-23 ENCOUNTER — APPOINTMENT (OUTPATIENT)
Dept: LAB | Facility: CLINIC | Age: 83
End: 2018-10-23
Payer: MEDICARE

## 2018-10-23 DIAGNOSIS — E78.5 HYPERLIPIDEMIA, UNSPECIFIED HYPERLIPIDEMIA TYPE: ICD-10-CM

## 2018-10-23 DIAGNOSIS — E11.9 CONTROLLED TYPE 2 DIABETES MELLITUS WITHOUT COMPLICATION, WITHOUT LONG-TERM CURRENT USE OF INSULIN (HCC): ICD-10-CM

## 2018-10-23 LAB
ALBUMIN SERPL BCP-MCNC: 3.9 G/DL (ref 3.5–5)
ALP SERPL-CCNC: 113 U/L (ref 46–116)
ALT SERPL W P-5'-P-CCNC: 36 U/L (ref 12–78)
ANION GAP SERPL CALCULATED.3IONS-SCNC: 10 MMOL/L (ref 4–13)
AST SERPL W P-5'-P-CCNC: 33 U/L (ref 5–45)
BILIRUB SERPL-MCNC: 0.86 MG/DL (ref 0.2–1)
BUN SERPL-MCNC: 39 MG/DL (ref 5–25)
CALCIUM SERPL-MCNC: 9.8 MG/DL (ref 8.3–10.1)
CHLORIDE SERPL-SCNC: 104 MMOL/L (ref 100–108)
CHOLEST SERPL-MCNC: 152 MG/DL (ref 50–200)
CO2 SERPL-SCNC: 24 MMOL/L (ref 21–32)
CREAT SERPL-MCNC: 1.91 MG/DL (ref 0.6–1.3)
EST. AVERAGE GLUCOSE BLD GHB EST-MCNC: 148 MG/DL
GFR SERPL CREATININE-BSD FRML MDRD: 23 ML/MIN/1.73SQ M
GLUCOSE P FAST SERPL-MCNC: 191 MG/DL (ref 65–99)
HBA1C MFR BLD: 6.8 % (ref 4.2–6.3)
HDLC SERPL-MCNC: 51 MG/DL (ref 40–60)
LDLC SERPL CALC-MCNC: 74 MG/DL (ref 0–100)
POTASSIUM SERPL-SCNC: 4.9 MMOL/L (ref 3.5–5.3)
PROT SERPL-MCNC: 8.1 G/DL (ref 6.4–8.2)
SODIUM SERPL-SCNC: 138 MMOL/L (ref 136–145)
TRIGL SERPL-MCNC: 137 MG/DL

## 2018-10-23 PROCEDURE — 80053 COMPREHEN METABOLIC PANEL: CPT

## 2018-10-23 PROCEDURE — 83036 HEMOGLOBIN GLYCOSYLATED A1C: CPT

## 2018-10-23 PROCEDURE — 80061 LIPID PANEL: CPT

## 2018-10-23 PROCEDURE — 36415 COLL VENOUS BLD VENIPUNCTURE: CPT

## 2018-10-24 ENCOUNTER — TELEPHONE (OUTPATIENT)
Dept: FAMILY MEDICINE CLINIC | Facility: CLINIC | Age: 83
End: 2018-10-24

## 2018-10-24 NOTE — TELEPHONE ENCOUNTER
----- Message from Diane Botello MD sent at 10/24/2018  9:56 AM EDT -----  Lipids nl, 3  Month control of BS nl but sugar day of lab almost 200-have sugars been trending up at home?

## 2018-10-26 DIAGNOSIS — I48.21 PERMANENT ATRIAL FIBRILLATION (HCC): ICD-10-CM

## 2018-10-26 DIAGNOSIS — F41.9 ANXIETY: ICD-10-CM

## 2018-10-26 RX ORDER — DILTIAZEM HYDROCHLORIDE 240 MG/1
240 CAPSULE, COATED, EXTENDED RELEASE ORAL DAILY
Qty: 90 CAPSULE | Refills: 0 | Status: SHIPPED | OUTPATIENT
Start: 2018-10-26 | End: 2018-12-23 | Stop reason: SDUPTHER

## 2018-10-26 RX ORDER — DILTIAZEM HYDROCHLORIDE 240 MG/1
CAPSULE, COATED, EXTENDED RELEASE ORAL
Qty: 30 CAPSULE | Refills: 0 | OUTPATIENT
Start: 2018-10-26

## 2018-10-26 RX ORDER — LORAZEPAM 1 MG/1
TABLET ORAL
Qty: 30 TABLET | Refills: 0 | OUTPATIENT
Start: 2018-10-26

## 2018-10-26 NOTE — TELEPHONE ENCOUNTER
Patient called In and stated his wife hasn't had any visiting nursing for the past 2 weeks due to her old nurse getting a new job pt will like to know if you can generate a script for her to get a new visiting nurse please advise

## 2018-10-30 DIAGNOSIS — I50.9 CONGESTIVE HEART FAILURE, UNSPECIFIED HF CHRONICITY, UNSPECIFIED HEART FAILURE TYPE (HCC): Primary | ICD-10-CM

## 2018-10-30 NOTE — TELEPHONE ENCOUNTER
Left detailed message advising Antonio Bojorquez was advise to call back to confirm message receipt

## 2018-10-31 ENCOUNTER — TELEPHONE (OUTPATIENT)
Dept: FAMILY MEDICINE CLINIC | Facility: CLINIC | Age: 83
End: 2018-10-31

## 2018-10-31 NOTE — TELEPHONE ENCOUNTER
Blood sugars   10/26/2018 12:30 287 4:08pm 149  10/27/2018 8am 107 5:27pm 243  10/28/2018 12pm 287 5pm 132  10/30/2018  5:20pm 216

## 2018-11-02 ENCOUNTER — TELEPHONE (OUTPATIENT)
Dept: FAMILY MEDICINE CLINIC | Facility: CLINIC | Age: 83
End: 2018-11-02

## 2018-11-02 NOTE — TELEPHONE ENCOUNTER
Nigel Alvarez from Visiting nurses wanted to let Dr Olivia Bhagat know Debbie Mayer is already under their Heart Failure program and they see her once a week  The other thing is that her sugar was at 204 today 2 hours after her first meal  A couple weeks ago her sugar was between 109  Mitul Bhagat has her on Glimepiride 1 mg daily but she wants to know how aggressive does Dr Olivia Bhagat want to be with Bruna's Sugars  Does Olivia Bhagat wants to change her medications or if she wants to get more Blood work  I informed her Dr Olivia Bhagat is out of the office until Thursday 11/8/2019  Per Inez Jorge if Dr Olivia Bhagat has any further questions she can call her at her Personal cell [phone] since today 11/2/2018 is her last day of work

## 2018-11-07 DIAGNOSIS — I10 BENIGN ESSENTIAL HTN: ICD-10-CM

## 2018-11-07 DIAGNOSIS — I25.10 CORONARY ARTERY DISEASE INVOLVING NATIVE HEART WITHOUT ANGINA PECTORIS, UNSPECIFIED VESSEL OR LESION TYPE: ICD-10-CM

## 2018-11-07 RX ORDER — POTASSIUM CHLORIDE 750 MG/1
TABLET, EXTENDED RELEASE ORAL
Qty: 90 TABLET | Refills: 0 | OUTPATIENT
Start: 2018-11-07

## 2018-11-08 NOTE — TELEPHONE ENCOUNTER
Is someone else taking over Shankar's case, if so let that nurse know that just to monitor sugars for now bid

## 2018-11-09 NOTE — TELEPHONE ENCOUNTER
Prakash Duque called back and she provided me with her Clinical Supervisor's phone number [phone] to call and verify who will be taking over Bruna's Case since she is no longer working of [facility]

## 2018-11-09 NOTE — TELEPHONE ENCOUNTER
Italo Castañeda will be Bruna's new primary care nurse  Per Carol Acosta she will monitor her BS bid and she will call in a week with readings

## 2018-11-19 ENCOUNTER — TELEPHONE (OUTPATIENT)
Dept: FAMILY MEDICINE CLINIC | Facility: CLINIC | Age: 83
End: 2018-11-19

## 2018-11-19 NOTE — TELEPHONE ENCOUNTER
Amos Maurer from [facility] advising she will be faxing over paperwork for pcp to complete  Amos Maurer states pt has not scheduled any future delivery for her oxygen tanks

## 2018-11-24 DIAGNOSIS — I48.21 PERMANENT ATRIAL FIBRILLATION (HCC): ICD-10-CM

## 2018-11-24 DIAGNOSIS — I48.91 ATRIAL FIBRILLATION WITH RAPID VENTRICULAR RESPONSE (HCC): ICD-10-CM

## 2018-11-24 DIAGNOSIS — I50.33 ACUTE ON CHRONIC DIASTOLIC CONGESTIVE HEART FAILURE (HCC): ICD-10-CM

## 2018-11-24 DIAGNOSIS — F41.9 ANXIETY: ICD-10-CM

## 2018-11-26 RX ORDER — APIXABAN 2.5 MG/1
TABLET, FILM COATED ORAL
Qty: 60 TABLET | Refills: 4 | Status: SHIPPED | OUTPATIENT
Start: 2018-11-26 | End: 2019-05-15 | Stop reason: SDUPTHER

## 2018-11-26 RX ORDER — LORAZEPAM 1 MG/1
TABLET ORAL
Qty: 30 TABLET | Refills: 0 | Status: SHIPPED | OUTPATIENT
Start: 2018-11-26 | End: 2018-12-27 | Stop reason: SDUPTHER

## 2018-11-26 RX ORDER — DILTIAZEM HYDROCHLORIDE 240 MG/1
CAPSULE, COATED, EXTENDED RELEASE ORAL
Qty: 30 CAPSULE | Refills: 0 | Status: SHIPPED | OUTPATIENT
Start: 2018-11-26 | End: 2018-11-30 | Stop reason: SDUPTHER

## 2018-11-26 RX ORDER — METOPROLOL SUCCINATE 50 MG/1
TABLET, EXTENDED RELEASE ORAL
Qty: 30 TABLET | Refills: 4 | Status: SHIPPED | OUTPATIENT
Start: 2018-11-26 | End: 2019-05-15 | Stop reason: SDUPTHER

## 2018-11-28 DIAGNOSIS — I48.91 ATRIAL FIBRILLATION WITH RVR (HCC): ICD-10-CM

## 2018-11-28 DIAGNOSIS — I50.43 ACUTE ON CHRONIC COMBINED SYSTOLIC AND DIASTOLIC CONGESTIVE HEART FAILURE (HCC): ICD-10-CM

## 2018-11-28 DIAGNOSIS — E43 SEVERE PROTEIN-CALORIE MALNUTRITION (GOMEZ: LESS THAN 60% OF STANDARD WEIGHT) (HCC): Chronic | ICD-10-CM

## 2018-11-28 RX ORDER — FUROSEMIDE 40 MG/1
TABLET ORAL
Qty: 90 TABLET | Refills: 0 | Status: SHIPPED | OUTPATIENT
Start: 2018-11-28 | End: 2019-05-15 | Stop reason: SDUPTHER

## 2018-11-29 DIAGNOSIS — E78.5 HYPERLIPIDEMIA, UNSPECIFIED HYPERLIPIDEMIA TYPE: Primary | ICD-10-CM

## 2018-11-29 DIAGNOSIS — E11.9 TYPE 2 DIABETES MELLITUS WITHOUT COMPLICATION, WITHOUT LONG-TERM CURRENT USE OF INSULIN (HCC): ICD-10-CM

## 2018-11-29 DIAGNOSIS — I50.43 ACUTE ON CHRONIC COMBINED SYSTOLIC AND DIASTOLIC CONGESTIVE HEART FAILURE (HCC): ICD-10-CM

## 2018-11-29 RX ORDER — GLIMEPIRIDE 1 MG/1
TABLET ORAL
Qty: 90 TABLET | Refills: 0 | Status: SHIPPED | OUTPATIENT
Start: 2018-11-29 | End: 2019-06-10 | Stop reason: SDUPTHER

## 2018-11-29 RX ORDER — ATORVASTATIN CALCIUM 20 MG/1
TABLET, FILM COATED ORAL
Qty: 90 TABLET | Refills: 0 | Status: SHIPPED | OUTPATIENT
Start: 2018-11-29 | End: 2019-06-10 | Stop reason: SDUPTHER

## 2018-11-29 NOTE — PROGRESS NOTES
Cardiology Follow Up    Bert Vernon  4/4/1927  [id number]  [address] [facility] [phone]    1  Essential (primary) hypertension     2  Pure hypercholesterolemia     3  Coronary arteriosclerosis     4  S/P CABG (coronary artery bypass graft)     5  Ischemic cardiomyopathy         Interval History:  Patient is here for a follow-up visit  She was most recently seen by me in June  She is here today with her   She was noted intermittently to have atrial flutter and is on rate control therapy  She is on low-dose Eliquis  She is here today with her family  Her most recent echocardiogram was done April 2018 and demonstrated a mildly dilated left ventricle with an ejection fraction of 44%  She was noted to have moderate biatrial cavity enlargement, moderate mitral regurgitation and mild to moderate aortic valve stenosis  she has been feeling well  She has had no chest pain or significant dyspnea      Patient Active Problem List   Diagnosis    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 failure (HCC)    Nonrheumatic aortic valve stenosis    Benign essential HTN    Controlled diabetes mellitus type II without complication (HCC)    Atrial fibrillation with RVR (HCC)    Acute ischemic right MCA stroke ([address] )    Coronary artery disease involving native coronary artery of native heart without angina pectoris    Cognitive impairment    Depression with anxiety    Esophageal reflux    Mixed hyperlipidemia    Essential hypertension    Osteoarthritis    Squamous cell carcinoma of skin    Tinnitus    Moderate protein-calorie malnutrition (HCC)    Goals of care, counseling/discussion    Impaired mobility and ADLs    Dementia    Ambulatory dysfunction    Physical deconditioning    Type 2 myocardial infarction without ST elevation ([address] )    Permanent atrial fibrillation [facility])     Past Medical History:   Diagnosis Date    Acute ischemic right MCA stroke ([facility] )     Anticoagulant long-term use     last assessed: 8/17/17    Aortic stenosis     Atrial fibrillation ([facility] )     Blurred vision     last assessed: 10/25/13    CHF (congestive heart failure) ([place] [address] )     Coronary artery disease     Dementia     Diabetes mellitus ([place] [address] )     Dysuria     last assessed: 8/3/15    Hyperlipidemia     Hypertension     Nonrheumatic aortic (valve) insufficiency     Old myocardial infarction     Rectal carcinoma (HCC)      Social History     Social History    Marital status: /Civil Union     Spouse name: N/A    Number of children: N/A    Years of education: N/A     Occupational History    Retired      Social History Main Topics    Smoking status: Former Smoker     Packs/day: 1 00     Years: 20 00     Quit date: 2002    Smokeless tobacco: Never Used      Comment: 1 pack a week;  No secondhand smoke exposure    Alcohol use No    Drug use: No    Sexual activity: Not on file     Other Topics Concern    Not on file     Social History Narrative    Lives with     House    2 children,5 grandchildren    retired      Family History   Problem Relation Age of Onset    Stroke Mother     Stroke Father      Past Surgical History:   Procedure Laterality Date    BALLOON ANGIOPLASTY, ARTERY      CORONARY ARTERY BYPASS GRAFT      RECTAL SURGERY         Current Outpatient Prescriptions:     acetaminophen (TYLENOL) 325 mg tablet, Take 1 tablet (325 mg total) by mouth every 6 (six) hours as needed for mild pain, Disp: 30 tablet, Rfl: 0    aspirin 81 mg chewable tablet, Chew 1 tablet daily, Disp: , Rfl: 0    atorvastatin (LIPITOR) 20 mg tablet, ONE TABLET BY MOUTH DAILY WITH DINNER, Disp: 90 tablet, Rfl: 0    calcitonin, salmon, (MIACALCIN) 200 units/act nasal spray, USE 1 SPRAY IN ONE NOSTRIL DAILY--ALTERNATE NOSTRILS, Disp: 11 1 mL, Rfl: 0    diltiazem (CARDIZEM CD) 240 mg 24 hr capsule, Take 1 capsule (240 mg total) by mouth daily, Disp: 90 capsule, Rfl: 0    ELIQUIS 2 5 MG, ONE TABLET BY MOUTH TWO TIMES DAILY, Disp: 60 tablet, Rfl: 4    famotidine (PEPCID) 20 mg tablet, ONE TABLET BY MOUTH DAILY, Disp: 90 tablet, Rfl: 0    furosemide (LASIX) 40 mg tablet, ONE TABLET BY MOUTH DAILY, Disp: 90 tablet, Rfl: 0    glimepiride (AMARYL) 1 mg tablet, ONE TABLET DAILY WITH BREAKFAST, Disp: 90 tablet, Rfl: 0    glucose blood (IGLUCOSE TEST STRIPS) test strip, Use as instructed-test bid, free style light, Disp: 100 each, Rfl: 3    glucose monitoring kit (FREESTYLE) monitoring kit, 1 each by Does not apply route daily, Disp: 1 each, Rfl: 0    lactulose 20 g/30 mL, Take 15 mL (10 g total) by mouth daily as needed (constipation), Disp: 1 Bottle, Rfl: 0    Lancets (FREESTYLE) lancets, Use as instructed, Disp: 100 each, Rfl: 2    LORazepam (ATIVAN) 1 mg tablet, ONE-HALF TABLET BY MOUTH TWO TIMES DAILY, Disp: 30 tablet, Rfl: 0    metoprolol succinate (TOPROL-XL) 50 mg 24 hr tablet, ONE TABLET BY MOUTH DAILY, Disp: 30 tablet, Rfl: 4    potassium chloride (K-DUR,KLOR-CON) 10 mEq tablet, Take 1 tablet (10 mEq total) by mouth daily, Disp: 90 tablet, Rfl: 1    QUEtiapine (SEROquel) 25 mg tablet, Take 1/2 tab at bedtiem, Disp: 45 tablet, Rfl: 1    senna-docusate sodium (SENOKOT S) 8 6-50 mg per tablet, Take 1 tablet by mouth daily at bedtime, Disp: 15 tablet, Rfl: 0    spironolactone (ALDACTONE) 25 mg tablet, Take 0 5 tablets (12 5 mg total) by mouth daily, Disp: 30 tablet, Rfl: 0  Allergies   Allergen Reactions    Heparin        Labs:not applicable  Imaging: No results found  Review of Systems:  Review of Systems   All other systems reviewed and are negative        Physical Exam:  /72 (BP Location: Right arm, Patient Position: Sitting, Cuff Size: Standard)   Pulse 72   Ht 5' 2" (1 575 m)   Wt 53 1 kg (117 lb)   BMI 21 40 kg/m²   Physical Exam Constitutional: She is oriented to person, place, and time  She appears well-developed and well-nourished  HENT:   Head: Normocephalic and atraumatic  Eyes: Pupils are equal, round, and reactive to light  Conjunctivae and EOM are normal    Neck: Normal range of motion  Neck supple  Cardiovascular: Normal rate and normal heart sounds  Pulmonary/Chest: Effort normal and breath sounds normal    Neurological: She is alert and oriented to person, place, and time  Skin: Skin is warm and dry  Psychiatric: She has a normal mood and affect  Vitals reviewed  Discussion/Summary:I will continue the patient's present medical regimen  The patient appears well compensated  I have asked the patient to call if there is a problem in the interim otherwise I will see the patient in six months time

## 2018-11-30 ENCOUNTER — TELEPHONE (OUTPATIENT)
Dept: FAMILY MEDICINE CLINIC | Facility: CLINIC | Age: 83
End: 2018-11-30

## 2018-12-04 ENCOUNTER — OFFICE VISIT (OUTPATIENT)
Dept: CARDIOLOGY CLINIC | Facility: CLINIC | Age: 83
End: 2018-12-04
Payer: MEDICARE

## 2018-12-04 VITALS
WEIGHT: 117 LBS | DIASTOLIC BLOOD PRESSURE: 72 MMHG | HEART RATE: 72 BPM | HEIGHT: 62 IN | BODY MASS INDEX: 21.53 KG/M2 | SYSTOLIC BLOOD PRESSURE: 132 MMHG

## 2018-12-04 DIAGNOSIS — E78.00 PURE HYPERCHOLESTEROLEMIA: ICD-10-CM

## 2018-12-04 DIAGNOSIS — I10 ESSENTIAL (PRIMARY) HYPERTENSION: Primary | ICD-10-CM

## 2018-12-04 DIAGNOSIS — Z95.1 S/P CABG (CORONARY ARTERY BYPASS GRAFT): ICD-10-CM

## 2018-12-04 DIAGNOSIS — I25.5 ISCHEMIC CARDIOMYOPATHY: ICD-10-CM

## 2018-12-04 DIAGNOSIS — I25.10 CORONARY ARTERIOSCLEROSIS: ICD-10-CM

## 2018-12-04 PROCEDURE — 99214 OFFICE O/P EST MOD 30 MIN: CPT | Performed by: INTERNAL MEDICINE

## 2018-12-22 DIAGNOSIS — F41.9 ANXIETY: ICD-10-CM

## 2018-12-22 DIAGNOSIS — I48.21 PERMANENT ATRIAL FIBRILLATION (HCC): ICD-10-CM

## 2018-12-23 DIAGNOSIS — I48.21 PERMANENT ATRIAL FIBRILLATION (HCC): ICD-10-CM

## 2018-12-23 RX ORDER — DILTIAZEM HYDROCHLORIDE 240 MG/1
CAPSULE, COATED, EXTENDED RELEASE ORAL
Qty: 30 CAPSULE | OUTPATIENT
Start: 2018-12-23

## 2018-12-23 RX ORDER — DILTIAZEM HYDROCHLORIDE 240 MG/1
240 CAPSULE, COATED, EXTENDED RELEASE ORAL DAILY
Qty: 90 CAPSULE | Refills: 0 | Status: SHIPPED | OUTPATIENT
Start: 2018-12-23 | End: 2019-02-20 | Stop reason: SDUPTHER

## 2018-12-23 RX ORDER — LORAZEPAM 1 MG/1
TABLET ORAL
Qty: 30 TABLET | OUTPATIENT
Start: 2018-12-23

## 2018-12-24 DIAGNOSIS — F41.9 ANXIETY: ICD-10-CM

## 2018-12-24 DIAGNOSIS — I48.21 PERMANENT ATRIAL FIBRILLATION (HCC): ICD-10-CM

## 2018-12-27 DIAGNOSIS — F41.9 ANXIETY: ICD-10-CM

## 2018-12-27 RX ORDER — DILTIAZEM HYDROCHLORIDE 240 MG/1
CAPSULE, COATED, EXTENDED RELEASE ORAL
Qty: 30 CAPSULE | OUTPATIENT
Start: 2018-12-27

## 2018-12-27 RX ORDER — LORAZEPAM 1 MG/1
0.5 TABLET ORAL 2 TIMES DAILY PRN
Qty: 30 TABLET | Refills: 2 | Status: SHIPPED | OUTPATIENT
Start: 2018-12-27 | End: 2018-12-28 | Stop reason: SDUPTHER

## 2018-12-27 RX ORDER — LORAZEPAM 1 MG/1
TABLET ORAL
Qty: 30 TABLET | OUTPATIENT
Start: 2018-12-27

## 2018-12-28 DIAGNOSIS — F41.9 ANXIETY: ICD-10-CM

## 2018-12-28 RX ORDER — LORAZEPAM 1 MG/1
0.5 TABLET ORAL 2 TIMES DAILY PRN
Qty: 30 TABLET | Refills: 2 | Status: SHIPPED | OUTPATIENT
Start: 2018-12-28 | End: 2019-03-03 | Stop reason: SDUPTHER

## 2018-12-28 NOTE — TELEPHONE ENCOUNTER
Dr Tad Mendiola can you please resend pt Lorazepam to [facility]  The Rx you sent was sent to ydlon Wilkerson was notified

## 2019-01-01 ENCOUNTER — APPOINTMENT (OUTPATIENT)
Dept: LAB | Facility: CLINIC | Age: 84
End: 2019-01-01
Payer: MEDICARE

## 2019-01-01 ENCOUNTER — OFFICE VISIT (OUTPATIENT)
Dept: NEPHROLOGY | Facility: CLINIC | Age: 84
End: 2019-01-01
Payer: MEDICARE

## 2019-01-01 ENCOUNTER — TELEPHONE (OUTPATIENT)
Dept: FAMILY MEDICINE CLINIC | Facility: CLINIC | Age: 84
End: 2019-01-01

## 2019-01-01 ENCOUNTER — OFFICE VISIT (OUTPATIENT)
Dept: FAMILY MEDICINE CLINIC | Facility: CLINIC | Age: 84
End: 2019-01-01
Payer: MEDICARE

## 2019-01-01 ENCOUNTER — TELEPHONE (OUTPATIENT)
Dept: NEPHROLOGY | Facility: CLINIC | Age: 84
End: 2019-01-01

## 2019-01-01 ENCOUNTER — TELEPHONE (OUTPATIENT)
Dept: OTHER | Facility: OTHER | Age: 84
End: 2019-01-01

## 2019-01-01 ENCOUNTER — HOSPITAL ENCOUNTER (EMERGENCY)
Facility: HOSPITAL | Age: 84
Discharge: HOME | End: 2019-11-20
Attending: EMERGENCY MEDICINE
Payer: MEDICARE

## 2019-01-01 ENCOUNTER — APPOINTMENT (EMERGENCY)
Dept: CT IMAGING | Facility: HOSPITAL | Age: 84
End: 2019-01-01
Payer: MEDICARE

## 2019-01-01 VITALS
SYSTOLIC BLOOD PRESSURE: 128 MMHG | HEART RATE: 78 BPM | WEIGHT: 116.13 LBS | RESPIRATION RATE: 16 BRPM | HEIGHT: 62 IN | BODY MASS INDEX: 21.37 KG/M2 | DIASTOLIC BLOOD PRESSURE: 68 MMHG

## 2019-01-01 VITALS
BODY MASS INDEX: 19.73 KG/M2 | WEIGHT: 107.2 LBS | HEIGHT: 62 IN | SYSTOLIC BLOOD PRESSURE: 138 MMHG | RESPIRATION RATE: 18 BRPM | HEART RATE: 88 BPM | DIASTOLIC BLOOD PRESSURE: 60 MMHG | TEMPERATURE: 97.5 F

## 2019-01-01 VITALS
HEART RATE: 97 BPM | SYSTOLIC BLOOD PRESSURE: 149 MMHG | BODY MASS INDEX: 19.35 KG/M2 | OXYGEN SATURATION: 99 % | DIASTOLIC BLOOD PRESSURE: 63 MMHG | RESPIRATION RATE: 18 BRPM | WEIGHT: 105.82 LBS | TEMPERATURE: 98.3 F

## 2019-01-01 VITALS
SYSTOLIC BLOOD PRESSURE: 121 MMHG | WEIGHT: 105.2 LBS | HEART RATE: 74 BPM | BODY MASS INDEX: 19.86 KG/M2 | DIASTOLIC BLOOD PRESSURE: 76 MMHG | HEIGHT: 61 IN

## 2019-01-01 VITALS
HEIGHT: 62 IN | WEIGHT: 111 LBS | DIASTOLIC BLOOD PRESSURE: 58 MMHG | SYSTOLIC BLOOD PRESSURE: 142 MMHG | HEART RATE: 84 BPM | BODY MASS INDEX: 20.43 KG/M2

## 2019-01-01 VITALS
HEART RATE: 84 BPM | SYSTOLIC BLOOD PRESSURE: 120 MMHG | DIASTOLIC BLOOD PRESSURE: 68 MMHG | HEIGHT: 62 IN | TEMPERATURE: 98.6 F | BODY MASS INDEX: 20.22 KG/M2 | WEIGHT: 109.9 LBS

## 2019-01-01 DIAGNOSIS — N17.9 ACUTE KIDNEY INJURY SUPERIMPOSED ON CKD (HCC): ICD-10-CM
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
## 2019-01-01 DIAGNOSIS — E11.22 TYPE 2 DIABETES MELLITUS WITH STAGE 4 CHRONIC KIDNEY DISEASE, WITHOUT LONG-TERM CURRENT USE OF INSULIN (HCC): ICD-10-CM

## 2019-01-01 DIAGNOSIS — E78.2 MIXED HYPERLIPIDEMIA: ICD-10-CM

## 2019-01-01 DIAGNOSIS — N18.4 STAGE 4 CHRONIC KIDNEY DISEASE (HCC): ICD-10-CM

## 2019-01-01 DIAGNOSIS — I25.10 CORONARY ARTERY DISEASE INVOLVING NATIVE HEART WITHOUT ANGINA PECTORIS, UNSPECIFIED VESSEL OR LESION TYPE: ICD-10-CM

## 2019-01-01 DIAGNOSIS — S00.03XD HEMATOMA OF SCALP, SUBSEQUENT ENCOUNTER: ICD-10-CM

## 2019-01-01 DIAGNOSIS — I48.21 PERMANENT ATRIAL FIBRILLATION (HCC): ICD-10-CM

## 2019-01-01 DIAGNOSIS — I13.0 HYPERTENSIVE HEART AND KIDNEY DISEASE WITH HF AND WITH CKD STAGE I-IV (HCC): ICD-10-CM

## 2019-01-01 DIAGNOSIS — I10 BENIGN ESSENTIAL HTN: ICD-10-CM

## 2019-01-01 DIAGNOSIS — E43 SEVERE PROTEIN-CALORIE MALNUTRITION (GOMEZ: LESS THAN 60% OF STANDARD WEIGHT) (HCC): Chronic | ICD-10-CM

## 2019-01-01 DIAGNOSIS — I48.91 ATRIAL FIBRILLATION WITH RAPID VENTRICULAR RESPONSE (HCC): ICD-10-CM

## 2019-01-01 DIAGNOSIS — I50.33 ACUTE ON CHRONIC DIASTOLIC CONGESTIVE HEART FAILURE (HCC): ICD-10-CM

## 2019-01-01 DIAGNOSIS — E44.0 MODERATE PROTEIN-CALORIE MALNUTRITION (HCC): ICD-10-CM

## 2019-01-01 DIAGNOSIS — F41.9 ANXIETY: ICD-10-CM

## 2019-01-01 DIAGNOSIS — N18.4 TYPE 2 DIABETES MELLITUS WITH STAGE 4 CHRONIC KIDNEY DISEASE, WITHOUT LONG-TERM CURRENT USE OF INSULIN (HCC): ICD-10-CM

## 2019-01-01 DIAGNOSIS — N18.4 TYPE 2 DIABETES MELLITUS WITH STAGE 4 CHRONIC KIDNEY DISEASE, WITHOUT LONG-TERM CURRENT USE OF INSULIN (HCC): Primary | ICD-10-CM

## 2019-01-01 DIAGNOSIS — E87.2 METABOLIC ACIDOSIS: Primary | ICD-10-CM

## 2019-01-01 DIAGNOSIS — F01.50 VASCULAR DEMENTIA WITHOUT BEHAVIORAL DISTURBANCE (HCC): ICD-10-CM

## 2019-01-01 DIAGNOSIS — I25.10 CORONARY ARTERY DISEASE INVOLVING NATIVE CORONARY ARTERY OF NATIVE HEART WITHOUT ANGINA PECTORIS: ICD-10-CM

## 2019-01-01 DIAGNOSIS — I48.91 ATRIAL FIBRILLATION WITH RVR (HCC): ICD-10-CM

## 2019-01-01 DIAGNOSIS — S32.2XXA CLOSED FRACTURE OF COCCYX, INITIAL ENCOUNTER (HCC): ICD-10-CM

## 2019-01-01 DIAGNOSIS — N30.01 ACUTE CYSTITIS WITH HEMATURIA: ICD-10-CM

## 2019-01-01 DIAGNOSIS — E11.9 TYPE 2 DIABETES MELLITUS WITHOUT COMPLICATION, WITHOUT LONG-TERM CURRENT USE OF INSULIN (HCC): ICD-10-CM

## 2019-01-01 DIAGNOSIS — S32.2XXD CLOSED FRACTURE OF SACRUM AND COCCYX WITH ROUTINE HEALING, SUBSEQUENT ENCOUNTER: Primary | ICD-10-CM

## 2019-01-01 DIAGNOSIS — N18.9 ACUTE KIDNEY INJURY SUPERIMPOSED ON CKD (HCC): ICD-10-CM

## 2019-01-01 DIAGNOSIS — I50.43 ACUTE ON CHRONIC COMBINED SYSTOLIC AND DIASTOLIC CONGESTIVE HEART FAILURE (HCC): ICD-10-CM

## 2019-01-01 DIAGNOSIS — E78.5 HYPERLIPIDEMIA, UNSPECIFIED HYPERLIPIDEMIA TYPE: ICD-10-CM

## 2019-01-01 DIAGNOSIS — N18.4 STAGE 4 CHRONIC KIDNEY DISEASE (HCC): Primary | ICD-10-CM

## 2019-01-01 DIAGNOSIS — E87.2 METABOLIC ACIDOSIS: ICD-10-CM

## 2019-01-01 DIAGNOSIS — I10 ESSENTIAL HYPERTENSION: ICD-10-CM

## 2019-01-01 DIAGNOSIS — E11.22 TYPE 2 DIABETES MELLITUS WITH STAGE 4 CHRONIC KIDNEY DISEASE, WITHOUT LONG-TERM CURRENT USE OF INSULIN (HCC): Primary | ICD-10-CM

## 2019-01-01 DIAGNOSIS — S32.10XD CLOSED FRACTURE OF SACRUM AND COCCYX WITH ROUTINE HEALING, SUBSEQUENT ENCOUNTER: Primary | ICD-10-CM

## 2019-01-01 DIAGNOSIS — R19.7 DIARRHEA OF PRESUMED INFECTIOUS ORIGIN: ICD-10-CM

## 2019-01-01 DIAGNOSIS — R30.0 DYSURIA: Primary | ICD-10-CM

## 2019-01-01 DIAGNOSIS — N30.00 ACUTE CYSTITIS WITHOUT HEMATURIA: Primary | ICD-10-CM

## 2019-01-01 LAB
ALBUMIN SERPL BCP-MCNC: 4 G/DL (ref 3.5–5)
ALBUMIN SERPL BCP-MCNC: 4.2 G/DL (ref 3.5–5)
ANION GAP SERPL CALCULATED.3IONS-SCNC: 10 MMOL/L (ref 4–13)
ANION GAP SERPL CALCULATED.3IONS-SCNC: 11 MMOL/L (ref 4–13)
ANION GAP SERPL CALCULATED.3IONS-SCNC: 6 MMOL/L (ref 4–13)
BACTERIA UR CULT: ABNORMAL
BACTERIA UR QL AUTO: ABNORMAL /HPF
BILIRUB UR QL STRIP: NEGATIVE
BUN SERPL-MCNC: 52 MG/DL (ref 5–25)
BUN SERPL-MCNC: 56 MG/DL (ref 5–25)
BUN SERPL-MCNC: 77 MG/DL (ref 5–25)
CALCIUM SERPL-MCNC: 9.5 MG/DL (ref 8.3–10.1)
CALCIUM SERPL-MCNC: 9.8 MG/DL (ref 8.3–10.1)
CALCIUM SERPL-MCNC: 9.9 MG/DL (ref 8.3–10.1)
CHLORIDE SERPL-SCNC: 107 MMOL/L (ref 100–108)
CHLORIDE SERPL-SCNC: 115 MMOL/L (ref 100–108)
CHLORIDE SERPL-SCNC: 116 MMOL/L (ref 100–108)
CLARITY UR: CLEAR
CO2 SERPL-SCNC: 15 MMOL/L (ref 21–32)
CO2 SERPL-SCNC: 15 MMOL/L (ref 21–32)
CO2 SERPL-SCNC: 23 MMOL/L (ref 21–32)
COLOR UR: YELLOW
CREAT SERPL-MCNC: 1.75 MG/DL (ref 0.6–1.3)
CREAT SERPL-MCNC: 2.18 MG/DL (ref 0.6–1.3)
CREAT SERPL-MCNC: 2.44 MG/DL (ref 0.6–1.3)
CREAT UR-MCNC: 127 MG/DL
ERYTHROCYTE [DISTWIDTH] IN BLOOD BY AUTOMATED COUNT: 13.8 % (ref 11.6–15.1)
EST. AVERAGE GLUCOSE BLD GHB EST-MCNC: 131 MG/DL
GFR SERPL CREATININE-BSD FRML MDRD: 17 ML/MIN/1.73SQ M
GFR SERPL CREATININE-BSD FRML MDRD: 19 ML/MIN/1.73SQ M
GFR SERPL CREATININE-BSD FRML MDRD: 25 ML/MIN/1.73SQ M
GLUCOSE P FAST SERPL-MCNC: 113 MG/DL (ref 65–99)
GLUCOSE SERPL-MCNC: 204 MG/DL (ref 65–140)
GLUCOSE SERPL-MCNC: 225 MG/DL (ref 65–140)
GLUCOSE UR STRIP-MCNC: NEGATIVE MG/DL
HBA1C MFR BLD: 6.2 % (ref 4.2–6.3)
HCT VFR BLD AUTO: 36.5 % (ref 34.8–46.1)
HGB BLD-MCNC: 11.3 G/DL (ref 11.5–15.4)
HGB UR QL STRIP.AUTO: NEGATIVE
HYALINE CASTS #/AREA URNS LPF: ABNORMAL /LPF
KETONES UR STRIP-MCNC: NEGATIVE MG/DL
LEUKOCYTE ESTERASE UR QL STRIP: ABNORMAL
MCH RBC QN AUTO: 29.7 PG (ref 26.8–34.3)
MCHC RBC AUTO-ENTMCNC: 31 G/DL (ref 31.4–37.4)
MCV RBC AUTO: 96 FL (ref 82–98)
NITRITE UR QL STRIP: NEGATIVE
NON-SQ EPI CELLS URNS QL MICRO: ABNORMAL /HPF
PH UR STRIP.AUTO: 6 [PH]
PHOSPHATE SERPL-MCNC: 3.4 MG/DL (ref 2.3–4.1)
PHOSPHATE SERPL-MCNC: 4.3 MG/DL (ref 2.3–4.1)
PLATELET # BLD AUTO: 112 THOUSANDS/UL (ref 149–390)
PMV BLD AUTO: 12.8 FL (ref 8.9–12.7)
POTASSIUM SERPL-SCNC: 4.3 MMOL/L (ref 3.5–5.3)
POTASSIUM SERPL-SCNC: 4.3 MMOL/L (ref 3.5–5.3)
POTASSIUM SERPL-SCNC: 4.7 MMOL/L (ref 3.5–5.3)
PROT UR STRIP-MCNC: NEGATIVE MG/DL
PROT UR-MCNC: 33 MG/DL
PROT/CREAT UR: 0.26 MG/G{CREAT} (ref 0–0.1)
PTH-INTACT SERPL-MCNC: 146.2 PG/ML (ref 18.4–80.1)
PTH-INTACT SERPL-MCNC: 77.1 PG/ML (ref 18.4–80.1)
RBC # BLD AUTO: 3.81 MILLION/UL (ref 3.81–5.12)
RBC #/AREA URNS AUTO: ABNORMAL /HPF
SL AMB  POCT GLUCOSE, UA: ABNORMAL
SL AMB LEUKOCYTE ESTERASE,UA: ABNORMAL
SL AMB POCT BILIRUBIN,UA: ABNORMAL
SL AMB POCT BLOOD,UA: ABNORMAL
SL AMB POCT CLARITY,UA: ABNORMAL
SL AMB POCT COLOR,UA: YELLOW
SL AMB POCT KETONES,UA: ABNORMAL
SL AMB POCT NITRITE,UA: POSITIVE
SL AMB POCT PH,UA: 5.5
SL AMB POCT SPECIFIC GRAVITY,UA: 1.02
SL AMB POCT URINE PROTEIN: 100
SL AMB POCT UROBILINOGEN: 0.2
SODIUM SERPL-SCNC: 136 MMOL/L (ref 136–145)
SODIUM SERPL-SCNC: 141 MMOL/L (ref 136–145)
SODIUM SERPL-SCNC: 141 MMOL/L (ref 136–145)
SP GR UR STRIP.AUTO: 1.01 (ref 1–1.03)
UROBILINOGEN UR QL STRIP.AUTO: 0.2 E.U./DL
WBC # BLD AUTO: 7.1 THOUSAND/UL (ref 4.31–10.16)
WBC #/AREA URNS AUTO: ABNORMAL /HPF

## 2019-01-01 PROCEDURE — 99213 OFFICE O/P EST LOW 20 MIN: CPT | Performed by: FAMILY MEDICINE

## 2019-01-01 PROCEDURE — 83036 HEMOGLOBIN GLYCOSYLATED A1C: CPT

## 2019-01-01 PROCEDURE — 81001 URINALYSIS AUTO W/SCOPE: CPT

## 2019-01-01 PROCEDURE — 80069 RENAL FUNCTION PANEL: CPT

## 2019-01-01 PROCEDURE — 85027 COMPLETE CBC AUTOMATED: CPT

## 2019-01-01 PROCEDURE — 36415 COLL VENOUS BLD VENIPUNCTURE: CPT

## 2019-01-01 PROCEDURE — 99214 OFFICE O/P EST MOD 30 MIN: CPT | Performed by: PHYSICIAN ASSISTANT

## 2019-01-01 PROCEDURE — 81002 URINALYSIS NONAUTO W/O SCOPE: CPT | Performed by: PHYSICIAN ASSISTANT

## 2019-01-01 PROCEDURE — 74176 CT ABD & PELVIS W/O CONTRAST: CPT

## 2019-01-01 PROCEDURE — 87077 CULTURE AEROBIC IDENTIFY: CPT | Performed by: PHYSICIAN ASSISTANT

## 2019-01-01 PROCEDURE — 83970 ASSAY OF PARATHORMONE: CPT

## 2019-01-01 PROCEDURE — 99214 OFFICE O/P EST MOD 30 MIN: CPT | Performed by: INTERNAL MEDICINE

## 2019-01-01 PROCEDURE — 99283 EMERGENCY DEPT VISIT LOW MDM: CPT

## 2019-01-01 PROCEDURE — 99285 EMERGENCY DEPT VISIT HI MDM: CPT | Performed by: EMERGENCY MEDICINE

## 2019-01-01 PROCEDURE — 87086 URINE CULTURE/COLONY COUNT: CPT | Performed by: PHYSICIAN ASSISTANT

## 2019-01-01 PROCEDURE — 80048 BASIC METABOLIC PNL TOTAL CA: CPT

## 2019-01-01 PROCEDURE — 84156 ASSAY OF PROTEIN URINE: CPT

## 2019-01-01 PROCEDURE — 99214 OFFICE O/P EST MOD 30 MIN: CPT | Performed by: FAMILY MEDICINE

## 2019-01-01 PROCEDURE — 87186 SC STD MICRODIL/AGAR DIL: CPT | Performed by: PHYSICIAN ASSISTANT

## 2019-01-01 PROCEDURE — 82570 ASSAY OF URINE CREATININE: CPT

## 2019-01-01 RX ORDER — POTASSIUM CHLORIDE 750 MG/1
TABLET, EXTENDED RELEASE ORAL
Qty: 30 TABLET | Refills: 5 | Status: SHIPPED | OUTPATIENT
Start: 2019-01-01 | End: 2020-01-01 | Stop reason: HOSPADM

## 2019-01-01 RX ORDER — FUROSEMIDE 40 MG/1
TABLET ORAL
Qty: 90 TABLET | Refills: 1 | Status: SHIPPED | OUTPATIENT
Start: 2019-01-01 | End: 2020-01-01

## 2019-01-01 RX ORDER — FAMOTIDINE 20 MG/1
TABLET, FILM COATED ORAL
Qty: 90 TABLET | Refills: 1 | Status: SHIPPED | OUTPATIENT
Start: 2019-01-01 | End: 2020-01-01 | Stop reason: HOSPADM

## 2019-01-01 RX ORDER — APIXABAN 2.5 MG/1
TABLET, FILM COATED ORAL
Qty: 60 TABLET | Refills: 2 | Status: SHIPPED | OUTPATIENT
Start: 2019-01-01 | End: 2019-01-01 | Stop reason: SDUPTHER

## 2019-01-01 RX ORDER — APIXABAN 2.5 MG/1
TABLET, FILM COATED ORAL
Qty: 180 TABLET | Refills: 1 | Status: SHIPPED | OUTPATIENT
Start: 2019-01-01 | End: 2020-01-01 | Stop reason: HOSPADM

## 2019-01-01 RX ORDER — FUROSEMIDE 40 MG/1
TABLET ORAL
Qty: 90 TABLET | Refills: 0 | Status: SHIPPED | OUTPATIENT
Start: 2019-01-01 | End: 2019-01-01 | Stop reason: SDUPTHER

## 2019-01-01 RX ORDER — METOPROLOL SUCCINATE 50 MG/1
TABLET, EXTENDED RELEASE ORAL
Qty: 30 TABLET | Refills: 2 | Status: SHIPPED | OUTPATIENT
Start: 2019-01-01 | End: 2019-01-01 | Stop reason: SDUPTHER

## 2019-01-01 RX ORDER — SODIUM BICARBONATE 650 MG/1
650 TABLET ORAL 2 TIMES DAILY
Qty: 60 TABLET | Refills: 5 | Status: SHIPPED | OUTPATIENT
Start: 2019-01-01 | End: 2019-01-01 | Stop reason: SDUPTHER

## 2019-01-01 RX ORDER — SODIUM BICARBONATE 650 MG/1
650 TABLET ORAL 2 TIMES DAILY
Qty: 60 TABLET | Refills: 5 | Status: SHIPPED | OUTPATIENT
Start: 2019-01-01 | End: 2019-01-01

## 2019-01-01 RX ORDER — ATORVASTATIN CALCIUM 20 MG/1
TABLET, FILM COATED ORAL
Qty: 90 TABLET | Refills: 1 | Status: SHIPPED | OUTPATIENT
Start: 2019-01-01 | End: 2020-01-01

## 2019-01-01 RX ORDER — SPIRONOLACTONE 25 MG/1
TABLET ORAL
Qty: 45 TABLET | Refills: 1 | Status: SHIPPED | OUTPATIENT
Start: 2019-01-01 | End: 2020-01-01 | Stop reason: HOSPADM

## 2019-01-01 RX ORDER — LORAZEPAM 1 MG/1
0.5 TABLET ORAL
Qty: 30 TABLET | Refills: 1
Start: 2019-01-01 | End: 2020-01-01 | Stop reason: HOSPADM

## 2019-01-01 RX ORDER — ACETAMINOPHEN 325 MG/1
975 TABLET ORAL ONCE
Status: COMPLETED | OUTPATIENT
Start: 2019-01-01 | End: 2019-01-01

## 2019-01-01 RX ORDER — MEGESTROL ACETATE 20 MG/1
20 TABLET ORAL DAILY
Qty: 15 TABLET | Refills: 3 | Status: SHIPPED | OUTPATIENT
Start: 2019-01-01 | End: 2019-01-01 | Stop reason: SDUPTHER

## 2019-01-01 RX ORDER — CIPROFLOXACIN 500 MG/1
500 TABLET, FILM COATED ORAL EVERY 12 HOURS SCHEDULED
Qty: 14 TABLET | Refills: 0 | Status: SHIPPED | OUTPATIENT
Start: 2019-01-01 | End: 2019-01-01

## 2019-01-01 RX ORDER — DILTIAZEM HYDROCHLORIDE 240 MG/1
CAPSULE, COATED, EXTENDED RELEASE ORAL
Qty: 30 CAPSULE | Refills: 4 | Status: SHIPPED | OUTPATIENT
Start: 2019-01-01 | End: 2020-01-01

## 2019-01-01 RX ORDER — GLIMEPIRIDE 1 MG/1
TABLET ORAL
Qty: 90 TABLET | Refills: 1 | Status: SHIPPED | OUTPATIENT
Start: 2019-01-01 | End: 2020-01-01

## 2019-01-01 RX ORDER — GLIMEPIRIDE 1 MG/1
TABLET ORAL
Qty: 90 TABLET | Refills: 0 | Status: SHIPPED | OUTPATIENT
Start: 2019-01-01 | End: 2019-01-01 | Stop reason: SDUPTHER

## 2019-01-01 RX ORDER — POTASSIUM CHLORIDE 750 MG/1
TABLET, EXTENDED RELEASE ORAL
Qty: 30 TABLET | Refills: 0 | Status: SHIPPED | OUTPATIENT
Start: 2019-01-01 | End: 2020-01-01 | Stop reason: SDUPTHER

## 2019-01-01 RX ORDER — ATORVASTATIN CALCIUM 20 MG/1
TABLET, FILM COATED ORAL
Qty: 90 TABLET | Refills: 0 | Status: SHIPPED | OUTPATIENT
Start: 2019-01-01 | End: 2019-01-01 | Stop reason: SDUPTHER

## 2019-01-01 RX ORDER — LORAZEPAM 1 MG/1
TABLET ORAL
Qty: 60 TABLET | Refills: 0 | Status: SHIPPED | OUTPATIENT
Start: 2019-01-01 | End: 2020-01-01 | Stop reason: SDUPTHER

## 2019-01-01 RX ORDER — DILTIAZEM HYDROCHLORIDE 240 MG/1
CAPSULE, COATED, EXTENDED RELEASE ORAL
Qty: 30 CAPSULE | Refills: 4 | Status: SHIPPED | OUTPATIENT
Start: 2019-01-01 | End: 2019-01-01

## 2019-01-01 RX ORDER — POTASSIUM CHLORIDE 750 MG/1
TABLET, EXTENDED RELEASE ORAL
Qty: 30 TABLET | Refills: 0 | Status: SHIPPED | OUTPATIENT
Start: 2019-01-01 | End: 2019-01-01 | Stop reason: SDUPTHER

## 2019-01-01 RX ORDER — QUETIAPINE FUMARATE 25 MG/1
TABLET, FILM COATED ORAL
Qty: 45 TABLET | Refills: 0 | Status: SHIPPED | OUTPATIENT
Start: 2019-01-01 | End: 2020-01-01

## 2019-01-01 RX ORDER — MEGESTROL ACETATE 20 MG/1
TABLET ORAL
Qty: 45 TABLET | Refills: 1 | Status: SHIPPED | OUTPATIENT
Start: 2019-01-01 | End: 2020-01-01 | Stop reason: HOSPADM

## 2019-01-01 RX ORDER — METOPROLOL SUCCINATE 50 MG/1
TABLET, EXTENDED RELEASE ORAL
Qty: 90 TABLET | Refills: 1 | Status: SHIPPED | OUTPATIENT
Start: 2019-01-01 | End: 2020-01-01 | Stop reason: HOSPADM

## 2019-01-01 RX ORDER — CIPROFLOXACIN 250 MG/1
250 TABLET, FILM COATED ORAL EVERY 12 HOURS SCHEDULED
Qty: 14 TABLET | Refills: 0 | Status: SHIPPED | OUTPATIENT
Start: 2019-01-01 | End: 2019-01-01

## 2019-01-01 RX ADMIN — ACETAMINOPHEN 975 MG: 325 TABLET ORAL at 15:37

## 2019-01-04 ENCOUNTER — TELEPHONE (OUTPATIENT)
Dept: FAMILY MEDICINE CLINIC | Facility: CLINIC | Age: 84
End: 2019-01-04

## 2019-02-19 DIAGNOSIS — F41.9 ANXIETY: ICD-10-CM

## 2019-02-19 DIAGNOSIS — I10 BENIGN ESSENTIAL HTN: ICD-10-CM

## 2019-02-19 DIAGNOSIS — I25.10 CORONARY ARTERY DISEASE INVOLVING NATIVE HEART WITHOUT ANGINA PECTORIS, UNSPECIFIED VESSEL OR LESION TYPE: ICD-10-CM

## 2019-02-19 RX ORDER — POTASSIUM CHLORIDE 750 MG/1
TABLET, EXTENDED RELEASE ORAL
Qty: 90 TABLET | Refills: 0 | OUTPATIENT
Start: 2019-02-19

## 2019-02-19 RX ORDER — LORAZEPAM 1 MG/1
TABLET ORAL
Qty: 30 TABLET | Refills: 1 | OUTPATIENT
Start: 2019-02-19

## 2019-02-20 DIAGNOSIS — I48.21 PERMANENT ATRIAL FIBRILLATION (HCC): ICD-10-CM

## 2019-02-20 RX ORDER — DILTIAZEM HYDROCHLORIDE 240 MG/1
CAPSULE, COATED, EXTENDED RELEASE ORAL
Qty: 30 CAPSULE | Refills: 0 | Status: SHIPPED | OUTPATIENT
Start: 2019-02-20 | End: 2019-04-17 | Stop reason: SDUPTHER

## 2019-03-01 DIAGNOSIS — F41.9 ANXIETY: ICD-10-CM

## 2019-03-02 DIAGNOSIS — I10 BENIGN ESSENTIAL HTN: ICD-10-CM

## 2019-03-02 DIAGNOSIS — I25.10 CORONARY ARTERY DISEASE INVOLVING NATIVE HEART WITHOUT ANGINA PECTORIS, UNSPECIFIED VESSEL OR LESION TYPE: ICD-10-CM

## 2019-03-03 DIAGNOSIS — F41.9 ANXIETY: ICD-10-CM

## 2019-03-03 RX ORDER — LORAZEPAM 1 MG/1
0.5 TABLET ORAL 2 TIMES DAILY PRN
Qty: 30 TABLET | Refills: 0 | Status: SHIPPED | OUTPATIENT
Start: 2019-03-03 | End: 2019-03-11 | Stop reason: SDUPTHER

## 2019-03-03 RX ORDER — POTASSIUM CHLORIDE 750 MG/1
TABLET, EXTENDED RELEASE ORAL
Qty: 90 TABLET | Refills: 0 | Status: SHIPPED | OUTPATIENT
Start: 2019-03-03 | End: 2019-06-13 | Stop reason: SDUPTHER

## 2019-03-03 RX ORDER — LORAZEPAM 1 MG/1
0.5 TABLET ORAL 2 TIMES DAILY PRN
Qty: 30 TABLET | Refills: 2 | OUTPATIENT
Start: 2019-03-03

## 2019-03-07 ENCOUNTER — TELEPHONE (OUTPATIENT)
Dept: FAMILY MEDICINE CLINIC | Facility: CLINIC | Age: 84
End: 2019-03-07

## 2019-03-07 DIAGNOSIS — N30.00 ACUTE CYSTITIS WITHOUT HEMATURIA: Primary | ICD-10-CM

## 2019-03-07 RX ORDER — NITROFURANTOIN 25; 75 MG/1; MG/1
100 CAPSULE ORAL 2 TIMES DAILY
Qty: 10 CAPSULE | Refills: 0 | Status: SHIPPED | OUTPATIENT
Start: 2019-03-07 | End: 2019-03-12

## 2019-03-07 NOTE — TELEPHONE ENCOUNTER
Pt's daughter called stating pt has been having burning when she urinates and would like a script send to the pharmacy   There are no open appt with you today

## 2019-03-11 DIAGNOSIS — F41.9 ANXIETY: ICD-10-CM

## 2019-03-11 RX ORDER — LORAZEPAM 1 MG/1
0.5 TABLET ORAL 2 TIMES DAILY PRN
Qty: 30 TABLET | Refills: 0 | Status: SHIPPED | OUTPATIENT
Start: 2019-03-11 | End: 2019-03-18 | Stop reason: SDUPTHER

## 2019-03-16 ENCOUNTER — TELEPHONE (OUTPATIENT)
Dept: OTHER | Facility: OTHER | Age: 84
End: 2019-03-16

## 2019-03-16 NOTE — TELEPHONE ENCOUNTER
Israel Harden 1927  CONFIDENTIALTY NOTICE: This fax transmission is intended only for the addressee  It contains information that is legally privileged,  confidential or otherwise protected from use or disclosure  If you are not the intended recipient, you are strictly prohibited from reviewing,  disclosing, copying using or disseminating any of this information or taking any action in reliance on or regarding this information  If you have  received this fax in error, please notify us immediately by telephone so that we can arrange for its return to us  Page: 1  3  Call Id: [id number]  Health Call  Standard Call Report  Health Call  Patient Name: Israel Harden  Gender: Female  : 1927  Age: 80 Y 6 M 12 D  Return Phone  Number: [phone] (Home)  Address: [address] [place] [address]/[facility]/Zip: [address] [place] [address]  Practice Name: [place] [id number] Charged:  Physician:  [address] [place] [address] Name:  Relationship To  Patient: Spouse  Return Phone Number: [phone] (Home)  Presenting Problem: "My wife is having pain when she  goes to the bathroom "  Service Type: Triage  Charged Service 1: N/A  Pharmacy Name and  Number:  Nurse Assessment  Nurse: Toy Garcia RN, Sharlene Mason Date/Time: 3/16/2019 2:01:14 PM  Type of assessment required:  ---General (Adult or Child)  Duration of Current S/S  ---Day 9  Location/Radiation  ---  Temperature (F) and route:  ---denies fever  Symptom Specific Meds (Dose/Time):  Has not had any improvement  ---[facility] [place] on Wednesday  Other S/S  ---Magali Konig and tingles when voiding  frequency  Pain Scale on scale of 1-10, 10 being the worst:  ---Lower back pain # 8  Symptom progression:  ---worse  Intake and Output  ---Voiding more frequently 4-6 cups Burns when voiding  Israel Harden 1927  CONFIDENTIALTY NOTICE: This fax transmission is intended only for the addressee   It contains information that is legally privileged,  confidential or otherwise protected from use or disclosure  If you are not the intended recipient, you are strictly prohibited from reviewing,  disclosing, copying using or disseminating any of this information or taking any action in reliance on or regarding this information  If you have  received this fax in error, please notify us immediately by telephone so that we can arrange for its return to us  Page: 2 of 3  Call Id: [id number]  Nurse Assessment  Last Exam/Treatment:  ---Was last seen 10/10/2018  Nurse: Gayle Graham RN, Haroldo Aguero Date/Time: 3/16/2019 2:14:47 PM  Type of assessment required:  ---Telephone Order (Meds)  For MD Signature? Date signed:  ---Yes  Date and Time of TO:  ---3/16/2019 @1410  Prescribing doctor:  ---Zander Adams  Medication ordered:  ---Ciprofloxacin  Dose:  ---250 mg  Route  ---PO  Frequency:  ---Daily  Amount dispensed:  ---5 tablets  Refills:  ---0  Pharmacy and phone number:  ---[phone]  Date and time prescription called to pharmacy:  ---3/16/2019 @ [phone]  Telephone order taken and read back by RN?  ---Yes  Protocols  Protocol Title Nurse Date/Time  Urinary Tract Infection on Antibiotic Follow-up  Call - Female  Marta Benitez 3/16/2019 2:06:36 PM  Question Caller Affirmed  Rishabh Tillman 4/4/1927  CONFIDENTIALTY NOTICE: This fax transmission is intended only for the addressee  It contains information that is legally privileged,  confidential or otherwise protected from use or disclosure  If you are not the intended recipient, you are strictly prohibited from reviewing,  disclosing, copying using or disseminating any of this information or taking any action in reliance on or regarding this information  If you have  received this fax in error, please notify us immediately by telephone so that we can arrange for its return to us  Page: 3 of 3  Call Id: [id number]  [address]   Time Disposition Final User  3/16/2019 1:44:56 PM Send to Follow Up Brien Calles RN, Haroldo Aguero  3/16/2019 2:19:15 PM See Physician within 4 Hours (or PCP  triage)  Calin Wilson RN, Tonyayousif Malachi  3/16/2019 2:21:43 PM RN Triaged Yes Calin Wilson RN, [facility] Advice Given Per Protocol  SEE PHYSICIAN WITHIN 4 HOURS (or PCP triage): * IF OFFICE WILL BE CLOSED AND NO PCP TRIAGE: You need to be seen  within the next 3 or 4 hours  A nearby Urgent Care Center is often a good source of care  Another choice is to go to the ER  Go sooner  if you become worse  PAIN MEDICINES: * For pain relief, take acetaminophen, ibuprofen, or naproxen  * Use the lowest amount that  makes your pain feel better  ACETAMINOPHEN (E G , TYLENOL): * Take 650 mg (two 325 mg pills) by mouth every 4-6 hours as  needed  Each Regular Strength Tylenol pill has 325 mg of acetaminophen  The most you should take each day is 3,250 mg (10 Regular  Strength pills a day)  * Another choice is to take 1,000 mg (two 500 mg pills) every 8 hours as needed  Each Extra Strength Tylenol  pill has 500 mg of acetaminophen  The most you should take each day is 3,000 mg (6 Extra Strength pills a day)  IBUPROFEN (E G ,  MOTRIN, ADVIL): * Take 400 mg (two 200 mg pills) by mouth every 6 hours as needed  * Another choice is to take 600 mg (three  200 mg pills) by mouth every 8 hours as needed  * The most you should take each day is 1,200 mg (six 200 mg pills a day), unless  your doctor has told you to take more  CALL BACK IF: * You become worse  CARE ADVICE given per Urinary Tract Infection on  Antibiotic Follow-Up Call, Female (Adult) guideline  Caller Understands: Yes  Caller Disagree/Comply: Disagree  PreDisposition: Unsure  Comments  User: Stephanie Mujica RN Date/Time: 3/16/2019 2:21:33 PM  As per guideline pt should be seen within 4 hours by a physician  Pt refuses to be seen before her appointment on Monday  Spoke with Dr Federico Jimenez and he ordered Cipro 250mg daily x 5 days  Pt made aware  Her daughter will pick it up from the pharmacy  today

## 2019-03-18 ENCOUNTER — OFFICE VISIT (OUTPATIENT)
Dept: FAMILY MEDICINE CLINIC | Facility: CLINIC | Age: 84
End: 2019-03-18
Payer: MEDICARE

## 2019-03-18 VITALS
HEIGHT: 62 IN | HEART RATE: 60 BPM | SYSTOLIC BLOOD PRESSURE: 110 MMHG | DIASTOLIC BLOOD PRESSURE: 78 MMHG | RESPIRATION RATE: 18 BRPM | WEIGHT: 121.2 LBS | BODY MASS INDEX: 22.31 KG/M2 | TEMPERATURE: 97.6 F

## 2019-03-18 DIAGNOSIS — E11.9 TYPE 2 DIABETES MELLITUS WITHOUT COMPLICATION, WITHOUT LONG-TERM CURRENT USE OF INSULIN (HCC): ICD-10-CM

## 2019-03-18 DIAGNOSIS — F41.9 ANXIETY: ICD-10-CM

## 2019-03-18 DIAGNOSIS — I10 BENIGN ESSENTIAL HTN: ICD-10-CM

## 2019-03-18 DIAGNOSIS — Z00.00 MEDICARE ANNUAL WELLNESS VISIT, SUBSEQUENT: Primary | ICD-10-CM

## 2019-03-18 PROCEDURE — G0439 PPPS, SUBSEQ VISIT: HCPCS | Performed by: FAMILY MEDICINE

## 2019-03-18 PROCEDURE — 99214 OFFICE O/P EST MOD 30 MIN: CPT | Performed by: FAMILY MEDICINE

## 2019-03-18 RX ORDER — LORAZEPAM 1 MG/1
0.5 TABLET ORAL 2 TIMES DAILY PRN
Qty: 60 TABLET | Refills: 2 | Status: SHIPPED | OUTPATIENT
Start: 2019-03-18 | End: 2019-03-21 | Stop reason: SDUPTHER

## 2019-03-18 NOTE — PROGRESS NOTES
Assessment/Plan:    No problem-specific Assessment & Plan notes found for this encounter  Diagnoses and all orders for this visit:    Medicare annual wellness visit, subsequent    Type 2 diabetes mellitus without complication, without long-term current use of insulin ([facility] [address] )  -     Comprehensive metabolic panel; Future  -     Hemoglobin A1C; Future  -     Lipid Panel with Direct LDL reflex; Future  -     Microalbumin / creatinine urine ratio    Benign essential HTN          Subjective:      Patient ID: Geneva Lewis is a 80 y o  female  F/u anxiety/diabetes      The following portions of the patient's history were reviewed and updated as appropriate: allergies, current medications, past family history, past medical history, past social history, past surgical history and problem list       Review of Systems   Constitutional: Negative for activity change and appetite change  Respiratory: Negative for shortness of breath  Cardiovascular: Negative for chest pain  Neurological: Negative for dizziness and headaches  Psychiatric/Behavioral: The patient is nervous/anxious  Objective:      /78 (BP Location: Left arm, Patient Position: Sitting, Cuff Size: Standard)   Pulse 60   Temp 97 6 °F (36 4 °C) (Temporal)   Resp 18   Ht 5' 2" (1 575 m)   Wt 55 kg (121 lb 3 2 oz)   BMI 22 17 kg/m²          Physical Exam   Constitutional: She appears well-developed and well-nourished  Neck: No thyromegaly present  Cardiovascular: Normal rate, regular rhythm and normal heart sounds  Pulmonary/Chest: Effort normal and breath sounds normal    Musculoskeletal: She exhibits no edema  Lymphadenopathy:     She has no cervical adenopathy  Psychiatric: She has a normal mood and affect  Vitals reviewed

## 2019-03-18 NOTE — PROGRESS NOTES
Assessment and Plan:    Problem List Items Addressed This Visit     None        Health Maintenance Due   Topic Date Due    HEPATITIS B VACCINES (1 of 3 - Risk 3-dose series) 04/04/1946    DM Eye Exam  11/30/2016    Diabetic Foot Exam  01/26/2018         HPI:  Dexter Louis is a 80 y o  female here for her Subsequent Wellness Visit      Patient Active Problem List   Diagnosis    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 failure (HCC)    Nonrheumatic aortic valve stenosis    Benign essential HTN    Controlled diabetes mellitus type II without complication (HCC)    Atrial fibrillation with RVR (HCC)    Acute ischemic right MCA stroke ([address] )    Coronary artery disease involving native coronary artery of native heart without angina pectoris    Cognitive impairment    Depression with anxiety    Esophageal reflux    Mixed hyperlipidemia    Essential hypertension    Osteoarthritis    Squamous cell carcinoma of skin    Tinnitus    Moderate protein-calorie malnutrition (HCC)    Goals of care, counseling/discussion    Impaired mobility and ADLs    Dementia    Ambulatory dysfunction    Physical deconditioning    Type 2 myocardial infarction without ST elevation (HCC)    Permanent atrial fibrillation ([address] )     Past Medical History:   Diagnosis Date    Acute ischemic right MCA stroke ([address] )     Anticoagulant long-term use     last assessed: 8/17/17    Aortic stenosis     Atrial fibrillation (HCC)     Blurred vision     last assessed: 10/25/13    CHF (congestive heart failure) (HCC)     Coronary artery disease     Dementia     Diabetes mellitus ([address] )     Dysuria     last assessed: 8/3/15    Hyperlipidemia     Hypertension     Nonrheumatic aortic (valve) insufficiency     Old myocardial infarction     Rectal carcinoma (HCC)      Past Surgical History:   Procedure Laterality Date    BALLOON ANGIOPLASTY, ARTERY      CORONARY ARTERY BYPASS GRAFT      RECTAL SURGERY Family History   Problem Relation Age of Onset    Stroke Mother     Stroke Father      Social History     Tobacco Use   Smoking Status Former Smoker    Packs/day: 1 00    Years: 20 00    Pack years: 20 00    Last attempt to quit:     Years since quittin 2   Smokeless Tobacco Never Used   Tobacco Comment    1 pack a week;  No secondhand smoke exposure     Social History     Substance and Sexual Activity   Alcohol Use No      Social History     Substance and Sexual Activity   Drug Use No       Current Outpatient Medications   Medication Sig Dispense Refill    aspirin 81 mg chewable tablet Chew 1 tablet daily  0    atorvastatin (LIPITOR) 20 mg tablet ONE TABLET BY MOUTH DAILY WITH DINNER 90 tablet 0    calcitonin, salmon, (MIACALCIN) 200 units/act nasal spray USE 1 SPRAY IN ONE NOSTRIL DAILY--ALTERNATE NOSTRILS 11 1 mL 0    diltiazem (CARDIZEM CD) 240 mg 24 hr capsule ONE CAPSULE BY MOUTH DAILY 30 capsule 0    ELIQUIS 2 5 MG ONE TABLET BY MOUTH TWO TIMES DAILY 60 tablet 4    famotidine (PEPCID) 20 mg tablet ONE TABLET BY MOUTH DAILY 90 tablet 0    furosemide (LASIX) 40 mg tablet ONE TABLET BY MOUTH DAILY 90 tablet 0    glimepiride (AMARYL) 1 mg tablet ONE TABLET DAILY WITH BREAKFAST 90 tablet 0    glucose blood (IGLUCOSE TEST STRIPS) test strip Use as instructed-test bid, free style light 100 each 3    glucose monitoring kit (FREESTYLE) monitoring kit 1 each by Does not apply route daily 1 each 0    lactulose 20 g/30 mL Take 15 mL (10 g total) by mouth daily as needed (constipation) 1 Bottle 0    Lancets (FREESTYLE) lancets Use as instructed 100 each 2    LORazepam (ATIVAN) 1 mg tablet Take 0 5 tablets (0 5 mg total) by mouth 2 (two) times a day as needed for anxiety for up to 15 days 30 tablet 0    metoprolol succinate (TOPROL-XL) 50 mg 24 hr tablet ONE TABLET BY MOUTH DAILY 30 tablet 4    potassium chloride (K-DUR,KLOR-CON) 10 mEq tablet ONE TABLET BY MOUTH DAILY 90 tablet 0    QUEtiapine (SEROquel) 25 mg tablet Take 1/2 tab at bedtiem 45 tablet 1    senna-docusate sodium (SENOKOT S) 8 6-50 mg per tablet Take 1 tablet by mouth daily at bedtime 15 tablet 0    spironolactone (ALDACTONE) 25 mg tablet Take 0 5 tablets (12 5 mg total) by mouth daily 30 tablet 0    acetaminophen (TYLENOL) 325 mg tablet Take 1 tablet (325 mg total) by mouth every 6 (six) hours as needed for mild pain (Patient not taking: Reported on 3/18/2019) 30 tablet 0     No current facility-administered medications for this visit  Allergies   Allergen Reactions    Heparin      Immunization History   Administered Date(s) Administered    INFLUENZA 09/07/2011, 10/09/2015, 09/26/2016, 09/04/2018    Influenza Quadrivalent Preservative Free 3 years and older IM 10/08/2014    Influenza Split High Dose Preservative Free IM 09/25/2013, 10/09/2015, 09/26/2016, 10/05/2017    Influenza TIV (IM) 09/24/2012    Pneumococcal Conjugate 13-Valent 11/16/2016    Pneumococcal Polysaccharide PPV23 04/29/1991, 11/03/2014    Td (adult), adsorbed 05/28/2004    Zoster 07/20/2011       Patient Care Team:  Manish Atkinson MD as PCP - General (Family Medicine)  Vannessa Navarro MD    Medicare Screening Tests and Risk Assessments:      Health Risk Assessment:  Patient rates overall health as good  Patient feels that their physical health rating is Same  Eyesight was rated as Same  Hearing was rated as Same  Patient feels that their emotional and mental health rating is Same  Pain experienced by patient in the last 7 days has been None  Patient states that she has experienced no weight loss or gain in last 6 months  Emotional/Mental Health:  Patient has been feeling nervous/anxious  PHQ-9 Depression Screening:    Frequency of the following problems over the past two weeks:      1  Little interest or pleasure in doing things: 0 - not at all      2   Feeling down, depressed, or hopeless: 0 - not at all  PHQ-2 Score: 0          Broken Bones/Falls: Fall Risk Assessment:    In the past year, patient has experienced: History of falling in past year  Patient does not feel she is unsteady standing  Patient is not taking medication that can cause feelings of lightheadedness or tiredness  Patient often has a need to rush to the toilet  Bladder/Bowel:  Patient has leaked urine accidently in the last six months  Patient reports loss of bowel control  Immunizations:  Patient has had a flu vaccination within the last year  Patient has received a pneumonia shot  Patient has not received a shingles shot  Patient has received tetanus/diphtheria shot  Home Safety:  Patient does not have trouble with stairs inside or outside of their home  Patient currently reports that there are no safety hazards present in home, working smoke alarms, working carbon monoxide detectors  Nutrition:  Current diet: Regular with servings of the following:    Medications:  Patient is currently taking over-the-counter supplements  Patient is able to manage medications  Lifestyle Choices:  Patient reports no tobacco use  Patient has smoked or used tobacco in the past   Patient has stopped her tobacco use  Patient reports no alcohol use  Patient does not drive a vehicle  Patient wears seat belt  Current level of exercise of physical activity described by patient as: little  Activities of Daily Living:  Can get out of bed by his or her self, able to dress self, unable to make own meals, unable to do own shopping, able to bathe self, can do own laundry/housekeeping, can manage own money, pay bills and track expenses    Previous Hospitalizations:  No hospitalization or ED visit in past 12 months        Advanced Directives:  Patient has decided on a power of   Patient has spoken to designated power of   Patient has completed advanced directive          Preventative Screening/Counseling:      Cardiovascular:      General: Screening Current      Counseling: Healthy Diet and Healthy Weight     Due for Labs/Analytes/Optional EKG: Lipid Panel          Diabetes:      General: Screening Current      Counseling: Healthy Diet and Healthy Weight      Due for labs: Blood Glucose          Colorectal Cancer:      General: Screening Not Indicated          Breast Cancer:      General: Screening Not Indicated          Cervical Cancer:      General: Screening Not Indicated          Osteoporosis:      General: Risks and Benefits Discussed      Counseling: Calcium and Vitamin D Intake and Regular Weightbearing Exercise          AAA:      General: Screening Current          Glaucoma:      General: Risks and Benefits Discussed      Referrals: Ophthalmology          HIV:      General: Screening Not Indicated          Hepatitis C:      General: Screening Not Indicated        Advanced Directives:   Patient has living will for healthcare, does not have durable POA for healthcare, patient does not have an advanced directive  5 wishes given  Immunizations:      Influenza: Influenza UTD This Year      Pneumococcal: Lifetime Vaccine Completed      TDAP: Risks & Benefits Discussed      Other Preventative Counseling (Non-Medicare):   Fall Prevention, Helmet Safety, Increase physical activity, Nutrition Counseling, Car/seat belt/driving safety reviewed, Skin self-exam and Sunscreen use

## 2019-03-19 DIAGNOSIS — I50.33 ACUTE ON CHRONIC DIASTOLIC CONGESTIVE HEART FAILURE (HCC): ICD-10-CM

## 2019-03-19 DIAGNOSIS — F41.9 ANXIETY: ICD-10-CM

## 2019-03-19 RX ORDER — LORAZEPAM 1 MG/1
TABLET ORAL
Qty: 30 TABLET | Refills: 1 | OUTPATIENT
Start: 2019-03-19

## 2019-03-19 RX ORDER — FAMOTIDINE 20 MG/1
TABLET, FILM COATED ORAL
Qty: 90 TABLET | Refills: 0 | OUTPATIENT
Start: 2019-03-19

## 2019-03-19 RX ORDER — FAMOTIDINE 20 MG/1
20 TABLET, FILM COATED ORAL DAILY
Qty: 90 TABLET | Refills: 1 | Status: SHIPPED | OUTPATIENT
Start: 2019-03-19 | End: 2019-03-21 | Stop reason: SDUPTHER

## 2019-03-20 DIAGNOSIS — I50.33 ACUTE ON CHRONIC DIASTOLIC CONGESTIVE HEART FAILURE (HCC): ICD-10-CM

## 2019-03-20 RX ORDER — QUETIAPINE FUMARATE 25 MG/1
TABLET, FILM COATED ORAL
Qty: 45 TABLET | Refills: 1 | Status: SHIPPED | OUTPATIENT
Start: 2019-03-20 | End: 2019-01-01 | Stop reason: SDUPTHER

## 2019-03-21 DIAGNOSIS — F41.9 ANXIETY: ICD-10-CM

## 2019-03-21 DIAGNOSIS — I50.33 ACUTE ON CHRONIC DIASTOLIC CONGESTIVE HEART FAILURE (HCC): ICD-10-CM

## 2019-03-21 RX ORDER — LORAZEPAM 1 MG/1
0.5 TABLET ORAL 2 TIMES DAILY PRN
Qty: 60 TABLET | Refills: 2 | Status: SHIPPED | OUTPATIENT
Start: 2019-03-21 | End: 2019-07-09 | Stop reason: SDUPTHER

## 2019-03-21 RX ORDER — FAMOTIDINE 20 MG/1
20 TABLET, FILM COATED ORAL DAILY
Qty: 90 TABLET | Refills: 1 | Status: SHIPPED | OUTPATIENT
Start: 2019-03-21 | End: 2019-06-10 | Stop reason: SDUPTHER

## 2019-03-21 RX ORDER — FAMOTIDINE 20 MG/1
20 TABLET, FILM COATED ORAL DAILY
Qty: 90 TABLET | Refills: 1 | Status: SHIPPED | OUTPATIENT
Start: 2019-03-21 | End: 2019-03-21 | Stop reason: SDUPTHER

## 2019-03-21 RX ORDER — LORAZEPAM 1 MG/1
0.5 TABLET ORAL 2 TIMES DAILY PRN
Qty: 60 TABLET | Refills: 2 | Status: SHIPPED | OUTPATIENT
Start: 2019-03-21 | End: 2019-03-21 | Stop reason: SDUPTHER

## 2019-03-28 ENCOUNTER — APPOINTMENT (OUTPATIENT)
Dept: LAB | Facility: CLINIC | Age: 84
End: 2019-03-28
Payer: MEDICARE

## 2019-03-28 DIAGNOSIS — E11.9 TYPE 2 DIABETES MELLITUS WITHOUT COMPLICATION, WITHOUT LONG-TERM CURRENT USE OF INSULIN (HCC): ICD-10-CM

## 2019-03-28 LAB
ALBUMIN SERPL BCP-MCNC: 4.3 G/DL (ref 3.5–5)
ALP SERPL-CCNC: 98 U/L (ref 46–116)
ALT SERPL W P-5'-P-CCNC: 23 U/L (ref 12–78)
ANION GAP SERPL CALCULATED.3IONS-SCNC: 8 MMOL/L (ref 4–13)
AST SERPL W P-5'-P-CCNC: 21 U/L (ref 5–45)
BACTERIA UR QL AUTO: ABNORMAL /HPF
BILIRUB SERPL-MCNC: 0.81 MG/DL (ref 0.2–1)
BILIRUB UR QL STRIP: NEGATIVE
BUN SERPL-MCNC: 66 MG/DL (ref 5–25)
CALCIUM SERPL-MCNC: 9.7 MG/DL (ref 8.3–10.1)
CHLORIDE SERPL-SCNC: 106 MMOL/L (ref 100–108)
CHOLEST SERPL-MCNC: 145 MG/DL (ref 50–200)
CLARITY UR: ABNORMAL
CO2 SERPL-SCNC: 22 MMOL/L (ref 21–32)
COLOR UR: YELLOW
CREAT SERPL-MCNC: 2.21 MG/DL (ref 0.6–1.3)
CREAT UR-MCNC: 97.8 MG/DL
EST. AVERAGE GLUCOSE BLD GHB EST-MCNC: 140 MG/DL
GFR SERPL CREATININE-BSD FRML MDRD: 19 ML/MIN/1.73SQ M
GLUCOSE P FAST SERPL-MCNC: 162 MG/DL (ref 65–99)
GLUCOSE UR STRIP-MCNC: NEGATIVE MG/DL
HBA1C MFR BLD: 6.5 % (ref 4.2–6.3)
HDLC SERPL-MCNC: 53 MG/DL (ref 40–60)
HGB UR QL STRIP.AUTO: NEGATIVE
KETONES UR STRIP-MCNC: NEGATIVE MG/DL
LDLC SERPL CALC-MCNC: 63 MG/DL (ref 0–100)
LEUKOCYTE ESTERASE UR QL STRIP: ABNORMAL
MICROALBUMIN UR-MCNC: 46.2 MG/L (ref 0–20)
MICROALBUMIN/CREAT 24H UR: 47 MG/G CREATININE (ref 0–30)
NITRITE UR QL STRIP: NEGATIVE
NON-SQ EPI CELLS URNS QL MICRO: ABNORMAL /HPF
PH UR STRIP.AUTO: 6 [PH]
POTASSIUM SERPL-SCNC: 5 MMOL/L (ref 3.5–5.3)
PROT SERPL-MCNC: 8.3 G/DL (ref 6.4–8.2)
PROT UR STRIP-MCNC: NEGATIVE MG/DL
RBC #/AREA URNS AUTO: ABNORMAL /HPF
SODIUM SERPL-SCNC: 136 MMOL/L (ref 136–145)
SP GR UR STRIP.AUTO: 1.02 (ref 1–1.03)
TRIGL SERPL-MCNC: 144 MG/DL
UROBILINOGEN UR QL STRIP.AUTO: 0.2 E.U./DL
WBC #/AREA URNS AUTO: ABNORMAL /HPF

## 2019-03-28 PROCEDURE — 87086 URINE CULTURE/COLONY COUNT: CPT | Performed by: FAMILY MEDICINE

## 2019-03-28 PROCEDURE — 81001 URINALYSIS AUTO W/SCOPE: CPT | Performed by: FAMILY MEDICINE

## 2019-03-28 PROCEDURE — 80053 COMPREHEN METABOLIC PANEL: CPT

## 2019-03-28 PROCEDURE — 82043 UR ALBUMIN QUANTITATIVE: CPT | Performed by: FAMILY MEDICINE

## 2019-03-28 PROCEDURE — 82570 ASSAY OF URINE CREATININE: CPT | Performed by: FAMILY MEDICINE

## 2019-03-28 PROCEDURE — 80061 LIPID PANEL: CPT

## 2019-03-28 PROCEDURE — 83036 HEMOGLOBIN GLYCOSYLATED A1C: CPT

## 2019-03-28 PROCEDURE — 36415 COLL VENOUS BLD VENIPUNCTURE: CPT

## 2019-03-29 ENCOUNTER — TELEPHONE (OUTPATIENT)
Dept: FAMILY MEDICINE CLINIC | Facility: CLINIC | Age: 84
End: 2019-03-29

## 2019-03-29 DIAGNOSIS — N18.4 STAGE 4 CHRONIC KIDNEY DISEASE (HCC): Primary | ICD-10-CM

## 2019-03-29 LAB — BACTERIA UR CULT: NORMAL

## 2019-04-01 ENCOUNTER — TELEPHONE (OUTPATIENT)
Dept: FAMILY MEDICINE CLINIC | Facility: CLINIC | Age: 84
End: 2019-04-01

## 2019-04-01 ENCOUNTER — TELEPHONE (OUTPATIENT)
Dept: NEPHROLOGY | Facility: CLINIC | Age: 84
End: 2019-04-01

## 2019-04-01 DIAGNOSIS — R35.0 URINARY FREQUENCY: Primary | ICD-10-CM

## 2019-04-04 ENCOUNTER — APPOINTMENT (OUTPATIENT)
Dept: LAB | Facility: CLINIC | Age: 84
End: 2019-04-04
Payer: MEDICARE

## 2019-04-04 PROCEDURE — 87086 URINE CULTURE/COLONY COUNT: CPT | Performed by: FAMILY MEDICINE

## 2019-04-05 ENCOUNTER — TELEPHONE (OUTPATIENT)
Dept: FAMILY MEDICINE CLINIC | Facility: CLINIC | Age: 84
End: 2019-04-05

## 2019-04-05 LAB — BACTERIA UR CULT: NORMAL

## 2019-04-17 DIAGNOSIS — I48.21 PERMANENT ATRIAL FIBRILLATION (HCC): ICD-10-CM

## 2019-04-17 RX ORDER — DILTIAZEM HYDROCHLORIDE 240 MG/1
CAPSULE, COATED, EXTENDED RELEASE ORAL
Qty: 30 CAPSULE | Refills: 5 | Status: SHIPPED | OUTPATIENT
Start: 2019-04-17 | End: 2019-01-01 | Stop reason: SDUPTHER

## 2019-04-25 ENCOUNTER — TELEPHONE (OUTPATIENT)
Dept: NEPHROLOGY | Facility: CLINIC | Age: 84
End: 2019-04-25

## 2019-04-25 ENCOUNTER — CONSULT (OUTPATIENT)
Dept: NEPHROLOGY | Facility: CLINIC | Age: 84
End: 2019-04-25
Payer: MEDICARE

## 2019-04-25 VITALS
DIASTOLIC BLOOD PRESSURE: 64 MMHG | SYSTOLIC BLOOD PRESSURE: 128 MMHG | BODY MASS INDEX: 21.92 KG/M2 | WEIGHT: 119.13 LBS | HEART RATE: 86 BPM | RESPIRATION RATE: 16 BRPM | HEIGHT: 62 IN

## 2019-04-25 DIAGNOSIS — I10 ESSENTIAL HYPERTENSION: ICD-10-CM

## 2019-04-25 DIAGNOSIS — N17.9 ACUTE KIDNEY INJURY SUPERIMPOSED ON CKD (HCC): Primary | ICD-10-CM

## 2019-04-25 DIAGNOSIS — N18.4 STAGE 4 CHRONIC KIDNEY DISEASE (HCC): ICD-10-CM

## 2019-04-25 DIAGNOSIS — I63.511 ACUTE ISCHEMIC RIGHT MCA STROKE (HCC): ICD-10-CM

## 2019-04-25 DIAGNOSIS — E11.9 CONTROLLED TYPE 2 DIABETES MELLITUS WITHOUT COMPLICATION, WITHOUT LONG-TERM CURRENT USE OF INSULIN (HCC): ICD-10-CM

## 2019-04-25 DIAGNOSIS — I25.10 CORONARY ARTERY DISEASE INVOLVING NATIVE CORONARY ARTERY OF NATIVE HEART WITHOUT ANGINA PECTORIS: ICD-10-CM

## 2019-04-25 DIAGNOSIS — N18.9 ACUTE KIDNEY INJURY SUPERIMPOSED ON CKD (HCC): Primary | ICD-10-CM

## 2019-04-25 DIAGNOSIS — I48.21 PERMANENT ATRIAL FIBRILLATION (HCC): ICD-10-CM

## 2019-04-25 PROCEDURE — 99214 OFFICE O/P EST MOD 30 MIN: CPT | Performed by: INTERNAL MEDICINE

## 2019-04-26 ENCOUNTER — TELEPHONE (OUTPATIENT)
Dept: NEPHROLOGY | Facility: CLINIC | Age: 84
End: 2019-04-26

## 2019-04-29 ENCOUNTER — APPOINTMENT (OUTPATIENT)
Dept: LAB | Facility: CLINIC | Age: 84
End: 2019-04-29
Payer: MEDICARE

## 2019-04-29 DIAGNOSIS — N18.9 ACUTE KIDNEY INJURY SUPERIMPOSED ON CKD (HCC): ICD-10-CM

## 2019-04-29 DIAGNOSIS — N17.9 ACUTE KIDNEY INJURY SUPERIMPOSED ON CKD (HCC): ICD-10-CM

## 2019-04-29 LAB
ALBUMIN SERPL BCP-MCNC: 4.1 G/DL (ref 3.5–5)
ANION GAP SERPL CALCULATED.3IONS-SCNC: 8 MMOL/L (ref 4–13)
BACTERIA UR QL AUTO: ABNORMAL /HPF
BASOPHILS # BLD AUTO: 0.02 THOUSANDS/ΜL (ref 0–0.1)
BASOPHILS NFR BLD AUTO: 0 % (ref 0–1)
BILIRUB UR QL STRIP: NEGATIVE
BUN SERPL-MCNC: 39 MG/DL (ref 5–25)
C3 SERPL-MCNC: 94.3 MG/DL (ref 90–180)
C4 SERPL-MCNC: 26 MG/DL (ref 10–40)
CALCIUM SERPL-MCNC: 9.6 MG/DL (ref 8.3–10.1)
CHLORIDE SERPL-SCNC: 109 MMOL/L (ref 100–108)
CLARITY UR: ABNORMAL
CO2 SERPL-SCNC: 21 MMOL/L (ref 21–32)
COLOR UR: YELLOW
CREAT SERPL-MCNC: 2.16 MG/DL (ref 0.6–1.3)
CREAT UR-MCNC: 71.4 MG/DL
EOSINOPHIL # BLD AUTO: 0.07 THOUSAND/ΜL (ref 0–0.61)
EOSINOPHIL NFR BLD AUTO: 1 % (ref 0–6)
ERYTHROCYTE [DISTWIDTH] IN BLOOD BY AUTOMATED COUNT: 13.4 % (ref 11.6–15.1)
GFR SERPL CREATININE-BSD FRML MDRD: 19 ML/MIN/1.73SQ M
GLUCOSE P FAST SERPL-MCNC: 103 MG/DL (ref 65–99)
GLUCOSE UR STRIP-MCNC: NEGATIVE MG/DL
HCT VFR BLD AUTO: 38.9 % (ref 34.8–46.1)
HGB BLD-MCNC: 12.4 G/DL (ref 11.5–15.4)
HGB UR QL STRIP.AUTO: NEGATIVE
HYALINE CASTS #/AREA URNS LPF: ABNORMAL /LPF
IMM GRANULOCYTES # BLD AUTO: 0.01 THOUSAND/UL (ref 0–0.2)
IMM GRANULOCYTES NFR BLD AUTO: 0 % (ref 0–2)
KETONES UR STRIP-MCNC: NEGATIVE MG/DL
LEUKOCYTE ESTERASE UR QL STRIP: ABNORMAL
LYMPHOCYTES # BLD AUTO: 1.38 THOUSANDS/ΜL (ref 0.6–4.47)
LYMPHOCYTES NFR BLD AUTO: 25 % (ref 14–44)
MCH RBC QN AUTO: 30.5 PG (ref 26.8–34.3)
MCHC RBC AUTO-ENTMCNC: 31.9 G/DL (ref 31.4–37.4)
MCV RBC AUTO: 96 FL (ref 82–98)
MONOCYTES # BLD AUTO: 0.46 THOUSAND/ΜL (ref 0.17–1.22)
MONOCYTES NFR BLD AUTO: 8 % (ref 4–12)
NEUTROPHILS # BLD AUTO: 3.65 THOUSANDS/ΜL (ref 1.85–7.62)
NEUTS SEG NFR BLD AUTO: 66 % (ref 43–75)
NITRITE UR QL STRIP: NEGATIVE
NON-SQ EPI CELLS URNS QL MICRO: ABNORMAL /HPF
NRBC BLD AUTO-RTO: 0 /100 WBCS
PH UR STRIP.AUTO: 6 [PH]
PHOSPHATE SERPL-MCNC: 3.8 MG/DL (ref 2.3–4.1)
PLATELET # BLD AUTO: 109 THOUSANDS/UL (ref 149–390)
PMV BLD AUTO: 13 FL (ref 8.9–12.7)
POTASSIUM SERPL-SCNC: 4.7 MMOL/L (ref 3.5–5.3)
PROT UR STRIP-MCNC: NEGATIVE MG/DL
PROT UR-MCNC: <6 MG/DL
PROT/CREAT UR: <0.08 MG/G{CREAT} (ref 0–0.1)
RBC # BLD AUTO: 4.06 MILLION/UL (ref 3.81–5.12)
RBC #/AREA URNS AUTO: ABNORMAL /HPF
SODIUM SERPL-SCNC: 138 MMOL/L (ref 136–145)
SP GR UR STRIP.AUTO: 1.01 (ref 1–1.03)
UROBILINOGEN UR QL STRIP.AUTO: 0.2 E.U./DL
WBC # BLD AUTO: 5.59 THOUSAND/UL (ref 4.31–10.16)
WBC #/AREA URNS AUTO: ABNORMAL /HPF

## 2019-04-29 PROCEDURE — 80069 RENAL FUNCTION PANEL: CPT

## 2019-04-29 PROCEDURE — 36415 COLL VENOUS BLD VENIPUNCTURE: CPT

## 2019-04-29 PROCEDURE — 84156 ASSAY OF PROTEIN URINE: CPT

## 2019-04-29 PROCEDURE — 81001 URINALYSIS AUTO W/SCOPE: CPT

## 2019-04-29 PROCEDURE — 86038 ANTINUCLEAR ANTIBODIES: CPT

## 2019-04-29 PROCEDURE — 82570 ASSAY OF URINE CREATININE: CPT

## 2019-04-29 PROCEDURE — 84165 PROTEIN E-PHORESIS SERUM: CPT

## 2019-04-29 PROCEDURE — 84166 PROTEIN E-PHORESIS/URINE/CSF: CPT

## 2019-04-29 PROCEDURE — 86160 COMPLEMENT ANTIGEN: CPT

## 2019-04-29 PROCEDURE — 86039 ANTINUCLEAR ANTIBODIES (ANA): CPT

## 2019-04-29 PROCEDURE — 86255 FLUORESCENT ANTIBODY SCREEN: CPT

## 2019-04-29 PROCEDURE — 83520 IMMUNOASSAY QUANT NOS NONAB: CPT

## 2019-04-29 PROCEDURE — 84166 PROTEIN E-PHORESIS/URINE/CSF: CPT | Performed by: PATHOLOGY

## 2019-04-29 PROCEDURE — 84165 PROTEIN E-PHORESIS SERUM: CPT | Performed by: PATHOLOGY

## 2019-04-29 PROCEDURE — 85025 COMPLETE CBC W/AUTO DIFF WBC: CPT

## 2019-04-30 LAB
ALBUMIN SERPL ELPH-MCNC: 4.47 G/DL (ref 3.5–5)
ALBUMIN SERPL ELPH-MCNC: 55.9 % (ref 52–65)
ALBUMIN UR ELPH-MCNC: 100 %
ALPHA1 GLOB MFR UR ELPH: 0 %
ALPHA1 GLOB SERPL ELPH-MCNC: 0.35 G/DL (ref 0.1–0.4)
ALPHA1 GLOB SERPL ELPH-MCNC: 4.4 % (ref 2.5–5)
ALPHA2 GLOB MFR UR ELPH: 0 %
ALPHA2 GLOB SERPL ELPH-MCNC: 0.87 G/DL (ref 0.4–1.2)
ALPHA2 GLOB SERPL ELPH-MCNC: 10.9 % (ref 7–13)
B-GLOBULIN MFR UR ELPH: 0 %
BETA GLOB ABNORMAL SERPL ELPH-MCNC: 0.44 G/DL (ref 0.4–0.8)
BETA1 GLOB SERPL ELPH-MCNC: 5.5 % (ref 5–13)
BETA2 GLOB SERPL ELPH-MCNC: 5.9 % (ref 2–8)
BETA2+GAMMA GLOB SERPL ELPH-MCNC: 0.47 G/DL (ref 0.2–0.5)
GAMMA GLOB ABNORMAL SERPL ELPH-MCNC: 1.39 G/DL (ref 0.5–1.6)
GAMMA GLOB MFR UR ELPH: 0 %
GAMMA GLOB SERPL ELPH-MCNC: 17.4 % (ref 12–22)
IGG/ALB SER: 1.27 {RATIO} (ref 1.1–1.8)
PROT PATTERN SERPL ELPH-IMP: NORMAL
PROT PATTERN UR ELPH-IMP: NORMAL
PROT SERPL-MCNC: 8 G/DL (ref 6.4–8.2)
PROT UR-MCNC: 6 MG/DL

## 2019-05-01 LAB
ANA HOMOGEN SER QL IF: NORMAL
ANA HOMOGEN TITR SER: NORMAL {TITER}
GBM AB SER IA-ACNC: 5 UNITS (ref 0–20)
RYE IGE QN: POSITIVE

## 2019-05-02 ENCOUNTER — TELEPHONE (OUTPATIENT)
Dept: OTHER | Facility: HOSPITAL | Age: 84
End: 2019-05-02

## 2019-05-02 DIAGNOSIS — I50.43 ACUTE ON CHRONIC COMBINED SYSTOLIC AND DIASTOLIC CONGESTIVE HEART FAILURE (HCC): Primary | ICD-10-CM

## 2019-05-02 DIAGNOSIS — N18.9 ACUTE KIDNEY INJURY SUPERIMPOSED ON CKD (HCC): ICD-10-CM

## 2019-05-02 DIAGNOSIS — N17.9 ACUTE KIDNEY INJURY SUPERIMPOSED ON CKD (HCC): ICD-10-CM

## 2019-05-02 LAB
C-ANCA TITR SER IF: NORMAL TITER
MYELOPEROXIDASE AB SER IA-ACNC: <9 U/ML (ref 0–9)
P-ANCA ATYPICAL TITR SER IF: NORMAL TITER
P-ANCA TITR SER IF: NORMAL TITER
PROTEINASE3 AB SER IA-ACNC: <3.5 U/ML (ref 0–3.5)

## 2019-05-09 LAB
LEFT EYE DIABETIC RETINOPATHY: NORMAL
RIGHT EYE DIABETIC RETINOPATHY: NORMAL

## 2019-05-15 DIAGNOSIS — I50.33 ACUTE ON CHRONIC DIASTOLIC CONGESTIVE HEART FAILURE (HCC): ICD-10-CM

## 2019-05-15 DIAGNOSIS — I50.43 ACUTE ON CHRONIC COMBINED SYSTOLIC AND DIASTOLIC CONGESTIVE HEART FAILURE (HCC): ICD-10-CM

## 2019-05-15 DIAGNOSIS — I48.91 ATRIAL FIBRILLATION WITH RAPID VENTRICULAR RESPONSE (HCC): ICD-10-CM

## 2019-05-15 DIAGNOSIS — E43 SEVERE PROTEIN-CALORIE MALNUTRITION (GOMEZ: LESS THAN 60% OF STANDARD WEIGHT) (HCC): Chronic | ICD-10-CM

## 2019-05-15 DIAGNOSIS — I48.91 ATRIAL FIBRILLATION WITH RVR (HCC): ICD-10-CM

## 2019-05-16 RX ORDER — APIXABAN 2.5 MG/1
TABLET, FILM COATED ORAL
Qty: 60 TABLET | Refills: 3 | Status: SHIPPED | OUTPATIENT
Start: 2019-05-16 | End: 2019-01-01 | Stop reason: SDUPTHER

## 2019-05-16 RX ORDER — SPIRONOLACTONE 25 MG/1
TABLET ORAL
Qty: 30 TABLET | Refills: 3 | Status: SHIPPED | OUTPATIENT
Start: 2019-05-16 | End: 2019-01-01 | Stop reason: SDUPTHER

## 2019-05-16 RX ORDER — FUROSEMIDE 40 MG/1
TABLET ORAL
Qty: 90 TABLET | Refills: 0 | Status: SHIPPED | OUTPATIENT
Start: 2019-05-16 | End: 2019-01-01 | Stop reason: SDUPTHER

## 2019-05-16 RX ORDER — METOPROLOL SUCCINATE 50 MG/1
TABLET, EXTENDED RELEASE ORAL
Qty: 30 TABLET | Refills: 3 | Status: SHIPPED | OUTPATIENT
Start: 2019-05-16 | End: 2019-01-01 | Stop reason: SDUPTHER

## 2019-05-21 ENCOUNTER — OFFICE VISIT (OUTPATIENT)
Dept: FAMILY MEDICINE CLINIC | Facility: CLINIC | Age: 84
End: 2019-05-21

## 2019-05-21 VITALS
HEIGHT: 62 IN | TEMPERATURE: 97.7 F | RESPIRATION RATE: 16 BRPM | OXYGEN SATURATION: 98 % | WEIGHT: 119 LBS | HEART RATE: 63 BPM | DIASTOLIC BLOOD PRESSURE: 60 MMHG | BODY MASS INDEX: 21.9 KG/M2 | SYSTOLIC BLOOD PRESSURE: 140 MMHG

## 2019-05-21 DIAGNOSIS — R30.0 BURNING WITH URINATION: Primary | ICD-10-CM

## 2019-05-21 PROBLEM — J96.21 ACUTE AND CHRONIC RESPIRATORY FAILURE WITH HYPOXIA (HCC): Status: ACTIVE | Noted: 2019-05-21

## 2019-05-21 LAB
SL AMB  POCT GLUCOSE, UA: NORMAL
SL AMB LEUKOCYTE ESTERASE,UA: NORMAL
SL AMB POCT BILIRUBIN,UA: NORMAL
SL AMB POCT BLOOD,UA: NORMAL
SL AMB POCT CLARITY,UA: NORMAL
SL AMB POCT COLOR,UA: YELLOW
SL AMB POCT KETONES,UA: NORMAL
SL AMB POCT NITRITE,UA: NORMAL
SL AMB POCT PH,UA: NORMAL
SL AMB POCT SPECIFIC GRAVITY,UA: 1.02
SL AMB POCT URINE PROTEIN: 15
SL AMB POCT UROBILINOGEN: 0.2

## 2019-05-21 PROCEDURE — 87186 SC STD MICRODIL/AGAR DIL: CPT | Performed by: NURSE PRACTITIONER

## 2019-05-21 PROCEDURE — 87077 CULTURE AEROBIC IDENTIFY: CPT | Performed by: NURSE PRACTITIONER

## 2019-05-21 PROCEDURE — 99213 OFFICE O/P EST LOW 20 MIN: CPT | Performed by: NURSE PRACTITIONER

## 2019-05-21 PROCEDURE — 87086 URINE CULTURE/COLONY COUNT: CPT | Performed by: NURSE PRACTITIONER

## 2019-05-21 PROCEDURE — 81002 URINALYSIS NONAUTO W/O SCOPE: CPT | Performed by: NURSE PRACTITIONER

## 2019-05-21 RX ORDER — CEFUROXIME AXETIL 500 MG/1
500 TABLET ORAL EVERY 12 HOURS SCHEDULED
Qty: 14 TABLET | Refills: 0 | Status: SHIPPED | OUTPATIENT
Start: 2019-05-21 | End: 2019-05-28

## 2019-05-24 ENCOUNTER — TELEPHONE (OUTPATIENT)
Dept: FAMILY MEDICINE CLINIC | Facility: CLINIC | Age: 84
End: 2019-05-24

## 2019-05-24 LAB — BACTERIA UR CULT: ABNORMAL

## 2019-05-28 ENCOUNTER — TELEPHONE (OUTPATIENT)
Dept: FAMILY MEDICINE CLINIC | Facility: CLINIC | Age: 84
End: 2019-05-28

## 2019-05-28 DIAGNOSIS — N30.00 ACUTE CYSTITIS WITHOUT HEMATURIA: Primary | ICD-10-CM

## 2019-05-29 RX ORDER — SULFAMETHOXAZOLE AND TRIMETHOPRIM 800; 160 MG/1; MG/1
1 TABLET ORAL 2 TIMES DAILY
Qty: 10 TABLET | Refills: 0 | Status: SHIPPED | OUTPATIENT
Start: 2019-05-29 | End: 2019-06-03

## 2019-06-03 ENCOUNTER — TELEPHONE (OUTPATIENT)
Dept: NEPHROLOGY | Facility: CLINIC | Age: 84
End: 2019-06-03

## 2019-06-04 ENCOUNTER — APPOINTMENT (OUTPATIENT)
Dept: LAB | Facility: HOSPITAL | Age: 84
End: 2019-06-04
Attending: INTERNAL MEDICINE
Payer: MEDICARE

## 2019-06-04 DIAGNOSIS — N17.9 ACUTE KIDNEY INJURY SUPERIMPOSED ON CKD (HCC): ICD-10-CM

## 2019-06-04 DIAGNOSIS — N18.9 ACUTE KIDNEY INJURY SUPERIMPOSED ON CKD (HCC): ICD-10-CM

## 2019-06-04 LAB
ANION GAP SERPL CALCULATED.3IONS-SCNC: 14 MMOL/L (ref 4–13)
BACTERIA UR QL AUTO: ABNORMAL /HPF
BILIRUB UR QL STRIP: NEGATIVE
BUN SERPL-MCNC: 57 MG/DL (ref 5–25)
CALCIUM SERPL-MCNC: 10 MG/DL (ref 8.3–10.1)
CHLORIDE SERPL-SCNC: 107 MMOL/L (ref 100–108)
CLARITY UR: ABNORMAL
CO2 SERPL-SCNC: 19 MMOL/L (ref 21–32)
COLOR UR: YELLOW
CREAT SERPL-MCNC: 2.1 MG/DL (ref 0.6–1.3)
GFR SERPL CREATININE-BSD FRML MDRD: 20 ML/MIN/1.73SQ M
GLUCOSE P FAST SERPL-MCNC: 135 MG/DL (ref 65–99)
GLUCOSE UR STRIP-MCNC: NEGATIVE MG/DL
HGB UR QL STRIP.AUTO: ABNORMAL
KETONES UR STRIP-MCNC: NEGATIVE MG/DL
LEUKOCYTE ESTERASE UR QL STRIP: ABNORMAL
NITRITE UR QL STRIP: NEGATIVE
NON-SQ EPI CELLS URNS QL MICRO: ABNORMAL /HPF
PH UR STRIP.AUTO: 6 [PH]
POTASSIUM SERPL-SCNC: 5 MMOL/L (ref 3.5–5.3)
PROT UR STRIP-MCNC: NEGATIVE MG/DL
RBC #/AREA URNS AUTO: ABNORMAL /HPF
SODIUM SERPL-SCNC: 140 MMOL/L (ref 136–145)
SP GR UR STRIP.AUTO: 1.02 (ref 1–1.03)
UROBILINOGEN UR QL STRIP.AUTO: 0.2 E.U./DL
WBC #/AREA URNS AUTO: ABNORMAL /HPF

## 2019-06-04 PROCEDURE — 87205 SMEAR GRAM STAIN: CPT

## 2019-06-04 PROCEDURE — 81001 URINALYSIS AUTO W/SCOPE: CPT

## 2019-06-04 PROCEDURE — 36415 COLL VENOUS BLD VENIPUNCTURE: CPT

## 2019-06-04 PROCEDURE — 80048 BASIC METABOLIC PNL TOTAL CA: CPT

## 2019-06-05 LAB — EOSINOPHIL NFR URNS MANUAL: 0 %

## 2019-06-10 ENCOUNTER — OFFICE VISIT (OUTPATIENT)
Dept: CARDIOLOGY CLINIC | Facility: CLINIC | Age: 84
End: 2019-06-10
Payer: MEDICARE

## 2019-06-10 VITALS
WEIGHT: 119 LBS | HEIGHT: 62 IN | BODY MASS INDEX: 21.9 KG/M2 | DIASTOLIC BLOOD PRESSURE: 62 MMHG | HEART RATE: 73 BPM | SYSTOLIC BLOOD PRESSURE: 134 MMHG

## 2019-06-10 DIAGNOSIS — I10 ESSENTIAL (PRIMARY) HYPERTENSION: Primary | ICD-10-CM

## 2019-06-10 DIAGNOSIS — I35.0 NONRHEUMATIC AORTIC VALVE STENOSIS: ICD-10-CM

## 2019-06-10 DIAGNOSIS — E11.9 TYPE 2 DIABETES MELLITUS WITHOUT COMPLICATION, WITHOUT LONG-TERM CURRENT USE OF INSULIN (HCC): ICD-10-CM

## 2019-06-10 DIAGNOSIS — I50.33 ACUTE ON CHRONIC DIASTOLIC CONGESTIVE HEART FAILURE (HCC): ICD-10-CM

## 2019-06-10 DIAGNOSIS — Z95.1 S/P CABG (CORONARY ARTERY BYPASS GRAFT): ICD-10-CM

## 2019-06-10 DIAGNOSIS — E78.00 PURE HYPERCHOLESTEROLEMIA: ICD-10-CM

## 2019-06-10 DIAGNOSIS — I25.10 CORONARY ARTERIOSCLEROSIS: ICD-10-CM

## 2019-06-10 DIAGNOSIS — I48.91 ATRIAL FIBRILLATION, UNSPECIFIED TYPE (HCC): ICD-10-CM

## 2019-06-10 DIAGNOSIS — I25.5 ISCHEMIC CARDIOMYOPATHY: ICD-10-CM

## 2019-06-10 DIAGNOSIS — E78.5 HYPERLIPIDEMIA, UNSPECIFIED HYPERLIPIDEMIA TYPE: ICD-10-CM

## 2019-06-10 PROCEDURE — 99214 OFFICE O/P EST MOD 30 MIN: CPT | Performed by: INTERNAL MEDICINE

## 2019-06-10 PROCEDURE — 93000 ELECTROCARDIOGRAM COMPLETE: CPT | Performed by: INTERNAL MEDICINE

## 2019-06-10 RX ORDER — FAMOTIDINE 20 MG/1
TABLET, FILM COATED ORAL
Qty: 90 TABLET | Refills: 0 | Status: SHIPPED | OUTPATIENT
Start: 2019-06-10 | End: 2019-01-01 | Stop reason: SDUPTHER

## 2019-06-10 RX ORDER — ATORVASTATIN CALCIUM 20 MG/1
TABLET, FILM COATED ORAL
Qty: 90 TABLET | Refills: 0 | Status: SHIPPED | OUTPATIENT
Start: 2019-06-10 | End: 2019-01-01 | Stop reason: SDUPTHER

## 2019-06-10 RX ORDER — GLIMEPIRIDE 1 MG/1
TABLET ORAL
Qty: 90 TABLET | Refills: 0 | Status: SHIPPED | OUTPATIENT
Start: 2019-06-10 | End: 2019-01-01 | Stop reason: SDUPTHER

## 2019-06-13 DIAGNOSIS — I25.10 CORONARY ARTERY DISEASE INVOLVING NATIVE HEART WITHOUT ANGINA PECTORIS, UNSPECIFIED VESSEL OR LESION TYPE: ICD-10-CM

## 2019-06-13 DIAGNOSIS — I10 BENIGN ESSENTIAL HTN: ICD-10-CM

## 2019-06-14 RX ORDER — POTASSIUM CHLORIDE 750 MG/1
TABLET, EXTENDED RELEASE ORAL
Qty: 90 TABLET | Refills: 1 | Status: SHIPPED | OUTPATIENT
Start: 2019-06-14 | End: 2019-01-01 | Stop reason: SDUPTHER

## 2019-06-21 ENCOUNTER — TELEPHONE (OUTPATIENT)
Dept: NEPHROLOGY | Facility: CLINIC | Age: 84
End: 2019-06-21

## 2019-06-21 DIAGNOSIS — N17.9 ACUTE KIDNEY INJURY SUPERIMPOSED ON CKD (HCC): Primary | ICD-10-CM

## 2019-06-21 DIAGNOSIS — N18.9 ACUTE KIDNEY INJURY SUPERIMPOSED ON CKD (HCC): Primary | ICD-10-CM

## 2019-06-27 DIAGNOSIS — E11.9 CONTROLLED TYPE 2 DIABETES MELLITUS WITHOUT COMPLICATION, WITHOUT LONG-TERM CURRENT USE OF INSULIN (HCC): Primary | ICD-10-CM

## 2019-07-01 DIAGNOSIS — E11.9 CONTROLLED TYPE 2 DIABETES MELLITUS WITHOUT COMPLICATION, WITHOUT LONG-TERM CURRENT USE OF INSULIN (HCC): ICD-10-CM

## 2019-07-01 NOTE — TELEPHONE ENCOUNTER
[address] [facility] called stating they never received her test strips  Can you please send again

## 2019-07-09 DIAGNOSIS — F41.9 ANXIETY: ICD-10-CM

## 2019-07-09 RX ORDER — LORAZEPAM 1 MG/1
TABLET ORAL
Qty: 60 TABLET | Refills: 1 | Status: SHIPPED | OUTPATIENT
Start: 2019-07-09 | End: 2019-01-01 | Stop reason: SDUPTHER

## 2019-07-22 NOTE — TELEPHONE ENCOUNTER
Ree from [facility] investigated, no concerns when she made home visit, I told her I have no concerns

## 2019-08-01 NOTE — TELEPHONE ENCOUNTER
Amalia Bojorquez thinks she has a UTI  She has some burning  They do not have any way to come in   Please call her at [phone]

## 2019-08-21 NOTE — TELEPHONE ENCOUNTER
Unable to speak / leave a message  It was a recording saying that they're phone is not accepting our phone number  Trying to remind her of appt for 8/22

## 2019-08-22 PROBLEM — N18.4 STAGE 4 CHRONIC KIDNEY DISEASE (HCC): Status: ACTIVE | Noted: 2019-04-25

## 2019-08-22 NOTE — PROGRESS NOTES
OFFICE FOLLOW UP - Nephrology   Nelda Styles 80 y o  female MRN: [id number]       ASSESSMENT and PLAN:  Harsh Aquino was seen today for chronic kidney disease and follow-up  Diagnoses and all orders for this visit:    Stage 4 chronic kidney disease ([address] )  -     Renal function panel; Future  -     PTH, intact; Future  -     Protein / creatinine ratio, urine; Future    Essential hypertension    Coronary artery disease involving native coronary artery of native heart without angina pectoris    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 failure [facility])        This is a 80-year-old lady who was initially seen on April of this year for evaluation of worsening kidney function, after initial visit serologies were ordered and were unremarkable, patient today returns for 8 months follow-up  1  Chronic kidney disease stage 4, noted her creatinine is starting to increase since June of last year to 1 8, most recent creatinine 2 4  Previous serologies unremarkable with serum and urine electrophoresis did not show any monoclonal bands, complements were normal, ANCA panel and anti-GBM were negative  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, on top of an underlying component of hypertensive nephropathy and atherosclerotic disease  Once again discussed with patient's son Eitan Lan and her daughter-in law regarding her is slowly progressing kidney disease  Currently patient is asymptomatic  We discussed in the past about options including kidney biopsy but considering her age with decide to continue only with conservative therapy  We discussed today again with patient regarding possibility of dialysis in the future, she states she does not want to, she understand that without dialysis she will die sooner  Five package wishes given to patient and discussed with her family to fill it out at home and we can discuss ACM next office visit    I would like to see her back in 3-4 months with repeat labs  Avoid NSAIDs  2  Chronic combined systolic and diastolic congestive heart failure, looks euvolemic on exam, no changes on her medications at this moment  3  Hypertension, blood pressure well controlled  4  Mineral bone disease, phosphorus and PTH acceptable  5  Mild anemia with a most recent hemoglobin of 11 3, noted that previous serum and urine electrophoresis did not show any monoclonal bands  Patient Instructions   I want you to have repeat blood and urine test in 3-4 months, I would like to see you after that  Follow a low-salt diet  Avoid NSAIDs (no ibuprofen, Motrin, Advil, Aleve, naproxen)  Okay to take Tylenol or paracetamol or acetaminophen as needed for pain  As we discussed during the office visit with your son and daughter-in-law, you state that you do not want dialysis in the future if needed  For now will just continue with close follow-up  I gave you the 5 wishes package, I want you to review it and feel it out at home, we can discuss further in your next office visit  HPI: Estefania De La Cruz is a 80 y o  female who is here for Chronic Kidney Disease and Follow-up    Patient initially seen on April of this year for evaluation of worsening kidney function  She have history of CKD stage IIIB/4 with progressively worsening renal function with serum creatinine up to 1 8 last year, most recent creatinine 2 4  After initial visit serologies were obtained was unremarkable  Patient today returns to the office for follow-up with her , her son Christi Castellanos as well as her daughter-in-law  Since our last visit, there has been no ER visits or hospitilizations  Patient currently has no complaints at this time and is feeling well  Patient denies any chest pain, shortness of breath and swelling  The last blood work was done on 08/08/2019, which we have reviewed together    Most recent serum creatinine 2 44 with an estimated GFR of 17  She denies any urinary problems  Denies any lower extremity edema  Appetite stable  Denies any NSAID use  ROS:   All the systems were reviewed and were negative except as documented on the HPI      Allergies: Heparin    Medications:   Current Outpatient Medications:     acetaminophen (TYLENOL) 325 mg tablet, Take 1 tablet (325 mg total) by mouth every 6 (six) hours as needed for mild pain, Disp: 30 tablet, Rfl: 0    aspirin 81 mg chewable tablet, Chew 1 tablet daily, Disp: , Rfl: 0    atorvastatin (LIPITOR) 20 mg tablet, ONE TABLET BY MOUTH DAILY WITH DINNER, Disp: 90 tablet, Rfl: 0    calcitonin, salmon, (MIACALCIN) 200 units/act nasal spray, USE 1 SPRAY IN ONE NOSTRIL DAILY--ALTERNATE NOSTRILS, Disp: 11 1 mL, Rfl: 0    diltiazem (CARDIZEM CD) 240 mg 24 hr capsule, ONE CAPSULE BY MOUTH DAILY, Disp: 30 capsule, Rfl: 5    ELIQUIS 2 5 MG, ONE TABLET BY MOUTH TWO TIMES DAILY, Disp: 60 tablet, Rfl: 3    famotidine (PEPCID) 20 mg tablet, ONE TABLET BY MOUTH DAILY, Disp: 90 tablet, Rfl: 0    furosemide (LASIX) 40 mg tablet, ONE TABLET BY MOUTH DAILY, Disp: 90 tablet, Rfl: 0    glimepiride (AMARYL) 1 mg tablet, ONE TABLET BY MOUTH DAILY WITH BREAKFAST, Disp: 90 tablet, Rfl: 0    glucose blood (FREESTYLE PRECISION ELIA TEST) test strip, Use as instructed to test sugar 2 times daily DX: E11 9, Disp: 200 each, Rfl: 3    glucose monitoring kit (FREESTYLE) monitoring kit, 1 each by Does not apply route daily, Disp: 1 each, Rfl: 0    lactulose 20 g/30 mL, Take 15 mL (10 g total) by mouth daily as needed (constipation), Disp: 1 Bottle, Rfl: 0    Lancets (FREESTYLE) lancets, Use as instructed, Disp: 100 each, Rfl: 2    LORazepam (ATIVAN) 1 mg tablet, ONE-HALF TABLET BY MOUTH TWO TIMES DAILY AS NEEDED FOR ANXIETY, Disp: 60 tablet, Rfl: 1    metoprolol succinate (TOPROL-XL) 50 mg 24 hr tablet, ONE TABLET BY MOUTH DAILY, Disp: 30 tablet, Rfl: 3    potassium chloride (K-DUR,KLOR-CON) 10 mEq tablet, ONE TABLET BY MOUTH DAILY, Disp: 90 tablet, Rfl: 1    QUEtiapine (SEROquel) 25 mg tablet, ONE-HALF TABLET (12 5MG) BY MOUTH DAILY AT BEDTIME, Disp: 45 tablet, Rfl: 1    senna-docusate sodium (SENOKOT S) 8 6-50 mg per tablet, Take 1 tablet by mouth daily at bedtime, Disp: 15 tablet, Rfl: 0    spironolactone (ALDACTONE) 25 mg tablet, ONE-HALF TABLET (12 5MG) BY MOUTH DAILY, Disp: 30 tablet, Rfl: 3    Past Medical History:   Diagnosis Date    Acute ischemic right MCA stroke (HCC)     Anticoagulant long-term use     last assessed: 8/17/17    Aortic stenosis     Atrial fibrillation (HCC)     Blurred vision     last assessed: 10/25/13    [facility]est[facility]     Chronic kidney disease     Coronary artery disease     Dementia     Diabetes mellitus ([facility] [address] )     Dysuria     last assessed: 8/3/15    Hyperlipidemia     Hypertension     Nonrheumatic aortic (valve) insufficiency     Old myocardial infarction     Rectal carcinoma (HCC)      Past Surgical History:   Procedure Laterality Date    BALLOON ANGIOPLASTY, ARTERY      CORONARY ARTERY BYPASS GRAFT      RECTAL SURGERY      TONSILLECTOMY       Family History   Problem Relation Age of Onset    Stroke Mother     Stroke Father     Diabetes Son     No Known Problems Daughter       reports that she quit smoking about 44 years ago  She has a 20 00 pack-year smoking history  She has never used smokeless tobacco  She reports that she does not drink alcohol or use drugs  Physical Exam:   Vitals:    08/22/19 1422 08/22/19 1502   BP:  128/68   BP Location:  Right arm   Patient Position:  Sitting   Pulse:  78   Resp: 16    Weight: 52 7 kg (116 lb 2 oz)    Height: 5' 2" (1 575 m)      Body mass index is 21 24 kg/m²      General: cooperative, in not acute distress  Eyes: conjunctivae pink, anicteric sclerae  ENT: lips and mucous membranes moist  Neck: supple, no JVD  Chest: clear breath sounds bilateral, no crackles, ronchus or wheezings  CVS: distinct S1 & S2, normal rate, regular rhythm  Abdomen: soft, non-tender, non-distended, normoactive bowel sounds  Back: no CVA tenderness  Extremities: no edema of both legs  Skin: no rash  Neuro: awake, alert, oriented      Lab Results:  Results for orders placed or performed in visit on 08/08/19   CBC   Result Value Ref Range    WBC 7 10 4 31 - 10 16 Thousand/uL    RBC 3 81 3 81 - 5 12 Million/uL    Hemoglobin 11 3 (L) 11 5 - 15 4 g/dL    Hematocrit 36 5 34 8 - 46 1 %    MCV 96 82 - 98 fL    MCH 29 7 26 8 - 34 3 pg    MCHC 31 0 (L) 31 4 - 37 4 g/dL    RDW 13 8 11 6 - 15 1 %    Platelets 100 (L) 453 - 390 Thousands/uL    MPV 12 8 (H) 8 9 - 12 7 fL   Renal function panel   Result Value Ref Range    Albumin 4 0 3 5 - 5 0 g/dL    Calcium 9 5 8 3 - 10 1 mg/dL    Phosphorus 4 3 (H) 2 3 - 4 1 mg/dL    Glucose 225 (H) 65 - 140 mg/dL    BUN 56 (H) 5 - 25 mg/dL    Creatinine 2 44 (H) 0 60 - 1 30 mg/dL    Sodium 136 136 - 145 mmol/L    Potassium 4 7 3 5 - 5 3 mmol/L    Chloride 107 100 - 108 mmol/L    CO2 23 21 - 32 mmol/L    ANION GAP 6 4 - 13 mmol/L    eGFR 17 ml/min/1 73sq m   PTH, intact   Result Value Ref Range     2 (H) 18 4 - 80 1 pg/mL   Urinalysis with microscopic   Result Value Ref Range    Clarity, UA Clear     Color, UA Yellow     Specific [place], UA 1 014 1 003 - 1 030    pH, UA 6 0 4 5, 5 0, 5 5, 6 0, 6 5, 7 0, 7 5, 8 0    Glucose, UA Negative Negative mg/dl    Ketones, UA Negative Negative mg/dl    Blood, UA Negative Negative    Protein, UA Negative Negative mg/dl    Nitrite, UA Negative Negative    Bilirubin, UA Negative Negative    Urobilinogen, UA 0 2 0 2, 1 0 E U /dl E U /dl    Leukocytes, UA Trace (A) Negative    WBC, UA 4-10 (A) None Seen, 0-5, 5-55, 5-65 /hpf    RBC, UA None Seen None Seen, 0-5 /hpf    Hyaline Casts, UA None Seen None Seen /lpf    Bacteria, UA None Seen None Seen, Occasional /hpf    Epithelial Cells None Seen None Seen, Occasional /hpf           Portions of the record may have been created with voice recognition software  Occasional wrong word or "sound a like" substitutions may have occurred due to the inherent limitations of voice recognition software  Read the chart carefully and recognize, using context, where substitutions have occurred  If you have any questions, please contact the dictating provider

## 2019-08-22 NOTE — PATIENT INSTRUCTIONS
I want you to have repeat blood and urine test in 3-4 months, I would like to see you after that  Follow a low-salt diet  Avoid NSAIDs (no ibuprofen, Motrin, Advil, Aleve, naproxen)  Okay to take Tylenol or paracetamol or acetaminophen as needed for pain  As we discussed during the office visit with your son and daughter-in-law, you state that you do not want dialysis in the future if needed  For now will just continue with close follow-up  I gave you the 5 wishes package, I want you to review it and feel it out at home, we can discuss further in your next office visit

## 2019-09-13 PROBLEM — E11.22 TYPE 2 DIABETES MELLITUS WITH CHRONIC KIDNEY DISEASE, WITHOUT LONG-TERM CURRENT USE OF INSULIN (HCC): Status: ACTIVE | Noted: 2017-08-03

## 2019-09-13 PROBLEM — I13.0 HYPERTENSIVE HEART AND KIDNEY DISEASE WITH HF AND WITH CKD STAGE I-IV (HCC): Status: ACTIVE | Noted: 2019-01-01

## 2019-09-18 PROBLEM — J96.21 ACUTE AND CHRONIC RESPIRATORY FAILURE WITH HYPOXIA (HCC): Status: RESOLVED | Noted: 2019-05-21 | Resolved: 2019-01-01

## 2019-09-18 PROBLEM — N18.4 TYPE 2 DIABETES MELLITUS WITH STAGE 4 CHRONIC KIDNEY DISEASE, WITHOUT LONG-TERM CURRENT USE OF INSULIN (HCC): Status: ACTIVE | Noted: 2017-08-03

## 2019-09-18 NOTE — ASSESSMENT & PLAN NOTE
Lab Results   Component Value Date    HGBA1C 6 5 (H) 03/28/2019   await lab,continues to have slow decline in kidney function    No results for input(s): POCGLU in the last 72 hours      Blood Sugar Average: Last 72 hrs:

## 2019-09-18 NOTE — ASSESSMENT & PLAN NOTE
Malnutrition Findings:           BMI Findings:      has lost 10 lb, encouraged to do small frequent meals  Body mass index is 20 3 kg/m²

## 2019-09-18 NOTE — ASSESSMENT & PLAN NOTE
Wt Readings from Last 3 Encounters:   09/18/19 50 3 kg (111 lb)   08/22/19 52 7 kg (116 lb 2 oz)   06/10/19 54 kg (119 lb)     Cards appt 11/19

## 2019-09-18 NOTE — PROGRESS NOTES
Assessment and Plan:    Problem List Items Addressed This Visit        Endocrine    Type 2 diabetes mellitus with stage 4 chronic kidney disease, without long-term current use of insulin ([facility] [address] ) - Primary     Lab Results   Component Value Date    HGBA1C 6 5 (H) 03/28/2019   await lab,continues to have slow decline in kidney function    No results for input(s): POCGLU in the last 72 hours  Blood Sugar Average: Last 72 hrs: Other    Mixed hyperlipidemia     Await lab           Other Visit Diagnoses     Anxiety                     Diagnoses and all orders for this visit:    Type 2 diabetes mellitus with stage 4 chronic kidney disease, without long-term current use of insulin ([facility])    Anxiety    Mixed hyperlipidemia              Subjective:      Patient ID: Keri Brown is a 80 y o  female  CC:    Chief Complaint   Patient presents with    Follow-up     patient is here for a 6 month f/u re: chronic medical conditions  ak       HPI:    Diabetes   She presents for her follow-up diabetic visit  She has type 2 diabetes mellitus  No MedicAlert identification noted  Her disease course has been stable  Pertinent negatives for hypoglycemia include no dizziness or headaches  Pertinent negatives for diabetes include no blurred vision and no chest pain  Symptoms are stable  The following portions of the patient's history were reviewed and updated as appropriate: allergies, current medications, past family history, past medical history, past social history, past surgical history and problem list         Review of Systems   Constitutional: Positive for appetite change and unexpected weight change  10 lb weight loss   Eyes: Negative for blurred vision  Respiratory: Negative for shortness of breath  Cardiovascular: Negative for chest pain  Gastrointestinal: Positive for diarrhea  Musculoskeletal: Positive for back pain  Neurological: Negative for dizziness and headaches           Data to review:       Objective:    Vitals:    09/18/19 1222   BP: 142/58   Pulse: 84   Weight: 50 3 kg (111 lb)   Height: 5' 2" (1 575 m)        Physical Exam   Constitutional: She appears well-developed and well-nourished  Neck: No thyromegaly present  Cardiovascular: Normal rate, regular rhythm and normal heart sounds  Pulmonary/Chest: Effort normal and breath sounds normal    Musculoskeletal: She exhibits no edema  Lymphadenopathy:     She has no cervical adenopathy  Vitals reviewed

## 2019-11-06 NOTE — TELEPHONE ENCOUNTER
Patient's daughter states that she feels like her mother has a UTI and would like to know if Dr Tricia Brewer would prescribe an antibiotic for her  Also, they are revising their will and the  would like a letter from Dr Tricia Brewer for her mother and father Mary Jane Garcia 06/12/1926 stating they are of sound mind  Daughter advised that both requests may require an office visit for evaluation   Please call daughter Frankie Dewitt [phone]

## 2019-11-11 PROBLEM — R19.7 DIARRHEA OF PRESUMED INFECTIOUS ORIGIN: Status: ACTIVE | Noted: 2019-01-01

## 2019-11-11 PROBLEM — N30.01 ACUTE CYSTITIS WITH HEMATURIA: Status: ACTIVE | Noted: 2019-01-01

## 2019-11-11 NOTE — ASSESSMENT & PLAN NOTE
Send urine for culture however nitrates are positive so will treat empirically with Cipro as Macrobid was intermediate at the last culture  Increase fluids  Info sheet given

## 2019-11-11 NOTE — PATIENT INSTRUCTIONS
Problem List Items Addressed This Visit        Digestive    Diarrhea of presumed infectious origin     Has had diarrhea for one week which is likely why pt has another UTI  Check stool studies  Call with results  Relevant Orders    Clostridium difficile toxin by PCR    H  pylori antigen, stool    Stool Enteric Bacterial Panel by PCR    Ova and parasite examination       Genitourinary    Acute cystitis with hematuria      Send urine for culture however nitrates are positive so will treat empirically with Cipro as Macrobid was intermediate at the last culture  Increase fluids  Info sheet given  Relevant Medications    ciprofloxacin (CIPRO) 250 mg tablet      Other Visit Diagnoses     Dysuria    -  Primary    Relevant Orders    Urine culture    POCT urine dip (Completed)        Urinary Tract Infection in Women   AMBULATORY CARE:   A urinary tract infection (UTI)  is caused by bacteria that get inside your urinary tract  Most bacteria that enter your urinary tract come out when you urinate  If the bacteria stay in your urinary tract, you may get an infection  Your urinary tract includes your kidneys, ureters, bladder, and urethra  Urine is made in your kidneys, and it flows from the ureters to the bladder  Urine leaves the bladder through the urethra  A UTI is more common in your lower urinary tract, which includes your bladder and urethra  Common symptoms include the following:   · Urinating more often or waking from sleep to urinate    · Pain or burning when you urinate    · Pain or pressure in your lower abdomen     · Urine that smells bad    · Blood in your urine    · Leaking urine  Seek care immediately if:   · You are urinating very little or not at all  · You have a high fever with shaking chills  · You have side or back pain that gets worse  Contact your healthcare provider if:   · You have a fever  · You do not feel better after 2 days of taking antibiotics      · You are vomiting  · You have questions or concerns about your condition or care  Treatment for a UTI  may include medicines to treat a bacterial infection  You may also need medicines to decrease pain and burning, or decrease the urge to urinate often  Prevent a UTI:   · Empty your bladder often  Urinate and empty your bladder as soon as you feel the need  Do not hold your urine for long periods of time  · Wipe from front to back after you urinate or have a bowel movement  This will help prevent germs from getting into your urinary tract through your urethra  · Drink liquids as directed  Ask how much liquid to drink each day and which liquids are best for you  You may need to drink more liquids than usual to help flush out the bacteria  Do not drink alcohol, caffeine, or citrus juices  These can irritate your bladder and increase your symptoms  Your healthcare provider may recommend cranberry juice to help prevent a UTI  · Urinate after you have sex  This can help flush out bacteria passed during sex  · Do not douche or use feminine deodorants  These can change the chemical balance in your vagina  · Change sanitary pads or tampons often  This will help prevent germs from getting into your urinary tract  · Do pelvic muscle exercises often  Pelvic muscle exercises may help you start and stop urinating  Strong pelvic muscles may help you empty your bladder easier  Squeeze these muscles tightly for 5 seconds like you are trying to hold back urine  Then relax for 5 seconds  Gradually work up to squeezing for 10 seconds  Do 3 sets of 15 repetitions a day, or as directed  Follow up with your healthcare provider as directed:  Write down your questions so you remember to ask them during your visits  © 2017 [employer] Information is for End User's use only and may not be sold, redistributed or otherwise used for commercial purposes   All illustrations and images included in CareNotes® are the copyrighted property of A D A M [employer] Inc  or [employer]  The above information is an  only  It is not intended as medical advice for individual conditions or treatments  Talk to your doctor, nurse or pharmacist before following any medical regimen to see if it is safe and effective for you

## 2019-11-11 NOTE — TELEPHONE ENCOUNTER
Patient's daughter would like us to call her if the antibiotic needs to be changed after the urine culture comes back  Please use the [phone] number

## 2019-11-11 NOTE — PROGRESS NOTES
Assessment and Plan:    Problem List Items Addressed This Visit        Digestive    Diarrhea of presumed infectious origin     Has had diarrhea for one week which is likely why pt has another UTI  Check stool studies  Call with results  Relevant Orders    Clostridium difficile toxin by PCR    H  pylori antigen, stool    Stool Enteric Bacterial Panel by PCR    Ova and parasite examination       Genitourinary    Acute cystitis with hematuria      Send urine for culture however nitrates are positive so will treat empirically with Cipro as Macrobid was intermediate at the last culture  Increase fluids  Info sheet given  Relevant Medications    ciprofloxacin (CIPRO) 250 mg tablet      Other Visit Diagnoses     Dysuria    -  Primary    Relevant Orders    Urine culture    POCT urine dip (Completed)                 Diagnoses and all orders for this visit:    Dysuria  -     Urine culture  -     POCT urine dip    Acute cystitis with hematuria  -     ciprofloxacin (CIPRO) 250 mg tablet; Take 1 tablet (250 mg total) by mouth every 12 (twelve) hours for 7 days    Diarrhea of presumed infectious origin  -     Clostridium difficile toxin by PCR; Future  -     H  pylori antigen, stool; Future  -     Stool Enteric Bacterial Panel by PCR; Future  -     Ova and parasite examination; Future              Subjective:      Patient ID: Zabrina De is a 80 y o  female  CC:    Chief Complaint   Patient presents with    Urinary Tract Infection     Pt also c/o low back pain and burning with urination  Also pt states she has ongoing diarrhea  kw       HPI:      Patient here today accompanied by her daughter with complaints of back discomfort and burning with urination for the past several days prior to the weekend  No fever nausea vomiting or discomfort over her bladder however she has been having diarrhea for 1 week as well    There confused about the patient's end-stage renal disease as the daughter was not able to go with the patient to the last appointment and the specialist told her mother that he was going to put some kind of opening in her back  When I went back to look over the nephrologists note basically he wanted to do a biopsy and the patient refused  The following portions of the patient's history were reviewed and updated as appropriate: allergies, current medications, past family history, past medical history, past social history, past surgical history and problem list       Review of Systems   Constitutional: Negative  HENT: Negative  Eyes: Negative  Respiratory: Negative  Cardiovascular: Negative  Gastrointestinal: Positive for diarrhea  Endocrine: Negative  Genitourinary: Positive for dysuria  Musculoskeletal: Positive for back pain  Skin: Negative  Allergic/Immunologic: Negative  Neurological: Negative  Hematological: Negative  Psychiatric/Behavioral: Negative  Data to review:       Objective:    Vitals:    11/11/19 1148   BP: 120/68   BP Location: Left arm   Patient Position: Sitting   Pulse: 84   Temp: 98 6 °F (37 °C)   TempSrc: Tympanic   Weight: 49 9 kg (109 lb 14 4 oz)   Height: 5' 2" (1 575 m)        Physical Exam   Constitutional: She is oriented to person, place, and time  She appears well-developed and well-nourished  No distress  HENT:   Head: Normocephalic and atraumatic  Eyes: Conjunctivae are normal  Right eye exhibits no discharge  Left eye exhibits no discharge  Neck: Neck supple  Carotid bruit is not present  Cardiovascular: Normal rate, regular rhythm and normal heart sounds  Exam reveals no gallop and no friction rub  No murmur heard  Pulmonary/Chest: Effort normal and breath sounds normal  No respiratory distress  She has no wheezes  She has no rales  Abdominal: Soft  Normal appearance   She exhibits no shifting dullness, no distension, no pulsatile liver, no fluid wave, no abdominal bruit, no ascites, no pulsatile midline mass and no mass  Bowel sounds are increased  There is no tenderness  There is no rigidity, no guarding and no CVA tenderness  Neurological: She is alert and oriented to person, place, and time  Skin: Skin is warm and dry  She is not diaphoretic  Psychiatric: She has a normal mood and affect  Judgment normal    Nursing note and vitals reviewed

## 2019-11-11 NOTE — ASSESSMENT & PLAN NOTE
Has had diarrhea for one week which is likely why pt has another UTI  Check stool studies  Call with results

## 2019-11-14 NOTE — TELEPHONE ENCOUNTER
Per results noted:   Notes recorded by Kirby Mg PA-C on 11/14/2019 at 10:46 AM EST  Pt was placed on Cipro which should work for this  No need to call pt      Left message for pt's daughter informing her cipro should work

## 2019-11-20 NOTE — DISCHARGE INSTRUCTIONS
Take acetaminophen, 650mg, every 6 hours as needed for discomfort  Speak with your family doctor, discuss possibly getting physical therapy to your house to help you keep your strength  Return to the ER if you are having difficulties at home, there may be other resources we can offer

## 2019-11-20 NOTE — ED PROVIDER NOTES
History  Chief Complaint   Patient presents with    Fall     Pt suffered mechanical fall and hit head over one week ago; Pt denies LOC; Pt c/o back pain where she fell; per Pt home med list Pt  takes Eliquis however Pt reports she is not taking med    Head Injury     79 YO female presents >1 week after a mechanical fall  Pt states she was outside, fell backwards landing on her buttocks and striking her occipital head  Pt denies LOC, she does take Eliquis  She denies headache, no confusion, no dizziness or vomiting  Pt states low back, tailbone pain that has persisted since the fall  States this was worse today  Pt states he is able to ambulate with a cane, son notes she is moving slower since the fall  Pt denies other pain or injuries  Pt denies CP/SOB/F/C/N/V/D/C, no dysuria, burning on urination or blood in urine  History provided by:  Patient   used: No    Fall   Mechanism of injury: fall    Injury location:  Head/neck and torso  Head/neck injury location:  Scalp  Torso injury location:  Back  Incident location:  Home  Time since incident:  7 days  Arrived directly from scene: no    Fall:     Fall occurred:  Walking    Impact surface:  [place]    Point of impact:  Head and buttocks  Suspicion of alcohol use: no    Suspicion of drug use: no    Prior to arrival data:     Blood loss:  None    Responsiveness at scene:  Alert    Orientation at scene:  Person, place, situation and time    Loss of consciousness: no      Amnesic to event: no    Associated symptoms: no abdominal pain, no chest pain and no vomiting        Prior to Admission Medications   Prescriptions Last Dose Informant Patient Reported? Taking?    ELIQUIS 2 5 MG   No No   Sig: ONE TABLET BY MOUTH TWO TIMES DAILY   LORazepam (ATIVAN) 1 mg tablet   No No   Sig: Take 0 5 tablets (0 5 mg total) by mouth daily at bedtime   LORazepam (ATIVAN) 1 mg tablet   No No   Sig: ONE-HALF TABLET BY MOUTH TWO TIMES DAILY AS NEEDED FOR ANXIETY Lancets (FREESTYLE) lancets  Self No No   Sig: Use as instructed   QUEtiapine (SEROquel) 25 mg tablet   No No   Sig: ONE-HALF TABLET (12 5MG) BY MOUTH AT BEDTIME   acetaminophen (TYLENOL) 325 mg tablet  Self No No   Sig: Take 1 tablet (325 mg total) by mouth every 6 (six) hours as needed for mild pain   aspirin 81 mg chewable tablet  Self No No   Sig: Chew 1 tablet daily   atorvastatin (LIPITOR) 20 mg tablet   No No   Sig: ONE TABLET BY MOUTH DAILY WITH DINNER   calcitonin, salmon, (MIACALCIN) 200 units/act nasal spray  Self No No   Sig: USE 1 SPRAY IN ONE NOSTRIL DAILY--ALTERNATE NOSTRILS   Patient not taking: Reported on 2019   diltiazem (CARDIZEM CD) 240 mg 24 hr capsule   No No   Sig: ONE CAPSULE BY MOUTH DAILY   famotidine (PEPCID) 20 mg tablet   No No   Sig: ONE TABLET BY MOUTH DAILY   furosemide (LASIX) 40 mg tablet   No No   Sig: ONE TABLET BY MOUTH DAILY   glimepiride (AMARYL) 1 mg tablet   No No   Sig: ONE TABLET BY MOUTH DAILY WITH BREAKFAST   glucose blood (FREESTYLE PRECISION ELIA TEST) test strip   No No   Sig: Use as instructed to test sugar 2 times daily DX: E11 9   glucose monitoring kit (FREESTYLE) monitoring kit  Self No No   Si each by Does not apply route daily   megestrol (MEGACE) 20 mg tablet   No No   Sig: Take 1 tablet (20 mg total) by mouth daily 1/2 tab po qday for weight gain   metoprolol succinate (TOPROL-XL) 50 mg 24 hr tablet   No No   Sig: ONE TABLET BY MOUTH DAILY   potassium chloride (K-DUR,KLOR-CON) 10 mEq tablet   No No   Sig: ONE TABLET BY MOUTH DAILY   potassium chloride (K-DUR,KLOR-CON) 10 mEq tablet   No No   Sig: ONE TABLET BY MOUTH DAILY   potassium chloride (K-DUR,KLOR-CON) 10 mEq tablet   No No   Sig: ONE TABLET BY MOUTH DAILY   spironolactone (ALDACTONE) 25 mg tablet  Self No No   Sig: ONE-HALF TABLET (12 5MG) BY MOUTH DAILY      Facility-Administered Medications: None       Past Medical History:   Diagnosis Date    Acute ischemic right MCA stroke ([facility] [address] )     Anticoagulant long-term use     last assessed: 17    Aortic stenosis     Atrial fibrillation (HCC)     Blurred vision     last assessed: 10/25/13    [facility]est[facility] failure[facility])     Chronic kidney disease     Coronary artery disease     Dementia ([facility] )     Diabetes mellitus ([facility] )     Dysuria     last assessed: 8/3/15    Hyperlipidemia     Hypertension     Nonrheumatic aortic (valve) insufficiency     Old myocardial infarction     Rectal carcinoma (HCC)        Past Surgical History:   Procedure Laterality Date    BALLOON ANGIOPLASTY, ARTERY      CORONARY ARTERY BYPASS GRAFT      RECTAL SURGERY      TONSILLECTOMY         Family History   Problem Relation Age of Onset    Stroke Mother     Stroke Father     Diabetes Son     No Known Problems Daughter      I have reviewed and agree with the history as documented  Social History     Tobacco Use    Smoking status: Former Smoker     Packs/day:      Years:      Pack years:      Last attempt to quit:      Years since quittin 9    Smokeless tobacco: Never Used    Tobacco comment: 1 pack a week; No secondhand smoke exposure   Substance Use Topics    Alcohol use: No    Drug use: No        Review of Systems   Constitutional: Negative for chills, fatigue and fever  HENT: Negative for dental problem  Eyes: Negative for visual disturbance  Respiratory: Negative for shortness of breath  Cardiovascular: Negative for chest pain  Gastrointestinal: Negative for abdominal pain, diarrhea and vomiting  Genitourinary: Negative for dysuria and frequency  Musculoskeletal: Negative for arthralgias  Skin: Negative for rash  Neurological: Negative for dizziness, weakness and light-headedness  Psychiatric/Behavioral: Negative for agitation, behavioral problems and confusion  All other systems reviewed and are negative        Physical Exam  Physical Exam   Constitutional: She is oriented to person, place, and time  She appears well-developed and well-nourished  HENT:   Head: Normocephalic and atraumatic  Eyes: Pupils are equal, round, and reactive to light  EOM are normal    Neck: Normal range of motion  Cardiovascular: Normal rate, regular rhythm and normal heart sounds  Pulmonary/Chest: Effort normal and breath sounds normal    Abdominal: Soft  Musculoskeletal: Normal range of motion  Neurological: She is alert and oriented to person, place, and time  Skin: Skin is warm and dry  Psychiatric: She has a normal mood and affect  Her behavior is normal  Thought content normal    Nursing note and vitals reviewed  Vital Signs  ED Triage Vitals   Temperature Pulse Respirations Blood Pressure SpO2   11/20/19 1502 11/20/19 1502 11/20/19 1502 11/20/19 1502 11/20/19 1502   98 3 °F (36 8 °C) (!) 118 18 134/62 99 %      Temp Source Heart Rate Source Patient Position - Orthostatic VS BP Location FiO2 (%)   11/20/19 1502 11/20/19 1502 11/20/19 1629 11/20/19 1629 --   Oral Monitor Lying Right arm       Pain Score       11/20/19 1502       3           Vitals:    11/20/19 1502 11/20/19 1629   BP: 134/62 149/63   Pulse: (!) 118 97   Patient Position - Orthostatic VS:  Lying         Visual Acuity      ED Medications  Medications   acetaminophen (TYLENOL) tablet 975 mg (975 mg Oral Given 11/20/19 1537)       Diagnostic Studies  Results Reviewed     None                 CT abdomen pelvis wo contrast   Final Result by Eddie Levine MD (11/20 1621)   1  Subtle cortical irregularity of a lower coccygeal segment suspicious for nondisplaced fracture  2  Age-indeterminate T11 compression fracture  3  Cholelithiasis without other evidence of acute cholecystitis  4  Bilateral renal cysts  5  Small hiatal hernia           Workstation performed: [id number]                    Procedures  Procedures       ED Course                               MDM  Number of Diagnoses or Management Options  Closed fracture of coccyx, initial encounter [facility]): new and requires workup  Fall, initial encounter: new and requires workup  Diagnosis management comments: 1  Fall - Fall was >1 week prior  Pt has been able to ambulate since this time, she complains of lower back and coccyx discomfort  Will CT A/P to assess significant spinal injury, possible coccyx Fx  Will give acetaminophen  Amount and/or Complexity of Data Reviewed  Tests in the radiology section of CPT®: ordered and reviewed  Obtain history from someone other than the patient: yes  Independent visualization of images, tracings, or specimens: yes    Patient Progress  Patient progress: stable      Disposition  Final diagnoses:   Fall, initial encounter   Closed fracture of coccyx, initial encounter [facility])     Time reflects when diagnosis was documented in both MDM as applicable and the Disposition within this note     Time User Action Codes Description Comment    11/20/2019  4:52 PM Lamonte Chauhan [Z77  LCBE] Fall, initial encounter     11/20/2019  4:52 PM Lamonte Chauhan Nathan Bigg  2XXA] Closed fracture of coccyx, initial encounter [facility])       ED Disposition     ED Disposition Condition Date/Time Comment    Discharge Stable Wed Nov 20, 2019  4:52 PM Monique Nicholson discharge to home/self care              Follow-up Information     Follow up With Specialties Details Why Contact Info    Jared Weeks MD Family Medicine Schedule an appointment as soon as possible for a visit   [address] [place] [address]  [phone]            Discharge Medication List as of 11/20/2019  4:53 PM      CONTINUE these medications which have NOT CHANGED    Details   acetaminophen (TYLENOL) 325 mg tablet Take 1 tablet (325 mg total) by mouth every 6 (six) hours as needed for mild pain, Starting Tue 5/8/2018, Normal      aspirin 81 mg chewable tablet Chew 1 tablet daily, Starting Mon 8/7/2017, No Print      atorvastatin (LIPITOR) 20 mg tablet ONE TABLET BY MOUTH DAILY WITH DINNER, Normal calcitonin, salmon, (MIACALCIN) 200 units/act nasal spray USE 1 SPRAY IN ONE NOSTRIL DAILY--ALTERNATE NOSTRILS, Normal      diltiazem (CARDIZEM CD) 240 mg 24 hr capsule ONE CAPSULE BY MOUTH DAILY, Normal      ELIQUIS 2 5 MG ONE TABLET BY MOUTH TWO TIMES DAILY, Normal      famotidine (PEPCID) 20 mg tablet ONE TABLET BY MOUTH DAILY, Normal      furosemide (LASIX) 40 mg tablet ONE TABLET BY MOUTH DAILY, Normal      glimepiride (AMARYL) 1 mg tablet ONE TABLET BY MOUTH DAILY WITH BREAKFAST, Normal      glucose blood (FREESTYLE PRECISION ELIA TEST) test strip Use as instructed to test sugar 2 times daily DX: E11 9, Normal      glucose monitoring kit (FREESTYLE) monitoring kit 1 each by Does not apply route daily, Starting Fri 6/1/2018, Normal      Lancets (FREESTYLE) lancets Use as instructed, Normal      !! LORazepam (ATIVAN) 1 mg tablet Take 0 5 tablets (0 5 mg total) by mouth daily at bedtime, Starting Wed 9/18/2019, No Print      !! LORazepam (ATIVAN) 1 mg tablet ONE-HALF TABLET BY MOUTH TWO TIMES DAILY AS NEEDED FOR ANXIETY, Normal      megestrol (MEGACE) 20 mg tablet Take 1 tablet (20 mg total) by mouth daily 1/2 tab po qday for weight gain, Starting Wed 9/18/2019, Normal      metoprolol succinate (TOPROL-XL) 50 mg 24 hr tablet ONE TABLET BY MOUTH DAILY, Normal      !! potassium chloride (K-DUR,KLOR-CON) 10 mEq tablet ONE TABLET BY MOUTH DAILY, Normal      !! potassium chloride (K-DUR,KLOR-CON) 10 mEq tablet ONE TABLET BY MOUTH DAILY, Normal      !! potassium chloride (K-DUR,KLOR-CON) 10 mEq tablet ONE TABLET BY MOUTH DAILY, Normal      QUEtiapine (SEROquel) 25 mg tablet ONE-HALF TABLET (12 5MG) BY MOUTH AT BEDTIME, Normal      spironolactone (ALDACTONE) 25 mg tablet ONE-HALF TABLET (12 5MG) BY MOUTH DAILY, Normal       !! - Potential duplicate medications found  Please discuss with provider  No discharge procedures on file      ED Provider  Electronically Signed by           Daniel Lozano MD  11/24/19 [address]

## 2019-11-20 NOTE — ED NOTES
Unable to verify home medications, patient reports taking "a blister pack", but does not know her medications or doses  Son at bedside states he believes patient was told that she could stop taking Eliquis, but unsure if she ever stopped        Emil Keen RN  11/20/19 5457

## 2019-11-22 NOTE — TELEPHONE ENCOUNTER
Recent blood test reviewed, renal function is stable and better with a creatinine down to 1 75 from 2 18 two months ago, noted persistent hyperchloremic acidosis with a serum bicarbonate of 15  PTH between normal limits, minimal proteinuria  I have called patient's daughter Magui Shane and left a message regarding recent blood test showing improvement of her kidney function but persistent acidosis  I would like to start sodium bicarbonate tablets 1 tablet twice a day (prescription was sent to her pharmacy)  Will discuss further in the upcoming visit that she have with me on 12/05  Please try to contact patient's daughter again to make sure she received my message    Thanks,      I cc note to patient's PCP to keep her updated

## 2019-11-25 NOTE — TELEPHONE ENCOUNTER
I returned call and spoke with patient's daughter-in-law Yanna Carrasco regarding recent blood test and my recommendations to start sodium bicarbonate tabs  Based on contact information in Epic the emergency contact is patient's daughter Reza Gillespie and she is the person I called last week and left a message  No further questions or comments from Yanna Carrasco after our conversation  I cc note to patient's PCP to keep her updated

## 2019-11-25 NOTE — TELEPHONE ENCOUNTER
Patient sees Carl Oro  Daughter in law Cristhian Liz who is  to the patients son Leonor Curry, is calling upset because the patients Daughter Madeline Seo was given information regarding the patients care and did not relay the proper information to the patient  Per Cristhian Liz the daughter should not be called regarding her care with the nephrologist team   Cristhian Liz will like a call back at [phone] to discuss the information that was given to the daughter on 11/22/19

## 2019-11-25 NOTE — TELEPHONE ENCOUNTER
Please see telephone encounter from today (11/25) - Dr Hermelinda Hicks spoke with Bruna's daughter in law regarding the next steps & medication changes

## 2019-11-29 NOTE — PROGRESS NOTES
Assessment and Plan:    Problem List Items Addressed This Visit     None                 There are no diagnoses linked to this encounter  Subjective:      Patient ID: Riri Anne is a 80 y o  female  CC:    Chief Complaint   Patient presents with    Follow-up     pt is here for follow up from the ER       HPI:    Here for ER f/u after fall-was picking up leaf, had fractured talibone and got bruise on head-scabbing over      The following portions of the patient's history were reviewed and updated as appropriate: allergies, current medications, past family history, past medical history, past social history, past surgical history and problem list         Review of Systems   Constitutional: Positive for activity change  Negative for appetite change and unexpected weight change  Staying inside after fall   Respiratory: Negative for shortness of breath  Cardiovascular: Negative for chest pain  Musculoskeletal: Positive for back pain  Neurological: Positive for headaches  Psychiatric/Behavioral: Negative for confusion  Data to review:       Objective:    Vitals:    11/29/19 1425   BP: 138/60   BP Location: Left arm   Patient Position: Sitting   Cuff Size: Adult   Pulse: 88   Resp: 18   Temp: 97 5 °F (36 4 °C)   TempSrc: Temporal   Weight: 48 6 kg (107 lb 3 2 oz)   Height: 5' 1 9" (1 572 m)        Physical Exam   Constitutional: She appears well-developed and well-nourished  Neck: No thyromegaly present  Cardiovascular: Normal rate, regular rhythm and normal heart sounds  Pulmonary/Chest: Effort normal and breath sounds normal    Musculoskeletal: She exhibits tenderness  She exhibits no edema  tailbone   Lymphadenopathy:     She has no cervical adenopathy  Skin:   Hematoma of scalp   Psychiatric: She has a normal mood and affect  Thought content normal    Vitals reviewed

## 2019-11-29 NOTE — LETTER
Dear Claudio Andersen:    I have evaluated Mrs Eduar Langston on 11/29/19  Evaluation of her memory shows adequate recall for place, person and recent events  Feel free to contact me to contact me with any questions  Sincerely,    JAVIER Rosas

## 2019-12-04 NOTE — TELEPHONE ENCOUNTER
A message has been left on son Khalif's voicemail to remind him of his mothers ACM meeting tomorrow at 1 pm  I also reminded pt to bring along the completed 5 wishes packet that was given to them at her last appointment

## 2019-12-05 PROBLEM — E87.2 METABOLIC ACIDOSIS: Status: ACTIVE | Noted: 2019-01-01

## 2019-12-05 NOTE — PATIENT INSTRUCTIONS
I would like to see her back in the office in 4 months with repeat blood test   Remember to follow low-salt diet as much as possible  Recommend to take Tylenol or paracetamol or acetaminophen as needed for pain  Remember to stay well-hydrated  Encourage oral intake

## 2019-12-05 NOTE — PROGRESS NOTES
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)    Stage 4 chronic kidney disease (HCC)    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 failure (HCC)    Benign essential HTN    Vascular dementia without behavioral disturbance (HCC)    Mixed hyperlipidemia    Metabolic acidosis        This is a 78-year-old lady who was initially seen on April of this year for evaluation of worsening kidney function, previous serologies unremarkable, patient today returns for 4 months follow-up  1  Chronic kidney disease stage 4  Previous serologies unremarkable with serum and urine electrophoresis without monoclonal bands, complements were normal, ANCA panel and anti-GBM were negative  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, on top of an underlying component of hypertensive nephropathy and atherosclerotic disease  Noted initially renal function was worsening, creatinine peak at 2 44 on 08/08/2019, since then her most recent creatinine went down to 1 75 on 11/21  Unfortunately suspected lower creatinine due to significant weight loss  Patient with poor appetite, she have lost 9 lb over the last 3 months  Discussed with patient's son day bit as well as her daughter-in-law regarding her underlying kidney disease  Once again discussed with patient, her , her son and her daughter-in-law, at this moment her kidney function is stable will continue with conservative management    Patient is not interested on dialysis in the future if needed  Patient's son Sonja Bob states that patient has a trust with a section related to her living will were she clearly stated that she does not want aggressive measures including dialysis when needed  Recommend to bring that documented to be copied and scanned into our system  I would like to see her back in 4 months with repeat labs  Avoid NSAIDs  2  Chronic combined systolic and diastolic congestive heart failure, looks euvolemic on exam, no changes on her medications at this moment  3  Hypertension, blood pressure well controlled  4  Mineral bone disease, phosphorus and PTH acceptable  5  Mild anemia with a most recent hemoglobin of 11 3 on 08/08/2018, noted that previous serum and urine electrophoresis did not show any monoclonal bands  Follow CBC in 4 months  6  Metabolic acidosis secondary to chronic kidney disease, patient was started on sodium bicarbonate, follow labs in 4 months  7  On and off diarrhea for over a month, patient saw her primary care doctor on 11/11, at that time his stool studies were ordered but was never done, patient today at the end of the visit has a incontinent loose stool episode  This note will be sent to patient's primary doctor for further follow-up  Patient Instructions   I would like to see her back in the office in 4 months with repeat blood test   Remember to follow low-salt diet as much as possible  Recommend to take Tylenol or paracetamol or acetaminophen as needed for pain  Remember to stay well-hydrated  Encourage oral intake  HPI: Cari Buck is a 80 y o  female who is here for Follow-up and Chronic Kidney Disease    Patient initially seen on April 2018 for evaluation of worsening kidney function  She have history of CKD stage IIIB/4 with progressively worsening renal function with serum creatinine 1 8 in 2018, creatinine increased 2 4 on 08/2019      After initial visit serologies were obtained was unremarkable  Patient today returns to the office for follow-up with her , her son Raisa Powers as well as her daughter-in-law Reg Nicholson  Last time seen was 08/22/2019  Since our last visit, she went to the ER 11/20 after a fall complicated with a closed fracture of coccyx  Patient lately having poor appetite, she lost 9 lb over the last 3 months  Patient denies any chest pain, shortness of breath and swelling  Denies any urinary problems  No lower extremity edema  Denies any abdominal pain, no nausea or vomiting  Reported also patient is having on and off episode diarrhea for over a month, initially says that her diarrhea is currently results for more than a week but at the end of this visit she had an incontinent loose stool episode  The last blood work was done on 11/21/2019, which we have reviewed together  Most recent serum creatinine 1 75 with an estimated GFR of 25, bicarbonate 15  No NSAID use  ROS: All the systems were reviewed and were negative except as documented on the H&P          Allergies: Heparin    Medications:   Current Outpatient Medications:     acetaminophen (TYLENOL) 325 mg tablet, Take 1 tablet (325 mg total) by mouth every 6 (six) hours as needed for mild pain, Disp: 30 tablet, Rfl: 0    aspirin 81 mg chewable tablet, Chew 1 tablet daily, Disp: , Rfl: 0    atorvastatin (LIPITOR) 20 mg tablet, ONE TABLET BY MOUTH DAILY WITH DINNER, Disp: 90 tablet, Rfl: 1    diltiazem (CARDIZEM CD) 240 mg 24 hr capsule, ONE CAPSULE BY MOUTH DAILY, Disp: 30 capsule, Rfl: 4    ELIQUIS 2 5 MG, ONE TABLET BY MOUTH TWO TIMES DAILY, Disp: 180 tablet, Rfl: 1    famotidine (PEPCID) 20 mg tablet, ONE TABLET BY MOUTH DAILY, Disp: 90 tablet, Rfl: 0    furosemide (LASIX) 40 mg tablet, ONE TABLET BY MOUTH DAILY, Disp: 90 tablet, Rfl: 1    glimepiride (AMARYL) 1 mg tablet, ONE TABLET BY MOUTH DAILY WITH BREAKFAST, Disp: 90 tablet, Rfl: 1    glucose blood (FREESTYLE PRECISION ELIA TEST) test strip, Use as instructed to test sugar 2 times daily DX: E11 9, Disp: 200 each, Rfl: 3    glucose monitoring kit (FREESTYLE) monitoring kit, 1 each by Does not apply route daily, Disp: 1 each, Rfl: 0    Lancets (FREESTYLE) lancets, Use as instructed, Disp: 100 each, Rfl: 2    LORazepam (ATIVAN) 1 mg tablet, Take 0 5 tablets (0 5 mg total) by mouth daily at bedtime, Disp: 30 tablet, Rfl: 1    LORazepam (ATIVAN) 1 mg tablet, ONE-HALF TABLET BY MOUTH TWO TIMES DAILY AS NEEDED FOR ANXIETY, Disp: 60 tablet, Rfl: 0    megestrol (MEGACE) 20 mg tablet, ONE-HALF TABLET BY MOUTH DAILY FOR WEIGHT GAIN, Disp: 45 tablet, Rfl: 1    metoprolol succinate (TOPROL-XL) 50 mg 24 hr tablet, ONE TABLET BY MOUTH DAILY, Disp: 90 tablet, Rfl: 1    potassium chloride (K-DUR,KLOR-CON) 10 mEq tablet, ONE TABLET BY MOUTH DAILY, Disp: 30 tablet, Rfl: 5    potassium chloride (K-DUR,KLOR-CON) 10 mEq tablet, ONE TABLET BY MOUTH DAILY, Disp: 30 tablet, Rfl: 0    QUEtiapine (SEROquel) 25 mg tablet, ONE-HALF TABLET (12 5MG) BY MOUTH AT BEDTIME, Disp: 45 tablet, Rfl: 0    sodium bicarbonate 650 mg tablet, Take 1 tablet (650 mg total) by mouth 2 (two) times a day, Disp: 60 tablet, Rfl: 5    spironolactone (ALDACTONE) 25 mg tablet, ONE-HALF TABLET (12 5MG) BY MOUTH DAILY, Disp: 30 tablet, Rfl: 3    calcitonin, salmon, (MIACALCIN) 200 units/act nasal spray, USE 1 SPRAY IN ONE NOSTRIL DAILY--ALTERNATE NOSTRILS (Patient not taking: Reported on 9/18/2019), Disp: 11 1 mL, Rfl: 0    Past Medical History:   Diagnosis Date    Acute ischemic right MCA stroke (HCC)     Anticoagulant long-term use     last assessed: 8/17/17    Aortic stenosis     Atrial fibrillation (HCC)     Blurred vision     last assessed: 10/25/13    [facility]     Chronic kidney disease     Coronary artery disease     Dementia ([facility] [address]     Diabetes mellitus ([facility] )     Dysuria     last assessed: 8/3/15    Hyperlipidemia     Hypertension     Nonrheumatic aortic (valve) insufficiency     Old myocardial infarction     Rectal carcinoma (HCC)      Past Surgical History:   Procedure Laterality Date    BALLOON ANGIOPLASTY, ARTERY      CORONARY ARTERY BYPASS GRAFT      RECTAL SURGERY      TONSILLECTOMY       Family History   Problem Relation Age of Onset    Stroke Mother     Stroke Father     Diabetes Son     No Known Problems Daughter       reports that she quit smoking about 44 years ago  She has a 20 00 pack-year smoking history  She has never used smokeless tobacco  She reports that she does not drink alcohol or use drugs  Physical Exam:   Vitals:    12/05/19 1256 12/05/19 1329   BP:  121/76   BP Location:  Left arm   Patient Position:  Sitting   Pulse:  74   Weight: 47 7 kg (105 lb 3 2 oz)    Height: 5' 1" (1 549 m)      Body mass index is 19 88 kg/m²      General:  Elderly and fragile woman, cooperative, in not acute distress  Eyes: conjunctivae pink, anicteric sclerae  ENT: lips and mucous membranes fairly moist  Neck: supple, no JVD  Chest: clear breath sounds bilateral, no crackles, ronchus or wheezings  CVS: distinct S1 & S2, normal rate, regular rhythm  Abdomen: soft, non-tender, non-distended, normoactive bowel sounds  Back: no CVA tenderness  Extremities: no edema of both legs  Skin: no rash  Neuro: awake, alert, interactive            Lab Results:  Results for orders placed or performed in visit on 11/21/19   Renal function panel   Result Value Ref Range    Albumin 4 2 3 5 - 5 0 g/dL    Calcium 9 8 8 3 - 10 1 mg/dL    Phosphorus 3 4 2 3 - 4 1 mg/dL    Glucose 204 (H) 65 - 140 mg/dL    BUN 52 (H) 5 - 25 mg/dL    Creatinine 1 75 (H) 0 60 - 1 30 mg/dL    Sodium 141 136 - 145 mmol/L    Potassium 4 3 3 5 - 5 3 mmol/L    Chloride 115 (H) 100 - 108 mmol/L    CO2 15 (L) 21 - 32 mmol/L    ANION GAP 11 4 - 13 mmol/L    eGFR 25 ml/min/1 73sq m   PTH, intact   Result Value Ref Range    PTH 77 1 18 4 - 80 1 pg/mL   Protein / creatinine ratio, urine   Result Value Ref Range    Creatinine, Ur 127 0 mg/dL    Protein Urine Random 33 mg/dL    Prot/Creat Ratio, Ur 0 26 (H) 0 00 - 0 10           Portions of the record may have been created with voice recognition software  Occasional wrong word or "sound a like" substitutions may have occurred due to the inherent limitations of voice recognition software  Read the chart carefully and recognize, using context, where substitutions have occurred  If you have any questions, please contact the dictating provider

## 2019-12-05 NOTE — TELEPHONE ENCOUNTER
Thank for documenting and contacting patient's PCP office    Agree that she needs further investigation for her on and off chronic diarrhea  Thanks,

## 2019-12-05 NOTE — TELEPHONE ENCOUNTER
Called PCP to notify them that the patient came to the office today with loose stool and incontinence  The stool had the "C  Diff" smell  Patient reported to Dr Meg Phelps and [place], [place] that she has had diarrhea for one month  She has been seen by her PCP recently for diarrhea and there are incomplete labs awaiting that were ordered by her PCP for stool evaluation  I spoke Stacylandon Watersel the nurse at Dr Corie Wynne office and she is going to reach out to the patient regarding outstanding stool samples  Thank you

## 2020-01-01 ENCOUNTER — HOSPITAL ENCOUNTER (INPATIENT)
Facility: HOSPITAL | Age: 85
LOS: 4 days | Discharge: NURSING, REHAB OR LONG-TERM CARE | DRG: 923 | End: 2020-03-03
Attending: EMERGENCY MEDICINE | Admitting: INTERNAL MEDICINE
Payer: MEDICARE

## 2020-01-01 ENCOUNTER — APPOINTMENT (EMERGENCY)
Dept: RADIOLOGY | Facility: HOSPITAL | Age: 85
End: 2020-01-01
Payer: MEDICARE

## 2020-01-01 ENCOUNTER — NURSING HOME VISIT (OUTPATIENT)
Dept: GERIATRICS | Facility: OTHER | Age: 85
End: 2020-01-01
Payer: MEDICARE

## 2020-01-01 ENCOUNTER — APPOINTMENT (OUTPATIENT)
Dept: LAB | Facility: CLINIC | Age: 85
End: 2020-01-01
Payer: MEDICARE

## 2020-01-01 ENCOUNTER — APPOINTMENT (OUTPATIENT)
Dept: LAB | Facility: CLINIC | Age: 85
DRG: 923 | End: 2020-01-01
Payer: MEDICARE

## 2020-01-01 ENCOUNTER — TELEPHONE (OUTPATIENT)
Dept: FAMILY MEDICINE CLINIC | Facility: CLINIC | Age: 85
End: 2020-01-01

## 2020-01-01 ENCOUNTER — TRANSCRIBE ORDERS (OUTPATIENT)
Dept: LAB | Facility: CLINIC | Age: 85
End: 2020-01-01

## 2020-01-01 ENCOUNTER — APPOINTMENT (EMERGENCY)
Dept: RADIOLOGY | Facility: HOSPITAL | Age: 85
DRG: 923 | End: 2020-01-01
Payer: MEDICARE

## 2020-01-01 ENCOUNTER — APPOINTMENT (EMERGENCY)
Dept: RADIOLOGY | Facility: HOSPITAL | Age: 85
DRG: 872 | End: 2020-01-01
Payer: MEDICARE

## 2020-01-01 ENCOUNTER — TELEPHONE (OUTPATIENT)
Dept: OTHER | Facility: OTHER | Age: 85
End: 2020-01-01

## 2020-01-01 ENCOUNTER — TRANSITIONAL CARE MANAGEMENT (OUTPATIENT)
Dept: FAMILY MEDICINE CLINIC | Facility: CLINIC | Age: 85
End: 2020-01-01

## 2020-01-01 ENCOUNTER — HOSPITAL ENCOUNTER (EMERGENCY)
Facility: HOSPITAL | Age: 85
Discharge: HOME | End: 2020-03-19
Attending: EMERGENCY MEDICINE
Payer: MEDICARE

## 2020-01-01 ENCOUNTER — PATIENT OUTREACH (OUTPATIENT)
Dept: CASE MANAGEMENT | Facility: OTHER | Age: 85
End: 2020-01-01

## 2020-01-01 ENCOUNTER — HOSPITAL ENCOUNTER (INPATIENT)
Facility: HOSPITAL | Age: 85
LOS: 1 days | DRG: 177 | End: 2020-07-14
Attending: EMERGENCY MEDICINE | Admitting: INTERNAL MEDICINE
Payer: MEDICARE

## 2020-01-01 ENCOUNTER — APPOINTMENT (INPATIENT)
Dept: NON INVASIVE DIAGNOSTICS | Facility: HOSPITAL | Age: 85
DRG: 923 | End: 2020-01-01
Payer: MEDICARE

## 2020-01-01 ENCOUNTER — TELEPHONE (OUTPATIENT)
Dept: NEPHROLOGY | Facility: CLINIC | Age: 85
End: 2020-01-01

## 2020-01-01 ENCOUNTER — CONSULT (OUTPATIENT)
Dept: UROLOGY | Facility: MEDICAL CENTER | Age: 85
End: 2020-01-01

## 2020-01-01 ENCOUNTER — APPOINTMENT (EMERGENCY)
Dept: RADIOLOGY | Facility: HOSPITAL | Age: 85
DRG: 177 | End: 2020-01-01
Payer: MEDICARE

## 2020-01-01 ENCOUNTER — APPOINTMENT (INPATIENT)
Dept: RADIOLOGY | Facility: HOSPITAL | Age: 85
DRG: 872 | End: 2020-01-01
Payer: MEDICARE

## 2020-01-01 ENCOUNTER — HOSPITAL ENCOUNTER (INPATIENT)
Facility: HOSPITAL | Age: 85
LOS: 3 days | Discharge: HOME HEALTH | DRG: 872 | End: 2020-05-21
Attending: EMERGENCY MEDICINE | Admitting: INTERNAL MEDICINE
Payer: MEDICARE

## 2020-01-01 ENCOUNTER — OFFICE VISIT (OUTPATIENT)
Dept: FAMILY MEDICINE CLINIC | Facility: CLINIC | Age: 85
End: 2020-01-01
Payer: MEDICARE

## 2020-01-01 ENCOUNTER — DOCUMENTATION (OUTPATIENT)
Dept: NEPHROLOGY | Facility: CLINIC | Age: 85
End: 2020-01-01

## 2020-01-01 VITALS
DIASTOLIC BLOOD PRESSURE: 60 MMHG | WEIGHT: 101 LBS | TEMPERATURE: 96.8 F | BODY MASS INDEX: 19.07 KG/M2 | SYSTOLIC BLOOD PRESSURE: 106 MMHG | HEIGHT: 61 IN | HEART RATE: 61 BPM

## 2020-01-01 VITALS
RESPIRATION RATE: 16 BRPM | HEART RATE: 60 BPM | WEIGHT: 100.31 LBS | BODY MASS INDEX: 18.95 KG/M2 | SYSTOLIC BLOOD PRESSURE: 141 MMHG | TEMPERATURE: 97.5 F | OXYGEN SATURATION: 96 % | DIASTOLIC BLOOD PRESSURE: 65 MMHG

## 2020-01-01 VITALS
TEMPERATURE: 98.8 F | BODY MASS INDEX: 18.88 KG/M2 | HEIGHT: 61 IN | HEART RATE: 78 BPM | OXYGEN SATURATION: 94 % | RESPIRATION RATE: 17 BRPM | WEIGHT: 100 LBS | DIASTOLIC BLOOD PRESSURE: 58 MMHG | SYSTOLIC BLOOD PRESSURE: 155 MMHG

## 2020-01-01 VITALS
HEART RATE: 83 BPM | SYSTOLIC BLOOD PRESSURE: 119 MMHG | TEMPERATURE: 98.7 F | WEIGHT: 100.4 LBS | DIASTOLIC BLOOD PRESSURE: 87 MMHG | OXYGEN SATURATION: 98 % | BODY MASS INDEX: 18.97 KG/M2 | RESPIRATION RATE: 16 BRPM

## 2020-01-01 VITALS
TEMPERATURE: 99 F | SYSTOLIC BLOOD PRESSURE: 150 MMHG | DIASTOLIC BLOOD PRESSURE: 70 MMHG | RESPIRATION RATE: 14 BRPM | HEART RATE: 70 BPM | BODY MASS INDEX: 19.81 KG/M2 | OXYGEN SATURATION: 99 % | WEIGHT: 104.94 LBS | HEIGHT: 61 IN

## 2020-01-01 VITALS
BODY MASS INDEX: 19.83 KG/M2 | HEART RATE: 84 BPM | DIASTOLIC BLOOD PRESSURE: 60 MMHG | HEIGHT: 61 IN | WEIGHT: 105 LBS | TEMPERATURE: 97.3 F | SYSTOLIC BLOOD PRESSURE: 136 MMHG

## 2020-01-01 VITALS
WEIGHT: 105 LBS | DIASTOLIC BLOOD PRESSURE: 82 MMHG | SYSTOLIC BLOOD PRESSURE: 122 MMHG | TEMPERATURE: 97.7 F | HEART RATE: 90 BPM | BODY MASS INDEX: 19.83 KG/M2 | OXYGEN SATURATION: 99 % | RESPIRATION RATE: 20 BRPM | HEIGHT: 61 IN

## 2020-01-01 DIAGNOSIS — I48.0 PAROXYSMAL A-FIB (HCC): ICD-10-CM

## 2020-01-01 DIAGNOSIS — I13.0 HYPERTENSIVE HEART AND KIDNEY DISEASE WITH HF AND WITH CKD STAGE I-IV (HCC): ICD-10-CM

## 2020-01-01 DIAGNOSIS — R77.8 ELEVATED TROPONIN I LEVEL: ICD-10-CM

## 2020-01-01 DIAGNOSIS — N18.4 STAGE 4 CHRONIC KIDNEY DISEASE (HCC): ICD-10-CM

## 2020-01-01 DIAGNOSIS — I50.9 CHF EXACERBATION (HCC): ICD-10-CM

## 2020-01-01 DIAGNOSIS — N39.0 RECURRENT UTI: ICD-10-CM

## 2020-01-01 DIAGNOSIS — E11.22 TYPE 2 DIABETES MELLITUS WITH STAGE 4 CHRONIC KIDNEY DISEASE, WITHOUT LONG-TERM CURRENT USE OF INSULIN (HCC): ICD-10-CM

## 2020-01-01 DIAGNOSIS — K59.00 CONSTIPATION: ICD-10-CM

## 2020-01-01 DIAGNOSIS — F01.51 VASCULAR DEMENTIA WITH BEHAVIOR DISTURBANCE (HCC): Primary | ICD-10-CM

## 2020-01-01 DIAGNOSIS — Z79.01 ON APIXABAN THERAPY: ICD-10-CM

## 2020-01-01 DIAGNOSIS — I50.33 ACUTE ON CHRONIC DIASTOLIC CONGESTIVE HEART FAILURE (HCC): ICD-10-CM

## 2020-01-01 DIAGNOSIS — E78.5 HYPERLIPIDEMIA, UNSPECIFIED HYPERLIPIDEMIA TYPE: ICD-10-CM

## 2020-01-01 DIAGNOSIS — N18.4 TYPE 2 DIABETES MELLITUS WITH STAGE 4 CHRONIC KIDNEY DISEASE, WITHOUT LONG-TERM CURRENT USE OF INSULIN (HCC): ICD-10-CM

## 2020-01-01 DIAGNOSIS — E43 SEVERE PROTEIN-CALORIE MALNUTRITION (GOMEZ: LESS THAN 60% OF STANDARD WEIGHT) (HCC): Chronic | ICD-10-CM
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
## 2020-01-01 DIAGNOSIS — E44.0 MODERATE PROTEIN-CALORIE MALNUTRITION (HCC): ICD-10-CM

## 2020-01-01 DIAGNOSIS — E87.2 METABOLIC ACIDOSIS WITH INCREASED ANION GAP AND ACCUMULATION OF ORGANIC ACIDS: ICD-10-CM

## 2020-01-01 DIAGNOSIS — I50.42 CHRONIC COMBINED SYSTOLIC AND DIASTOLIC CHF (CONGESTIVE HEART FAILURE) (HCC): ICD-10-CM

## 2020-01-01 DIAGNOSIS — I48.91 ATRIAL FIBRILLATION WITH RVR (HCC): ICD-10-CM

## 2020-01-01 DIAGNOSIS — R19.7 DIARRHEA OF PRESUMED INFECTIOUS ORIGIN: ICD-10-CM

## 2020-01-01 DIAGNOSIS — N17.9 AKI (ACUTE KIDNEY INJURY) (HCC): ICD-10-CM

## 2020-01-01 DIAGNOSIS — N39.0 RECURRENT UTI: Primary | ICD-10-CM

## 2020-01-01 DIAGNOSIS — Z71.89 COMPLEX CARE COORDINATION: Primary | ICD-10-CM

## 2020-01-01 DIAGNOSIS — I50.43 ACUTE ON CHRONIC COMBINED SYSTOLIC AND DIASTOLIC CONGESTIVE HEART FAILURE (HCC): ICD-10-CM

## 2020-01-01 DIAGNOSIS — N39.0 UTI (URINARY TRACT INFECTION): Primary | ICD-10-CM

## 2020-01-01 DIAGNOSIS — E11.9 TYPE 2 DIABETES MELLITUS WITHOUT COMPLICATION, WITHOUT LONG-TERM CURRENT USE OF INSULIN (HCC): ICD-10-CM

## 2020-01-01 DIAGNOSIS — R39.9 UTI SYMPTOMS: Primary | ICD-10-CM

## 2020-01-01 DIAGNOSIS — R30.0 DYSURIA: Primary | ICD-10-CM

## 2020-01-01 DIAGNOSIS — N39.0 UTI (URINARY TRACT INFECTION): ICD-10-CM

## 2020-01-01 DIAGNOSIS — R64 CACHEXIA (HCC): ICD-10-CM

## 2020-01-01 DIAGNOSIS — R26.2 AMBULATORY DYSFUNCTION: ICD-10-CM

## 2020-01-01 DIAGNOSIS — K08.89 TOOTHACHE: ICD-10-CM

## 2020-01-01 DIAGNOSIS — U07.1 COVID-19 VIRUS INFECTION: Primary | ICD-10-CM

## 2020-01-01 DIAGNOSIS — R74.01 TRANSAMINITIS: ICD-10-CM

## 2020-01-01 DIAGNOSIS — T68.XXXA HYPOTHERMIA DUE TO EXPOSURE: Primary | ICD-10-CM

## 2020-01-01 DIAGNOSIS — A41.9 SEPSIS (HCC): ICD-10-CM

## 2020-01-01 DIAGNOSIS — I48.21 PERMANENT ATRIAL FIBRILLATION (HCC): ICD-10-CM

## 2020-01-01 LAB
ALBUMIN SERPL BCP-MCNC: 2.8 G/DL (ref 3.5–5)
ALBUMIN SERPL BCP-MCNC: 3.2 G/DL (ref 3.5–5)
ALBUMIN SERPL BCP-MCNC: 3.2 G/DL (ref 3.5–5)
ALBUMIN SERPL BCP-MCNC: 3.5 G/DL (ref 3.5–5)
ALBUMIN SERPL BCP-MCNC: 3.7 G/DL (ref 3.5–5)
ALBUMIN SERPL BCP-MCNC: 4 G/DL (ref 3.5–5)
ALBUMIN SNV-MCNC: 3 G/DL
ALP SERPL-CCNC: 69 U/L (ref 46–116)
ALP SERPL-CCNC: 85 U/L (ref 46–116)
ALP SERPL-CCNC: 89 U/L (ref 46–116)
ALP SERPL-CCNC: 93 U/L (ref 46–116)
ALP SERPL-CCNC: 93 U/L (ref 46–116)
ALP SERPL-CCNC: 98 U/L (ref 46–116)
ALT SERPL W P-5'-P-CCNC: 107 U/L (ref 12–78)
ALT SERPL W P-5'-P-CCNC: 38 U/L (ref 12–78)
ALT SERPL W P-5'-P-CCNC: 42 U/L (ref 12–78)
ALT SERPL W P-5'-P-CCNC: 44 U/L (ref 12–78)
ALT SERPL W P-5'-P-CCNC: 74 U/L (ref 12–78)
ALT SERPL W P-5'-P-CCNC: 78 U/L (ref 12–78)
ANION GAP SERPL CALCULATED.3IONS-SCNC: 14 MMOL/L (ref 4–13)
ANION GAP SERPL CALCULATED.3IONS-SCNC: 5 MMOL/L (ref 4–13)
ANION GAP SERPL CALCULATED.3IONS-SCNC: 7 MMOL/L (ref 4–13)
ANION GAP SERPL CALCULATED.3IONS-SCNC: 9 MMOL/L (ref 4–13)
ANION GAP SERPL CALCULATED.3IONS-SCNC: 9 MMOL/L (ref 4–13)
APTT PPP: 34 SECONDS (ref 23–37)
AST SERPL W P-5'-P-CCNC: 119 U/L (ref 5–45)
AST SERPL W P-5'-P-CCNC: 24 U/L (ref 5–45)
AST SERPL W P-5'-P-CCNC: 29 U/L (ref 5–45)
AST SERPL W P-5'-P-CCNC: 30 U/L (ref 5–45)
AST SERPL W P-5'-P-CCNC: 59 U/L (ref 5–45)
AST SERPL W P-5'-P-CCNC: 61 U/L (ref 5–45)
ATRIAL RATE: 129 BPM
ATRIAL RATE: 133 BPM
ATRIAL RATE: 258 BPM
ATRIAL RATE: 89 BPM
ATRIAL RATE: 89 BPM
BACTERIA BLD CULT: NORMAL
BACTERIA BLD CULT: NORMAL
BACTERIA UR CULT: ABNORMAL
BACTERIA UR QL AUTO: ABNORMAL /HPF
BASE EX.OXY STD BLDV CALC-SCNC: 66 % (ref 60–80)
BASE EXCESS BLDV CALC-SCNC: -9.8 MMOL/L
BASOPHILS # BLD AUTO: 0.01 THOUSANDS/ΜL (ref 0–0.1)
BASOPHILS # BLD AUTO: 0.02 THOUSANDS/ΜL (ref 0–0.1)
BASOPHILS # BLD AUTO: 0.03 THOUSANDS/ΜL (ref 0–0.1)
BASOPHILS NFR BLD AUTO: 0 % (ref 0–1)
BILIRUB SERPL-MCNC: 0.88 MG/DL (ref 0.2–1)
BILIRUB SERPL-MCNC: 0.96 MG/DL (ref 0.2–1)
BILIRUB SERPL-MCNC: 0.98 MG/DL (ref 0.2–1)
BILIRUB SERPL-MCNC: 1.01 MG/DL (ref 0.2–1)
BILIRUB SERPL-MCNC: 1.07 MG/DL (ref 0.2–1)
BILIRUB SERPL-MCNC: 1.18 MG/DL (ref 0.2–1)
BILIRUB UR QL STRIP: NEGATIVE
BUN SERPL-MCNC: 22 MG/DL (ref 5–25)
BUN SERPL-MCNC: 26 MG/DL (ref 5–25)
BUN SERPL-MCNC: 30 MG/DL (ref 5–25)
BUN SERPL-MCNC: 31 MG/DL (ref 5–25)
BUN SERPL-MCNC: 33 MG/DL (ref 5–25)
BUN SERPL-MCNC: 37 MG/DL (ref 5–25)
BUN SERPL-MCNC: 37 MG/DL (ref 5–25)
BUN SERPL-MCNC: 43 MG/DL (ref 5–25)
BUN SERPL-MCNC: 44 MG/DL (ref 5–25)
BUN SERPL-MCNC: 50 MG/DL (ref 5–25)
CALCIUM SERPL-MCNC: 8.4 MG/DL (ref 8.3–10.1)
CALCIUM SERPL-MCNC: 8.8 MG/DL (ref 8.3–10.1)
CALCIUM SERPL-MCNC: 8.9 MG/DL (ref 8.3–10.1)
CALCIUM SERPL-MCNC: 9 MG/DL (ref 8.3–10.1)
CALCIUM SERPL-MCNC: 9 MG/DL (ref 8.3–10.1)
CALCIUM SERPL-MCNC: 9.2 MG/DL (ref 8.3–10.1)
CALCIUM SERPL-MCNC: 9.5 MG/DL (ref 8.3–10.1)
CALCIUM SERPL-MCNC: 9.7 MG/DL (ref 8.3–10.1)
CAMPYLOBACTER DNA SPEC NAA+PROBE: NORMAL
CHLORIDE SERPL-SCNC: 108 MMOL/L (ref 100–108)
CHLORIDE SERPL-SCNC: 108 MMOL/L (ref 100–108)
CHLORIDE SERPL-SCNC: 109 MMOL/L (ref 100–108)
CHLORIDE SERPL-SCNC: 111 MMOL/L (ref 100–108)
CHLORIDE SERPL-SCNC: 113 MMOL/L (ref 100–108)
CHLORIDE SERPL-SCNC: 113 MMOL/L (ref 100–108)
CHLORIDE SERPL-SCNC: 114 MMOL/L (ref 100–108)
CHLORIDE SERPL-SCNC: 114 MMOL/L (ref 100–108)
CK MB SERPL-MCNC: 1.1 % (ref 0–2.5)
CK MB SERPL-MCNC: 2.2 NG/ML (ref 0–5)
CK MB SERPL-MCNC: 3.4 NG/ML (ref 0–5)
CK MB SERPL-MCNC: <1 % (ref 0–2.5)
CK SERPL-CCNC: 317 U/L (ref 26–192)
CK SERPL-CCNC: 344 U/L (ref 26–192)
CLARITY UR: ABNORMAL
CLARITY UR: CLEAR
CLARITY UR: CLEAR
CLARITY, POC: NORMAL
CO2 SERPL-SCNC: 16 MMOL/L (ref 21–32)
CO2 SERPL-SCNC: 19 MMOL/L (ref 21–32)
CO2 SERPL-SCNC: 21 MMOL/L (ref 21–32)
CO2 SERPL-SCNC: 21 MMOL/L (ref 21–32)
CO2 SERPL-SCNC: 22 MMOL/L (ref 21–32)
CO2 SERPL-SCNC: 23 MMOL/L (ref 21–32)
CO2 SERPL-SCNC: 25 MMOL/L (ref 21–32)
CO2 SERPL-SCNC: 26 MMOL/L (ref 21–32)
CO2 SERPL-SCNC: 27 MMOL/L (ref 21–32)
CO2 SERPL-SCNC: 28 MMOL/L
COLOR UR: YELLOW
COLOR, POC: NORMAL
COLOR, POC: NORMAL
COLOR, POC: YELLOW
CORTIS SERPL-MCNC: 28.7 UG/DL
CREAT SERPL-MCNC: 1.06 MG/DL (ref 0.6–1.3)
CREAT SERPL-MCNC: 1.07 MG/DL (ref 0.6–1.3)
CREAT SERPL-MCNC: 1.16 MG/DL (ref 0.6–1.3)
CREAT SERPL-MCNC: 1.19 MG/DL (ref 0.6–1.3)
CREAT SERPL-MCNC: 1.27 MG/DL (ref 0.6–1.3)
CREAT SERPL-MCNC: 1.35 MG/DL (ref 0.6–1.3)
CREAT SERPL-MCNC: 1.38 MG/DL (ref 0.6–1.3)
CREAT SERPL-MCNC: 1.43 MG/DL (ref 0.6–1.3)
CREAT SERPL-MCNC: 1.56 MG/DL (ref 0.6–1.3)
CREAT SERPL-MCNC: 1.88 MG/DL (ref 0.6–1.3)
EOSINOPHIL # BLD AUTO: 0 THOUSAND/ΜL (ref 0–0.61)
EOSINOPHIL # BLD AUTO: 0.04 THOUSAND/ΜL (ref 0–0.61)
EOSINOPHIL # BLD AUTO: 0.06 THOUSAND/ΜL (ref 0–0.61)
EOSINOPHIL # BLD AUTO: 0.12 THOUSAND/ΜL (ref 0–0.61)
EOSINOPHIL # BLD AUTO: 0.21 THOUSAND/ΜL (ref 0–0.61)
EOSINOPHIL NFR BLD AUTO: 0 % (ref 0–6)
EOSINOPHIL NFR BLD AUTO: 1 % (ref 0–6)
EOSINOPHIL NFR BLD AUTO: 2 % (ref 0–6)
EOSINOPHIL NFR BLD AUTO: 2 % (ref 0–6)
ERYTHROCYTE [DISTWIDTH] IN BLOOD BY AUTOMATED COUNT: 13.2 % (ref 11.6–15.1)
ERYTHROCYTE [DISTWIDTH] IN BLOOD BY AUTOMATED COUNT: 13.3 % (ref 11.6–15.1)
ERYTHROCYTE [DISTWIDTH] IN BLOOD BY AUTOMATED COUNT: 14.1 % (ref 11.6–15.1)
ERYTHROCYTE [DISTWIDTH] IN BLOOD BY AUTOMATED COUNT: 14.5 % (ref 11.6–15.1)
ERYTHROCYTE [DISTWIDTH] IN BLOOD BY AUTOMATED COUNT: 14.5 % (ref 11.6–15.1)
ERYTHROCYTE [DISTWIDTH] IN BLOOD BY AUTOMATED COUNT: 14.6 % (ref 11.6–15.1)
ERYTHROCYTE [DISTWIDTH] IN BLOOD BY AUTOMATED COUNT: 16.8 % (ref 11.6–15.1)
EST. AVERAGE GLUCOSE BLD GHB EST-MCNC: 151 MG/DL
EXTERNAL PTH: 60.5
GFR SERPL CREATININE-BSD FRML MDRD: 23 ML/MIN/1.73SQ M
GFR SERPL CREATININE-BSD FRML MDRD: 28 ML/MIN/1.73SQ M
GFR SERPL CREATININE-BSD FRML MDRD: 32 ML/MIN/1.73SQ M
GFR SERPL CREATININE-BSD FRML MDRD: 33 ML/MIN/1.73SQ M
GFR SERPL CREATININE-BSD FRML MDRD: 34 ML/MIN/1.73SQ M
GFR SERPL CREATININE-BSD FRML MDRD: 36 ML/MIN/1.73SQ M
GFR SERPL CREATININE-BSD FRML MDRD: 41 ML/MIN/1.73SQ M
GFR SERPL CREATININE-BSD FRML MDRD: 45 ML/MIN/1.73SQ M
GFR SERPL CREATININE-BSD FRML MDRD: 46 ML/MIN/1.73SQ M
GLUCOSE SERPL-MCNC: 102 MG/DL (ref 65–140)
GLUCOSE SERPL-MCNC: 103 MG/DL (ref 65–140)
GLUCOSE SERPL-MCNC: 106 MG/DL (ref 65–140)
GLUCOSE SERPL-MCNC: 115 MG/DL (ref 65–140)
GLUCOSE SERPL-MCNC: 118 MG/DL (ref 65–140)
GLUCOSE SERPL-MCNC: 121 MG/DL (ref 65–140)
GLUCOSE SERPL-MCNC: 122 MG/DL (ref 65–140)
GLUCOSE SERPL-MCNC: 125 MG/DL (ref 65–140)
GLUCOSE SERPL-MCNC: 131 MG/DL (ref 65–140)
GLUCOSE SERPL-MCNC: 141 MG/DL (ref 65–140)
GLUCOSE SERPL-MCNC: 142 MG/DL (ref 65–140)
GLUCOSE SERPL-MCNC: 146 MG/DL (ref 65–140)
GLUCOSE SERPL-MCNC: 150 MG/DL (ref 65–140)
GLUCOSE SERPL-MCNC: 151 MG/DL (ref 65–140)
GLUCOSE SERPL-MCNC: 161 MG/DL (ref 65–140)
GLUCOSE SERPL-MCNC: 169 MG/DL (ref 65–140)
GLUCOSE SERPL-MCNC: 169 MG/DL (ref 65–140)
GLUCOSE SERPL-MCNC: 175 MG/DL (ref 65–140)
GLUCOSE SERPL-MCNC: 177 MG/DL (ref 65–140)
GLUCOSE SERPL-MCNC: 178 MG/DL (ref 65–140)
GLUCOSE SERPL-MCNC: 179 MG/DL (ref 65–140)
GLUCOSE SERPL-MCNC: 201 MG/DL (ref 65–140)
GLUCOSE SERPL-MCNC: 211 MG/DL (ref 65–140)
GLUCOSE SERPL-MCNC: 220 MG/DL (ref 65–140)
GLUCOSE SERPL-MCNC: 220 MG/DL (ref 65–140)
GLUCOSE SERPL-MCNC: 224 MG/DL (ref 65–140)
GLUCOSE SERPL-MCNC: 272 MG/DL (ref 65–140)
GLUCOSE SERPL-MCNC: 287 MG/DL (ref 65–140)
GLUCOSE SERPL-MCNC: 313 MG/DL (ref 65–140)
GLUCOSE SERPL-MCNC: 316 MG/DL (ref 65–140)
GLUCOSE SERPL-MCNC: 41 MG/DL (ref 65–140)
GLUCOSE SERPL-MCNC: 48 MG/DL (ref 65–140)
GLUCOSE SERPL-MCNC: 54 MG/DL (ref 65–140)
GLUCOSE SERPL-MCNC: 61 MG/DL
GLUCOSE SERPL-MCNC: 66 MG/DL (ref 65–140)
GLUCOSE SERPL-MCNC: 70 MG/DL (ref 65–140)
GLUCOSE SERPL-MCNC: 82 MG/DL (ref 65–140)
GLUCOSE SERPL-MCNC: 83 MG/DL (ref 65–140)
GLUCOSE SERPL-MCNC: 93 MG/DL (ref 65–140)
GLUCOSE SERPL-MCNC: 96 MG/DL (ref 65–140)
GLUCOSE UR STRIP-MCNC: ABNORMAL MG/DL
GLUCOSE UR STRIP-MCNC: NEGATIVE MG/DL
GLUCOSE UR STRIP-MCNC: NEGATIVE MG/DL
H PYLORI AG STL QL IA: NEGATIVE
HBA1C MFR BLD: 6.9 %
HCO3 BLDV-SCNC: 16.2 MMOL/L (ref 24–30)
HCT VFR BLD AUTO: 28.7 % (ref 34.8–46.1)
HCT VFR BLD AUTO: 30 % (ref 34.8–46.1)
HCT VFR BLD AUTO: 30.2 % (ref 34.8–46.1)
HCT VFR BLD AUTO: 32.9 % (ref 34.8–46.1)
HCT VFR BLD AUTO: 33 % (ref 34.8–46.1)
HCT VFR BLD AUTO: 33.5 % (ref 34.8–46.1)
HCT VFR BLD AUTO: 35.3 % (ref 34.8–46.1)
HCT VFR BLD AUTO: 35.7 % (ref 34.8–46.1)
HCT VFR BLD AUTO: 36.2 % (ref 34.8–46.1)
HCT VFR BLD AUTO: 37.2 % (ref 34.8–46.1)
HGB BLD-MCNC: 10.1 G/DL (ref 11.5–15.4)
HGB BLD-MCNC: 10.3 G/DL (ref 11.5–15.4)
HGB BLD-MCNC: 10.6 G/DL (ref 11.5–15.4)
HGB BLD-MCNC: 10.8 G/DL (ref 11.5–15.4)
HGB BLD-MCNC: 10.9 G/DL (ref 11.5–15.4)
HGB BLD-MCNC: 11.5 G/DL (ref 11.5–15.4)
HGB BLD-MCNC: 11.6 G/DL (ref 11.5–15.4)
HGB BLD-MCNC: 9 G/DL (ref 11.5–15.4)
HGB BLD-MCNC: 9.5 G/DL (ref 11.5–15.4)
HGB BLD-MCNC: 9.9 G/DL (ref 11.5–15.4)
HGB UR QL STRIP.AUTO: ABNORMAL
IMM GRANULOCYTES # BLD AUTO: 0.01 THOUSAND/UL (ref 0–0.2)
IMM GRANULOCYTES # BLD AUTO: 0.02 THOUSAND/UL (ref 0–0.2)
IMM GRANULOCYTES # BLD AUTO: 0.04 THOUSAND/UL (ref 0–0.2)
IMM GRANULOCYTES # BLD AUTO: 0.05 THOUSAND/UL (ref 0–0.2)
IMM GRANULOCYTES # BLD AUTO: 0.11 THOUSAND/UL (ref 0–0.2)
IMM GRANULOCYTES NFR BLD AUTO: 0 % (ref 0–2)
IMM GRANULOCYTES NFR BLD AUTO: 1 % (ref 0–2)
INR PPP: 1.58 (ref 0.84–1.19)
KETONES UR STRIP-MCNC: NEGATIVE MG/DL
LACTATE SERPL-SCNC: 1.9 MMOL/L (ref 0.5–2)
LEUKOCYTE ESTERASE UR QL STRIP: ABNORMAL
LYMPHOCYTES # BLD AUTO: 0.48 THOUSANDS/ΜL (ref 0.6–4.47)
LYMPHOCYTES # BLD AUTO: 0.52 THOUSANDS/ΜL (ref 0.6–4.47)
LYMPHOCYTES # BLD AUTO: 0.94 THOUSANDS/ΜL (ref 0.6–4.47)
LYMPHOCYTES # BLD AUTO: 1.1 THOUSANDS/ΜL (ref 0.6–4.47)
LYMPHOCYTES # BLD AUTO: 1.12 THOUSANDS/ΜL (ref 0.6–4.47)
LYMPHOCYTES # BLD AUTO: 1.25 THOUSANDS/ΜL (ref 0.6–4.47)
LYMPHOCYTES # BLD AUTO: 2.48 THOUSANDS/ΜL (ref 0.6–4.47)
LYMPHOCYTES NFR BLD AUTO: 11 % (ref 14–44)
LYMPHOCYTES NFR BLD AUTO: 12 % (ref 14–44)
LYMPHOCYTES NFR BLD AUTO: 19 % (ref 14–44)
LYMPHOCYTES NFR BLD AUTO: 27 % (ref 14–44)
LYMPHOCYTES NFR BLD AUTO: 5 % (ref 14–44)
LYMPHOCYTES NFR BLD AUTO: 7 % (ref 14–44)
LYMPHOCYTES NFR BLD AUTO: 9 % (ref 14–44)
MAGNESIUM SERPL-MCNC: 1.9 MG/DL (ref 1.6–2.6)
MAGNESIUM SERPL-MCNC: 2 MG/DL (ref 1.6–2.6)
MAGNESIUM SERPL-MCNC: 2 MG/DL (ref 1.6–2.6)
MCH RBC QN AUTO: 30 PG (ref 26.8–34.3)
MCH RBC QN AUTO: 30.1 PG (ref 26.8–34.3)
MCH RBC QN AUTO: 30.2 PG (ref 26.8–34.3)
MCH RBC QN AUTO: 30.3 PG (ref 26.8–34.3)
MCH RBC QN AUTO: 30.4 PG (ref 26.8–34.3)
MCH RBC QN AUTO: 30.4 PG (ref 26.8–34.3)
MCH RBC QN AUTO: 30.5 PG (ref 26.8–34.3)
MCH RBC QN AUTO: 30.5 PG (ref 26.8–34.3)
MCH RBC QN AUTO: 31.1 PG (ref 26.8–34.3)
MCHC RBC AUTO-ENTMCNC: 30.5 G/DL (ref 31.4–37.4)
MCHC RBC AUTO-ENTMCNC: 30.6 G/DL (ref 31.4–37.4)
MCHC RBC AUTO-ENTMCNC: 30.6 G/DL (ref 31.4–37.4)
MCHC RBC AUTO-ENTMCNC: 31.2 G/DL (ref 31.4–37.4)
MCHC RBC AUTO-ENTMCNC: 31.3 G/DL (ref 31.4–37.4)
MCHC RBC AUTO-ENTMCNC: 31.4 G/DL (ref 31.4–37.4)
MCHC RBC AUTO-ENTMCNC: 31.5 G/DL (ref 31.4–37.4)
MCHC RBC AUTO-ENTMCNC: 31.6 G/DL (ref 31.4–37.4)
MCHC RBC AUTO-ENTMCNC: 31.8 G/DL (ref 31.4–37.4)
MCV RBC AUTO: 96 FL (ref 82–98)
MCV RBC AUTO: 96 FL (ref 82–98)
MCV RBC AUTO: 97 FL (ref 82–98)
MCV RBC AUTO: 97 FL (ref 82–98)
MCV RBC AUTO: 98 FL (ref 82–98)
MCV RBC AUTO: 98 FL (ref 82–98)
MCV RBC AUTO: 99 FL (ref 82–98)
MONOCYTES # BLD AUTO: 0.27 THOUSAND/ΜL (ref 0.17–1.22)
MONOCYTES # BLD AUTO: 0.33 THOUSAND/ΜL (ref 0.17–1.22)
MONOCYTES # BLD AUTO: 0.34 THOUSAND/ΜL (ref 0.17–1.22)
MONOCYTES # BLD AUTO: 0.5 THOUSAND/ΜL (ref 0.17–1.22)
MONOCYTES # BLD AUTO: 0.51 THOUSAND/ΜL (ref 0.17–1.22)
MONOCYTES # BLD AUTO: 0.68 THOUSAND/ΜL (ref 0.17–1.22)
MONOCYTES # BLD AUTO: 1.02 THOUSAND/ΜL (ref 0.17–1.22)
MONOCYTES NFR BLD AUTO: 4 % (ref 4–12)
MONOCYTES NFR BLD AUTO: 4 % (ref 4–12)
MONOCYTES NFR BLD AUTO: 5 % (ref 4–12)
MONOCYTES NFR BLD AUTO: 6 % (ref 4–12)
MONOCYTES NFR BLD AUTO: 6 % (ref 4–12)
MONOCYTES NFR BLD AUTO: 7 % (ref 4–12)
MONOCYTES NFR BLD AUTO: 7 % (ref 4–12)
MRSA NOSE QL CULT: NORMAL
NEUTROPHILS # BLD AUTO: 11.67 THOUSANDS/ΜL (ref 1.85–7.62)
NEUTROPHILS # BLD AUTO: 4.23 THOUSANDS/ΜL (ref 1.85–7.62)
NEUTROPHILS # BLD AUTO: 6.24 THOUSANDS/ΜL (ref 1.85–7.62)
NEUTROPHILS # BLD AUTO: 6.35 THOUSANDS/ΜL (ref 1.85–7.62)
NEUTROPHILS # BLD AUTO: 6.56 THOUSANDS/ΜL (ref 1.85–7.62)
NEUTROPHILS # BLD AUTO: 7.61 THOUSANDS/ΜL (ref 1.85–7.62)
NEUTROPHILS # BLD AUTO: 8.58 THOUSANDS/ΜL (ref 1.85–7.62)
NEUTS SEG NFR BLD AUTO: 69 % (ref 43–75)
NEUTS SEG NFR BLD AUTO: 74 % (ref 43–75)
NEUTS SEG NFR BLD AUTO: 79 % (ref 43–75)
NEUTS SEG NFR BLD AUTO: 79 % (ref 43–75)
NEUTS SEG NFR BLD AUTO: 83 % (ref 43–75)
NEUTS SEG NFR BLD AUTO: 89 % (ref 43–75)
NEUTS SEG NFR BLD AUTO: 90 % (ref 43–75)
NITRITE UR QL STRIP: NEGATIVE
NITRITE UR QL STRIP: NEGATIVE
NITRITE UR QL STRIP: POSITIVE
NON-SQ EPI CELLS URNS QL MICRO: ABNORMAL /HPF
NRBC BLD AUTO-RTO: 0 /100 WBCS
NT-PROBNP SERPL-MCNC: ABNORMAL PG/ML
O+P STL CONC: NORMAL
O2 CT BLDV-SCNC: 11.5 ML/DL
OTHER STN SPEC: ABNORMAL
OTHER STN SPEC: ABNORMAL
P AXIS: 93 DEGREES
PCO2 BLDV: 36.2 MM HG (ref 42–50)
PH BLDV: 7.27 [PH] (ref 7.3–7.4)
PH UR STRIP.AUTO: 5.5 [PH] (ref 4.5–8)
PH UR STRIP.AUTO: 5.5 [PH] (ref 4.5–8)
PH UR STRIP.AUTO: 6.5 [PH] (ref 4.5–8)
PHOSPHATE SERPL-MCNC: 2.1 MG/DL (ref 2.3–4.1)
PHOSPHATE SERPL-MCNC: 2.6 MG/DL (ref 2.3–4.1)
PHOSPHATE SERPL-MCNC: 3.2 MG/DL (ref 2.3–4.1)
PLATELET # BLD AUTO: 103 THOUSANDS/UL (ref 149–390)
PLATELET # BLD AUTO: 118 THOUSANDS/UL (ref 149–390)
PLATELET # BLD AUTO: 120 THOUSANDS/UL (ref 149–390)
PLATELET # BLD AUTO: 127 THOUSANDS/UL (ref 149–390)
PLATELET # BLD AUTO: 146 THOUSANDS/UL (ref 149–390)
PLATELET # BLD AUTO: 149 THOUSANDS/UL (ref 149–390)
PLATELET # BLD AUTO: 160 THOUSANDS/UL (ref 149–390)
PLATELET # BLD AUTO: 170 THOUSANDS/UL (ref 149–390)
PLATELET # BLD AUTO: 179 THOUSANDS/UL (ref 149–390)
PMV BLD AUTO: 11.2 FL (ref 8.9–12.7)
PMV BLD AUTO: 11.5 FL (ref 8.9–12.7)
PMV BLD AUTO: 12.1 FL (ref 8.9–12.7)
PMV BLD AUTO: 12.1 FL (ref 8.9–12.7)
PMV BLD AUTO: 12.2 FL (ref 8.9–12.7)
PMV BLD AUTO: 12.3 FL (ref 8.9–12.7)
PMV BLD AUTO: 12.7 FL (ref 8.9–12.7)
PMV BLD AUTO: 13 FL (ref 8.9–12.7)
PMV BLD AUTO: 13 FL (ref 8.9–12.7)
PO2 BLDV: 42.4 MM HG (ref 35–45)
POTASSIUM SERPL-SCNC: 3.5 MMOL/L (ref 3.5–5.3)
POTASSIUM SERPL-SCNC: 3.7 MMOL/L (ref 3.5–5.3)
POTASSIUM SERPL-SCNC: 3.8 MMOL/L (ref 3.5–5.3)
POTASSIUM SERPL-SCNC: 3.8 MMOL/L (ref 3.5–5.3)
POTASSIUM SERPL-SCNC: 3.9 MMOL/L (ref 3.5–5.3)
POTASSIUM SERPL-SCNC: 4.2 MMOL/L (ref 3.5–5.3)
POTASSIUM SERPL-SCNC: 4.4 MMOL/L (ref 3.5–5.3)
POTASSIUM SERPL-SCNC: 4.4 MMOL/L (ref 3.5–5.3)
POTASSIUM SERPL-SCNC: 4.8 MMOL/L (ref 3.5–5.3)
POTASSIUM SERPL-SCNC: 4.9 MMOL/L (ref 3.5–5.3)
PR INTERVAL: 168 MS
PROCALCITONIN SERPL-MCNC: 11.96 NG/ML
PROCALCITONIN SERPL-MCNC: 21.22 NG/ML
PROCALCITONIN SERPL-MCNC: 36.23 NG/ML
PROCALCITONIN SERPL-MCNC: 40.01 NG/ML
PROT SERPL-MCNC: 6 G/DL (ref 6.4–8.2)
PROT SERPL-MCNC: 6.7 G/DL (ref 6.4–8.2)
PROT SERPL-MCNC: 7.1 G/DL (ref 6.4–8.2)
PROT SERPL-MCNC: 7.2 G/DL (ref 6.4–8.2)
PROT SERPL-MCNC: 7.2 G/DL (ref 6.4–8.2)
PROT SERPL-MCNC: 7.7 G/DL (ref 6.4–8.2)
PROT UR STRIP-MCNC: >=300 MG/DL
PROT UR STRIP-MCNC: ABNORMAL MG/DL
PROT UR STRIP-MCNC: NEGATIVE MG/DL
PROTHROMBIN TIME: 18.4 SECONDS (ref 11.6–14.5)
QRS AXIS: -41 DEGREES
QRS AXIS: -41 DEGREES
QRS AXIS: -72 DEGREES
QRS AXIS: -87 DEGREES
QRS AXIS: 266 DEGREES
QRSD INTERVAL: 110 MS
QRSD INTERVAL: 110 MS
QRSD INTERVAL: 76 MS
QRSD INTERVAL: 78 MS
QRSD INTERVAL: 98 MS
QT INTERVAL: 312 MS
QT INTERVAL: 318 MS
QT INTERVAL: 402 MS
QT INTERVAL: 402 MS
QT INTERVAL: 456 MS
QTC INTERVAL: 447 MS
QTC INTERVAL: 447 MS
QTC INTERVAL: 457 MS
QTC INTERVAL: 475 MS
QTC INTERVAL: 475 MS
RBC # BLD AUTO: 2.89 MILLION/UL (ref 3.81–5.12)
RBC # BLD AUTO: 3.11 MILLION/UL (ref 3.81–5.12)
RBC # BLD AUTO: 3.26 MILLION/UL (ref 3.81–5.12)
RBC # BLD AUTO: 3.32 MILLION/UL (ref 3.81–5.12)
RBC # BLD AUTO: 3.41 MILLION/UL (ref 3.81–5.12)
RBC # BLD AUTO: 3.49 MILLION/UL (ref 3.81–5.12)
RBC # BLD AUTO: 3.6 MILLION/UL (ref 3.81–5.12)
RBC # BLD AUTO: 3.62 MILLION/UL (ref 3.81–5.12)
RBC # BLD AUTO: 3.79 MILLION/UL (ref 3.81–5.12)
RBC # BLD AUTO: 3.8 MILLION/UL (ref 3.81–5.12)
RBC #/AREA URNS AUTO: ABNORMAL /HPF
SALMONELLA DNA SPEC QL NAA+PROBE: NORMAL
SARS-COV-2 RNA RESP QL NAA+PROBE: NEGATIVE
SARS-COV-2 RNA RESP QL NAA+PROBE: POSITIVE
SHIGA TOXIN STX GENE SPEC NAA+PROBE: NORMAL
SHIGELLA DNA SPEC QL NAA+PROBE: NORMAL
SL AMB  POCT GLUCOSE, UA: NEGATIVE
SL AMB LEUKOCYTE ESTERASE,UA: ABNORMAL
SL AMB POCT BILIRUBIN,UA: NEGATIVE
SL AMB POCT BLOOD,UA: ABNORMAL
SL AMB POCT CLARITY,UA: ABNORMAL
SL AMB POCT COLOR,UA: YELLOW
SL AMB POCT KETONES,UA: NEGATIVE
SL AMB POCT NITRITE,UA: POSITIVE
SL AMB POCT PH,UA: 6.5
SL AMB POCT SPECIFIC GRAVITY,UA: 1.02
SL AMB POCT URINE PROTEIN: 100
SL AMB POCT UROBILINOGEN: 0.2
SODIUM SERPL-SCNC: 138 MMOL/L (ref 136–145)
SODIUM SERPL-SCNC: 138 MMOL/L (ref 136–145)
SODIUM SERPL-SCNC: 139 MMOL/L (ref 136–145)
SODIUM SERPL-SCNC: 139 MMOL/L (ref 136–145)
SODIUM SERPL-SCNC: 140 MMOL/L (ref 136–145)
SODIUM SERPL-SCNC: 141 MMOL/L (ref 136–145)
SODIUM SERPL-SCNC: 142 MMOL/L (ref 136–145)
SODIUM SERPL-SCNC: 144 MMOL/L (ref 136–145)
SODIUM SERPL-SCNC: 145 MMOL/L (ref 136–145)
SODIUM SERPL-SCNC: 145 MMOL/L (ref 136–145)
SP GR UR STRIP.AUTO: 1.01 (ref 1–1.03)
SP GR UR STRIP.AUTO: 1.01 (ref 1–1.03)
SP GR UR STRIP.AUTO: 1.02 (ref 1–1.03)
T WAVE AXIS: -30 DEGREES
T WAVE AXIS: -4 DEGREES
T WAVE AXIS: -4 DEGREES
T WAVE AXIS: 24 DEGREES
T WAVE AXIS: 89 DEGREES
TROPONIN I SERPL-MCNC: 0.5 NG/ML
TROPONIN I SERPL-MCNC: 0.56 NG/ML
TROPONIN I SERPL-MCNC: 0.59 NG/ML
TROPONIN I SERPL-MCNC: 1.08 NG/ML
TSH SERPL DL<=0.05 MIU/L-ACNC: 1.53 UIU/ML (ref 0.36–3.74)
TSH SERPL DL<=0.05 MIU/L-ACNC: 2.28 UIU/ML (ref 0.36–3.74)
UROBILINOGEN UR QL STRIP.AUTO: 0.2 E.U./DL
VENTRICULAR RATE: 119 BPM
VENTRICULAR RATE: 129 BPM
VENTRICULAR RATE: 58 BPM
VENTRICULAR RATE: 84 BPM
VENTRICULAR RATE: 84 BPM
WBC # BLD AUTO: 14.07 THOUSAND/UL (ref 4.31–10.16)
WBC # BLD AUTO: 3.53 THOUSAND/UL (ref 4.31–10.16)
WBC # BLD AUTO: 4.7 THOUSAND/UL (ref 4.31–10.16)
WBC # BLD AUTO: 5.77 THOUSAND/UL (ref 4.31–10.16)
WBC # BLD AUTO: 7.34 THOUSAND/UL (ref 4.31–10.16)
WBC # BLD AUTO: 7.86 THOUSAND/UL (ref 4.31–10.16)
WBC # BLD AUTO: 8.18 THOUSAND/UL (ref 4.31–10.16)
WBC # BLD AUTO: 9.27 THOUSAND/UL (ref 4.31–10.16)
WBC # BLD AUTO: 9.67 THOUSAND/UL (ref 4.31–10.16)
WBC # BLD AUTO: 9.68 THOUSAND/UL (ref 4.31–10.16)
WBC #/AREA URNS AUTO: ABNORMAL /HPF
WBC CLUMPS # UR AUTO: YES /UL

## 2020-01-01 PROCEDURE — 71045 X-RAY EXAM CHEST 1 VIEW: CPT

## 2020-01-01 PROCEDURE — 87077 CULTURE AEROBIC IDENTIFY: CPT

## 2020-01-01 PROCEDURE — 99239 HOSP IP/OBS DSCHRG MGMT >30: CPT | Performed by: INTERNAL MEDICINE

## 2020-01-01 PROCEDURE — 81002 URINALYSIS NONAUTO W/O SCOPE: CPT | Performed by: NURSE PRACTITIONER

## 2020-01-01 PROCEDURE — 82553 CREATINE MB FRACTION: CPT | Performed by: INTERNAL MEDICINE

## 2020-01-01 PROCEDURE — 81001 URINALYSIS AUTO W/SCOPE: CPT

## 2020-01-01 PROCEDURE — 83605 ASSAY OF LACTIC ACID: CPT | Performed by: EMERGENCY MEDICINE

## 2020-01-01 PROCEDURE — 80053 COMPREHEN METABOLIC PANEL: CPT | Performed by: INTERNAL MEDICINE

## 2020-01-01 PROCEDURE — 85025 COMPLETE CBC W/AUTO DIFF WBC: CPT | Performed by: INTERNAL MEDICINE

## 2020-01-01 PROCEDURE — 85025 COMPLETE CBC W/AUTO DIFF WBC: CPT | Performed by: PHYSICIAN ASSISTANT

## 2020-01-01 PROCEDURE — 83735 ASSAY OF MAGNESIUM: CPT | Performed by: PHYSICIAN ASSISTANT

## 2020-01-01 PROCEDURE — 82550 ASSAY OF CK (CPK): CPT | Performed by: INTERNAL MEDICINE

## 2020-01-01 PROCEDURE — 85025 COMPLETE CBC W/AUTO DIFF WBC: CPT | Performed by: EMERGENCY MEDICINE

## 2020-01-01 PROCEDURE — 84145 PROCALCITONIN (PCT): CPT | Performed by: EMERGENCY MEDICINE

## 2020-01-01 PROCEDURE — 93005 ELECTROCARDIOGRAM TRACING: CPT

## 2020-01-01 PROCEDURE — 82948 REAGENT STRIP/BLOOD GLUCOSE: CPT

## 2020-01-01 PROCEDURE — 97163 PT EVAL HIGH COMPLEX 45 MIN: CPT

## 2020-01-01 PROCEDURE — 85610 PROTHROMBIN TIME: CPT | Performed by: EMERGENCY MEDICINE

## 2020-01-01 PROCEDURE — 99204 OFFICE O/P NEW MOD 45 MIN: CPT | Performed by: NURSE PRACTITIONER

## 2020-01-01 PROCEDURE — 96374 THER/PROPH/DIAG INJ IV PUSH: CPT

## 2020-01-01 PROCEDURE — 99285 EMERGENCY DEPT VISIT HI MDM: CPT

## 2020-01-01 PROCEDURE — 71046 X-RAY EXAM CHEST 2 VIEWS: CPT

## 2020-01-01 PROCEDURE — 87086 URINE CULTURE/COLONY COUNT: CPT

## 2020-01-01 PROCEDURE — 99223 1ST HOSP IP/OBS HIGH 75: CPT | Performed by: INTERNAL MEDICINE

## 2020-01-01 PROCEDURE — U0003 INFECTIOUS AGENT DETECTION BY NUCLEIC ACID (DNA OR RNA); SEVERE ACUTE RESPIRATORY SYNDROME CORONAVIRUS 2 (SARS-COV-2) (CORONAVIRUS DISEASE [COVID-19]), AMPLIFIED PROBE TECHNIQUE, MAKING USE OF HIGH THROUGHPUT TECHNOLOGIES AS DESCRIBED BY CMS-2020-01-R: HCPCS | Performed by: EMERGENCY MEDICINE

## 2020-01-01 PROCEDURE — 93306 TTE W/DOPPLER COMPLETE: CPT

## 2020-01-01 PROCEDURE — 92526 ORAL FUNCTION THERAPY: CPT

## 2020-01-01 PROCEDURE — 99239 HOSP IP/OBS DSCHRG MGMT >30: CPT | Performed by: NURSE PRACTITIONER

## 2020-01-01 PROCEDURE — 84484 ASSAY OF TROPONIN QUANT: CPT | Performed by: INTERNAL MEDICINE

## 2020-01-01 PROCEDURE — 70450 CT HEAD/BRAIN W/O DYE: CPT

## 2020-01-01 PROCEDURE — 99291 CRITICAL CARE FIRST HOUR: CPT | Performed by: EMERGENCY MEDICINE

## 2020-01-01 PROCEDURE — 87186 SC STD MICRODIL/AGAR DIL: CPT

## 2020-01-01 PROCEDURE — 87177 OVA AND PARASITES SMEARS: CPT

## 2020-01-01 PROCEDURE — 80048 BASIC METABOLIC PNL TOTAL CA: CPT | Performed by: PHYSICIAN ASSISTANT

## 2020-01-01 PROCEDURE — 97530 THERAPEUTIC ACTIVITIES: CPT

## 2020-01-01 PROCEDURE — 84145 PROCALCITONIN (PCT): CPT | Performed by: PHYSICIAN ASSISTANT

## 2020-01-01 PROCEDURE — 87338 HPYLORI STOOL AG IA: CPT

## 2020-01-01 PROCEDURE — 99284 EMERGENCY DEPT VISIT MOD MDM: CPT

## 2020-01-01 PROCEDURE — 85027 COMPLETE CBC AUTOMATED: CPT | Performed by: PHYSICIAN ASSISTANT

## 2020-01-01 PROCEDURE — 99316 NF DSCHRG MGMT 30 MIN+: CPT | Performed by: NURSE PRACTITIONER

## 2020-01-01 PROCEDURE — 99309 SBSQ NF CARE MODERATE MDM 30: CPT | Performed by: NURSE PRACTITIONER

## 2020-01-01 PROCEDURE — 96361 HYDRATE IV INFUSION ADD-ON: CPT

## 2020-01-01 PROCEDURE — 92610 EVALUATE SWALLOWING FUNCTION: CPT

## 2020-01-01 PROCEDURE — 36415 COLL VENOUS BLD VENIPUNCTURE: CPT | Performed by: EMERGENCY MEDICINE

## 2020-01-01 PROCEDURE — 99232 SBSQ HOSP IP/OBS MODERATE 35: CPT | Performed by: INTERNAL MEDICINE

## 2020-01-01 PROCEDURE — 93010 ELECTROCARDIOGRAM REPORT: CPT | Performed by: INTERNAL MEDICINE

## 2020-01-01 PROCEDURE — 96360 HYDRATION IV INFUSION INIT: CPT

## 2020-01-01 PROCEDURE — 93306 TTE W/DOPPLER COMPLETE: CPT | Performed by: INTERNAL MEDICINE

## 2020-01-01 PROCEDURE — 96376 TX/PRO/DX INJ SAME DRUG ADON: CPT

## 2020-01-01 PROCEDURE — 84484 ASSAY OF TROPONIN QUANT: CPT | Performed by: EMERGENCY MEDICINE

## 2020-01-01 PROCEDURE — 99305 1ST NF CARE MODERATE MDM 35: CPT | Performed by: FAMILY MEDICINE

## 2020-01-01 PROCEDURE — 97167 OT EVAL HIGH COMPLEX 60 MIN: CPT

## 2020-01-01 PROCEDURE — 99283 EMERGENCY DEPT VISIT LOW MDM: CPT | Performed by: EMERGENCY MEDICINE

## 2020-01-01 PROCEDURE — 84100 ASSAY OF PHOSPHORUS: CPT | Performed by: INTERNAL MEDICINE

## 2020-01-01 PROCEDURE — 87209 SMEAR COMPLEX STAIN: CPT

## 2020-01-01 PROCEDURE — 3044F HG A1C LEVEL LT 7.0%: CPT | Performed by: NURSE PRACTITIONER

## 2020-01-01 PROCEDURE — 83036 HEMOGLOBIN GLYCOSYLATED A1C: CPT | Performed by: INTERNAL MEDICINE

## 2020-01-01 PROCEDURE — 99285 EMERGENCY DEPT VISIT HI MDM: CPT | Performed by: EMERGENCY MEDICINE

## 2020-01-01 PROCEDURE — 80053 COMPREHEN METABOLIC PANEL: CPT | Performed by: EMERGENCY MEDICINE

## 2020-01-01 PROCEDURE — 71250 CT THORAX DX C-: CPT

## 2020-01-01 PROCEDURE — 82533 TOTAL CORTISOL: CPT | Performed by: INTERNAL MEDICINE

## 2020-01-01 PROCEDURE — 85730 THROMBOPLASTIN TIME PARTIAL: CPT | Performed by: EMERGENCY MEDICINE

## 2020-01-01 PROCEDURE — 99233 SBSQ HOSP IP/OBS HIGH 50: CPT | Performed by: INTERNAL MEDICINE

## 2020-01-01 PROCEDURE — 87040 BLOOD CULTURE FOR BACTERIA: CPT | Performed by: EMERGENCY MEDICINE

## 2020-01-01 PROCEDURE — 83735 ASSAY OF MAGNESIUM: CPT | Performed by: INTERNAL MEDICINE

## 2020-01-01 PROCEDURE — 97535 SELF CARE MNGMENT TRAINING: CPT

## 2020-01-01 PROCEDURE — 99232 SBSQ HOSP IP/OBS MODERATE 35: CPT | Performed by: PHYSICIAN ASSISTANT

## 2020-01-01 PROCEDURE — 84443 ASSAY THYROID STIM HORMONE: CPT | Performed by: EMERGENCY MEDICINE

## 2020-01-01 PROCEDURE — 82805 BLOOD GASES W/O2 SATURATION: CPT | Performed by: EMERGENCY MEDICINE

## 2020-01-01 PROCEDURE — 83880 ASSAY OF NATRIURETIC PEPTIDE: CPT | Performed by: EMERGENCY MEDICINE

## 2020-01-01 PROCEDURE — 99214 OFFICE O/P EST MOD 30 MIN: CPT | Performed by: NURSE PRACTITIONER

## 2020-01-01 PROCEDURE — 87505 NFCT AGENT DETECTION GI: CPT

## 2020-01-01 PROCEDURE — 84443 ASSAY THYROID STIM HORMONE: CPT | Performed by: INTERNAL MEDICINE

## 2020-01-01 PROCEDURE — 74176 CT ABD & PELVIS W/O CONTRAST: CPT

## 2020-01-01 PROCEDURE — 85027 COMPLETE CBC AUTOMATED: CPT | Performed by: INTERNAL MEDICINE

## 2020-01-01 PROCEDURE — 80048 BASIC METABOLIC PNL TOTAL CA: CPT | Performed by: INTERNAL MEDICINE

## 2020-01-01 PROCEDURE — 87081 CULTURE SCREEN ONLY: CPT | Performed by: INTERNAL MEDICINE

## 2020-01-01 RX ORDER — SPIRONOLACTONE 25 MG/1
TABLET ORAL
Qty: 45 TABLET | Refills: 0 | Status: SHIPPED | OUTPATIENT
Start: 2020-01-01 | End: 2020-01-01 | Stop reason: HOSPADM

## 2020-01-01 RX ORDER — LORAZEPAM 2 MG/ML
0.25 INJECTION INTRAMUSCULAR EVERY 4 HOURS PRN
Status: DISCONTINUED | OUTPATIENT
Start: 2020-01-01 | End: 2020-01-01

## 2020-01-01 RX ORDER — DILTIAZEM HYDROCHLORIDE 240 MG/1
240 CAPSULE, COATED, EXTENDED RELEASE ORAL DAILY
Status: DISCONTINUED | OUTPATIENT
Start: 2020-01-01 | End: 2020-01-01

## 2020-01-01 RX ORDER — GLYCOPYRROLATE 0.2 MG/ML
0.1 INJECTION INTRAMUSCULAR; INTRAVENOUS
Status: DISCONTINUED | OUTPATIENT
Start: 2020-01-01 | End: 2020-07-15 | Stop reason: HOSPADM

## 2020-01-01 RX ORDER — OLANZAPINE 2.5 MG/1
2.5 TABLET ORAL
COMMUNITY
End: 2020-01-01 | Stop reason: HOSPADM

## 2020-01-01 RX ORDER — METOPROLOL TARTRATE 5 MG/5ML
2.5 INJECTION INTRAVENOUS EVERY 6 HOURS PRN
Status: DISCONTINUED | OUTPATIENT
Start: 2020-01-01 | End: 2020-01-01 | Stop reason: HOSPADM

## 2020-01-01 RX ORDER — ASPIRIN 325 MG
325 TABLET ORAL ONCE
Status: COMPLETED | OUTPATIENT
Start: 2020-01-01 | End: 2020-01-01

## 2020-01-01 RX ORDER — CETIRIZINE HYDROCHLORIDE 5 MG/1
5 TABLET ORAL DAILY
COMMUNITY
End: 2020-01-01 | Stop reason: HOSPADM

## 2020-01-01 RX ORDER — MIRTAZAPINE 7.5 MG/1
7.5 TABLET, FILM COATED ORAL
COMMUNITY
End: 2020-01-01 | Stop reason: HOSPADM

## 2020-01-01 RX ORDER — DEXTROSE MONOHYDRATE 25 G/50ML
INJECTION, SOLUTION INTRAVENOUS
Status: COMPLETED
Start: 2020-01-01 | End: 2020-01-01

## 2020-01-01 RX ORDER — BISACODYL 10 MG
10 SUPPOSITORY, RECTAL RECTAL DAILY PRN
Status: DISCONTINUED | OUTPATIENT
Start: 2020-01-01 | End: 2020-01-01 | Stop reason: HOSPADM

## 2020-01-01 RX ORDER — TORSEMIDE 10 MG/1
10 TABLET ORAL DAILY
COMMUNITY
End: 2020-01-01 | Stop reason: HOSPADM

## 2020-01-01 RX ORDER — METOPROLOL SUCCINATE 50 MG/1
50 TABLET, EXTENDED RELEASE ORAL DAILY
Status: DISCONTINUED | OUTPATIENT
Start: 2020-01-01 | End: 2020-01-01 | Stop reason: HOSPADM

## 2020-01-01 RX ORDER — BENZONATATE 100 MG/1
100 CAPSULE ORAL 3 TIMES DAILY
Status: DISCONTINUED | OUTPATIENT
Start: 2020-01-01 | End: 2020-07-15 | Stop reason: HOSPADM

## 2020-01-01 RX ORDER — BISACODYL 10 MG
10 SUPPOSITORY, RECTAL RECTAL DAILY PRN
Qty: 12 SUPPOSITORY | Refills: 0
Start: 2020-01-01 | End: 2020-01-01 | Stop reason: HOSPADM

## 2020-01-01 RX ORDER — ASPIRIN 81 MG/1
81 TABLET, CHEWABLE ORAL DAILY
Status: DISCONTINUED | OUTPATIENT
Start: 2020-01-01 | End: 2020-07-15 | Stop reason: HOSPADM

## 2020-01-01 RX ORDER — HYDROMORPHONE HCL/PF 1 MG/ML
0.5 SYRINGE (ML) INJECTION
Status: DISCONTINUED | OUTPATIENT
Start: 2020-01-01 | End: 2020-07-15 | Stop reason: HOSPADM

## 2020-01-01 RX ORDER — GLIMEPIRIDE 1 MG/1
TABLET ORAL
Qty: 90 TABLET | Refills: 0 | Status: SHIPPED | OUTPATIENT
Start: 2020-01-01 | End: 2020-01-01 | Stop reason: HOSPADM

## 2020-01-01 RX ORDER — ASPIRIN 81 MG/1
81 TABLET, CHEWABLE ORAL DAILY
Status: DISCONTINUED | OUTPATIENT
Start: 2020-01-01 | End: 2020-01-01 | Stop reason: HOSPADM

## 2020-01-01 RX ORDER — ATORVASTATIN CALCIUM 20 MG/1
20 TABLET, FILM COATED ORAL
Status: DISCONTINUED | OUTPATIENT
Start: 2020-01-01 | End: 2020-01-01 | Stop reason: HOSPADM

## 2020-01-01 RX ORDER — CALCITONIN SALMON 200 [IU]/.09ML
1 SPRAY, METERED NASAL DAILY
Status: DISCONTINUED | OUTPATIENT
Start: 2020-01-01 | End: 2020-01-01

## 2020-01-01 RX ORDER — METOPROLOL SUCCINATE 100 MG/1
100 TABLET, EXTENDED RELEASE ORAL DAILY
Status: DISCONTINUED | OUTPATIENT
Start: 2020-07-15 | End: 2020-07-15 | Stop reason: HOSPADM

## 2020-01-01 RX ORDER — SODIUM CHLORIDE, SODIUM GLUCONATE, SODIUM ACETATE, POTASSIUM CHLORIDE, MAGNESIUM CHLORIDE, SODIUM PHOSPHATE, DIBASIC, AND POTASSIUM PHOSPHATE .53; .5; .37; .037; .03; .012; .00082 G/100ML; G/100ML; G/100ML; G/100ML; G/100ML; G/100ML; G/100ML
75 INJECTION, SOLUTION INTRAVENOUS CONTINUOUS
Status: DISCONTINUED | OUTPATIENT
Start: 2020-01-01 | End: 2020-01-01

## 2020-01-01 RX ORDER — FAMOTIDINE 20 MG/1
20 TABLET, FILM COATED ORAL DAILY
Status: DISCONTINUED | OUTPATIENT
Start: 2020-01-01 | End: 2020-07-15 | Stop reason: HOSPADM

## 2020-01-01 RX ORDER — DILTIAZEM HYDROCHLORIDE 60 MG/1
60 TABLET, FILM COATED ORAL EVERY 6 HOURS SCHEDULED
Status: DISCONTINUED | OUTPATIENT
Start: 2020-01-01 | End: 2020-01-01 | Stop reason: HOSPADM

## 2020-01-01 RX ORDER — METOPROLOL TARTRATE 5 MG/5ML
2.5 INJECTION INTRAVENOUS EVERY 6 HOURS PRN
Status: DISCONTINUED | OUTPATIENT
Start: 2020-01-01 | End: 2020-07-15 | Stop reason: HOSPADM

## 2020-01-01 RX ORDER — FUROSEMIDE 40 MG/1
TABLET ORAL
Qty: 90 TABLET | Refills: 0 | Status: SHIPPED | OUTPATIENT
Start: 2020-01-01 | End: 2020-01-01 | Stop reason: HOSPADM

## 2020-01-01 RX ORDER — MELATONIN
2000 DAILY
Status: DISCONTINUED | OUTPATIENT
Start: 2020-01-01 | End: 2020-07-15 | Stop reason: HOSPADM

## 2020-01-01 RX ORDER — OLANZAPINE 2.5 MG/1
2.5 TABLET ORAL
Status: DISCONTINUED | OUTPATIENT
Start: 2020-01-01 | End: 2020-07-15 | Stop reason: HOSPADM

## 2020-01-01 RX ORDER — OLANZAPINE 2.5 MG/1
2.5 TABLET ORAL
Status: DISCONTINUED | OUTPATIENT
Start: 2020-01-01 | End: 2020-01-01 | Stop reason: HOSPADM

## 2020-01-01 RX ORDER — POTASSIUM CHLORIDE 750 MG/1
10 TABLET, EXTENDED RELEASE ORAL DAILY
Status: DISCONTINUED | OUTPATIENT
Start: 2020-01-01 | End: 2020-07-15 | Stop reason: HOSPADM

## 2020-01-01 RX ORDER — SACCHAROMYCES BOULARDII 250 MG
250 CAPSULE ORAL 2 TIMES DAILY
Status: DISCONTINUED | OUTPATIENT
Start: 2020-01-01 | End: 2020-01-01 | Stop reason: HOSPADM

## 2020-01-01 RX ORDER — GUAIFENESIN 100 MG/5ML
200 SOLUTION ORAL EVERY 4 HOURS PRN
Status: DISCONTINUED | OUTPATIENT
Start: 2020-01-01 | End: 2020-07-15 | Stop reason: HOSPADM

## 2020-01-01 RX ORDER — DILTIAZEM HYDROCHLORIDE 240 MG/1
240 CAPSULE, COATED, EXTENDED RELEASE ORAL DAILY
Status: DISCONTINUED | OUTPATIENT
Start: 2020-01-01 | End: 2020-07-15 | Stop reason: HOSPADM

## 2020-01-01 RX ORDER — ATORVASTATIN CALCIUM 20 MG/1
20 TABLET, FILM COATED ORAL
Status: DISCONTINUED | OUTPATIENT
Start: 2020-01-01 | End: 2020-07-15 | Stop reason: HOSPADM

## 2020-01-01 RX ORDER — GINSENG 100 MG
1 CAPSULE ORAL 2 TIMES DAILY
Status: DISCONTINUED | OUTPATIENT
Start: 2020-01-01 | End: 2020-01-01 | Stop reason: HOSPADM

## 2020-01-01 RX ORDER — CEPHALEXIN 250 MG/1
250 CAPSULE ORAL EVERY 12 HOURS SCHEDULED
Qty: 5 CAPSULE | Refills: 0
Start: 2020-01-01 | End: 2020-01-01 | Stop reason: SDUPTHER

## 2020-01-01 RX ORDER — LOPERAMIDE HYDROCHLORIDE 2 MG/1
2 CAPSULE ORAL 4 TIMES DAILY PRN
Status: DISCONTINUED | OUTPATIENT
Start: 2020-01-01 | End: 2020-01-01 | Stop reason: HOSPADM

## 2020-01-01 RX ORDER — BISACODYL 10 MG
10 SUPPOSITORY, RECTAL RECTAL DAILY PRN
Status: DISCONTINUED | OUTPATIENT
Start: 2020-01-01 | End: 2020-07-15 | Stop reason: HOSPADM

## 2020-01-01 RX ORDER — AMOXICILLIN AND CLAVULANATE POTASSIUM 500; 125 MG/1; MG/1
1 TABLET, FILM COATED ORAL EVERY 12 HOURS SCHEDULED
Qty: 14 TABLET | Refills: 0 | Status: SHIPPED | OUTPATIENT
Start: 2020-01-01 | End: 2020-01-01

## 2020-01-01 RX ORDER — MIRTAZAPINE 15 MG/1
7.5 TABLET, FILM COATED ORAL
Status: DISCONTINUED | OUTPATIENT
Start: 2020-01-01 | End: 2020-01-01 | Stop reason: HOSPADM

## 2020-01-01 RX ORDER — LEVOFLOXACIN 500 MG/1
500 TABLET, FILM COATED ORAL EVERY 24 HOURS
Qty: 5 TABLET | Refills: 0 | Status: SHIPPED | OUTPATIENT
Start: 2020-01-01 | End: 2020-01-01

## 2020-01-01 RX ORDER — ACETAMINOPHEN 325 MG/1
650 TABLET ORAL EVERY 6 HOURS PRN
Status: DISCONTINUED | OUTPATIENT
Start: 2020-01-01 | End: 2020-07-15 | Stop reason: HOSPADM

## 2020-01-01 RX ORDER — POLYETHYLENE GLYCOL 3350 17 G/17G
17 POWDER, FOR SOLUTION ORAL DAILY
Qty: 14 EACH | Refills: 0
Start: 2020-01-01 | End: 2020-01-01 | Stop reason: HOSPADM

## 2020-01-01 RX ORDER — LORAZEPAM 2 MG/ML
0.5 INJECTION INTRAMUSCULAR EVERY 4 HOURS PRN
Status: DISCONTINUED | OUTPATIENT
Start: 2020-01-01 | End: 2020-07-15 | Stop reason: HOSPADM

## 2020-01-01 RX ORDER — DILTIAZEM HYDROCHLORIDE 240 MG/1
240 CAPSULE, COATED, EXTENDED RELEASE ORAL DAILY
Status: DISCONTINUED | OUTPATIENT
Start: 2020-01-01 | End: 2020-01-01 | Stop reason: HOSPADM

## 2020-01-01 RX ORDER — DILTIAZEM HYDROCHLORIDE 5 MG/ML
0.25 INJECTION INTRAVENOUS ONCE
Status: COMPLETED | OUTPATIENT
Start: 2020-01-01 | End: 2020-01-01

## 2020-01-01 RX ORDER — ONDANSETRON 2 MG/ML
4 INJECTION INTRAMUSCULAR; INTRAVENOUS EVERY 6 HOURS PRN
Status: DISCONTINUED | OUTPATIENT
Start: 2020-01-01 | End: 2020-01-01 | Stop reason: HOSPADM

## 2020-01-01 RX ORDER — DILTIAZEM HYDROCHLORIDE 240 MG/1
CAPSULE, COATED, EXTENDED RELEASE ORAL
Qty: 30 CAPSULE | Refills: 2 | Status: SHIPPED | OUTPATIENT
Start: 2020-01-01 | End: 2020-01-01 | Stop reason: HOSPADM

## 2020-01-01 RX ORDER — METOPROLOL TARTRATE 5 MG/5ML
2.5 INJECTION INTRAVENOUS ONCE
Status: COMPLETED | OUTPATIENT
Start: 2020-01-01 | End: 2020-01-01

## 2020-01-01 RX ORDER — LACTULOSE 20 G/30ML
20 SOLUTION ORAL ONCE
Status: COMPLETED | OUTPATIENT
Start: 2020-01-01 | End: 2020-01-01

## 2020-01-01 RX ORDER — POLYETHYLENE GLYCOL 3350 17 G/17G
17 POWDER, FOR SOLUTION ORAL DAILY
Status: DISCONTINUED | OUTPATIENT
Start: 2020-01-01 | End: 2020-01-01 | Stop reason: HOSPADM

## 2020-01-01 RX ORDER — CEPHALEXIN 250 MG/1
250 CAPSULE ORAL EVERY 6 HOURS SCHEDULED
Status: DISCONTINUED | OUTPATIENT
Start: 2020-01-01 | End: 2020-07-15 | Stop reason: HOSPADM

## 2020-01-01 RX ORDER — CEPHALEXIN 250 MG/1
250 CAPSULE ORAL EVERY 6 HOURS SCHEDULED
Status: DISCONTINUED | OUTPATIENT
Start: 2020-01-01 | End: 2020-01-01

## 2020-01-01 RX ORDER — DEXTROSE MONOHYDRATE 25 G/50ML
25 INJECTION, SOLUTION INTRAVENOUS ONCE
Status: COMPLETED | OUTPATIENT
Start: 2020-01-01 | End: 2020-01-01

## 2020-01-01 RX ORDER — CEPHALEXIN 250 MG/1
250 CAPSULE ORAL EVERY 12 HOURS SCHEDULED
Qty: 7 CAPSULE | Refills: 0
Start: 2020-01-01 | End: 2020-01-01

## 2020-01-01 RX ORDER — MIRTAZAPINE 15 MG/1
7.5 TABLET, FILM COATED ORAL
Status: DISCONTINUED | OUTPATIENT
Start: 2020-01-01 | End: 2020-07-15 | Stop reason: HOSPADM

## 2020-01-01 RX ORDER — DILTIAZEM HYDROCHLORIDE 5 MG/ML
0.35 INJECTION INTRAVENOUS ONCE
Status: COMPLETED | OUTPATIENT
Start: 2020-01-01 | End: 2020-01-01

## 2020-01-01 RX ORDER — TORSEMIDE 20 MG/1
10 TABLET ORAL DAILY
Status: DISCONTINUED | OUTPATIENT
Start: 2020-01-01 | End: 2020-01-01 | Stop reason: HOSPADM

## 2020-01-01 RX ORDER — CEPHALEXIN 250 MG/1
250 CAPSULE ORAL EVERY 12 HOURS SCHEDULED
Qty: 14 CAPSULE | Refills: 0 | Status: SHIPPED | OUTPATIENT
Start: 2020-01-01 | End: 2020-01-01

## 2020-01-01 RX ORDER — QUETIAPINE FUMARATE 25 MG/1
TABLET, FILM COATED ORAL
Qty: 45 TABLET | Refills: 0 | Status: SHIPPED | OUTPATIENT
Start: 2020-01-01 | End: 2020-01-01 | Stop reason: HOSPADM

## 2020-01-01 RX ORDER — ATORVASTATIN CALCIUM 20 MG/1
TABLET, FILM COATED ORAL
Qty: 90 TABLET | Refills: 0 | Status: SHIPPED | OUTPATIENT
Start: 2020-01-01 | End: 2020-01-01 | Stop reason: HOSPADM

## 2020-01-01 RX ORDER — ACETAMINOPHEN 325 MG/1
650 TABLET ORAL EVERY 6 HOURS PRN
Status: DISCONTINUED | OUTPATIENT
Start: 2020-01-01 | End: 2020-01-01 | Stop reason: HOSPADM

## 2020-01-01 RX ORDER — POLYETHYLENE GLYCOL 3350 17 G/17G
17 POWDER, FOR SOLUTION ORAL DAILY
Status: DISCONTINUED | OUTPATIENT
Start: 2020-01-01 | End: 2020-07-15 | Stop reason: HOSPADM

## 2020-01-01 RX ORDER — LORATADINE 10 MG/1
10 TABLET ORAL DAILY
Status: DISCONTINUED | OUTPATIENT
Start: 2020-01-01 | End: 2020-07-15 | Stop reason: HOSPADM

## 2020-01-01 RX ORDER — METOPROLOL SUCCINATE 50 MG/1
50 TABLET, EXTENDED RELEASE ORAL DAILY
Status: DISCONTINUED | OUTPATIENT
Start: 2020-01-01 | End: 2020-01-01

## 2020-01-01 RX ADMIN — DILTIAZEM HYDROCHLORIDE 240 MG: 240 CAPSULE, COATED, EXTENDED RELEASE ORAL at 08:50

## 2020-01-01 RX ADMIN — GLYCOPYRROLATE 0.1 MG: 0.2 INJECTION, SOLUTION INTRAMUSCULAR; INTRAVENOUS at 07:40

## 2020-01-01 RX ADMIN — SODIUM CHLORIDE 365 ML: 0.9 INJECTION, SOLUTION INTRAVENOUS at 16:04

## 2020-01-01 RX ADMIN — APIXABAN 2.5 MG: 2.5 TABLET, FILM COATED ORAL at 09:36

## 2020-01-01 RX ADMIN — DEXTROSE MONOHYDRATE 25 ML: 500 INJECTION PARENTERAL at 06:29

## 2020-01-01 RX ADMIN — BENZONATATE 100 MG: 100 CAPSULE ORAL at 16:55

## 2020-01-01 RX ADMIN — POLYETHYLENE GLYCOL 3350 17 G: 17 POWDER, FOR SOLUTION ORAL at 08:00

## 2020-01-01 RX ADMIN — Medication 250 MG: at 10:07

## 2020-01-01 RX ADMIN — APIXABAN 2.5 MG: 2.5 TABLET, FILM COATED ORAL at 09:56

## 2020-01-01 RX ADMIN — CEFTRIAXONE SODIUM 1000 MG: 10 INJECTION, POWDER, FOR SOLUTION INTRAVENOUS at 16:17

## 2020-01-01 RX ADMIN — CEFTRIAXONE SODIUM 1000 MG: 10 INJECTION, POWDER, FOR SOLUTION INTRAVENOUS at 15:44

## 2020-01-01 RX ADMIN — DILTIAZEM HYDROCHLORIDE 60 MG: 60 TABLET, FILM COATED ORAL at 05:41

## 2020-01-01 RX ADMIN — ASPIRIN 81 MG 81 MG: 81 TABLET ORAL at 09:55

## 2020-01-01 RX ADMIN — MIRTAZAPINE 7.5 MG: 15 TABLET, FILM COATED ORAL at 22:00

## 2020-01-01 RX ADMIN — BISACODYL 10 MG: 10 SUPPOSITORY RECTAL at 10:09

## 2020-01-01 RX ADMIN — Medication 250 MG: at 08:50

## 2020-01-01 RX ADMIN — APIXABAN 2.5 MG: 2.5 TABLET, FILM COATED ORAL at 08:50

## 2020-01-01 RX ADMIN — LACTULOSE 20 G: 10 SOLUTION ORAL at 10:16

## 2020-01-01 RX ADMIN — ASPIRIN 81 MG 81 MG: 81 TABLET ORAL at 08:50

## 2020-01-01 RX ADMIN — METOPROLOL SUCCINATE 50 MG: 50 TABLET, EXTENDED RELEASE ORAL at 08:01

## 2020-01-01 RX ADMIN — INSULIN LISPRO 1 UNITS: 100 INJECTION, SOLUTION INTRAVENOUS; SUBCUTANEOUS at 22:19

## 2020-01-01 RX ADMIN — DIBASIC SODIUM PHOSPHATE, MONOBASIC POTASSIUM PHOSPHATE AND MONOBASIC SODIUM PHOSPHATE 1 TABLET: 852; 155; 130 TABLET ORAL at 17:39

## 2020-01-01 RX ADMIN — HYDROMORPHONE HYDROCHLORIDE 0.5 MG: 1 INJECTION, SOLUTION INTRAMUSCULAR; INTRAVENOUS; SUBCUTANEOUS at 07:47

## 2020-01-01 RX ADMIN — DILTIAZEM HYDROCHLORIDE 17.5 MG: 5 INJECTION INTRAVENOUS at 14:32

## 2020-01-01 RX ADMIN — MORPHINE SULFATE 1 MG: 2 INJECTION, SOLUTION INTRAMUSCULAR; INTRAVENOUS at 21:10

## 2020-01-01 RX ADMIN — DILTIAZEM HYDROCHLORIDE 60 MG: 60 TABLET, FILM COATED ORAL at 11:26

## 2020-01-01 RX ADMIN — MORPHINE SULFATE 1 MG: 2 INJECTION, SOLUTION INTRAMUSCULAR; INTRAVENOUS at 05:08

## 2020-01-01 RX ADMIN — LOPERAMIDE HYDROCHLORIDE 2 MG: 2 CAPSULE ORAL at 10:08

## 2020-01-01 RX ADMIN — APIXABAN 2.5 MG: 2.5 TABLET, FILM COATED ORAL at 08:03

## 2020-01-01 RX ADMIN — LORAZEPAM 0.25 MG: 2 INJECTION INTRAMUSCULAR; INTRAVENOUS at 00:41

## 2020-01-01 RX ADMIN — INSULIN LISPRO 1 UNITS: 100 INJECTION, SOLUTION INTRAVENOUS; SUBCUTANEOUS at 21:48

## 2020-01-01 RX ADMIN — ASPIRIN 81 MG 81 MG: 81 TABLET ORAL at 08:03

## 2020-01-01 RX ADMIN — SODIUM CHLORIDE 500 ML: 0.9 INJECTION, SOLUTION INTRAVENOUS at 14:50

## 2020-01-01 RX ADMIN — INSULIN LISPRO 3 UNITS: 100 INJECTION, SOLUTION INTRAVENOUS; SUBCUTANEOUS at 11:11

## 2020-01-01 RX ADMIN — INSULIN LISPRO 1 UNITS: 100 INJECTION, SOLUTION INTRAVENOUS; SUBCUTANEOUS at 18:02

## 2020-01-01 RX ADMIN — POLYETHYLENE GLYCOL 3350 17 G: 17 POWDER, FOR SOLUTION ORAL at 10:09

## 2020-01-01 RX ADMIN — DILTIAZEM HYDROCHLORIDE 240 MG: 240 CAPSULE, COATED, EXTENDED RELEASE ORAL at 09:13

## 2020-01-01 RX ADMIN — APIXABAN 2.5 MG: 2.5 TABLET, FILM COATED ORAL at 07:17

## 2020-01-01 RX ADMIN — SODIUM CHLORIDE 500 ML: 0.9 INJECTION, SOLUTION INTRAVENOUS at 13:24

## 2020-01-01 RX ADMIN — DILTIAZEM HYDROCHLORIDE 240 MG: 240 CAPSULE, COATED, EXTENDED RELEASE ORAL at 09:56

## 2020-01-01 RX ADMIN — DILTIAZEM HYDROCHLORIDE 12.5 MG: 5 INJECTION INTRAVENOUS at 13:51

## 2020-01-01 RX ADMIN — HYDROMORPHONE HYDROCHLORIDE 0.5 MG: 1 INJECTION, SOLUTION INTRAMUSCULAR; INTRAVENOUS; SUBCUTANEOUS at 16:09

## 2020-01-01 RX ADMIN — CEFTRIAXONE SODIUM 1000 MG: 1 INJECTION, POWDER, FOR SOLUTION INTRAMUSCULAR; INTRAVENOUS at 17:02

## 2020-01-01 RX ADMIN — DILTIAZEM HYDROCHLORIDE 60 MG: 60 TABLET, FILM COATED ORAL at 11:17

## 2020-01-01 RX ADMIN — APIXABAN 2.5 MG: 2.5 TABLET, FILM COATED ORAL at 17:07

## 2020-01-01 RX ADMIN — DEXTROSE 50 % IN WATER (D50W) INTRAVENOUS SYRINGE 25 ML: at 08:30

## 2020-01-01 RX ADMIN — METOPROLOL SUCCINATE 50 MG: 50 TABLET, EXTENDED RELEASE ORAL at 09:14

## 2020-01-01 RX ADMIN — MORPHINE SULFATE 1 MG: 2 INJECTION, SOLUTION INTRAMUSCULAR; INTRAVENOUS at 00:41

## 2020-01-01 RX ADMIN — OLANZAPINE 2.5 MG: 2.5 TABLET, FILM COATED ORAL at 22:00

## 2020-01-01 RX ADMIN — OLANZAPINE 2.5 MG: 2.5 TABLET, FILM COATED ORAL at 21:16

## 2020-01-01 RX ADMIN — INSULIN LISPRO 2 UNITS: 100 INJECTION, SOLUTION INTRAVENOUS; SUBCUTANEOUS at 12:46

## 2020-01-01 RX ADMIN — DIBASIC SODIUM PHOSPHATE, MONOBASIC POTASSIUM PHOSPHATE AND MONOBASIC SODIUM PHOSPHATE 1 TABLET: 852; 155; 130 TABLET ORAL at 07:17

## 2020-01-01 RX ADMIN — METOPROLOL SUCCINATE 50 MG: 50 TABLET, EXTENDED RELEASE ORAL at 09:34

## 2020-01-01 RX ADMIN — METOPROLOL SUCCINATE 50 MG: 50 TABLET, EXTENDED RELEASE ORAL at 08:50

## 2020-01-01 RX ADMIN — DILTIAZEM HYDROCHLORIDE 60 MG: 60 TABLET, FILM COATED ORAL at 00:37

## 2020-01-01 RX ADMIN — METOPROLOL TARTRATE 2.5 MG: 5 INJECTION INTRAVENOUS at 17:45

## 2020-01-01 RX ADMIN — INSULIN LISPRO 2 UNITS: 100 INJECTION, SOLUTION INTRAVENOUS; SUBCUTANEOUS at 11:26

## 2020-01-01 RX ADMIN — ATORVASTATIN CALCIUM 20 MG: 20 TABLET, FILM COATED ORAL at 17:07

## 2020-01-01 RX ADMIN — SODIUM CHLORIDE, SODIUM GLUCONATE, SODIUM ACETATE, POTASSIUM CHLORIDE, MAGNESIUM CHLORIDE, SODIUM PHOSPHATE, DIBASIC, AND POTASSIUM PHOSPHATE 100 ML/HR: .53; .5; .37; .037; .03; .012; .00082 INJECTION, SOLUTION INTRAVENOUS at 04:08

## 2020-01-01 RX ADMIN — APIXABAN 2.5 MG: 2.5 TABLET, FILM COATED ORAL at 17:15

## 2020-01-01 RX ADMIN — CEFTRIAXONE SODIUM 1000 MG: 10 INJECTION, POWDER, FOR SOLUTION INTRAVENOUS at 16:04

## 2020-01-01 RX ADMIN — TORSEMIDE 10 MG: 20 TABLET ORAL at 09:34

## 2020-01-01 RX ADMIN — ASPIRIN 325 MG ORAL TABLET 325 MG: 325 PILL ORAL at 18:39

## 2020-01-01 RX ADMIN — SODIUM CHLORIDE, SODIUM GLUCONATE, SODIUM ACETATE, POTASSIUM CHLORIDE, MAGNESIUM CHLORIDE, SODIUM PHOSPHATE, DIBASIC, AND POTASSIUM PHOSPHATE 75 ML/HR: .53; .5; .37; .037; .03; .012; .00082 INJECTION, SOLUTION INTRAVENOUS at 17:48

## 2020-01-01 RX ADMIN — MIRTAZAPINE 7.5 MG: 15 TABLET, FILM COATED ORAL at 21:16

## 2020-01-01 RX ADMIN — APIXABAN 2.5 MG: 2.5 TABLET, FILM COATED ORAL at 08:01

## 2020-01-01 RX ADMIN — METOPROLOL TARTRATE 2.5 MG: 5 INJECTION INTRAVENOUS at 16:56

## 2020-01-01 RX ADMIN — APIXABAN 2.5 MG: 2.5 TABLET, FILM COATED ORAL at 17:39

## 2020-01-01 RX ADMIN — TORSEMIDE 10 MG: 20 TABLET ORAL at 08:01

## 2020-01-01 RX ADMIN — LORAZEPAM 0.25 MG: 2 INJECTION INTRAMUSCULAR; INTRAVENOUS at 16:56

## 2020-01-01 RX ADMIN — ASPIRIN 81 MG 81 MG: 81 TABLET ORAL at 07:17

## 2020-01-01 RX ADMIN — ATORVASTATIN CALCIUM 20 MG: 20 TABLET, FILM COATED ORAL at 16:55

## 2020-01-01 RX ADMIN — INSULIN LISPRO 1 UNITS: 100 INJECTION, SOLUTION INTRAVENOUS; SUBCUTANEOUS at 00:35

## 2020-01-01 RX ADMIN — INSULIN LISPRO 1 UNITS: 100 INJECTION, SOLUTION INTRAVENOUS; SUBCUTANEOUS at 10:42

## 2020-01-01 RX ADMIN — APIXABAN 2.5 MG: 2.5 TABLET, FILM COATED ORAL at 17:56

## 2020-01-01 RX ADMIN — ASPIRIN 81 MG 81 MG: 81 TABLET ORAL at 09:35

## 2020-01-01 RX ADMIN — METOPROLOL SUCCINATE 50 MG: 50 TABLET, EXTENDED RELEASE ORAL at 09:56

## 2020-01-01 RX ADMIN — LORAZEPAM 0.5 MG: 2 INJECTION INTRAMUSCULAR; INTRAVENOUS at 16:17

## 2020-01-01 RX ADMIN — ATORVASTATIN CALCIUM 20 MG: 20 TABLET, FILM COATED ORAL at 16:54

## 2020-01-01 RX ADMIN — DIBASIC SODIUM PHOSPHATE, MONOBASIC POTASSIUM PHOSPHATE AND MONOBASIC SODIUM PHOSPHATE 1 TABLET: 852; 155; 130 TABLET ORAL at 11:27

## 2020-01-01 RX ADMIN — Medication 250 MG: at 17:56

## 2020-01-01 RX ADMIN — APIXABAN 2.5 MG: 2.5 TABLET, FILM COATED ORAL at 16:54

## 2020-01-01 RX ADMIN — ASPIRIN 81 MG 81 MG: 81 TABLET ORAL at 09:56

## 2020-01-01 RX ADMIN — APIXABAN 2.5 MG: 2.5 TABLET, FILM COATED ORAL at 16:30

## 2020-01-01 RX ADMIN — LORAZEPAM 0.25 MG: 2 INJECTION INTRAMUSCULAR; INTRAVENOUS at 05:07

## 2020-01-01 RX ADMIN — INSULIN LISPRO 1 UNITS: 100 INJECTION, SOLUTION INTRAVENOUS; SUBCUTANEOUS at 05:39

## 2020-01-01 RX ADMIN — ASPIRIN 81 MG 81 MG: 81 TABLET ORAL at 08:01

## 2020-01-01 RX ADMIN — METOPROLOL TARTRATE 2.5 MG: 5 INJECTION INTRAVENOUS at 04:25

## 2020-01-01 RX ADMIN — METOPROLOL TARTRATE 2.5 MG: 5 INJECTION INTRAVENOUS at 09:15

## 2020-01-01 RX ADMIN — APIXABAN 2.5 MG: 2.5 TABLET, FILM COATED ORAL at 09:55

## 2020-01-01 RX ADMIN — LORAZEPAM 0.25 MG: 2 INJECTION INTRAMUSCULAR; INTRAVENOUS at 21:11

## 2020-01-20 NOTE — TELEPHONE ENCOUNTER
A message has been left asking son to contact the office  I would like to know if he would like to reschedule his mothers ACM that was originally scheduled on 12/5 but had to be canceled due to her not having the required packet completed  Pt is due to come back in April for f/u with Dr Dillon Shields may be scheduled during the time of her f/u  Should he wish that she have the ACM meeting sooner then pt may be scheduled with an extender

## 2020-01-31 PROBLEM — N30.01 ACUTE CYSTITIS WITH HEMATURIA: Status: RESOLVED | Noted: 2019-01-01 | Resolved: 2020-01-01

## 2020-01-31 PROBLEM — N39.0 RECURRENT UTI: Status: ACTIVE | Noted: 2020-01-01

## 2020-01-31 NOTE — ASSESSMENT & PLAN NOTE
Has had recurrent UTI due to loose stools  Will send culture again  Empiric keflex while culture  Will consult urology

## 2020-01-31 NOTE — ASSESSMENT & PLAN NOTE
Patient has not completed stool testing  Will give supplies and have asked that they complete this since symptoms are still occurring and are likely related to recurrent UTI

## 2020-01-31 NOTE — ASSESSMENT & PLAN NOTE
Patient does see nephrology  Her last kidney function GFR 25 and Creatinine 1 75  Patient will need antibiotic dose reductions because of this

## 2020-01-31 NOTE — PROGRESS NOTES
Assessment and Plan:    Problem List Items Addressed This Visit        Digestive    Diarrhea of presumed infectious origin     Patient has not completed stool testing  Will give supplies and have asked that they complete this since symptoms are still occurring and are likely related to recurrent UTI  Genitourinary    Stage 4 chronic kidney disease [facility])     Patient does see nephrology  Her last kidney function GFR 25 and Creatinine 1 75  Patient will need antibiotic dose reductions because of this  Recurrent UTI     Has had recurrent UTI due to loose stools  Will send culture again  Empiric keflex while culture  Will consult urology  Relevant Medications    cephalexin (KEFLEX) 250 mg capsule    Other Relevant Orders    Ambulatory referral to Urology      Other Visit Diagnoses     UTI symptoms    -  Primary    Relevant Medications    cephalexin (KEFLEX) 250 mg capsule    Other Relevant Orders    POCT urine dip (Completed)                 Diagnoses and all orders for this visit:    UTI symptoms  -     POCT urine dip  -     cephalexin (KEFLEX) 250 mg capsule; Take 1 capsule (250 mg total) by mouth every 12 (twelve) hours for 7 days    Recurrent UTI  -     cephalexin (KEFLEX) 250 mg capsule; Take 1 capsule (250 mg total) by mouth every 12 (twelve) hours for 7 days  -     Ambulatory referral to Urology; Future    Stage 4 chronic kidney disease (HCC)    Diarrhea of presumed infectious origin              Subjective:      Patient ID: Virginia Ruiz is a 80 y o  female  CC:    Chief Complaint   Patient presents with    Urinary Tract Infection     Burning with urination for the past few weeks  Pt notes she also has some low abdominal pain  -  lsh       HPI:    Patient presents with her daughter  Patient and daughter note that she had on and off symptoms of urinary track infection for the last 3 weeks   Patient notes she had episodes of diarrhea which she thinks is contributing to her recurrent UTI  She was seen last November for same UTI symptoms  She did have CT abdomen and pelvis which did not reveal any bladder or kidney abnormalities  patient does have stage 4 CKD, she does se nephrology  Patient states she has never been evaluated by urologist  She also has open orders for stool testing given her persistent loose stools  The following portions of the patient's history were reviewed and updated as appropriate: allergies, current medications, past family history, past medical history, past social history, past surgical history and problem list       Review of Systems   Constitutional: Negative for fever  Respiratory: Negative for chest tightness and shortness of breath  Cardiovascular: Negative for chest pain, palpitations and leg swelling  Gastrointestinal: Negative for diarrhea, nausea and vomiting  Genitourinary: Positive for dysuria, frequency and urgency  Negative for difficulty urinating  Musculoskeletal: Positive for back pain  Neurological: Negative for dizziness, light-headedness and headaches  Psychiatric/Behavioral: Negative for sleep disturbance  Data to review:       Objective:    Vitals:    01/31/20 1104   BP: 136/60   BP Location: Left arm   Patient Position: Sitting   Cuff Size: Standard   Pulse: 84   Temp: (!) 97 3 °F (36 3 °C)   Weight: 47 6 kg (105 lb)   Height: 5' 1" (1 549 m)        Physical Exam   Constitutional: Vital signs are normal    Cardiovascular: Normal rate and regular rhythm  Murmur heard  Pulmonary/Chest: Effort normal and breath sounds normal    Abdominal: Soft  Bowel sounds are increased  There is no tenderness  There is no CVA tenderness  Neurological: She is alert  patient urinalysis was abnormal at today visit

## 2020-02-13 NOTE — PATIENT INSTRUCTIONS
Urinalysis and urine culture to be performed 1 week after completion of antibiotics   complete Augmentin as scheduled   performed stools  Culture and testing   return to the office in 3 weeks for re-evaluation and discussion of plan of care   medical evaluation  Discontinue the Aumentin  Start Levaquin as directed

## 2020-02-13 NOTE — PROGRESS NOTES
2/13/2020      Chief Complaint   Patient presents with    Urinary Tract Infection     recurrent for over a year; unable to provide a specimen     Assessment and Plan    80 y o  female managed by NEW PATIENT    1  Recurrent Urinary Tract Infections  · Prescription for Levaquin provided  · Repeat urine testing 1 week after antibiotic therapy is completed    2  Urinary Incontinence   · Maintain adequate hydration  · Avoid bladder irritants  · Attempts with Kegel exercises if cognitively capable    3  Atrial Fibrillation  · Managed by PCP and Cardiology  · On Eliquis    4  Diarrhea  · Stool cultures ordered  · To be followed up on by PCP    5  Frequent falls  · To be followed upon by PCP to evaluate risk versus benefit with anticoagulants    History of Present Illness  Cari Buck is a 80 y o  female here for follow up evaluation of  recurrent urinary tract infections  Patient with a history of positive urine cultures performed 02/06/2019 for E coli it appears she was started on Augmentin 19845 every 12 hours x7 days  Secondary to patient being a poor historian due to her dementia and her 's inability to also provide a clear history it is unclear whether patient has been taking his antibiotics are not  Patient currently denies all lower urinary tract symptoms including dysuria, hematuria urinary frequency and urgency  Patient's family members at the bedside, daughter in law and son, have significant concerns secondary to self-care deficit and patient safety due to a history of frequent falls on Eliquis and increasing patient weakness  She currently denies fever and chills  Review of Systems   Constitutional: Negative for chills and fever  Respiratory: Negative for cough and shortness of breath  Cardiovascular: Negative for chest pain  Gastrointestinal: Negative for abdominal distention, abdominal pain, blood in stool, nausea and vomiting     Genitourinary: Negative for difficulty urinating, dysuria, enuresis, flank pain, frequency, hematuria and urgency  Urinary and fecal incontinence   Musculoskeletal: Negative for back pain  Skin: Negative for rash  Neurological: Negative for dizziness  Psychiatric/Behavioral: Positive for confusion  Past Medical History  Past Medical History:   Diagnosis Date    Acute ischemic right MCA stroke ([place] [address] )     Anticoagulant long-term use     last assessed: 17    Aortic stenosis     Atrial fibrillation ([place] [address] )     Blurred vision     last assessed: 10/25/13    [facility]est[facility]     Chronic kidney disease     Coronary artery disease     Dementia ([place] [address] )     Diabetes mellitus ([place] [address] )     Dysuria     last assessed: 8/3/15    Hyperlipidemia     Hypertension     Nonrheumatic aortic (valve) insufficiency     Old myocardial infarction     Rectal carcinoma ([facility])        Past Social History  Past Surgical History:   Procedure Laterality Date    BALLOON ANGIOPLASTY, ARTERY      CORONARY ARTERY BYPASS GRAFT      RECTAL SURGERY      TONSILLECTOMY       Social History     Tobacco Use   Smoking Status Former Smoker    Packs/day: 1 00    Years: 20 00    Pack years: 20 00    Last attempt to quit:     Years since quittin 1   Smokeless Tobacco Never Used   Tobacco Comment    1 pack a week;  No secondhand smoke exposure       Past Family History  Family History   Problem Relation Age of Onset    Stroke Mother     Stroke Father     Diabetes Son     No Known Problems Daughter        Past Social history  Social History     Socioeconomic History    Marital status: /Civil Union     Spouse name: Not on file    Number of children: Not on file    Years of education: Not on file    Highest education level: Not on file   Occupational History    Occupation: Retired   Social Needs    Financial resource strain: Not on file    Food insecurity:     Worry: Not on file     Inability: Not on file   [employer] needs: Medical: Not on file     Non-medical: Not on file   Tobacco Use    Smoking status: Former Smoker     Packs/day: 1 00     Years: 20 00     Pack years: 20 00     Last attempt to quit: 1975     Years since quittin 1    Smokeless tobacco: Never Used    Tobacco comment: 1 pack a week; No secondhand smoke exposure   Substance and Sexual Activity    Alcohol use: Not Currently    Drug use: No    Sexual activity: Not on file   Lifestyle    Physical activity:     Days per week: Not on file     Minutes per session: Not on file    Stress: Not on file   Relationships    Social connections:     Talks on phone: Not on file     Gets together: Not on file     Attends Jainism service: Not on file     Active member of club or organization: Not on file     Attends meetings of clubs or organizations: Not on file     Relationship status: Not on file    Intimate partner violence:     Fear of current or ex partner: Not on file     Emotionally abused: Not on file     Physically abused: Not on file     Forced sexual activity: Not on file   Other Topics Concern    Not on file   Social History Narrative    Lives with     House    2 children,5 grandchildren    retired       Current Medications  No current facility-administered medications for this visit  No current outpatient medications on file       Facility-Administered Medications Ordered in Other Visits   Medication Dose Route Frequency Provider Last Rate Last Dose    acetaminophen (TYLENOL) tablet 650 mg  650 mg Oral Q6H PRN Navarro Butts, DO        apixaban Ericka Nine) tablet 2 5 mg  2 5 mg Oral BID Navarro Butts, DO   2 5 mg at 20 1630    aspirin chewable tablet 81 mg  81 mg Oral Daily Navarro Butts, DO   81 mg at 20 0717    diltiazem (CARDIZEM CD) 24 hr capsule 240 mg  240 mg Oral Daily Dakota Britton MD   240 mg at 20 0913    insulin lispro (HumaLOG) 100 units/mL subcutaneous injection 1-5 Units  1-5 Units Subcutaneous TID [facility] DO Jerome   2 Units at 03/01/20 1126    insulin lispro (HumaLOG) 100 units/mL subcutaneous injection 1-5 Units  1-5 Units Subcutaneous HS Luis Swift DO        metoprolol (LOPRESSOR) injection 2 5 mg  2 5 mg Intravenous Q6H PRN Mackenzie Barrios PA-C   2 5 mg at 03/01/20 0425    metoprolol succinate (TOPROL-XL) 24 hr tablet 50 mg  50 mg Oral Daily Princess Mohan MD   50 mg at 03/01/20 0914    ondansetron (ZOFRAN) injection 4 mg  4 mg Intravenous Q6H PRN Luis Swift, DO           Allergies  Allergies   Allergen Reactions    Heparin          The following portions of the patient's history were reviewed and updated as appropriate: allergies, current medications, past medical history, past social history, past surgical history and problem list       Vitals  Vitals:    02/13/20 1517   BP: 106/60   Pulse: 61   Temp: (!) 96 8 °F (36 °C)   TempSrc: Tympanic   Weight: 45 8 kg (101 lb)   Height: 5' 1" (1 549 m)           Physical Exam  Physical Exam   Constitutional: She is oriented to person, place, and time  She appears well-developed and well-nourished  No distress  HENT:   Head: Atraumatic  Cardiovascular: Normal rate  Pulmonary/Chest: Effort normal and breath sounds normal  No respiratory distress  Musculoskeletal: Normal range of motion  Neurological: She is alert and oriented to person, place, and time  Skin: Skin is warm and dry  Psychiatric: She has a normal mood and affect  Vitals reviewed  Results  No results found for this or any previous visit (from the past 1 hour(s))  ]  No results found for: PSA  Lab Results   Component Value Date    GLUCOSE 124 08/13/2014    CALCIUM 9 0 03/01/2020     08/13/2014    K 3 8 03/01/2020    CO2 21 03/01/2020     (H) 03/01/2020    BUN 22 03/01/2020    CREATININE 1 07 03/01/2020     Lab Results   Component Value Date    WBC 8 18 03/01/2020    HGB 11 5 03/01/2020    HCT 36 2 03/01/2020    MCV 96 03/01/2020     03/01/2020     Orders  Orders Placed This Encounter   Procedures    Urine culture     Standing Status:   Future     Standing Expiration Date:   2/13/2021    Urinalysis with microscopic     Standing Status:   Future     Standing Expiration Date:   2/13/2021       SALVADOR Yun

## 2020-02-28 PROBLEM — R74.01 TRANSAMINITIS: Status: ACTIVE | Noted: 2020-01-01

## 2020-02-28 PROBLEM — T68.XXXA HYPOTHERMIA DUE TO COLD ENVIRONMENT: Status: ACTIVE | Noted: 2020-01-01

## 2020-02-28 PROBLEM — I48.0 PAROXYSMAL A-FIB (HCC): Status: ACTIVE | Noted: 2017-08-03

## 2020-02-28 PROBLEM — I50.42 CHRONIC COMBINED SYSTOLIC AND DIASTOLIC CHF (CONGESTIVE HEART FAILURE) (HCC): Status: ACTIVE | Noted: 2020-01-01

## 2020-02-28 PROBLEM — R77.8 ELEVATED TROPONIN: Status: ACTIVE | Noted: 2020-01-01

## 2020-02-28 NOTE — ED PROVIDER NOTES
History  Chief Complaint   Patient presents with    Cold Exposure     patient found at outside at [facility] for an unknown number of hours, tympanic temp originally of 91  patient alert at time of arrival; shivering; pt reports "I remember falling in the afternoon outside"      Patient is a 77-year-old female with past medical history of atrial fibrillation on Eliquis, prior history of CVA, diabetes, coronary artery disease, CKD stage 4 who presents the emergency department for evaluation of cold exposure  Her  saw her lying outside and called EMS  Per EMS patient had a tympanic temperature of 91° F, they started warmed IV fluids, placed warm packs in her axilla, and groin and warm blankets  On arrival patient is alert, she is oriented to self but not to place or time  Per her family she does have a history of dementia  Per her son and daughter-in-law, she and her  who is elderly and in a wheelchair lives at in a large house alone  They have to use stairs to go down into the basement so they can do laundry  They have [facility]NA 3 times a week for 3 hours on Monday, Wednesday and Friday, but otherwise they do not have any assistance at home  Per their son they refused to wear their life alert necklaces  History provided by:  EMS personnel and relative  History limited by:  Dementia      Prior to Admission Medications   Prescriptions Last Dose Informant Patient Reported? Taking?    ELIQUIS 2 5 MG  Child No Yes   Sig: ONE TABLET BY MOUTH TWO TIMES DAILY   LORazepam (ATIVAN) 1 mg tablet  Child No Yes   Sig: Take 0 5 tablets (0 5 mg total) by mouth daily at bedtime   Lancets (FREESTYLE) lancets  Child No Yes   Sig: Use as instructed   QUEtiapine (SEROquel) 25 mg tablet   No Yes   Sig: ONE-HALF TABLET (12 5MG) BY MOUTH AT BEDTIME   acetaminophen (TYLENOL) 325 mg tablet  Child No Yes   Sig: Take 1 tablet (325 mg total) by mouth every 6 (six) hours as needed for mild pain   aspirin 81 mg chewable tablet Child No Yes   Sig: Chew 1 tablet daily   atorvastatin (LIPITOR) 20 mg tablet   No Yes   Sig: ONE TABLET BY MOUTH DAILY WITH DINNER   calcitonin, salmon, (MIACALCIN) 200 units/act nasal spray  Child No Yes   Sig: USE 1 SPRAY IN ONE NOSTRIL DAILY--ALTERNATE NOSTRILS   diltiazem (CARDIZEM CD) 240 mg 24 hr capsule  Child No Yes   Sig: ONE CAPSULE BY MOUTH DAILY   famotidine (PEPCID) 20 mg tablet  Child No Yes   Sig: ONE TABLET BY MOUTH DAILY   furosemide (LASIX) 40 mg tablet   No Yes   Sig: ONE TABLET BY MOUTH DAILY   glimepiride (AMARYL) 1 mg tablet   No Yes   Sig: ONE TABLET BY MOUTH DAILY WITH BREAKFAST   glucose blood (FREESTYLE PRECISION ELIA TEST) test strip  Child No Yes   Sig: Use as instructed to test sugar 2 times daily DX: E11 9   glucose monitoring kit (FREESTYLE) monitoring kit  Child No Yes   Si each by Does not apply route daily   megestrol (MEGACE) 20 mg tablet  Child No Yes   Sig: ONE-HALF TABLET BY MOUTH DAILY FOR WEIGHT GAIN   metoprolol succinate (TOPROL-XL) 50 mg 24 hr tablet  Child No Yes   Sig: ONE TABLET BY MOUTH DAILY   potassium chloride (K-DUR,KLOR-CON) 10 mEq tablet  Child No Yes   Sig: ONE TABLET BY MOUTH DAILY   spironolactone (ALDACTONE) 25 mg tablet  Child No Yes   Sig: ONE-HALF TABLET (12 5MG) BY MOUTH DAILY      Facility-Administered Medications: None       Past Medical History:   Diagnosis Date    Acute ischemic right MCA stroke (HCC)     Anticoagulant long-term use     last assessed: 17    Aortic stenosis     Atrial fibrillation (HCC)     Blurred vision     last assessed: 10/25/13    CHF (congestive heart failure) (HCC)     Chronic kidney disease     Coronary artery disease     Dementia (HCC)     Diabetes mellitus (HCC)     Dysuria     last assessed: 8/3/15    Hyperlipidemia     Hypertension     Nonrheumatic aortic (valve) insufficiency     Old myocardial infarction     Rectal carcinoma ([facility] [address] )        Past Surgical History:   Procedure Laterality Date    BALLOON ANGIOPLASTY, ARTERY      CORONARY ARTERY BYPASS GRAFT      RECTAL SURGERY      TONSILLECTOMY         Family History   Problem Relation Age of Onset    Stroke Mother     Stroke Father     Diabetes Son     No Known Problems Daughter      I have reviewed and agree with the history as documented  E-Cigarette/Vaping    E-Cigarette Use Never User      E-Cigarette/Vaping Substances     Social History     Tobacco Use    Smoking status: Former Smoker     Packs/day: 1 00     Years: 20 00     Pack years: 20 00     Last attempt to quit:      Years since quittin 1    Smokeless tobacco: Never Used    Tobacco comment: 1 pack a week; No secondhand smoke exposure   Substance Use Topics    Alcohol use: No    Drug use: No        Review of Systems   Unable to perform ROS: Dementia       Physical Exam  ED Triage Vitals [20 1711]   Temperature Pulse Respirations Blood Pressure SpO2   (!) 93 9 °F (34 4 °C) (!) 109 20 155/97 99 %      Temp Source Heart Rate Source Patient Position - Orthostatic VS BP Location FiO2 (%)   Rectal Monitor Lying Left arm --      Pain Score       No Pain             Orthostatic Vital Signs  Vitals:    20 1842 20 1939 20   BP: 111/56 105/51 113/50 113/50   Pulse: 62 (!) 51 58 62   Patient Position - Orthostatic VS: Lying Lying         Physical Exam   Constitutional: She appears well-developed and well-nourished  HENT:   Head: Normocephalic and atraumatic  Right Ear: External ear normal    Left Ear: External ear normal    Nose: Nose normal    Dry mucous membranes   Eyes: Pupils are equal, round, and reactive to light  Conjunctivae and EOM are normal  No scleral icterus  Neck: Normal range of motion and full passive range of motion without pain  Neck supple  No JVD present  No tracheal deviation present  Cardiovascular: Normal heart sounds and intact distal pulses  An irregularly irregular rhythm present  Tachycardia present     No murmur heard  Pulmonary/Chest: Effort normal and breath sounds normal  No respiratory distress  Abdominal: Soft  Bowel sounds are normal  She exhibits no distension  There is no tenderness  There is no guarding  Musculoskeletal: Normal range of motion  She exhibits no edema  Neurological: She is alert  She has normal strength  She is disoriented  No cranial nerve deficit or sensory deficit  She exhibits normal muscle tone  GCS eye subscore is 4  GCS verbal subscore is 4  GCS motor subscore is 6  Skin: Capillary refill takes 2 to 3 seconds  Ecchymosis noted  She is not diaphoretic  There is pallor  Skin is cool to touch, delayed capillary refill, mottling diffusely   Psychiatric: She has a normal mood and affect  Her behavior is normal    Vitals reviewed        ED Medications  Medications   aspirin chewable tablet 81 mg (has no administration in time range)   apixaban (ELIQUIS) tablet 2 5 mg (has no administration in time range)   ondansetron (ZOFRAN) injection 4 mg (has no administration in time range)   acetaminophen (TYLENOL) tablet 650 mg (has no administration in time range)   multi-electrolyte (PLASMALYTE-A/ISOLYTE-S PH 7 4) IV solution (has no administration in time range)   insulin lispro (HumaLOG) 100 units/mL subcutaneous injection 1-5 Units (has no administration in time range)   insulin lispro (HumaLOG) 100 units/mL subcutaneous injection 1-5 Units (has no administration in time range)   metoprolol (LOPRESSOR) injection 2 5 mg (has no administration in time range)   aspirin tablet 325 mg (325 mg Oral Given 2/28/20 1839)       Diagnostic Studies  Results Reviewed     Procedure Component Value Units Date/Time    Urine Microscopic [[id number]]  (Abnormal) Collected:  02/28/20 1851    Lab Status:  Final result Specimen:  Urine, Other Updated:  02/28/20 2212     RBC, UA 2-4 /hpf      WBC, UA 2-4 /hpf      Epithelial Cells None Seen /hpf      Bacteria, UA None Seen /hpf     Troponin I [[id number]] Lab Status:  No result Specimen:  Blood     Troponin I [[id number]]     Lab Status:  No result Specimen:  Blood     CK (with reflex to MB) [[id number]]     Lab Status:  No result Specimen:  Blood     Troponin I [[id number]]  (Abnormal) Collected:  02/28/20 1927    Lab Status:  Final result Specimen:  Blood from Arm, Left Updated:  02/28/20 2002     Troponin I 0 50 ng/mL     POCT urinalysis dipstick [[id number]]  (Normal) Resulted:  02/28/20 1849    Lab Status:  Final result Updated:  02/28/20 1849     Color, UA see chart    Urine Macroscopic, POC [[id number]]  (Abnormal) Collected:  02/28/20 1851    Lab Status:  Final result Specimen:  Urine Updated:  02/28/20 1847     Color, UA Yellow     Clarity, UA Clear     pH, UA 6 5     Leukocytes, UA Large     Nitrite, UA Negative     Protein, UA Negative mg/dl      Glucose,  (1/10%) mg/dl      Ketones, UA Negative mg/dl      Urobilinogen, UA 0 2 E U /dl      Bilirubin, UA Negative     Blood, UA Moderate     Specific [place], UA 1 015    Narrative:       CLINITEK RESULT    Troponin I [[id number]]  (Abnormal) Collected:  02/28/20 1721    Lab Status:  Final result Specimen:  Blood from Arm, Left Updated:  02/28/20 1757     Troponin I 0 56 ng/mL     Comprehensive metabolic panel [[id number]]  (Abnormal) Collected:  02/28/20 1722    Lab Status:  Final result Specimen:  Blood from Arm, Left Updated:  02/28/20 1755     Sodium 138 mmol/L      Potassium 4 9 mmol/L      Chloride 108 mmol/L      CO2 16 mmol/L      ANION GAP 14 mmol/L      BUN 37 mg/dL      Creatinine 1 88 mg/dL      Glucose 313 mg/dL      Calcium 9 7 mg/dL       U/L       U/L      Alkaline Phosphatase 98 U/L      Total Protein 7 7 g/dL      Albumin 4 0 g/dL      Total Bilirubin 0 98 mg/dL      eGFR 23 ml/min/1 73sq m     Narrative:       Alyssa guidelines for Chronic Kidney Disease (CKD):     Stage 1 with normal or high GFR (GFR > 90 mL/min/1 73 square meters)    Stage 2 Mild CKD (GFR = 60-89 mL/min/1 73 square meters)    Stage 3A Moderate CKD (GFR = 45-59 mL/min/1 73 square meters)    Stage 3B Moderate CKD (GFR = 30-44 mL/min/1 73 square meters)    Stage 4 Severe CKD (GFR = 15-29 mL/min/1 73 square meters)    Stage 5 End Stage CKD (GFR <15 mL/min/1 73 square meters)  Note: GFR calculation is accurate only with a steady state creatinine    Blood gas, venous [[id number]]  (Abnormal) Collected:  02/28/20 1721    Lab Status:  Final result Specimen:  Blood from Arm, Left Updated:  02/28/20 1732     pH, Allan 7 270     pCO2, Allan 36 2 mm Hg      pO2, Allan 42 4 mm Hg      HCO3, Allan 16 2 mmol/L      Base Excess, Allan -9 8 mmol/L      O2 Content, Allan 11 5 ml/dL      O2 HGB, VENOUS 66 0 %     CBC and differential [[id number]]  (Abnormal) Collected:  02/28/20 1721    Lab Status:  Final result Specimen:  Blood from Arm, Left Updated:  02/28/20 1731     WBC 9 27 Thousand/uL      RBC 3 80 Million/uL      Hemoglobin 11 6 g/dL      Hematocrit 37 2 %      MCV 98 fL      MCH 30 5 pg      MCHC 31 2 g/dL      RDW 13 2 %      MPV 12 1 fL      Platelets 009 Thousands/uL      nRBC 0 /100 WBCs      Neutrophils Relative 69 %      Immat GRANS % 0 %      Lymphocytes Relative 27 %      Monocytes Relative 4 %      Eosinophils Relative 0 %      Basophils Relative 0 %      Neutrophils Absolute 6 35 Thousands/µL      Immature Grans Absolute 0 04 Thousand/uL      Lymphocytes Absolute 2 48 Thousands/µL      Monocytes Absolute 0 34 Thousand/µL      Eosinophils Absolute 0 04 Thousand/µL      Basophils Absolute 0 02 Thousands/µL     Fingerstick Glucose (POCT) [[id number]]  (Abnormal) Collected:  02/28/20 1713    Lab Status:  Final result Updated:  02/28/20 1714     POC Glucose 272 mg/dl                  CT head without contrast   Final Result by Lesia Bacon DO (02/28 1853)      No acute intracranial abnormality  Age-related atrophy and microangiopathic changes              Workstation performed: [id number] XR chest 2 views   ED Interpretation by Kerrie Euceda DO (02/28 1209)   Cardiomegaly  No acute cardiopulmonary processes             Procedures  Procedures      ED Course  ED Course as of Feb 28 2216   Fri Feb 28, 2020   1735 Procedure Note: EKG  Date/Time: 02/28/20 5:35 PM   Interpreted by: Elis Meier DO  Indications / Diagnosis: hypothermia  ECG reviewed by me, the ED Physician: yes   The EKG demonstrates:  Rhythm: * atrial fibrillation  Intervals: normal intervals  Axis:  Left axis  QRS/Blocks: normal QRS  ST Changes: No acute ST Changes, no STD/MURPHY  Identification of Seniors at Risk      Most Recent Value   (ISAR) Identification of Seniors at Risk   Before the illness or injury that brought you to the Emergency, did you need someone to help you on a regular basis? 1 Filed at: 02/28/2020 1713   In the last 24 hours, have you needed more help than usual?  1 Filed at: 02/28/2020 1713   Have you been hospitalized for one or more nights during the past 6 months? 1 Filed at: 02/28/2020 1713   In general, do you see well? 1 Filed at: 02/28/2020 1713   In general, do you have serious problems with your memory? 1 Filed at: 02/28/2020 1713   Do you take more than three different medications every day? 1 Filed at: 02/28/2020 1713   ISAR Score  6 Filed at: 02/28/2020 1713                          MDM  Number of Diagnoses or Management Options  Elevated troponin I level: new and requires workup  Hypothermia due to exposure: new and requires workup  Metabolic acidosis with increased anion gap and accumulation of organic acids: new and requires workup  Transaminitis: new and requires workup  Diagnosis management comments: Patient found outside, hypothermia secondary to exposure  Unclear why she was outside  She does have ecchymoses over her right elbow, and she is on Eliquis for atrial fibrillation, for obtain CT scan to evaluate for intracranial cause of confusion    Urinalysis to evaluate for UTI, labs, cardiac workup in EKG  EKG atrial fibrillation, metabolic acidosis with increased anion gap likely secondary to lactic acidosis in the setting of cold exposure  Transaminitis  Elevated troponin I without ischemic changes on EKG, likely secondary to NSTEMI type 2  Amount and/or Complexity of Data Reviewed  Clinical lab tests: ordered and reviewed  Tests in the radiology section of CPT®: ordered and reviewed  Tests in the medicine section of CPT®: ordered and reviewed  Decide to obtain previous medical records or to obtain history from someone other than the patient: yes  Obtain history from someone other than the patient: yes  Review and summarize past medical records: yes  Independent visualization of images, tracings, or specimens: yes          Disposition  Final diagnoses:   Hypothermia due to exposure   Metabolic acidosis with increased anion gap and accumulation of organic acids   Transaminitis   Elevated troponin I level     Time reflects when diagnosis was documented in both MDM as applicable and the Disposition within this note     Time User Action Codes Description Comment    2/28/2020  8:07 PM Sebastián, James[address]  XXXA] Hypothermia due to exposure     2/28/2020  8:07 PM Debbrah Camp Add [J04 9] Metabolic acidosis, normal anion gap (NAG)     2/28/2020  8:08 PM Debbrah Camp Remove [H61 4] Metabolic acidosis, normal anion gap (NAG)     2/28/2020  8:09 PM Debbrah Camp Add [O96 4] Metabolic acidosis with increased anion gap and accumulation of organic acids     2/28/2020  8:09 PM Debbrah Camp Add [R74 0] Transaminitis     2/28/2020  8:09 PM Debbrah Camp Add [R79 89] Elevated troponin I level       ED Disposition     ED Disposition Condition Date/Time Comment    Admit Stable Fri Feb 28, 2020  8:07 PM Case was discussed with RADHA and the patient's admission status was agreed to be Admission Status: inpatient status to the service of Dr Gertrude Mcdonald           Follow-up Information None         Current Discharge Medication List      CONTINUE these medications which have NOT CHANGED    Details   acetaminophen (TYLENOL) 325 mg tablet Take 1 tablet (325 mg total) by mouth every 6 (six) hours as needed for mild pain  Qty: 30 tablet, Refills: 0    Associated Diagnoses: Acute on chronic diastolic congestive heart failure (HCC)      aspirin 81 mg chewable tablet Chew 1 tablet daily  Refills: 0      atorvastatin (LIPITOR) 20 mg tablet ONE TABLET BY MOUTH DAILY WITH DINNER  Qty: 90 tablet, Refills: 0    Associated Diagnoses: Hyperlipidemia, unspecified hyperlipidemia type      calcitonin, salmon, (MIACALCIN) 200 units/act nasal spray USE 1 SPRAY IN ONE NOSTRIL DAILY--ALTERNATE NOSTRILS  Qty: 11 1 mL, Refills: 0    Comments: **Patient requests 90 days supply**  Associated Diagnoses: Chronic bilateral low back pain without sciatica      diltiazem (CARDIZEM CD) 240 mg 24 hr capsule ONE CAPSULE BY MOUTH DAILY  Qty: 30 capsule, Refills: 2    Associated Diagnoses: Permanent atrial fibrillation      ELIQUIS 2 5 MG ONE TABLET BY MOUTH TWO TIMES DAILY  Qty: 180 tablet, Refills: 1    Associated Diagnoses: Atrial fibrillation with rapid ventricular response ([facility])      famotidine (PEPCID) 20 mg tablet ONE TABLET BY MOUTH DAILY  Qty: 90 tablet, Refills: 1    Associated Diagnoses: Acute on chronic diastolic congestive heart failure ([facility])      furosemide (LASIX) 40 mg tablet ONE TABLET BY MOUTH DAILY  Qty: 90 tablet, Refills: 0    Associated Diagnoses: 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 failure ([address] ); Atrial fibrillation with RVR ([facility] [address] );  Severe protein-calorie malnutrition Paulene Meo: less than 60% of standard weight) ([facility])      glimepiride (AMARYL) 1 mg tablet ONE TABLET BY MOUTH DAILY WITH BREAKFAST  Qty: 90 tablet, Refills: 0    Associated Diagnoses: Type 2 diabetes mellitus without complication, without long-term current use of insulin ([facility])      glucose blood (FREESTYLE PRECISION ELIA TEST) test strip Use as instructed to test sugar 2 times daily DX: E11 9  Qty: 200 each, Refills: 3    Associated Diagnoses: Controlled type 2 diabetes mellitus without complication, without long-term current use of insulin ([facility])      glucose monitoring kit (FREESTYLE) monitoring kit 1 each by Does not apply route daily  Qty: 1 each, Refills: 0    Associated Diagnoses: Type 2 diabetes mellitus without complication, without long-term current use of insulin ([facility])      Lancets (FREESTYLE) lancets Use as instructed  Qty: 100 each, Refills: 2    Associated Diagnoses: Type 2 diabetes mellitus without complication, without long-term current use of insulin ([facility])      LORazepam (ATIVAN) 1 mg tablet Take 0 5 tablets (0 5 mg total) by mouth daily at bedtime  Qty: 30 tablet, Refills: 1    Associated Diagnoses: Anxiety      megestrol (MEGACE) 20 mg tablet ONE-HALF TABLET BY MOUTH DAILY FOR WEIGHT GAIN  Qty: 45 tablet, Refills: 1    Associated Diagnoses: Moderate protein-calorie malnutrition ([facility])      metoprolol succinate (TOPROL-XL) 50 mg 24 hr tablet ONE TABLET BY MOUTH DAILY  Qty: 90 tablet, Refills: 1    Associated Diagnoses: Acute on chronic diastolic congestive heart failure ([facility])      potassium chloride (K-DUR,KLOR-CON) 10 mEq tablet ONE TABLET BY MOUTH DAILY  Qty: 30 tablet, Refills: 5    Associated Diagnoses: Benign essential HTN; Coronary artery disease involving native heart without angina pectoris, unspecified vessel or lesion type      QUEtiapine (SEROquel) 25 mg tablet ONE-HALF TABLET (12 5MG) BY MOUTH AT BEDTIME  Qty: 45 tablet, Refills: 0    Associated Diagnoses: Acute on chronic diastolic congestive heart failure ([facility])      spironolactone (ALDACTONE) 25 mg tablet ONE-HALF TABLET (12 5MG) BY MOUTH DAILY  Qty: 45 tablet, Refills: 1    Associated Diagnoses: 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 failure ([address] );  Atrial fibrillation with RVR ([facility])           No discharge procedures on file     PDMP Review     None           ED Provider  Attending physically available and evaluated Angelito Yeboah  I managed the patient along with the ED Attending      Electronically Signed by         Tony Senior DO  02/28/20 3904

## 2020-02-29 PROBLEM — R41.89 COGNITIVE DEFICITS: Status: ACTIVE | Noted: 2020-01-01

## 2020-02-29 PROBLEM — R62.7 FTT (FAILURE TO THRIVE) IN ADULT: Status: ACTIVE | Noted: 2020-01-01

## 2020-02-29 PROBLEM — M62.82 RHABDOMYOLYSIS: Status: ACTIVE | Noted: 2020-01-01

## 2020-02-29 NOTE — ASSESSMENT & PLAN NOTE
Lab Results   Component Value Date    HGBA1C 6 2 09/20/2019       Recent Labs     02/28/20  1713   POCGLU 272*       Blood Sugar Average: Last 72 hrs:  (P) 272   Hold oral meds  Start insulin sliding scale

## 2020-02-29 NOTE — ASSESSMENT & PLAN NOTE
Patient denied chest pain or shortness of breath  Underlying CAD status post CABG  Suspect non MI elevated troponin likely secondary to possible rhabdo  Continue to monitor on tele

## 2020-02-29 NOTE — ASSESSMENT & PLAN NOTE
With dehydration,  Secondary cold exposure from being outside  Core temperature improving after treatment in ER with warmed IV fluid warm packs and warm blankets  Continue with IV fluid for hydration  Hold diuretics  Monitor

## 2020-02-29 NOTE — PROGRESS NOTES
Patient combative, screaming, hitting and kicking staff  Patient insists she has to go home to her twins and she's going to call the police on us  Patient taking off telemetry monitor, pulse ox, and attempting to get out of bed on her own  SLIM notified and ordered posey

## 2020-02-29 NOTE — H&P
H&P- Edu Hedrick 4/4/1927, 80 y o  female MRN: [id number]    Unit/Bed#: [facility] 733-01 Encounter: [id number]    Primary Care Provider:  Ladonna Cardozo MD   Date and time admitted to hospital: 2/28/2020  4:59 PM        * Hypothermia due to cold environment  Assessment & Plan  With dehydration,  Secondary cold exposure from being outside  Core temperature improving after treatment in ER with warmed IV fluid warm packs and warm blankets  Continue with IV fluid for hydration  Hold diuretics  Monitor    Transaminitis  Assessment & Plan  Likely secondary to above  Check CPK rule out rhabdo  Hold statin  Continue IV fluid for hydration    Elevated troponin  Assessment & Plan  Patient denied chest pain or shortness of breath  Underlying CAD status post CABG  Suspect non MI elevated troponin likely secondary to possible rhabdo  Continue to monitor on tele    Chronic combined systolic and diastolic CHF (congestive heart failure) ([facility])  Assessment & Plan  Wt Readings from Last 3 Encounters:   02/13/20 45 8 kg (101 lb)   01/31/20 47 6 kg (105 lb)   12/05/19 47 7 kg (105 lb 3 2 oz)     Last echo on 4/30/2018 with EF 44%  Mild to moderate severe AS and moderate pulmonary hypertension  Patient looks dehydrated  Hold diuretics  Continue with gentle IV fluid hydration        Stage 4 chronic kidney disease ([facility])  Assessment & Plan  Anion gap metabolic acidosis, suspect secondary to hypothermia and possible rhabdo   Hold diuretics  Continue with IV fluid for hydration  Continue to monitor    Essential hypertension  Assessment & Plan  Monitor blood pressure    Paroxysmal A-fib ([facility])  Assessment & Plan  With intermittent bradycardia  Hold beta-blocker and diltiazem  Continue Eliquis  Monitor heart rate and reassess in 24 hours for restarting above medications    Type 2 diabetes mellitus with stage 4 chronic kidney disease, without long-term current use of insulin ([facility])  Assessment & Plan  Lab Results   Component Value Date    HGBA1C 6 2 09/20/2019       Recent Labs     02/28/20  1713   POCGLU 272*       Blood Sugar Average: Last 72 hrs:  (P) 272   Hold oral meds  Start insulin sliding scale    VTE Prophylaxis: Apixaban (Eliquis)  / sequential compression device   Code Status: DNR/DNI  POLST: There is no POLST form on file for this patient (pre-hospital)  Discussion with family:  Unable to reach family by phone    Anticipated Length of Stay:  Patient will be admitted on an Inpatient basis with an anticipated length of stay of  > 2 midnights  Justification for Hospital Stay:  Management of hypothermia and elevated troponin    Total Time for Visit, including Counseling / Coordination of Care: 1 hour  Greater than 50% of this total time spent on direct patient counseling and coordination of care  Chief Complaint:     Cold exposure    History of Present Illness: Nikki Carvajal is a 80 y o  female who presents with cold exposure and  hypothermia  Patient with underlying atrial fibrillation on Eliquis, prior history of CVA, diabetes, chronic systolic and diastolic CHF, mild to moderate AS, CAD status post CABG,  CKD stage 4 who brought to the emergency department for evaluation of cold exposure  Unable to reach patient's family by phone, data collected from ER note and sign out  Per patient she went outside in the backyard during noon time, and she was unable to get inside her house, her  with hearing aid he could not hear her, so she was outside in the cold weather for unknown amount of hours i  Her  saw her lying outside and called EMS  Per EMS patient had a tympanic temperature of 91° F, they started warmed IV fluids, placed warm packs in her axilla, and groin and warm blankets  On arrival patient is alert, she is oriented to self but not to place or time  Per her family she does have a history of dementia    Per her son and daughter-in-law, she and her  who is elderly and in a wheelchair lives at in a large house alone  They have VNA 3 times a week for 3 hours on Monday, Wednesday and Friday, but otherwise they do not have any assistance at home  Per their son they refused to wear their life alert necklaces  Currently patient core temperature is much better, she is more awake alert oriented  Review of Systems:    Review of Systems   All other systems reviewed and are negative  Patient denies recent illness  No fever chills  No nausea vomiting or diarrhea  No chest pain or shortness of breath  No dysuria  Past Medical and Surgical History:     Past Medical History:   Diagnosis Date    [facility] ischemic right MCA [facility] )     Anticoagulant long-term use     last assessed: 8/17/17    Aortic stenosis     Atrial fibrillation ([place] [address] )     Blurred vision     last assessed: 10/25/13    [facility]congest[facility] failure) ([facility])     Chronic kidney disease     Coronary artery disease     Dementia ([place] [address] )     Diabetes mellitus ([place] [address] )     Dysuria     last assessed: 8/3/15    Hyperlipidemia     Hypertension     Nonrheumatic aortic (valve) insufficiency     Old myocardial infarction     Rectal carcinoma ([facility])        Past Surgical History:   Procedure Laterality Date    BALLOON ANGIOPLASTY, ARTERY      CORONARY ARTERY BYPASS GRAFT      RECTAL SURGERY      TONSILLECTOMY         Meds/Allergies:    Prior to Admission medications    Medication Sig Start Date End Date Taking?  Authorizing Provider   acetaminophen (TYLENOL) 325 mg tablet Take 1 tablet (325 mg total) by mouth every 6 (six) hours as needed for mild pain 5/8/18  Yes Jose G Ruelas MD   aspirin 81 mg chewable tablet Chew 1 tablet daily 8/7/17  Yes Clinton Gee DO   atorvastatin (LIPITOR) 20 mg tablet ONE TABLET BY MOUTH DAILY WITH DINNER 2/21/20  Yes Yuan Montelongo MD   calcitonin, salmon, (MIACALCIN) 200 units/act nasal spray USE 1 SPRAY IN ONE NOSTRIL DAILY--ALTERNATE NOSTRILS 3/15/18  Yes Yuan Montelongo MD   diltiazem (CARDIZEM CD) 240 mg 24 hr capsule ONE CAPSULE BY MOUTH DAILY 20  Yes Dakotah Caraballo MD   ELIQUIS 2 5 MG ONE TABLET BY MOUTH TWO TIMES DAILY 19  Yes Dakotah Caraballo MD   famotidine (PEPCID) 20 mg tablet ONE TABLET BY MOUTH DAILY 19  Yes Dakotah Caraballo MD   furosemide (LASIX) 40 mg tablet ONE TABLET BY MOUTH DAILY 20  Yes Dakotah Caraballo MD   glimepiride (AMARYL) 1 mg tablet ONE TABLET BY MOUTH DAILY WITH BREAKFAST 20  Yes Dakotah Caraballo MD   glucose blood (FREESTYLE PRECISION ELIA TEST) test strip Use as instructed to test sugar 2 times daily DX: E11 9 19  Yes Dakotah Caraballo MD   glucose monitoring kit (FREESTYLE) monitoring kit 1 each by Does not apply route daily 18  Yes Dakotah Caraballo MD   Lancets (FREESTYLE) lancets Use as instructed 18  Yes Dakotah Caraballo MD   LORazepam (ATIVAN) 1 mg tablet Take 0 5 tablets (0 5 mg total) by mouth daily at bedtime 19  Yes Dakotah Caraballo MD   megestrol (MEGACE) 20 mg tablet ONE-HALF TABLET BY MOUTH DAILY FOR WEIGHT GAIN 19  Yes Dakotah Caraballo MD   metoprolol succinate (TOPROL-XL) 50 mg 24 hr tablet ONE TABLET BY MOUTH DAILY 19  Yes Dakotah Caraballo MD   potassium chloride (K-DUR,KLOR-CON) 10 mEq tablet ONE TABLET BY MOUTH DAILY 10/1/19  Yes Dakotah Caraballo MD   QUEtiapine (SEROquel) 25 mg tablet ONE-HALF TABLET (12 5MG) BY MOUTH AT BEDTIME 20  Yes Dakotah Caraballo MD   spironolactone (ALDACTONE) 25 mg tablet ONE-HALF TABLET (12 5MG) BY MOUTH DAILY 19  Yes Dakotah Caraballo MD     I have reviewed home medications with a medical source (PCP, Pharmacy, other)  Allergies:    Allergies   Allergen Reactions    Heparin        Social History:     Marital Status: /Civil Union     Substance Use History:   Social History     Substance and Sexual Activity   Alcohol Use No     Social History     Tobacco Use   Smoking Status Former Smoker    Packs/day: 1 00    Years: 20 00    Pack years: 20 00    Last attempt to quit: 1975    Years since quittin 1   Smokeless Tobacco Never Used Tobacco Comment    1 pack a week; No secondhand smoke exposure     Social History     Substance and Sexual Activity   Drug Use No       Family History:    non-contributory    Physical Exam:     Vitals:   Blood Pressure: 105/51 (02/28/20 1939)  Pulse: (!) 51 (02/28/20 1939)  Temperature: (!) 97 3 °F (36 3 °C) (02/28/20 1842)  Temp Source: Rectal (02/28/20 1842)  Respirations: 19 (02/28/20 1939)  SpO2: 98 % (02/28/20 1939)    Physical Exam   Constitutional: She appears well-developed  No distress  Ill-appearing  Dehydrated   HENT:   Head: Normocephalic and atraumatic  Mouth/Throat: Oropharynx is clear and moist  No oropharyngeal exudate  Dry mucous membrane   Eyes: Conjunctivae and EOM are normal  No scleral icterus  Neck: Normal range of motion  Neck supple  No JVD present  Cardiovascular: Regular rhythm, normal heart sounds and intact distal pulses  No murmur (1/6 systolic murmur) heard  Pulmonary/Chest: Effort normal and breath sounds normal  No respiratory distress  She has no wheezes  Abdominal: Soft  Bowel sounds are normal  She exhibits no distension  There is no tenderness  There is no rebound  Musculoskeletal: Normal range of motion  She exhibits no edema or tenderness  Neurological: She is alert  No cranial nerve deficit  Skin: Skin is warm and dry  No rash noted  Additional Data:     Lab Results: I have personally reviewed pertinent reports        Results from last 7 days   Lab Units 02/28/20  1721   WBC Thousand/uL 9 27   HEMOGLOBIN g/dL 11 6   HEMATOCRIT % 37 2   PLATELETS Thousands/uL 170   NEUTROS PCT % 69   LYMPHS PCT % 27   MONOS PCT % 4   EOS PCT % 0     Results from last 7 days   Lab Units 02/28/20  1722   SODIUM mmol/L 138   POTASSIUM mmol/L 4 9   CHLORIDE mmol/L 108   CO2 mmol/L 16*   BUN mg/dL 37*   CREATININE mg/dL 1 88*   ANION GAP mmol/L 14*   CALCIUM mg/dL 9 7   ALBUMIN g/dL 4 0   TOTAL BILIRUBIN mg/dL 0 98   ALK PHOS U/L 98   ALT U/L 107*   AST U/L 119* GLUCOSE RANDOM mg/dL 313*         Results from last 7 days   Lab Units 02/28/20  1713   POC GLUCOSE mg/dl 272*               Imaging: I have personally reviewed pertinent reports  CT head without contrast   Final Result by Shannon Euceda DO (02/28 1853)      No acute intracranial abnormality  Age-related atrophy and microangiopathic changes  Workstation performed: [id number]         XR chest 2 views   ED Interpretation by Marie Mata DO (02/28 1855)   Cardiomegaly  No acute cardiopulmonary processes           EKG, Pathology, and Other Studies Reviewed on Admission:   · YES  · EKG is unavailable    [facility] / [facility] Records Reviewed: Yes     ** Please Note: This note has been constructed using a voice recognition system   **

## 2020-02-29 NOTE — PROGRESS NOTES
Progress Note - Remigio Letters 4/4/1927, 80 y o  female MRN: [id number]    Unit/Bed#: [facility] 733-01 Encounter: [id number]    Primary Care Provider: Dakotah Caraballo MD   Date and time admitted to hospital: 2/28/2020  4:59 PM        * Hypothermia due to cold environment  Assessment & Plan  With dehydration,  Secondary cold exposure from being outside  Core temperature improving after treatment in ER with warmed IV fluid warm packs and warm blankets  Continue with IV fluid for hydration  Hold diuretics  Temperature now normal    FTT (failure to thrive) in adult  Assessment & Plan  Recent weight loss at home  Not sure whether she has been feeding herself regularly at home  Appetite improved here  Monitor    Rhabdomyolysis  Assessment & Plan  Mild with CPK of 344  Pt's family reported possible falls at home  Continue IVF and trend  May be the cause of  mild transaminitis and elevated troponin    Type 2 diabetes mellitus with stage 4 chronic kidney disease, without long-term current use of insulin [facility])  Assessment & Plan  Lab Results   Component Value Date    HGBA1C 6 9 (H) 02/29/2020       Recent Labs     02/29/20  0810 02/29/20  0816 02/29/20  0834 02/29/20  1058   POCGLU 41* 54* 178* 211*       Blood Sugar Average: Last 72 hrs:  (P) 134   Hold oral meds  Hypoglycemia this morning  Appears resolved  Appetite improved   Will check cortisol Am     Transaminitis  Assessment & Plan  Likely secondary to above  Hold statin  Trending down    Paroxysmal A-fib (H[facility])  Assessment & Plan  With intermittent bradycardia  Hold beta-blocker and diltiazem  Continue Eliquis  Monitor heart rate and reassess in 24 hours for restarting above medications    Elevated troponin  Assessment & Plan  Patient denied chest pain or shortness of breath  Underlying CAD status post CABG  Suspect non MI elevated troponin likely secondary to possible rhabdo  Continue to monitor on tele    Stage 4 chronic kidney disease ([facility])  Assessment & Plan  Anion gap metabolic acidosis, suspect secondary to hypothermia and possible rhabdo   Hold diuretics  Continue with IV fluid for hydration  Continue to monitor    Chronic combined systolic and diastolic CHF (congestive heart failure) ([facility])  Assessment & Plan  Wt Readings from Last 3 Encounters:   20 45 8 kg (101 lb)   20 47 6 kg (105 lb)   19 47 7 kg (105 lb 3 2 oz)     Last echo on 2018 with EF 44%  Mild to moderate severe AS and moderate pulmonary hypertension  Patient looks dehydrated  Hold diuretics  Continue with gentle IV fluid hydration        Cognitive deficits  Assessment & Plan  Family reports memory loss and recent decline  She was also noted to have aggressive behavior toward her  at times  Will likely need long term placement  Family wants her  to go to the same facility if possible  CM is working on [employer]           VTE Pharmacologic Prophylaxis:   Pharmacologic: Apixaban (Eliquis)  Mechanical VTE Prophylaxis in Place: Yes    Patient Centered Rounds:   Discussions with Specialists or Other Care Team Provider:     Education and Discussions with Family / Patient: Spoke with family    Time Spent for Care: 20 minutes  More than 50% of total time spent on counseling and coordination of care as described above  Current Length of Stay: 1 day(s)    Current Patient Status: Inpatient   Certification Statement: The patient will continue to require additional inpatient hospital stay due to above    Discharge Plan / Estimated Discharge Date: Rehab recommended    Code Status: Level 3 - DNAR and DNI      Subjective:   Pt is awake appearing calm in her bed  Event noted  She was hypoglycemic this morning  Recent BG has improved   She knows "[facility]" " February"    Objective:     Vitals:   Temp (24hrs), Av 9 °F (36 1 °C), Min:93 9 °F (34 4 °C), Max:98 °F (36 7 °C)    Temp:  [93 9 °F (34 4 °C)-98 °F (36 7 °C)] 97 8 °F (36 6 °C)  HR:  [] 79  Resp:  [18-20] 18  BP: (105-155)/(50-97) 128/68  SpO2:  [97 %-99 %] 98 %  There is no height or weight on file to calculate BMI  Input and Output Summary (last 24 hours): Intake/Output Summary (Last 24 hours) at 2/29/2020 1548  Last data filed at 2/29/2020 1541  Gross per 24 hour   Intake 1255 ml   Output 778 ml   Net 477 ml       Physical Exam:     Physical Exam   Constitutional: No distress  Eyes: Right eye exhibits no discharge  Left eye exhibits no discharge  Cardiovascular: Normal rate and regular rhythm  Exam reveals no gallop and no friction rub  Pulmonary/Chest: Effort normal and breath sounds normal  No stridor  No respiratory distress  Abdominal: Soft  Bowel sounds are normal  She exhibits no distension  There is no tenderness  Musculoskeletal: She exhibits no edema  Neurological: She is alert  Skin: Skin is warm and dry  She is not diaphoretic  Psychiatric: Her speech is normal  She is inattentive         (  Additional Data:     Labs:    Results from last 7 days   Lab Units 02/29/20  1025   WBC Thousand/uL 5 77   HEMOGLOBIN g/dL 10 6*   HEMATOCRIT % 33 5*   PLATELETS Thousands/uL 146*   NEUTROS PCT % 74   LYMPHS PCT % 19   MONOS PCT % 6   EOS PCT % 1     Results from last 7 days   Lab Units 02/29/20  1025   POTASSIUM mmol/L 3 8   CHLORIDE mmol/L 109*   CO2 mmol/L 22   BUN mg/dL 30*   CREATININE mg/dL 1 35*   CALCIUM mg/dL 8 9   ALK PHOS U/L 89   ALT U/L 78   AST U/L 61*           Recent Cultures (last 7 days):           Last 24 Hours Medication List:     Current Facility-Administered Medications:  acetaminophen 650 mg Oral Q6H PRN Hilary Falls, DO    apixaban 2 5 mg Oral BID Hilary Falls, DO    aspirin 81 mg Oral Daily Hilary Falls, DO    insulin lispro 1-5 Units Subcutaneous TID AC Hilary Falls, DO    insulin lispro 1-5 Units Subcutaneous HS Hilary Falls, DO    metoprolol 2 5 mg Intravenous Q6H PRN Hilary Falls, DO    multi-electrolyte 75 mL/hr Intravenous Continuous Carlita Duncan MD Last Rate: 75 mL/hr (02/29/20 1541)   ondansetron 4 mg Intravenous Q6H PRN Flores Colace, DO    potassium-sodium phosphateS 1 tablet Oral TID With Meals Jaiden Garcia MD         Today, Patient Was Seen By: Jaiden Garcia MD    ** Please Note: This note has been constructed using a voice recognition system   **

## 2020-02-29 NOTE — OCCUPATIONAL THERAPY NOTE
Occupational Therapy Evaluation     Patient Name: Cari Buck  HZAMV'E Date: 2/29/2020  Problem List  Principal Problem:    Hypothermia due to cold environment  Active Problems:    Type 2 diabetes mellitus with stage 4 chronic kidney disease, without long-term current use of insulin (HCC)    Paroxysmal A-fib (HCC)    Essential hypertension    Stage 4 chronic kidney disease (HCC)    Transaminitis    Chronic combined systolic and diastolic CHF (congestive heart failure) ([facility])    Elevated troponin    Past Medical History  Past Medical History:   Diagnosis Date    Acute ischemic right MCA stroke ([facility] )     Anticoagulant long-term use     last assessed: 8/17/17    Aortic stenosis     Atrial fibrillation ([facility] )     Blurred vision     last assessed: 10/25/13    CHF [facility]congestive heart failure) [facility]     Chronic kidney disease     Coronary artery disease     Dementia ([facility] )     Diabetes mellitus ([facility] )     Dysuria     last assessed: 8/3/15    Hyperlipidemia     Hypertension     Nonrheumatic aortic (valve) insufficiency     Old myocardial infarction     Rectal carcinoma (HCC)      Past Surgical History  Past Surgical History:   Procedure Laterality Date    BALLOON ANGIOPLASTY, ARTERY      CORONARY ARTERY BYPASS GRAFT      RECTAL SURGERY      TONSILLECTOMY          02/29/20 0842   Note Type   Note type Eval only   Restrictions/Precautions   Weight Bearing Precautions Per Order No   Other Precautions Cognitive; Chair Alarm; Bed Alarm; Restraints;Multiple lines;Telemetry; Fall Risk;Pain   Pain Assessment   Pain Assessment 0-10   Pain Score No Pain   Home Living   Additional Comments Pt is a poor historian due to cognitive deficits  Will need to obtain home set-up from pt's family  Pt is inconsistent with who she lives with, initially reporting that her  and 4 adult grandkids live with her  However later reports it's just her   Per EMR,  is in a wc (pt reports he's not)    Pt claims her son lives within walking distance to her home  Prior Function   Falls in the last 6 months   (at least 1 (reason for admit))   Lifestyle   Autonomy Pt reports being I with ADLs, but is inconsistent and unreliable  Reports her  assists with med mgmt and family members help with cooking  Pt doesn't drive   Reciprocal Relationships  Harsh Brar (might be in a wc?), reports she thinks she may have 4 kids   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 ADL   Eating Assistance 4  Minimal Assistance   Grooming Assistance 4  Minimal Assistance   UB Bathing Assistance 3  Moderate Assistance   LB Bathing Assistance 3  Moderate Assistance   UB Dressing Assistance 3  Moderate Assistance   LB Dressing Assistance 2  Maximal Assistance   LB Dressing Deficit Don/doff L sock; Don/doff R sock   Toileting Assistance  2  Maximal Assistance   Additional Comments Pt is very easily distracted during ADL tasks and requires frequent cues for attention  Pt is unsure if she is normally able to complete LB dressing without assistance, but is unable to reach her feet while seated EOB at this date   Bed Mobility   Supine to Sit 3  Moderate assistance   Additional items Assist x 1; Increased time required;Verbal cues;LE management   Additional Comments Pt left seated in recliner with all needs within reach, chair alarm activated and posey reapplied  Transfers   Sit to Stand 3  Moderate assistance   Additional items Assist x 1; Increased time required;Verbal cues   Stand to Sit 3  Moderate assistance   Additional items Assist x 1; Increased time required;Verbal cues   Stand pivot 3  Moderate assistance   Additional items Assist x 1; Increased time required;Verbal cues   Functional Mobility   Functional Mobility 3  Moderate assistance   Additional Comments Ax1 to take few small steps with rw towards chair  Unsteady, mild R foot drag   Balance   Static Sitting Normal   Dynamic Sitting Fair   Static Standing Fair -   Dynamic Standing Fair -   Ambulatory Poor +   Activity Tolerance   Activity Tolerance Patient tolerated treatment well   Medical Staff Made Aware PT   Nurse Made Aware pt cleared by RN for OT evaluation and OOB mobility  Updated RN following evaluation   RUE Assessment   RUE Assessment   (generalized weakness but WFL)   LUE Assessment   LUE Assessment   (generalized weakness but WFL)   Vision - Complex Assessment   Ocular Range of Motion WFL   Head Position WDL   Tracking Able to track stimulus in all quads without difficulty   Perception   Inattention/Neglect Appears intact   Cognition   Overall Cognitive Status Impaired   Arousal/Participation Responsive; Alert; Cooperative   Attention Attends with cues to redirect   Orientation Level Oriented to person;Oriented to place; Disoriented to time;Oriented to situation  (believes its the 12s)   Memory Decreased recall of precautions;Decreased recall of recent events;Decreased short term memory;Decreased recall of biographical information   Following Commands Follows one step commands with increased time or repetition   Comments Pt's cognition appears to wax and wane during evaluation, and information provided by patient very inconsistent  Pt knows she is in the hospital, and the situation that led to her admission, but is unable to recall how many kids she has or all of their names  Able to provide basic info from long ago (how she met her , etc), but doesn't know her home set-up or PLOF  Per RN was very confused at night, requiring posey belt and b/l wrist restraints    Unclear how far this is from pt's cognitive baseline, per chart some h/o dementia  Unclear if  is a reliable support system cognitive or physically (possibly in a wc?)  EMR also reports that pt adamently refused a life alert, but pt reporting that she always wears one  Recommend pt be alarmed at all times during hospitalization to prevent falls  If patient doesn't have reliable 24/7 supervision at home, will complete ACLS during OT POC  Recommend pt have assist with higher level IADLs (cooking, med mgmt, transportation) and unclear if she previously had this level of support  Assessment   Limitation Decreased ADL status; Decreased UE strength;Decreased Safe judgement during ADL;Decreased cognition;Decreased endurance;Decreased self-care trans;Decreased high-level ADLs   Prognosis Fair   Assessment Pt is a 80year old female seen for initial OT evaluation s/p admission to [facility] 2/28/20 s/p fall and down in cold environment for unknown period of time  Pt dx'd with hypothermia  Additional active problems include: transaminitis, elevated troponin, chronic combined systolic and diastolic CHF, CKD, HTN, paroxysmal A-fib, and DM  Pt with active OT orders and cleared by NUVIA Bates for OT evaluation and OOB mobility  Pt is a poor historian due to cognitive deficits  Home set-up and PLOF will need to be confirmed with pt's family, but appears to have limited assist at home  Pt is currently demonstrating the following occupational deficits: eating with Sukumar, grooming with Sukumar, UB bathing with modA, LB bathing with modA, UB dressing with modA, LB dressing with maxA, toileting with maxA, bed mobility with modA, functional transfers with modA, and functional mobility with modA  Factors currently limiting pt's independence in these areas include: cognitive deficits, generalized weakness, endurance, balance, functional mobility, activity tolerance, and (?) unsupportive home environment    From an OT standpoint, recommend STR upon d/c   Pt is to continue to benefit from skilled occupational therapy services while in the hospital to maximize functioning and independence with daily activities  See below for OT goals to be addressed 3-5x/wk  Goals   Patient Goals to hug her  Katharine Peña when he comes to visit   Plan   Treatment Interventions ADL retraining;Functional transfer training;UE strengthening/ROM; Endurance training;Cognitive reorientation;Patient/family training;Equipment evaluation/education; Compensatory technique education;Continued evaluation; Activityengagement   Goal Expiration Date 03/09/20   OT Treatment Day 1   OT Frequency 3-5x/wk   Recommendation   OT Discharge Recommendation Short Term Rehab  (and possibly increased supervision following STR)   Equipment Recommended Tub seat with back   OT - OK to Discharge   (to STR when medically stable, no to home)   Barthel Index   Feeding 5   Bathing 0   Grooming Score 0   Dressing Score 5   Bladder Score 5   Bowels Score 5   Toilet Use Score 5   Transfers (Bed/Chair) Score 5   Mobility (Level Surface) Score 0   Stairs Score 0   Barthel Index Score 30     Goals:    Pt be oriented x4 for all OT sessions with use of environmental cues as appropriate  Pt will be attentive 100% of the time during ongoing cognitive assessment w/ G participation to assist w/ safe d/c planning/recommendations  Pt will attend to a functional activity for at least 10 minutes with no more than 2 cues for attention/redirection  Pt will improve activity tolerance to G for min 30 min txment sessions for increase engagement in functional tasks  Pt will complete all self care tasks w/ Gregory w/ use of DME/AD as appropriate      Pt will improve functional transfers to Mod I on/off all surfaces using DME as needed w/ G balance/safety     Pt will improve functional mobility during ADL/IADL/leisure tasks to Mod I using DME as needed w/ G balance/safety     Pt will participate in simulated IADL management task to increase independence to Mod I w/ G safety and endurance    Pt will demonstrate G carryover of pt/caregiver education and training as appropriate w/o cues w/ good tolerance to increase safety during functional tasks    Pt will maintain standing upright I'ly or at least 10 minutes while completing a dynamic functional activity with good balance and endurance      Felipe Corbett, MOT, OTR/L

## 2020-02-29 NOTE — ASSESSMENT & PLAN NOTE
With dehydration,  Secondary cold exposure from being outside  Core temperature improving after treatment in ER with warmed IV fluid warm packs and warm blankets  Continue with IV fluid for hydration  Hold diuretics  Temperature now normal

## 2020-02-29 NOTE — ASSESSMENT & PLAN NOTE
Lab Results   Component Value Date    HGBA1C 6 9 (H) 02/29/2020       Recent Labs     02/29/20  0810 02/29/20  0816 02/29/20  0834 02/29/20  1058   POCGLU 41* 54* 178* 211*       Blood Sugar Average: Last 72 hrs:  (P) 134   Hold oral meds  Hypoglycemia this morning  Appears resolved  Appetite improved   Will check cortisol Am

## 2020-02-29 NOTE — SOCIAL WORK
SNF rehab choice follow up:    Family asked CM to make referrals to the following SNF rehabs and mention to the facilities of interest in patient's  having respite care at the same facility  [facility] sent, per famiy / patient request, to:  [address] [place] [address] Richard HERNÁNDEZ Plan - IP rehab; 1st preference SNF rehab  Pending accepting facility  Alternate plan Acute rehab

## 2020-02-29 NOTE — ASSESSMENT & PLAN NOTE
Family reports memory loss and recent decline  She was also noted to have aggressive behavior toward her  at times  Will likely need long term placement  Family wants her  to go to the same facility if possible   CM is working on [employer]

## 2020-02-29 NOTE — ASSESSMENT & PLAN NOTE
Mild with CPK of 344  Pt's family reported possible falls at home  Continue IVF and trend   May be the cause of  mild transaminitis and elevated troponin

## 2020-02-29 NOTE — PLAN OF CARE
Problem: OCCUPATIONAL THERAPY ADULT  Goal: Performs self-care activities at highest level of function for planned discharge setting  See evaluation for individualized goals  Description  Treatment Interventions: ADL retraining, Functional transfer training, UE strengthening/ROM, Endurance training, Cognitive reorientation, Patient/family training, Equipment evaluation/education, Compensatory technique education, Continued evaluation, Activityengagement  Equipment Recommended: Tub seat with back       See flowsheet documentation for full assessment, interventions and recommendations  Note:   Limitation: Decreased ADL status, Decreased UE strength, Decreased Safe judgement during ADL, Decreased cognition, Decreased endurance, Decreased self-care trans, Decreased high-level ADLs  Prognosis: Fair  Assessment: Pt is a 80year old female seen for initial OT evaluation s/p admission to [facility] 2/28/20 s/p fall and down in cold environment for unknown period of time  Pt dx'd with hypothermia  Additional active problems include: transaminitis, elevated troponin, chronic combined systolic and diastolic CHF, CKD, HTN, paroxysmal A-fib, and DM  Pt with active OT orders and cleared by NUVIA Mariscal for OT evaluation and OOB mobility  Pt is a poor historian due to cognitive deficits  Home set-up and PLOF will need to be confirmed with pt's family, but appears to have limited assist at home  Pt is currently demonstrating the following occupational deficits: eating with Sukumar, grooming with Sukumar, UB bathing with modA, LB bathing with modA, UB dressing with modA, LB dressing with maxA, toileting with maxA, bed mobility with modA, functional transfers with modA, and functional mobility with modA  Factors currently limiting pt's independence in these areas include: cognitive deficits, generalized weakness, endurance, balance, functional mobility, activity tolerance, and (?) unsupportive home environment    From an OT standpoint, recommend STR upon d/c  Pt is to continue to benefit from skilled occupational therapy services while in the hospital to maximize functioning and independence with daily activities  See below for OT goals to be addressed 3-5x/wk       OT Discharge Recommendation: Short Term Rehab(and possibly increased supervision following STR)  OT - OK to Discharge: (to STR when medically stable, no to home)

## 2020-02-29 NOTE — ASSESSMENT & PLAN NOTE
Recent weight loss at home  Not sure whether she has been feeding herself regularly at home  Appetite improved here   Monitor

## 2020-02-29 NOTE — SOCIAL WORK
Initial interview:     CM met with the patient, her  Cain Perdue and Khalif's wife at bedside to review the CM role and discuss possible dc needs  Pt lives with her  in a raised [place] home in [place]", [address]  Pt requires assist with ADLs d/t weakness and frequent falls  Pt stated that her  assists with her bathing and dressing  Pt ambulates with a RW  Pt has a SPC, RW and a shower chair  Pt uses a tub shower w grab bars  Open to  VNA  Hx of [facility] IP rehab  Pt denied drug, etoh or mental illness history  No POA or LW  Prescriptions are filled at [facility] [employer], [address] in [facility] and [facility] order  Main contact:   Hector Jeter       cell [phone]  Dtr Nicki Monterroso  cell [phone]  **  Emily Beauchamp, very [facility], doesn't answer home phone  A post acute care recommendation was made by your care team for STR  Discussed Freedom of Choice with both patient and caregiver  List of facilities given to both patient and caregiver via in person  both patient and caregiver aware the list is custom filtered for them by preference  and that [facility] post acute providers are designated  Patient and family agreeable to IP rehab  Family asked for referral to [address]  CM advised of  ARC and reviewed difference in rehab level and care provided at Acute rehab vs SNF rehab  Family expressed understanding and agreeable to [facility] referral also  CM provided list for both acute and SNF level rehabs  Family to give CM SNF rehab level choices after reviewing list      DC Planning: IP rehab; ecin to  [address], [facility]  Pend SNF rehab choices  *note: , at bedside, sitting in wheelchair with SPC  He was confused at times with questions  Very [place] and frail  He admitted to [facility] frequently at home d/t difficulty walking  Per Family, he cannot be home alone   Family stated that they will need to pursue respite care, when pt is in IP rehab      CM reviewed d/c planning process including the following: identifying help at home, patient preference for d/c planning needs, Discharge Lounge, [facility] Med[facility] to Bed program, availability of treatment team to discuss questions or concerns patient and/or family may have regarding understanding medications and recognizing signs and symptoms once discharged  CM also encouraged patient to follow up with all recommended appointments after discharge  Patient advised of importance for patient and family to participate in managing patients medical well being

## 2020-02-29 NOTE — NURSING NOTE
Patients morning blood sugar at lowest, 41  Patient to eat breakfast and also 1/2 amp of dextrose given  Recheck blood sugar 178  Dr Dominguez Commander notified of above  Patient is cooperative with me this morning  She knows she is in the hospital ([facility]) because she fell  Wrist restrains D/Zay  New order for waistbelt restraint only

## 2020-02-29 NOTE — ASSESSMENT & PLAN NOTE
With intermittent bradycardia  Hold beta-blocker and diltiazem  Continue Eliquis  Monitor heart rate and reassess in 24 hours for restarting above medications

## 2020-02-29 NOTE — ASSESSMENT & PLAN NOTE
Anion gap metabolic acidosis, suspect secondary to hypothermia and possible rhabdo   Hold diuretics  Continue with IV fluid for hydration  Continue to monitor

## 2020-02-29 NOTE — SPEECH THERAPY NOTE
Speech Language/Pathology  Speech/Language Pathology  Assessment    Patient Name: Dolores Willis  QBSUW'K Date: 2/29/2020     Problem List  Principal Problem:    Hypothermia due to cold environment  Active Problems:    Type 2 diabetes mellitus with stage 4 chronic kidney disease, without long-term current use of insulin (HCC)    Paroxysmal A-fib (HCC)    Essential hypertension    Stage 4 chronic kidney disease (HCC)    Transaminitis    Chronic combined systolic and diastolic CHF (congestive heart failure) (HCC)    Elevated troponin    Past Medical History  Past Medical History:   Diagnosis Date    [facility]     Anticoagulant long-term use     last assessed: 8/17/17    Aortic stenosis     Atrial fibrillation [facility]     Blurred vision     last assessed: 10/25/13    [facility]) [facility]     Chronic kidney disease     Coronary artery disease     Dementia [facility]     Diabetes mellitus ([facility]     Dysuria     last assessed: 8/3/15    Hyperlipidemia     Hypertension     Nonrheumatic aortic (valve) insufficiency     Old myocardial infarction     Rectal carcinoma (HCC)      Past Surgical History  Past Surgical History:   Procedure Laterality Date    BALLOON ANGIOPLASTY, ARTERY      CORONARY ARTERY BYPASS GRAFT      RECTAL SURGERY      TONSILLECTOMY       Per H&P     80 y o  female who presents with cold exposure and  hypothermia  Patient with underlying atrial fibrillation on Eliquis, prior history of CVA, diabetes, chronic systolic and diastolic CHF, mild to moderate AS, CAD status post CABG,  CKD stage 4 who brought to the emergency department for evaluation of cold exposure     Unable to reach patient's family by phone, data collected from ER note and sign out  Per patient she went outside in the backyard during noon time, and she was unable to get inside her house, her  with hearing aid he could not hear her, so she was outside in the cold weather for unknown amount of hours  Her  saw her lying outside and called EMS   Per EMS patient had a tympanic temperature of 91° F, they started warmed IV fluids, placed warm packs in her axilla, and groin and warm blankets   On arrival patient is alert, she is oriented to self but not to place or time   Per her family she does have a history of dementia   Per her son and daughter-in-law, she and her  who is elderly and in a wheelchair lives at in a large house alone  David Swanson have VNA 3 times a week for 3 hours on Monday, Wednesday and Friday, but otherwise they do not have any assistance at home   Per their son they refused to wear their life alert necklaces      Currently patient core temperature is much better, she is more awake alert oriented    CT Head 2/28/20:  No acute intracranial abnormality      Age-related atrophy and microangiopathic changes       Bedside Swallow Evaluation:    Summary:  Pt presents w/ no s/s dysphagia and functional swallow  Pt able to orally manipulate, control, and transfer hard material and thin liquids  No overt s/s penetration/aspiration observed  No ST services needed at this time  Recommendations:  Diet: Regular  Liquid: Thin  Meds: As tolerated   Supervision: Intermittent   Positioning:Upright  Strategies: Pt to take PO/Meds only when fully alert and upright  Oral care: 2-4x/day   Aspiration precautions    Eval only, No f/u tx indicated  Goal(s):  Pt will tolerate least restrictive diet w/out s/s aspiration or oral/pharyngeal difficulties       Patient's goal: "Give me my snack"    Reason for consult:  R/o aspiration  Determine safest and least restrictive diet    Precautions:  Fall     Current diet:  Diet Dewey/CHO Controlled; Consistent Carbohydrate Diet Level 2    Premorbid diet[de-identified]  Regular w/ thin     Previous VBS:  None on record     O2 requirement:  RA    Voice/Speech:  Monotonous, mildly hoarse    Social:  Home w/ , home care staff    Follows commands: Yes                          Cognitive Status:  Awake, alert  Asking for different people    Oral [facility] exam:  Dentition: natural   Labial strength and ROM: wfl   Lingual strength and ROM: wfl   Mandibular strength and ROM: wfl  Volitional cough: present   Volitional swallow: present    Items administered:  Hard solid, thin liquids  Liquids were taken by straw/cup       Oral stage:  Lip closure: complete   Mastication: wfl   Bolus formation: wfl   Bolus control: wfl   Transfer: wfl   Oral residue: trace w/ solids   Pocketing: none     Pharyngeal stage:  Swallow promptness: prompt   Laryngeal rise: wfl to palpation   Wet voice: none   Throat clear: none   Cough: none   Secondary swallows: none   Audible swallows: during rapid consecutive sips   No overt s/s aspiration     Esophageal stage:  No s/s reported    Results d/w:  Pt, nursing

## 2020-02-29 NOTE — PLAN OF CARE
Problem: PHYSICAL THERAPY ADULT  Goal: Performs mobility at highest level of function for planned discharge setting  See evaluation for individualized goals  Description  Treatment/Interventions: Functional transfer training, LE strengthening/ROM, Therapeutic exercise, Endurance training, Patient/family training, Equipment eval/education, Bed mobility, Gait training  Equipment Recommended: Jayme Mckeon       See flowsheet documentation for full assessment, interventions and recommendations  Note:   Prognosis: Fair  Problem List: Decreased strength, Decreased endurance, Impaired balance, Decreased mobility, Decreased coordination, Decreased cognition, Impaired judgement, Decreased safety awareness, Pain  Assessment: Pt seen for high complexity physical therapy evaluation  Pt is a 81 y/o female w/ history/comorbidites og a-fib, CVA, DM, CAD, CKD, dementia, rectal CA, MI, HTN who is now admitted after found down outside her home for an unknown duration   is [place] and did not hear pt  Per family, both pt and  also refuse to wear life alerts  dx include fall, cold exposure/hypothermia, transaminitis  Due to acute medical issues, pain, fall risk, note unstable clinical picture  PT consulted to assess mobility, d/c needs  Pt presents w/ decreased functional mob, standing balance, endurance, B LE strength, barriers at home  will benefit from skilled PT to correct for the above problems  Recommend rehab at d/c        Recommendation: Post acute IP rehab     PT - OK to Discharge: (S) Yes(when stable to rehab)    See flowsheet documentation for full assessment

## 2020-02-29 NOTE — PHYSICAL THERAPY NOTE
Physical Therapy Evaluation    Patient's Name: Poonam Rebolledo    Admitting Diagnosis  Cold exposure Cris Smart  9XXA]  Transaminitis [R74 0]  Elevated troponin I level [A63 87]  Metabolic acidosis with increased anion gap and accumulation of organic acids [E87 2]  Hypothermia due to exposure [T68  XXXA]    Problem List  Patient Active Problem List   Diagnosis    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 failure (HCC)    Nonrheumatic aortic valve stenosis    Benign essential HTN    Type 2 diabetes mellitus with stage 4 chronic kidney disease, without long-term current use of insulin (HCC)    Paroxysmal A-fib (HCC)    [facility]    Coronary artery disease involving native coronary artery of native heart without angina pectoris    Cognitive impairment    Depression with anxiety    Esophageal reflux    Mixed hyperlipidemia    Essential hypertension    Osteoarthritis    Squamous cell carcinoma of skin    Tinnitus    Moderate protein-calorie malnutrition ([facility])    Goals of care, counseling/discussion    Impaired mobility and ADLs    Dementia ([facility] )    Ambulatory dysfunction    Physical deconditioning    Type 2 myocardial infarction without ST elevation ([facility] )    Permanent atrial fibrillation    Stage 4 chronic kidney disease ([facility]    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 ([facility] )    Diarrhea of presumed infectious origin    Metabolic acidosis    Recurrent UTI    Hypothermia due to cold environment    Transaminitis    Chronic combined systolic and diastolic CHF (congestive heart failure) ([facility])    Elevated troponin       Past Medical History  Past Medical History:   Diagnosis Date    [facility] ischemic right MCA stroke [facility]     Anticoagulant long-term use     last assessed: 8/17/17    Aortic stenosis     Atrial fibrillation ([facility]     Blurred vision     last assessed: 10/25/13    CHF (congestive heart failure) ([facility] )  Chronic kidney disease     Coronary artery disease     Dementia [facility]     Diabetes mellitus ([facility] )     Dysuria     last assessed: 8/3/15    Hyperlipidemia     Hypertension     Nonrheumatic aortic (valve) insufficiency     Old myocardial infarction     Rectal carcinoma (HCC)        Past Surgical History  Past Surgical History:   Procedure Laterality Date    BALLOON ANGIOPLASTY, ARTERY      CORONARY ARTERY BYPASS GRAFT      RECTAL SURGERY      TONSILLECTOMY            02/29/20 0850   Note Type   Note type Eval only   Pain Assessment   Pain Assessment 0-10   Pain Score 3   Pain Type Acute pain   Pain Location Generalized   Patient's Stated Pain Goal No pain   Hospital Pain Intervention(s) Ambulation/increased activity   Response to Interventions unchanged   Home Living   Type of Home House   Additional Comments Resides w/  in home  Normally indep self care, has son and grandchildren that are close by   ambulates w/ out device, but does have RW  Has refused to wear life alert in past   Prior Function   Level of [place] Independent with ADLs and functional mobility   Falls in the last 6 months 1 to 4   Restrictions/Precautions   Weight Bearing Precautions Per Order No   Other Precautions Cognitive; Bed Alarm; Chair Alarm;Multiple lines; Fall Risk;Pain   General   Family/Caregiver Present No   Cognition   Overall Cognitive Status Impaired   Arousal/Participation Arousable   Orientation Level Oriented to person;Oriented to place   Memory Unable to assess   Following Commands Follows one step commands without difficulty   Comments some confusion, but cooperative   RLE Assessment   RLE Assessment   (strength grossly 3+/5)   LLE Assessment   LLE Assessment   (strength grossly 3+/5)   Coordination   Movements are Fluid and Coordinated 0   Coordination and Movement Description B LE ataxia   Bed Mobility   Supine to Sit 3  Moderate assistance   Additional items Assist x 1   Transfers   Sit to Stand 3 Moderate assistance   Additional items Assist x 1   Stand to Sit 3  Moderate assistance   Additional items Assist x 1   Ambulation/Elevation   Gait pattern   (slow, antalgic, ataxia, short step length)   Gait Assistance 3  Moderate assist   Additional items Assist x 1   Assistive Device Rolling walker   Distance 5'x1 from bed to chair   Balance   Static Sitting Good   Dynamic Sitting Fair -   Static Standing Poor +   Dynamic Standing Poor +   Ambulatory Poor +   Endurance Deficit   Endurance Deficit Yes   Endurance Deficit Description fatigue, weakness, pain, cog   Activity Tolerance   Activity Tolerance Patient limited by fatigue;Patient limited by pain;Treatment limited secondary to medical complications (Comment)   Nurse Made Aware yes   Assessment   Prognosis Fair   Problem List Decreased strength;Decreased endurance; Impaired balance;Decreased mobility; Decreased coordination;Decreased cognition; Impaired judgement;Decreased safety awareness;Pain   Assessment Pt seen for high complexity physical therapy evaluation  Pt is a 81 y/o female w/ history/comorbidites og a-fib, CVA, DM, CAD, CKD, dementia, rectal CA, MI, HTN who is now admitted after found down outside her home for an unknown duration   is [place] and did not hear pt  Per family, both pt and  also refuse to wear life alerts  dx include fall, cold exposure/hypothermia, transaminitis  Due to acute medical issues, pain, fall risk, note unstable clinical picture  PT consulted to assess mobility, d/c needs  Pt presents w/ decreased functional mob, standing balance, endurance, B LE strength, barriers at home  will benefit from skilled PT to correct for the above problems    Recommend rehab at d/c   Goals   Patient Goals unable to state   STG Expiration Date 03/14/20   Short Term Goal #1 1-2 wks: bed mob and transfers w/ S/indep, standing balance to good/normal w/ device, ambulate 200-300 ft w/ RW and S/mod I, increase B LE strength by 1/2 -1 grade,    PT Treatment Day 0   Plan   Treatment/Interventions Functional transfer training;LE strengthening/ROM; Therapeutic exercise; Endurance training;Patient/family training;Equipment eval/education; Bed mobility;Gait training   PT Frequency Other (Comment)  (3-5x/wk)   Recommendation   Recommendation Post acute IP rehab   Equipment Recommended Walker   PT - OK to Discharge Yes  (when stable to rehab)   Modified [place] Scale   Modified [place] Scale 4   Barthel Index   Feeding 5   Bathing 0   Grooming Score 0   Dressing Score 5   Bladder Score 5   Bowels Score 5   Toilet Use Score 5   Transfers (Bed/Chair) Score 5   Mobility (Level Surface) Score 0   Stairs Score 0   Barthel Index Score 30         Nam Taveras PT, DPT, CSRS

## 2020-02-29 NOTE — PLAN OF CARE
Problem: Potential for Falls  Goal: Patient will remain free of falls  Description  INTERVENTIONS:  - Assess patient frequently for physical needs  -  Identify cognitive and physical deficits and behaviors that affect risk of falls    -  [place] fall precautions as indicated by assessment   - Educate patient/family on patient safety including physical limitations  - Instruct patient to call for assistance with activity based on assessment  - Modify environment to reduce risk of injury  - Consider OT/PT consult to assist with strengthening/mobility  Outcome: Progressing     Problem: CARDIOVASCULAR - ADULT  Goal: Maintains optimal cardiac output and hemodynamic stability  Description  INTERVENTIONS:  - Monitor I/O, vital signs and rhythm  - Monitor for S/S and trends of decreased cardiac output  - Administer and titrate ordered vasoactive medications to optimize hemodynamic stability  - Assess quality of pulses, skin color and temperature  - Assess for signs of decreased coronary artery perfusion  - Instruct patient to report change in severity of symptoms  Outcome: Progressing     Problem: HEMATOLOGIC - ADULT  Goal: Maintains hematologic stability  Description  INTERVENTIONS  - Assess for signs and symptoms of bleeding or hemorrhage  - Monitor labs  - Administer supportive blood products/factors as ordered and appropriate  Outcome: Progressing     Problem: MUSCULOSKELETAL - ADULT  Goal: Maintain or return mobility to safest level of function  Description  INTERVENTIONS:  - Assess patient's ability to carry out ADLs; assess patient's baseline for ADL function and identify physical deficits which impact ability to perform ADLs (bathing, care of mouth/teeth, toileting, grooming, dressing, etc )  - Assess/evaluate cause of self-care deficits   - Assess range of motion  - Assess patient's mobility  - Assess patient's need for assistive devices and provide as appropriate  - Encourage maximum independence but intervene and supervise when necessary  - Involve family in performance of ADLs  - Assess for home care needs following discharge   - Consider OT consult to assist with ADL evaluation and planning for discharge  - Provide patient education as appropriate  Outcome: Progressing     Problem: PAIN - ADULT  Goal: Verbalizes/displays adequate comfort level or baseline comfort level  Description  Interventions:  - Encourage patient to monitor pain and request assistance  - Assess pain using appropriate pain scale  - Administer analgesics based on type and severity of pain and evaluate response  - Implement non-pharmacological measures as appropriate and evaluate response  - Consider cultural and social influences on pain and pain management  - Notify physician/advanced practitioner if interventions unsuccessful or patient reports new pain  Outcome: Progressing     Problem: INFECTION - ADULT  Goal: Absence or prevention of progression during hospitalization  Description  INTERVENTIONS:  - Assess and monitor for signs and symptoms of infection  - Monitor lab/diagnostic results  - Monitor all insertion sites, i e  indwelling lines, tubes, and drains  - [place] appropriate cooling/warming therapies per order  - Administer medications as ordered  - Instruct and encourage patient and family to use good hand hygiene technique  - Identify and instruct in appropriate isolation precautions for identified infection/condition   Outcome: Progressing     Problem: SAFETY ADULT  Goal: Maintain or return to baseline ADL function  Description  INTERVENTIONS:  -  Assess patient's ability to carry out ADLs; assess patient's baseline for ADL function and identify physical deficits which impact ability to perform ADLs (bathing, care of mouth/teeth, toileting, grooming, dressing, etc )  - Assess/evaluate cause of self-care deficits   - Assess range of motion  - Assess patient's mobility; develop plan if impaired  - Assess patient's need for assistive devices and provide as appropriate  - Encourage maximum independence but intervene and supervise when necessary  - Involve family in performance of ADLs  - Assess for home care needs following discharge   - Consider OT consult to assist with ADL evaluation and planning for discharge  - Provide patient education as appropriate  Outcome: Progressing  Goal: Maintain or return mobility status to optimal level  Description  INTERVENTIONS:  - Assess patient's baseline mobility status (ambulation, transfers, stairs, etc )    - Identify cognitive and physical deficits and behaviors that affect mobility  - Identify mobility aids required to assist with transfers and/or ambulation (gait belt, sit-to-stand, lift, walker, cane, etc )  - [place] fall precautions as indicated by assessment  - Record patient progress and toleration of activity level on Mobility SBAR; progress patient to next Phase/Stage  - Instruct patient to call for assistance with activity based on assessment  - Consider rehabilitation consult to assist with strengthening/weightbearing, etc   Outcome: Progressing     Problem: DISCHARGE PLANNING  Goal: Discharge to home or other facility with appropriate resources  Description  INTERVENTIONS:  - Identify barriers to discharge w/patient and caregiver  - Arrange for needed discharge resources and transportation as appropriate  - Identify discharge learning needs (meds, wound care, etc )  - Arrange for interpretive services to assist at discharge as needed  - Refer to Case Management Department for coordinating discharge planning if the patient needs post-hospital services based on physician/advanced practitioner order or complex needs related to functional status, cognitive ability, or social support system  Outcome: Progressing     Problem: Knowledge Deficit  Goal: Patient/family/caregiver demonstrates understanding of disease process, treatment plan, medications, and discharge instructions  Description  Complete learning assessment and assess knowledge base    Interventions:  - Provide teaching at level of understanding  - Provide teaching via preferred learning methods  Outcome: Progressing     Problem: SAFETY,RESTRAINT: NV/NON-SELF DESTRUCTIVE BEHAVIOR  Goal: Remains free of harm/injury (restraint for non violent/non self-detsructive behavior)  Description  INTERVENTIONS:  - Instruct patient/family regarding restraint use   - Assess and monitor physiologic and psychological status   - Provide interventions and comfort measures to meet assessed patient needs   - Identify and implement measures to help patient regain control  - Assess readiness for release of restraint   Outcome: Progressing  Goal: Returns to optimal restraint-free functioning  Description  INTERVENTIONS:  - Assess the patient's behavior and symptoms that indicate continued need for restraint  - Identify and implement measures to help patient regain control  - Assess readiness for release of restraint   Outcome: Progressing

## 2020-02-29 NOTE — ASSESSMENT & PLAN NOTE
Wt Readings from Last 3 Encounters:   02/13/20 45 8 kg (101 lb)   01/31/20 47 6 kg (105 lb)   12/05/19 47 7 kg (105 lb 3 2 oz)     Last echo on 4/30/2018 with EF 44%  Mild to moderate severe AS and moderate pulmonary hypertension  Patient looks dehydrated  Hold diuretics  Continue with gentle IV fluid hydration

## 2020-03-01 NOTE — ASSESSMENT & PLAN NOTE
With dehydration,  Secondary cold exposure from being outside  Core temperature improving after treatment in ER with warmed IV fluid warm packs and warm blankets  Hold diuretics  Temperature now normal, will d/c IVF

## 2020-03-01 NOTE — ASSESSMENT & PLAN NOTE
Patient denied chest pain or shortness of breath  Underlying CAD status post CABG  Likely type 2 MI- elevated troponin likely secondary to possible rhabdo- trops flat and mildly elevated  Continue to monitor on tele  Will order EKG to rule out any new ST changes

## 2020-03-01 NOTE — ASSESSMENT & PLAN NOTE
Recent weight loss at home  Not sure whether she has been feeding herself regularly at home  Appetite improved here   Monitor  Nutrition supp ordered

## 2020-03-01 NOTE — ASSESSMENT & PLAN NOTE
Anion gap metabolic acidosis, suspect secondary to hypothermia and possible rhabdo- resolved  Hold diuretics  IVF d/c  Monitor BUN/Cr  Avoid nephrotoxic agents

## 2020-03-01 NOTE — ASSESSMENT & PLAN NOTE
Lab Results   Component Value Date    HGBA1C 6 9 (H) 02/29/2020       Recent Labs     02/29/20  1058 02/29/20  1556 02/29/20  2109 03/01/20  0633   POCGLU 211* 201* 118 122       Blood Sugar Average: Last 72 hrs:  (P) 740 [id number]   Hold oral meds  Diabetic diet, SSI, accuchecks

## 2020-03-01 NOTE — ASSESSMENT & PLAN NOTE
Wt Readings from Last 3 Encounters:   02/13/20 45 8 kg (101 lb)   01/31/20 47 6 kg (105 lb)   12/05/19 47 7 kg (105 lb 3 2 oz)     Last echo on 4/30/2018 with EF 44%  Mild to moderate severe AS and moderate pulmonary hypertension  euvolemic this morning  Continue to hold lasix- likely resume tomorrow  Continue toprol, daily weights, I/O's, 2g Na restricted diet

## 2020-03-01 NOTE — ASSESSMENT & PLAN NOTE
Rate uncontrolled this morning 140's Afib- likely due to meds being held for bradycardia on admission- was given IV lopressor 2 5mg  Resume beta-blocker and diltiazem  Continue Eliquis  Telemetry

## 2020-03-01 NOTE — PROGRESS NOTES
Progress Note - Kristen Hyman 4/4/1927, 80 y o  female MRN: [id number]    Unit/Bed#: [facility] 733-01 Encounter: [id number]    Primary Care Provider: Golden Quiroz MD   Date and time admitted to hospital: 2/28/2020  4:59 PM        Cognitive deficits  Assessment & Plan  Family reports memory loss and recent decline  She was also noted to have aggressive behavior toward her  at times  Will likely need long term placement  Family wants her  to go to the same facility if possible  CM is working on [employer]  Patient is requiring posey due to possible harm to self    Rhabdomyolysis  Assessment & Plan  Mild with CPK of 344  Pt's family reported possible falls at home  CPK continues to trend downwards    FTT (failure to thrive) in adult  Assessment & Plan  Recent weight loss at home  Not sure whether she has been feeding herself regularly at home  Appetite improved here   Monitor  Nutrition supp ordered    Elevated troponin  Assessment & Plan  Patient denied chest pain or shortness of breath  Underlying CAD status post CABG  Likely type 2 MI- elevated troponin likely secondary to possible rhabdo- trops flat and mildly elevated  Continue to monitor on tele  Will order EKG to rule out any new ST changes    Chronic combined systolic and diastolic CHF (congestive heart failure) (HCC)  Assessment & Plan  Wt Readings from Last 3 Encounters:   02/13/20 45 8 kg (101 lb)   01/31/20 47 6 kg (105 lb)   12/05/19 47 7 kg (105 lb 3 2 oz)     Last echo on 4/30/2018 with EF 44%  Mild to moderate severe AS and moderate pulmonary hypertension  euvolemic this morning  Continue to hold lasix- likely resume tomorrow  Continue toprol, daily weights, I/O's, 2g Na restricted diet    Transaminitis  Assessment & Plan  Likely secondary to above  Hold statin  Trending down    Stage 4 chronic kidney disease ([facility] [address] )  Assessment & Plan  Anion gap metabolic acidosis, suspect secondary to hypothermia and possible rhabdo- resolved  Hold diuretics  IVF d/c  Monitor BUN/Cr  Avoid nephrotoxic agents    Essential hypertension  Assessment & Plan  BP uncontrolled this am- likely due to meds being held on admission for hypotension and bradycardia  Resume Cardizem and Toprol    Paroxysmal A-fib (HCC)  Assessment & Plan  Rate uncontrolled this morning 140's Afib- likely due to meds being held for bradycardia on admission- was given IV lopressor 2 5mg  Resume beta-blocker and diltiazem  Continue Eliquis  Telemetry     Type 2 diabetes mellitus with stage 4 chronic kidney disease, without long-term current use of insulin [facility])  Assessment & Plan  Lab Results   Component Value Date    HGBA1C 6 9 (H) 02/29/2020       Recent Labs     02/29/20  1058 02/29/20  1556 02/29/20  2109 03/01/20  0633   POCGLU 211* 201* 118 122       Blood Sugar Average: Last 72 hrs:  (P) 365 [id number]   Hold oral meds  Diabetic diet, SSI, accuchecks    * Hypothermia due to cold environment  Assessment & Plan  With dehydration,  Secondary cold exposure from being outside  Core temperature improving after treatment in ER with warmed IV fluid warm packs and warm blankets  Hold diuretics  Temperature now normal, will d/c IVF      VTE Pharmacologic Prophylaxis:   Pharmacologic: Apixaban (Eliquis)  Mechanical VTE Prophylaxis in Place: Yes    Patient Centered Rounds: I have performed bedside rounds with nursing staff today  Discussions with Specialists or Other Care Team Provider: none    Education and Discussions with Family / Patient: discussed treatment plan with patient    Time Spent for Care: 45 minutes  More than 50% of total time spent on counseling and coordination of care as described above      Current Length of Stay: 2 day(s)    Current Patient Status: Inpatient   Certification Statement: The patient will continue to require additional inpatient hospital stay due to awaiting placment    Discharge Plan: rehab    Code Status: Level 3 - DNAR and DNI      Subjective:   No acute overnight events  This morning patient has no complaints  She is mildly confused to her situation but she is oriented to person place and time  Objective:     Vitals:   Temp (24hrs), Av 8 °F (36 6 °C), Min:97 8 °F (36 6 °C), Max:97 9 °F (36 6 °C)    Temp:  [97 8 °F (36 6 °C)-97 9 °F (36 6 °C)] 97 9 °F (36 6 °C)  HR:  [] 140  Resp:  [18] 18  BP: (128-180)/() 180/90  SpO2:  [97 %-98 %] 97 %  There is no height or weight on file to calculate BMI  Input and Output Summary (last 24 hours): Intake/Output Summary (Last 24 hours) at 3/1/2020 1101  Last data filed at 3/1/2020 0500  Gross per 24 hour   Intake 1553 75 ml   Output 1200 ml   Net 353 75 ml       Physical Exam:     Physical Exam   Constitutional: She is oriented to person, place, and time  Chronically ill appearing, cachetic   HENT:   Head: Normocephalic and atraumatic  Eyes: Pupils are equal, round, and reactive to light  Conjunctivae are normal    Neck: Neck supple  No JVD present  Cardiovascular: Normal heart sounds and intact distal pulses  Irregularly irregular   Pulmonary/Chest: Effort normal and breath sounds normal    Abdominal: Soft  Bowel sounds are normal    Musculoskeletal: She exhibits no edema or tenderness  Neurological: She is alert and oriented to person, place, and time  Skin: Skin is warm and dry  Capillary refill takes less than 2 seconds  Psychiatric: She has a normal mood and affect  Her behavior is normal  Judgment and thought content normal    Nursing note and vitals reviewed      Additional Data:     Labs:    Results from last 7 days   Lab Units 20  0604 20  1025   WBC Thousand/uL 8 18 5 77   HEMOGLOBIN g/dL 11 5 10 6*   HEMATOCRIT % 36 2 33 5*   PLATELETS Thousands/uL 160 146*   NEUTROS PCT %  --  74   LYMPHS PCT %  --  19   MONOS PCT %  --  6   EOS PCT %  --  1     Results from last 7 days   Lab Units 20  0605   SODIUM mmol/L 139   POTASSIUM mmol/L 3 8   CHLORIDE mmol/L 109*   CO2 mmol/L 21   BUN mg/dL 22   CREATININE mg/dL 1 07   ANION GAP mmol/L 9   CALCIUM mg/dL 9 0   ALBUMIN g/dL 3 7   TOTAL BILIRUBIN mg/dL 0 96   ALK PHOS U/L 93   ALT U/L 74   AST U/L 59*   GLUCOSE RANDOM mg/dL 161*         Results from last 7 days   Lab Units 03/01/20  0633 02/29/20  2109 02/29/20  1556 02/29/20  1058 02/29/20  0834 02/29/20  0816 02/29/20  0810 02/29/20  0607 02/28/20  1713   POC GLUCOSE mg/dl 122 118 201* 211* 178* 54* 41* 48* 272*     Results from last 7 days   Lab Units 02/29/20  1025   HEMOGLOBIN A1C % 6 9*               * I Have Reviewed All Lab Data Listed Above  * Additional Pertinent Lab Tests Reviewed: All Labs Within Last 24 Hours Reviewed    Imaging:    Imaging Reports Reviewed Today Include: none  Imaging Personally Reviewed by Myself Includes:  none    Recent Cultures (last 7 days):           Last 24 Hours Medication List:     Current Facility-Administered Medications:  acetaminophen 650 mg Oral Q6H PRN Rice Calkin, DO   apixaban 2 5 mg Oral BID Rice Calkin, DO   aspirin 81 mg Oral Daily Rice Calkin, DO   diltiazem 240 mg Oral Daily Edita Overton MD   insulin lispro 1-5 Units Subcutaneous TID AC Rice Calkin, DO   insulin lispro 1-5 Units Subcutaneous HS Rice Calkin, DO   metoprolol 2 5 mg Intravenous Q6H PRN Sophie Gan PA-C   metoprolol succinate 50 mg Oral Daily Edita Overton MD   ondansetron 4 mg Intravenous Q6H PRN Rice Calkin, DO   potassium-sodium phosphateS 1 tablet Oral TID With Meals Sis Mata MD        Today, Patient Was Seen By: Edita Overton MD    ** Please Note: Dictation voice to text software may have been used in the creation of this document   **

## 2020-03-01 NOTE — ASSESSMENT & PLAN NOTE
BP uncontrolled this am- likely due to meds being held on admission for hypotension and bradycardia  Resume Cardizem and Toprol

## 2020-03-01 NOTE — ASSESSMENT & PLAN NOTE
Family reports memory loss and recent decline  She was also noted to have aggressive behavior toward her  at times  Will likely need long term placement  Family wants her  to go to the same facility if possible   CM is working on [employer]  Patient is requiring posey due to possible harm to self

## 2020-03-01 NOTE — ASSESSMENT & PLAN NOTE
Mild with CPK of 344  Pt's family reported possible falls at home     CPK continues to trend downwards

## 2020-03-02 NOTE — ASSESSMENT & PLAN NOTE
Family reports memory loss and recent decline  She was also noted to have aggressive behavior toward her  at times  Will likely need long term placement  Family wants her  to go to the same facility if possible   CM is working on [employer]  Patient is off posey and able to be redirected this morning

## 2020-03-02 NOTE — PLAN OF CARE
Problem: Potential for Falls  Goal: Patient will remain free of falls  Description  INTERVENTIONS:  - Assess patient frequently for physical needs  -  Identify cognitive and physical deficits and behaviors that affect risk of falls    -  [place] fall precautions as indicated by assessment   - Educate patient/family on patient safety including physical limitations  - Instruct patient to call for assistance with activity based on assessment  - Modify environment to reduce risk of injury  - Consider OT/PT consult to assist with strengthening/mobility  Outcome: Progressing

## 2020-03-02 NOTE — ASSESSMENT & PLAN NOTE
Wt Readings from Last 3 Encounters:   03/01/20 45 4 kg (100 lb)   02/13/20 45 8 kg (101 lb)   01/31/20 47 6 kg (105 lb)     Last echo on 4/30/2018 with EF 44%  Mild to moderate severe AS and moderate pulmonary hypertension  euvolemic this morning  Continue to hold lasix- likely resume tomorrow  Continue toprol, daily weights, I/O's, 2g Na restricted diet

## 2020-03-02 NOTE — ASSESSMENT & PLAN NOTE
With dehydration,  Secondary cold exposure from being outside  Core temperature improving after treatment in ER with warmed IV fluid warm packs and warm blankets  Hold diuretics  Normothermic  Resolved

## 2020-03-02 NOTE — PHYSICAL THERAPY NOTE
Physical Therapy Treatment Note    Patient's Name: Kristen Hyman  : [birth year]   Pain Assessment   Pain Assessment No/denies pain   Pain Score No Pain   Restrictions/Precautions   Weight Bearing Precautions Per Order No   Other Precautions Cognitive; Bed Alarm; Fall Risk; Chair Alarm;Multiple lines   General   Chart Reviewed Yes   Family/Caregiver Present Yes   Cognition   Overall Cognitive Status Impaired   Arousal/Participation Arousable;Lethargic   Attention Difficulty attending to directions   Orientation Level Oriented to person   Comments Pt aroused to tactile stimuli, briefly opened eyes to spouse upon arrival    Bed Mobility   Supine to Sit 2  Maximal assistance   Additional items Assist x 1   Sit to Supine 3  Moderate assistance   Additional items Assist x 1   Transfers   Sit to Stand 3  Moderate assistance   Additional items Assist x 1   Stand to Sit 3  Moderate assistance   Additional items Assist x 1   Stand pivot 3  Moderate assistance   Additional items Assist x 1   Additional Comments Pt able to pivot in front of chair, but refused to sit in chair, stating "I want to lay back down " Despite max encouragement unable to sit in chair, returned to supine and positioned on L side  Balance   Static Sitting Fair -   Dynamic Sitting Poor +   Static Standing Poor +   Dynamic Standing Poor   Ambulatory Poor   Endurance Deficit   Endurance Deficit Yes   Activity Tolerance   Activity Tolerance Patient limited by fatigue;Treatment limited secondary to agitation   Nurse Made Aware Handoff communication with RN before and after PT session, made aware pt returned to supine with bed alarm intact   Assessment   Prognosis Fair   Problem List Decreased strength;Decreased endurance; Impaired balance;Decreased mobility; Decreased cognition;Decreased safety awareness   Assessment Pt lethargic today, reports increased fatigue    Initially agreeable to mobilization, but refusing to sit in bedside chair once mobilized  Pt appears to be limited secondary to cognitive impairments today, which family reports is not her baseline  Pt unable to follow commands appropriate to participate in exercise  Pt will benefit from continued skilled PT intervention during course of hospital stay to improve strength, endurance and balance to improve safety with functional mobility  Recommend IP rehab upon hospital D/C  Goals   Patient Goals family: to go to rehab   STG Expiration Date 03/14/20   PT Treatment Day 1   Plan   Treatment/Interventions Functional transfer training;LE strengthening/ROM; Therapeutic exercise; Endurance training;Patient/family training;Equipment eval/education; Bed mobility;Gait training   Progress Slow progress, cognitive deficits   PT Frequency Other (Comment)  (3-5x/week)   Recommendation   Recommendation Post acute IP rehab   PT - OK to Discharge Yes  (to IP rehab)       Nik Farmer, PT, DPT

## 2020-03-02 NOTE — SOCIAL WORK
Received a call from [facility] liaison who states she spoke to pt's son Carmell Leventhal and he prefers SNF for pt at [place]  CM called Carmell Leventhal to discuss same  Carmell Leventhal confirmed same  CM informed Carmell Leventhal that per his preferences [facility] is able to accept  Carmell Leventhal to contact Omar at [facility] to discuss admission for patient today  Omar at [facility] made aware of same

## 2020-03-02 NOTE — PROGRESS NOTES
Progress Note - Lyle Mcdaniel 4/4/1927, 80 y o  female MRN: [id number]    Unit/Bed#: [facility] 733-01 Encounter: [id number]    Primary Care Provider: Brigitte Momin MD   Date and time admitted to hospital: 2/28/2020  4:59 PM        Cognitive deficits  Assessment & Plan  Family reports memory loss and recent decline  She was also noted to have aggressive behavior toward her  at times  Will likely need long term placement  Family wants her  to go to the same facility if possible  CM is working on [employer]  Patient is off posey and able to be redirected this morning    Rhabdomyolysis  Assessment & Plan  Mild with CPK of 344  Pt's family reported possible falls at home  CPK continues to trend downwards    FTT (failure to thrive) in adult  Assessment & Plan  Recent weight loss at home  Not sure whether she has been feeding herself regularly at home  Appetite improved here   Monitor  Nutrition supp ordered    Elevated troponin  Assessment & Plan  Patient denied chest pain or shortness of breath  Underlying CAD status post CABG  Likely type 2 MI- elevated troponin likely secondary to possible rhabdo- trops flat and mildly elevated  No events on telemetry  EKG shows no new changes from 2018    Chronic combined systolic and diastolic CHF (congestive heart failure) (HCC)  Assessment & Plan  Wt Readings from Last 3 Encounters:   03/01/20 45 4 kg (100 lb)   02/13/20 45 8 kg (101 lb)   01/31/20 47 6 kg (105 lb)     Last echo on 4/30/2018 with EF 44%  Mild to moderate severe AS and moderate pulmonary hypertension  euvolemic this morning  Continue to hold lasix- likely resume tomorrow  Continue toprol, daily weights, I/O's, 2g Na restricted diet    Transaminitis  Assessment & Plan  Likely secondary to above  Hold statin  Trending down    Stage 4 chronic kidney disease ([facility] [address] )  Assessment & Plan  Anion gap metabolic acidosis, suspect secondary to hypothermia and possible rhabdo- resolved  Hold diuretics  Monitor BUN/Cr  Avoid nephrotoxic agents    Essential hypertension  Assessment & Plan  Controlled  Continue Cardizem and Toprol    Paroxysmal A-fib (HCC)  Assessment & Plan  Rate controlled  Continue Toprol and diltiazem  Continue Eliquis  Telemetry     Type 2 diabetes mellitus with stage 4 chronic kidney disease, without long-term current use of insulin [facility])  Assessment & Plan  Lab Results   Component Value Date    HGBA1C 6 9 (H) 2020       Recent Labs     20  1552 20  2041 20  0654 20  1053   POCGLU 106 151* 121 141*       Blood Sugar Average: Last 72 hrs:  (P) 142   Hold oral meds  Diabetic diet, SSI, accuchecks    * Hypothermia due to cold environment  Assessment & Plan  With dehydration,  Secondary cold exposure from being outside  Core temperature improving after treatment in ER with warmed IV fluid warm packs and warm blankets  Hold diuretics  Normothermic  Resolved       VTE Pharmacologic Prophylaxis:   Pharmacologic: Apixaban (Eliquis)  Mechanical VTE Prophylaxis in Place: Yes    Patient Centered Rounds: I have performed bedside rounds with nursing staff today  Discussions with Specialists or Other Care Team Provider: none    Education and Discussions with Family / Patient: Discussed with patient    Time Spent for Care: 45 minutes  More than 50% of total time spent on counseling and coordination of care as described above  Current Length of Stay: 3 day(s)    Current Patient Status: Inpatient   Certification Statement: The patient will continue to require additional inpatient hospital stay due to AMS and placement    Discharge Plan: rehab    Code Status: Level 3 - DNAR and DNI      Subjective:   No acute overnight events  This morning patient has no complaints      Objective:     Vitals:   Temp (24hrs), Av 7 °F (37 1 °C), Min:97 7 °F (36 5 °C), Max:100 2 °F (37 9 °C)    Temp:  [97 7 °F (36 5 °C)-100 2 °F (37 9 °C)] 98 °F (36 7 °C)  Resp:  [15-18] 15  BP: (127-134)/(71-97) 132/71  Body mass index is 18 89 kg/m²  Input and Output Summary (last 24 hours):     No intake or output data in the 24 hours ending 03/02/20 1124    Physical Exam:     Physical Exam   Constitutional:   Chronically ill-appearing, cachectic   HENT:   Head: Normocephalic and atraumatic  Mouth/Throat: Oropharynx is clear and moist    Eyes: Pupils are equal, round, and reactive to light  Conjunctivae are normal    Neck: Neck supple  No JVD present  Cardiovascular: Normal rate, regular rhythm, normal heart sounds and intact distal pulses  Pulmonary/Chest: Effort normal and breath sounds normal    Abdominal: Soft  Bowel sounds are normal    Musculoskeletal: She exhibits no edema or tenderness  Neurological: She is alert  Unable to follow command, confused   Skin: Skin is warm and dry  Capillary refill takes less than 2 seconds  Psychiatric:   Confused, unable to assess   Nursing note and vitals reviewed        Additional Data:     Labs:    Results from last 7 days   Lab Units 03/01/20  0604 02/29/20  1025   WBC Thousand/uL 8 18 5 77   HEMOGLOBIN g/dL 11 5 10 6*   HEMATOCRIT % 36 2 33 5*   PLATELETS Thousands/uL 160 146*   NEUTROS PCT %  --  74   LYMPHS PCT %  --  19   MONOS PCT %  --  6   EOS PCT %  --  1     Results from last 7 days   Lab Units 03/01/20  0605   SODIUM mmol/L 139   POTASSIUM mmol/L 3 8   CHLORIDE mmol/L 109*   CO2 mmol/L 21   BUN mg/dL 22   CREATININE mg/dL 1 07   ANION GAP mmol/L 9   CALCIUM mg/dL 9 0   ALBUMIN g/dL 3 7   TOTAL BILIRUBIN mg/dL 0 96   ALK PHOS U/L 93   ALT U/L 74   AST U/L 59*   GLUCOSE RANDOM mg/dL 161*         Results from last 7 days   Lab Units 03/02/20  1053 03/02/20  0654 03/01/20  2041 03/01/20  1552 03/01/20  1102 03/01/20  2458 02/29/20  2109 02/29/20  1556 02/29/20  1058 02/29/20  0834 02/29/20  0816 02/29/20  0810   POC GLUCOSE mg/dl 141* 121 151* 106 224* 122 118 201* 211* 178* 54* 41*     Results from last 7 days   Lab Units 02/29/20  1025 HEMOGLOBIN A1C % 6 9*               * I Have Reviewed All Lab Data Listed Above  * Additional Pertinent Lab Tests Reviewed: All Labs Within Last 24 Hours Reviewed    Imaging:    Imaging Reports Reviewed Today Include: n/a  Imaging Personally Reviewed by Myself Includes:  n/a    Recent Cultures (last 7 days):           Last 24 Hours Medication List:     Current Facility-Administered Medications:  acetaminophen 650 mg Oral Q6H PRN Oksana Amend, DO   apixaban 2 5 mg Oral BID Oksana Amend, DO   aspirin 81 mg Oral Daily Oksana Amend, DO   diltiazem 240 mg Oral Daily Cheli Miles MD   insulin lispro 1-5 Units Subcutaneous TID AC Oksana Amend, DO   insulin lispro 1-5 Units Subcutaneous HS Oksana Amend, DO   loperamide 2 mg Oral 4x Daily PRN Cheli Miles MD   metoprolol 2 5 mg Intravenous Q6H PRN Marli Zarate PA-C   metoprolol succinate 50 mg Oral Daily Cheli Miles MD   ondansetron 4 mg Intravenous Q6H PRN Oksana Amend, DO   saccharomyces boulardii 250 mg Oral BID Cheli Miles MD        Today, Patient Was Seen By: Cheli Miles MD    ** Please Note: Dictation voice to text software may have been used in the creation of this document   **

## 2020-03-02 NOTE — ASSESSMENT & PLAN NOTE
Anion gap metabolic acidosis, suspect secondary to hypothermia and possible rhabdo- resolved  Hold diuretics  Monitor BUN/Cr  Avoid nephrotoxic agents

## 2020-03-02 NOTE — ASSESSMENT & PLAN NOTE
Lab Results   Component Value Date    HGBA1C 6 9 (H) 02/29/2020       Recent Labs     03/01/20  1552 03/01/20  2041 03/02/20  0654 03/02/20  1053   POCGLU 106 151* 121 141*       Blood Sugar Average: Last 72 hrs:  (P) 142   Hold oral meds  Diabetic diet, SSI, accuchecks

## 2020-03-02 NOTE — PLAN OF CARE
Problem: PHYSICAL THERAPY ADULT  Goal: Performs mobility at highest level of function for planned discharge setting  See evaluation for individualized goals  Description  Treatment/Interventions: Functional transfer training, LE strengthening/ROM, Therapeutic exercise, Endurance training, Patient/family training, Equipment eval/education, Bed mobility, Gait training  Equipment Recommended: Beth Baker       See flowsheet documentation for full assessment, interventions and recommendations  Outcome: Not Progressing  Note:   Prognosis: Fair  Problem List: Decreased strength, Decreased endurance, Impaired balance, Decreased mobility, Decreased cognition, Decreased safety awareness  Assessment: Pt lethargic today, reports increased fatigue  Initially agreeable to mobilization, but refusing to sit in bedside chair once mobilized  Pt appears to be limited secondary to cognitive impairments today, which family reports is not her baseline  Pt unable to follow commands appropriate to participate in exercise  Pt will benefit from continued skilled PT intervention during course of hospital stay to improve strength, endurance and balance to improve safety with functional mobility  Recommend IP rehab upon hospital D/C  Recommendation: Post acute IP rehab     PT - OK to Discharge: Yes(to IP rehab)    See flowsheet documentation for full assessment

## 2020-03-02 NOTE — OCCUPATIONAL THERAPY NOTE
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  Met her  at her work   Intrinsic Gratification sedentary   Pain Assessment   Pain Assessment No/denies pain   Pain Score No Pain   ADL   Where Assessed Supine, bed   Grooming Assistance 2  Maximal Assistance   Grooming Deficit Wash/dry hands; Wash/dry face   UB Bathing Assistance 2  Maximal Assistance   UB Bathing Deficit Right arm;Left arm; Abdomen;Perineal area; Chest   LB Bathing Assistance 2  Maximal Assistance   LB Bathing Deficit Right lower leg including foot; Left lower leg including foot; Buttocks;Right upper leg;Left upper leg   UB Dressing Assistance 2  Maximal Assistance   UB Dressing Deficit Thread RUE; Thread LUE;Pull around back   LB Dressing Assistance 2  Maximal Assistance   LB Dressing Deficit Don/doff R sock; Don/doff L sock   Cognition   Overall Cognitive Status Impaired   Arousal/Participation Arousable; Uncooperative;Lethargic   Attention Difficulty attending to directions   Orientation Level Oriented to person;Disoriented to place   Memory Unable to assess   Following Commands Unable to follow one step commands   Assessment   Assessment Pt participated in occupational therapy with focus on activity tolerance, bed mob, UB/LB self-care  Pt cleared by Ree Juarez for pt participation in occupational therapy  Pt received supine and pt responded to  name called  pt Identifiers confirmed  Pt unable to report her goal today 2* pt cognitive impairments    Pt noted to be incont of bowel and urine upon initiation of OT session  Pt require assist for LB and LB self-care /bathing and dressing 2* pt mental status/dementia  Pt reported/complained of feeling cold  She was resistive to bed mob and required max assist for bed mob/rolling L and R 2* pt cognition this session  Pt will require post acute care rehab services to continue to address pt noted deficits with decreased strength and activity tolerance which currently impair pt ADL and functional mob  Pt bed alarm active post session all needs within reach  Plan   Treatment Interventions ADL retraining; Activityengagement   Goal Expiration Date 03/09/20   OT Treatment Day 2   Recommendation   OT Discharge Recommendation Short Term Rehab   Barthel Index   Feeding 5   Bathing 0   Grooming Score 0   Dressing Score 5   Bladder Score 5   Bowels Score 5   Toilet Use Score 5   Transfers (Bed/Chair) Score 5   Mobility (Level Surface) Score 0   Stairs Score 0   Barthel Index Score 30   Modified Gila Scale   Modified Gila Scale 4     Jose GARCIAA/L

## 2020-03-02 NOTE — SOCIAL WORK
Met with pt's daughter Xochitl Hosea at bedside to discuss dc plan  Xochitl Jc states plan is for pt to receive rehab at an SNF and have pt's  received respite services  Angelica to review SNF list again with her brother Joey Mathews and provide additional choices

## 2020-03-02 NOTE — ASSESSMENT & PLAN NOTE
Patient denied chest pain or shortness of breath  Underlying CAD status post CABG  Likely type 2 MI- elevated troponin likely secondary to possible rhabdo- trops flat and mildly elevated  No events on telemetry  EKG shows no new changes from 2018

## 2020-03-02 NOTE — PLAN OF CARE
Problem: OCCUPATIONAL THERAPY ADULT  Goal: Performs self-care activities at highest level of function for planned discharge setting  See evaluation for individualized goals  Description  Treatment Interventions: ADL retraining, Functional transfer training, UE strengthening/ROM, Endurance training, Cognitive reorientation, Patient/family training, Equipment evaluation/education, Compensatory technique education, Continued evaluation, Activityengagement  Equipment Recommended: Tub seat with back       See flowsheet documentation for full assessment, interventions and recommendations  Outcome: Progressing  Note:   Limitation: Decreased ADL status, Decreased UE strength, Decreased Safe judgement during ADL, Decreased cognition, Decreased endurance, Decreased self-care trans, Decreased high-level ADLs  Prognosis: Fair  Assessment: Pt participated in occupational therapy with focus on activity tolerance, bed mob, UB/LB self-care  Pt cleared by Khloe Upton for pt participation in occupational therapy  Pt received supine and pt responded to  name called  pt Identifiers confirmed  Pt unable to report her goal today 2* pt cognitive impairments  Pt noted to be incont of bowel and urine upon initiation of OT session  Pt require assist for LB and LB self-care /bathing and dressing 2* pt mental status/dementia  Pt reported/complained of feeling cold  She was resistive to bed mob and required max assist for bed mob/rolling L and R 2* pt cognition this session  Pt will require post acute care rehab services to continue to address pt noted deficits with decreased strength and activity tolerance which currently impair pt ADL and functional mob  Pt bed alarm active post session all needs within reach        OT Discharge Recommendation: Short Term Rehab  OT - OK to Discharge: (to STR when medically stable, no to home)

## 2020-03-02 NOTE — SOCIAL WORK
Met with pt's son Teressa to discuss rehab choices  Teressa states he spoke with his sister Renee Holbrook and they prefer a referral to [facility]   1st choice is [facility]    ECIN referral sent to [facility]

## 2020-03-03 PROBLEM — M62.82 RHABDOMYOLYSIS: Status: RESOLVED | Noted: 2020-01-01 | Resolved: 2020-01-01

## 2020-03-03 PROBLEM — T68.XXXA HYPOTHERMIA DUE TO COLD ENVIRONMENT: Status: RESOLVED | Noted: 2020-01-01 | Resolved: 2020-01-01

## 2020-03-03 PROBLEM — R64 CACHEXIA (HCC): Status: ACTIVE | Noted: 2020-01-01

## 2020-03-03 PROBLEM — R74.01 TRANSAMINITIS: Status: RESOLVED | Noted: 2020-01-01 | Resolved: 2020-01-01

## 2020-03-03 NOTE — DISCHARGE SUMMARY
Transition of Care Discharge Summary - [address] [facility]    Patient Information: Garett Guardado 80 y o  female MRN: [id number]  Unit/Bed#: [facility] 733-01 Encounter: [id number]    Discharging Physician / Practitioner: Sandrita Ball MD  PCP: Nereida Vergara MD  Admission Date: 2/28/2020  Discharge Date: 03/03/20    Disposition:      Short Term Rehab or SNF at [address]:   · [facility] Texted SNF Physician    Reason for Admission: hypotheramia, cold exposure    Discharge Diagnoses:     Principal Problem (Resolved):    Hypothermia due to cold environment  Active Problems:    Type 2 diabetes mellitus with stage 4 chronic kidney disease, without long-term current use of insulin (HCC)    Paroxysmal A-fib (HCC)    Essential hypertension    Stage 4 chronic kidney disease ([facility] )    Chronic combined systolic and diastolic CHF (congestive heart failure) ([facility])    Non MI Elevated troponin    Cachexia ([facility] )    Cognitive deficits  Resolved Problems:    Transaminitis    Rhabdomyolysis      Consultations During Hospital Stay:  · None    Procedures Performed:     · None    Medication Adjustments and Discharge Medications:  · Summary of Medication Adjustments made as a result of this hospitalization: lasix held  · Medication Dosing Tapers - Please refer to Discharge Medication List for details on any medication dosing tapers (if applicable to patient)  · Medications being temporarily held (include recommended restart time): Lasix, please check BMP and evaluate in 2-3 days for possible resumption  · Discharge Medication List: See after visit summary for reconciled discharge medications  Wound Care Recommendations:  When applicable, please see wound care section of After Visit Summary      Diet Recommendations at Discharge:  Diet -        Diet Orders   (From admission, onward)             Start     Ordered    03/01/20 1105  Dietary nutrition supplements  Once Question Answer Comment   Select Supplement: Ensure Compact-Chocolate    Frequency Breakfast, Lunch, Dinner        03/01/20 1104    03/01/20 1058  Diet Dewey/CHO Controlled; Consistent Carbohydrate Diet Level 2 (5 carb servings/75 grams CHO/meal); Sodium 2 GM  Diet effective now     Question Answer Comment   Diet Type Dewey/CHO Controlled    Dewey/CHO Controlled Consistent Carbohydrate Diet Level 2 (5 carb servings/75 grams CHO/meal)    Other Restriction(s): Sodium 2 GM    RD to adjust diet per protocol? Yes        03/01/20 1057              Fluid Restriction - No Fluid Restriction at Discharge  Instructions for any Catheters / Lines Present at Discharge (including removal date, if applicable): None    Significant Findings / Test Results:     · Tympanic temperature upon admission 91° F  · Total   · Admission creatinine 1 80  · CT brain, no acute intracranial abnormality, age-related atrophy and microangiopathic changes  Incidental Findings:   · None     Test Results Pending at Discharge (will require follow up): · None     Outpatient Tests Requested:  · BMP in 2-3 days    Complications:  None    Hospital Course: Sagrario Smith is a 80 y o  female patient who originally presented to the hospital on 2/28/2020 due to hypothermia  The patient was outside her home and had a fall, she was unable to stand  Her  knows that she was lying on the ground and EMS was called  A time her temperature was 91° F  She was treated with IV fluid hydration, warm blankets, hot packs  Upon admission she was found to have mild transaminitis, elevated CPK and elevated troponin  Troponin were flat consistent with non MI troponin elevation  Renal function improved with holding diuretics and IV hydration  When she stabilize she was discharged to inpatient rehabilitation      Condition at Discharge: stable     Discharge Day Visit / Exam:     Subjective:  Denies any acute complaints, limited due to dementia  Vitals: Blood Pressure: 157/56 (03/03/20 0741)  Pulse: (!) 140 (03/01/20 0723)  Temperature: (!) 96 7 °F (35 9 °C) (03/03/20 0741)  Temp Source: Rectal (02/28/20 1842)  Respirations: 14 (03/03/20 0741)  Height: 5' 1" (154 9 cm) (03/01/20 1100)  Weight - Scale: 45 4 kg (100 lb) (03/01/20 1100)  SpO2: 97 % (03/01/20 0723)  Exam:   Physical Exam   Constitutional:   Cachectic elderly female, lying in bed, appears comfortable   Pulmonary/Chest: Effort normal    Musculoskeletal:   Few ecchymoses of the right lower extremity  0 5 x 1 cm laceration without drainage or surrounding erythema   Neurological: She is alert  No cranial nerve deficit  No pronator drift       Discussion with Family:  Family at bedside, discussed code status, POLST form filled out  Goals of Care Discussions:  · Code Status at Discharge: Level 3 - DNAR and DNI  · Were there any Goals of Care Discussions during Hospitalization?: Yes  · Results of any General Goals of Care Discussions: see post form   · POLST Completed: Yes   · If POLST Completed, Summary of POLST Agreement Provided Here:  Do not resuscitate, limited interventions, no artificial hydration or nutrition   · OK to Rehospitalize if Needed? Yes    Discharge instructions/Information to patient and family:   See after visit summary section titled Discharge Instructions for information provided to patient and family  Planned Readmission: None      Discharge Statement:  I spent 45 minutes discharging the patient  This time was spent on the day of discharge  I had direct contact with the patient on the day of discharge  Greater than 50% of the total time was spent examining patient, answering all patient questions, arranging and discussing plan of care with patient as well as directly providing post-discharge instructions  Additional time then spent on discharge activities      ** Please Note: This note has been constructed using a voice recognition system **

## 2020-03-03 NOTE — SOCIAL WORK
DC to [facility] IP rehab:     Per Dr Amanda Rodriguez, pt is medically cleared for discharge today  [facility]IN response from [facility]; they are able to accept  [facility] is family's preferred choice  BLS transport needed d/t confusion and unpredictable behaviors w medical attendant needed  CM called and spoke with Justina Lazar at [facility]; BLS  set for 4pm    CMN completed, copy with stickers applied placed in medical record bin, original placed with face sheet on dc packet envelope and placed in pt's chart folder  CM called and spoke with pt's GOSIA Casarez Gerri, advised her of dc plan to [facility], discharge today and BLS  at [facility] relayed info to pt's Hayde Dow, who was standing next to her while on phone  Family agreeable to discharge today, IP rehab at [facility] and BLS transport  Family denied any other dc needs at this time  CM advised Dr Amanda Rodriguez and NUVIA Samuel of pt's discharge readiness  NUVIA Samuel advised of dc packet envelope in pt's chart folder and facility contact / transport info entered into Epic  DC Plan - 4pm BLS  via [facility] to [facility] rehab

## 2020-03-03 NOTE — TRANSPORTATION MEDICAL NECESSITY
Section I - General Information    Name of Patient: Jackie Ochoa                 : 1927    Medicare #: [id number]  Transport Date: 20 (PCS is valid for round trips on this date and for all repetitive trips in the 60-day range as noted below )  Origin: [facility]                                                         Destination: [address] [place] [address]  Is the pt's stay covered under Medicare Part A (PPS/DRG)   [x]     Closest appropriate facility? If no, why is transport to more distant facility required? Yes  If hospice pt, is this transport related to pt's terminal illness? No       Section II - Medical Necessity Questionnaire  Ambulance transportation is medically necessary only if other means of transport are contraindicated or would be potentially harmful to the patient  To meet this requirement, the patient must either be "bed confined" or suffer from a condition such that transport by means other than ambulance is contraindicated by the patient's condition  The following questions must be answered by the medical professional signing below for this form to be valid:    1)  Describe the MEDICAL CONDITION (physical and/or mental) of this patient AT [address] [place] [address] that requires the patient to be transported in an ambulance and why transport by other means is contraindicated by the patient's condition:  Hypothermia 2/2 prolonged exposure to cold, Transaminitis, CHF, Elevated Troponin, Failure to thrive, Rhabdomyolysis, Cognitive deficits  Hx HTN PAF, CKD4  2) Is the patient "bed confined" as defined below? Yes  To be "be confined" the patient must satisfy all three of the following conditions: (1) unable to get up from bed without Assistance; AND (2) unable to ambulate; AND (3) unable to sit in a chair or wheelchair      3) Can this patient safely be transported by car or wheelchair van (i e , seated during transport without a medical attendant or monitoring)? No    4) In addition to completing questions 1-3 above, please check any of the following conditions that apply*:   *Note: supporting documentation for any boxes checked must be maintained in the patient's medical records  If hosp-hosp transfer, describe services needed at 2nd facility not available at 1st facility? Patient is confused  Need or possible need for restraints   Medical attendant required   Unable to tolerate seated position for time needed to transport   Unable to sit in a chair or wheelchair due to decubitus ulcers or other wounds   Other(specify) Mod assist  Behavior management       Section III - Signature of Physician or Healthcare Professional  I certify that the above information is true and correct based on my evaluation of this patient, and represent that the patient requires transport by ambulance and that other forms of transport are contraindicated  I understand that this information will be used by the Centers for Medicare and Medicaid Services (CMS) to support the determination of medical necessity for ambulance services, and I represent that I have personal knowledge of the patient's condition at time of transport  []  If this box is checked, I also certify that the patient is physically or mentally incapable of signing the ambulance service's claim and that the institution with which I am affiliated has furnished care, services, or assistance to the patient  My signature below is made on behalf of the patient pursuant to 42 CFR §424 36(b)(4)  In accordance with 42 CFR §424 37, the specific reason(s) that the patient is physically or mentally incapable of signing the claim form is as follows: Pt is oriented to self only        Signature of Physician* or Healthcare Professional______________________________________________________________  Signature Date 03/03/20 (For scheduled repetitive transports, this form is not valid for transports performed more than 60 days after this date)    Printed Name & Credentials of Physician or Healthcare Professional (MD, DO, RN, etc )____________________________Elinor Sosa Lung RN Case Manager ____  *Form must be signed by patient's attending physician for scheduled, repetitive transports   For non-repetitive, unscheduled ambulance transports, if unable to obtain the signature of the attending physician, any of the following may sign (choose appropriate option below)  [] Physician Assistant []  Clinical Nurse Specialist []  Registered Nurse  []  Nurse Practitioner  [] Discharge Planner

## 2020-03-04 NOTE — ASSESSMENT & PLAN NOTE
Lab Results   Component Value Date    HGBA1C 6 9 (H) 02/29/2020     Continue diabetic diet and monitor BMP    Recheck Hb A1c in 3 months

## 2020-03-04 NOTE — ASSESSMENT & PLAN NOTE
Currently rate controlled, asymptomatic  Continue same regimen and monitor    Continue anticoagulation with eliquis

## 2020-03-04 NOTE — ASSESSMENT & PLAN NOTE
Wt Readings from Last 3 Encounters:   03/04/20 45 5 kg (100 lb 6 4 oz)   03/01/20 45 4 kg (100 lb)   02/13/20 45 8 kg (101 lb)       Diuretics on hold due to recent REHANA, will monitor daily weights and monitor clinically

## 2020-03-04 NOTE — ASSESSMENT & PLAN NOTE
Wt Readings from Last 3 Encounters:   03/04/20 45 5 kg (100 lb 6 4 oz)   03/01/20 45 4 kg (100 lb)   02/13/20 45 8 kg (101 lb)         BP well controled currently, she is asymptomatic  Continue metoprolol and continue to monitor

## 2020-03-04 NOTE — ED ATTENDING ATTESTATION
2/28/2020  IRonny MD, saw and evaluated the patient  I have discussed the patient with the resident/non-physician practitioner and agree with the resident's/non-physician practitioner's findings, Plan of Care, and MDM as documented in the resident's/non-physician practitioner's note, except where noted  All available labs and Radiology studies were reviewed  I was present for key portions of any procedure(s) performed by the resident/non-physician practitioner and I was immediately available to provide assistance  At this point I agree with the current assessment done in the Emergency Department  I have conducted an independent evaluation of this patient a history and physical is as follows:    80-year-old female found down outside house today cold exposure per EMS  found her outside  Tympanic temperature 91° F was started on IV fluids warm packs axilla and groin warm blankets  Patient is alert on arrival however is oriented to self but not place or time patient's prior history of dementia she lives alone with  and large house  They do have 3 days a week VNA assistance but then have any other assistance at home  Patient and  at home declined to wear Life Alert necklace     On arrival patient is mildly hypothermic and shivering  Pulsed tachycardic regular normotensive patient was post on a Robert Hugger and warmed up passively  Labs and studies reviewed positive troponin    Venous pH noted low bicarb large base excess CBC within normal limits CT imaging within normal limits ECG shows atrial fibrillation with leftward axis no acute ST changes      ED Course    patient was admitted to Medicine for the mild hypothermia elevated troponin and the  metabolic acidosis      Critical Care Time  Procedures

## 2020-03-04 NOTE — ASSESSMENT & PLAN NOTE
Most likely vascular  Will continue to monitor for behaviors  Will discontinue seroquel  Reorient as needed  Maintain sleep/wake cycle, encourage po hydration    Continue PT/OT

## 2020-03-04 NOTE — ASSESSMENT & PLAN NOTE
BMI 18 8  As per medical records had decreased appetite and started on megace  Will discontinue megace due to potential side effects and stat on mirtazapine 7 5 mg QHS  Will monitor weights, follow up with RD

## 2020-03-04 NOTE — PROGRESS NOTES
[facility]   [address], [place], [address] [address]  History and Physical  POS: 31    Records Reviewed include: Hospital records      Chief Complaint/ Reason for Admission:  Dementia with behavioral disturbance, cachexia     History of Present Illness:       Ms Iraj Daniel is a 81 yo female who was recently admitted to the [facility] s/p fall, hypothermia, REHANA, rhabdomyolysis, currently she is medically stable and will be admitted to [facility] for [address] [place]  Patient had history of dementia most likely with behavioral disturbance, was placed on Seroquel in the hospital , also started on megace for decreased appetite  Will discontinue both medication due to potential side effects and start on mirtazapine 7 5 mg QHS, will monitor for behaviors, reorient as needed  REHANA- creatinine improved  Will repeat BMP in 3 days, encourage po hydration, hold lasix for now, monitor weights daily         Allergies    Allergies   Allergen Reactions    Heparin        Past Medical History  Past Medical History:   Diagnosis Date    Acute ischemic right MCA stroke ([facility] )     Anticoagulant long-term use     last assessed: 8/17/17    Aortic stenosis     Atrial fibrillation (HCC)     Blurred vision     last assessed: 10/25/13    CH[facility]congestive heart failure) ([facility])     Chronic kidney disease     Coronary artery disease     Dementia ([facility] )     Diabetes mellitus ([facility] )     Dysuria     last assessed: 8/3/15    Hyperlipidemia     Hypertension     Nonrheumatic aortic (valve) insufficiency     Old myocardial infarction     Rectal carcinoma (HCC)         Past Surgical History:   Procedure Laterality Date    BALLOON ANGIOPLASTY, ARTERY      CORONARY ARTERY BYPASS GRAFT      RECTAL SURGERY      TONSILLECTOMY         Family History  Family History   Problem Relation Age of Onset    Stroke Mother     Stroke Father     Diabetes Son     No Known Problems Daughter        Social History  Social History     Tobacco Use Smoking Status Former Smoker    Packs/day: 1 00    Years: 20 00    Pack years: 20 00    Last attempt to quit:     Years since quittin 2   Smokeless Tobacco Never Used   Tobacco Comment    1 pack a week; No secondhand smoke exposure      Social History     Substance and Sexual Activity   Alcohol Use Not Currently      Social History     Substance and Sexual Activity   Drug Use No        Lives: Home,  Social Support: family  Fall in the past 12 months: yes  Use of assistance Device: Walker    Physical Exam    Vital Signs    Vitals:    20 1550   BP: 119/87   Pulse: 83   Resp: 16   Temp: 98 7 °F (37 1 °C)   SpO2: 98%           Constitutional: Frail appearing patient       Physical Exam   Constitutional: No distress  HENT:   Head: Normocephalic and atraumatic  Eyes: Pupils are equal, round, and reactive to light  EOM are normal  Right eye exhibits no discharge  Left eye exhibits no discharge  No scleral icterus  Neck: Normal range of motion  Neck supple  No JVD present  Cardiovascular: Normal rate and regular rhythm  Murmur heard  Pulmonary/Chest: Effort normal and breath sounds normal  No respiratory distress  She has no wheezes  Abdominal: Soft  There is tenderness (mild generalized)  There is no rebound and no guarding  Musculoskeletal: She exhibits edema (trace)  She exhibits no tenderness  Neurological: She is alert  Oriented to person knows her  but not her age, Does not know he name of the place but states he is here to get better, knows the year and went back and forth March - April for the month   Skin: Skin is warm and dry  She is not diaphoretic  Bruises upper and lower extremities   Psychiatric: Her behavior is normal    Nursing note and vitals reviewed  Review of Systems:  Review of Systems   Constitutional: Negative for chills and fever  Frail looking   HENT: Negative for congestion, drooling and rhinorrhea      Respiratory: Negative for cough and shortness of breath  Cardiovascular: Negative for chest pain, palpitations and leg swelling  Gastrointestinal: Positive for constipation  Negative for abdominal pain  Endocrine: Negative for cold intolerance  Genitourinary: Negative for dysuria and hematuria  Musculoskeletal: Positive for back pain and gait problem  Skin: Negative for rash  bruises   Allergic/Immunologic: Positive for environmental allergies  Neurological: Negative for dizziness, seizures and headaches  Hematological: Does not bruise/bleed easily  Psychiatric/Behavioral: Negative for behavioral problems and hallucinations  List of Current Medications:    Medication reviewed  All orders signed  Complete list is in the paper chart  Allergies    Allergies   Allergen Reactions    Heparin        Labs/Diagnostics (reviewed by this provider): I personally reviewed lab results and imaging studies  Full reports are in the paper chart  Assessment/Plan:    Dementia  Most likely vascular  Will continue to monitor for behaviors  Will discontinue seroquel  Reorient as needed  Maintain sleep/wake cycle, encourage po hydration  Continue PT/OT    Cachexia ([facility] )  BMI 18 8  As per medical records had decreased appetite and started on megace  Will discontinue megace due to potential side effects and stat on mirtazapine 7 5 mg QHS  Will monitor weights, follow up with RD  Chronic combined systolic and diastolic CHF (congestive heart failure) (HCC)  Wt Readings from Last 3 Encounters:   03/04/20 45 5 kg (100 lb 6 4 oz)   03/01/20 45 4 kg (100 lb)   02/13/20 45 8 kg (101 lb)       Diuretics on hold due to recent REHANA, will monitor daily weights and monitor clinically  Paroxysmal A-fib (HCC)  Currently rate controlled, asymptomatic  Continue same regimen and monitor    Continue anticoagulation with eliquis    Type 2 diabetes mellitus with stage 4 chronic kidney disease, without long-term current use of insulin [facility])    Lab Results   Component Value Date    HGBA1C 6 9 (H) 02/29/2020     Continue diabetic diet and monitor BMP  Recheck Hb A1c in 3 months    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 ([facility])  Wt Readings from Last 3 Encounters:   03/04/20 45 5 kg (100 lb 6 4 oz)   03/01/20 45 4 kg (100 lb)   02/13/20 45 8 kg (101 lb)         BP well controled currently, she is asymptomatic  Continue metoprolol and continue to monitor  Ambulatory dysfunction  Will continue PT/OT  Placed on fall precautions    The goal is to continue care in LTCF       Pain: back pain  Rehab Potential:Good  Patient Informed of Medical Condition: yes   Patient is Capable of Understanding Their Right: yes   Discharge Plan: LTCF  Vaccination:   Immunization History   Administered Date(s) Administered    INFLUENZA 09/07/2011, 10/09/2015, 09/26/2016, 09/04/2018, 11/15/2019    Influenza Quadrivalent Preservative Free 3 years and older IM 10/08/2014    Influenza Split High Dose Preservative Free IM 09/25/2013, 10/09/2015, 09/26/2016, 10/05/2017, 09/04/2018, 11/15/2019    Influenza TIV (IM) 09/24/2012    Pneumococcal Conjugate 13-Valent 11/16/2016    Pneumococcal Polysaccharide PPV23 04/29/1991, 11/03/2014    Td (adult), adsorbed 05/28/2004    Zoster 07/20/2011     Advanced Directives: yes: Yes   Code status:DNR (Per discussion with resident or POA)      Nishant Vences MD  Geriatric Medicine  3/2/82066:43 PM

## 2020-03-07 NOTE — TELEPHONE ENCOUNTER
Connect call with provider to [facility] @ [address] at [phone] Tito/Bruna Singleton/[id number]/Patient Venu Fitzgerald

## 2020-03-09 PROBLEM — K08.89 TOOTHACHE: Status: ACTIVE | Noted: 2020-01-01

## 2020-03-09 NOTE — PROGRESS NOTES
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 ([address] )  Wt Readings from Last 3 Encounters:   03/04/20 45 5 kg (100 lb 6 4 oz)   03/01/20 45 4 kg (100 lb)   02/13/20 45 8 kg (101 lb)     BP range (3/4-9/2020) = 118/78 to 142/66  Current wt: 47 54 kgs  (3/9/2020)  Will start Torsemide 20mg daily - HOLD for SBP less than 100  Continue Spironolactone 25mg daily  Continue KCl 10mEq daily  Continue Diltiazem 24H ER 240mg daily - with HOLD parameter  Continue Metoprolol Succ ER 50mg daily - with HOLD parameter  Continue daily weight  Nursing to report wt gain 3lbs in 24 hours or 5 lbs in 1 week  CMP on 3/11/2020    Chronic combined systolic and diastolic CHF (congestive heart failure) (HCC)  Wt Readings from Last 3 Encounters:   03/04/20 45 5 kg (100 lb 6 4 oz)   03/01/20 45 4 kg (100 lb)   02/13/20 45 8 kg (101 lb)     Noted weight gain by: 2 04 kgs in 5 days (3/9/2020)  Please see management on #2    Paroxysmal A-fib (HCC)  BP and HR controlled and stable  Continue Eliquis 2 5mg BID  Continue ASA 81mg daily    Type 2 diabetes mellitus with stage 4 chronic kidney disease, without long-term current use of insulin (HCC)    Lab Results   Component Value Date    HGBA1C 6 9 (H) 02/29/2020     FBG range (3/5-9/2020) = 81 to 144  Continue Glimepiride 1mg daily    Toothache  Consult Dental ASAP  Nursing to assess for pain Q shift  Offer PRN Acetaminophen 640mg Q4 hours as needed  Chief complaint / Reason for visit: Follow-up visit    History of Present Illness: This is a 80 y o   Female patient currently admitted at [facility] (3/3/2020 to present) following discharge from acute care hospitalization [facility]  [facility]) with Dx of Hypothermia, Fall, REHANA, Rhabdomyolysis and Vascular Dementia with behavioral disturbance (REHANA and Rhabdomyolysis deemed resolved prior to acute care discharge)  Patient is seen and examined today to follow-up acute and chronic medical conditions as mentioned above and Chronic Combined CHF, Paroxysmal Afib, DM Type 2, Deconditioning and Ambulatory dysfunction  Patient is OOB, alert, cooperative, verbal with clear coherent speech  Reported toothache located on the #10 lateral incisor - no abscess seen, tooth intact  Will consult dental - nursing to offer PRN Aceatminophen as needed  Easily distracted (requesting many things in her room to be changed or asked to be found) during this visit but can be redirected - ROS assessment limited  Per nursing, patient Right ankle found to be more swollen than last week  On examination noted (+1) pitting edema to Right ankle but Left LE no edema seen  Patient wearing only regular elastic socks  Will order Tubi  daily (ON prior to OOB/ Off HS)  The rest of the other system examination deemed unremarkable  V/S: T97 6F -P64 -R18 BP: 109/73 SpO2: 94% RA  Reviewed labs from [facility] Epic record: Mild anemia, No leukocytosis, Slightly elevated BUN, low Alb and TPro levels  Crea and GFR WNL  HbA1C 6 9 (2/29/2020)  TSH level WNL  CT scan of head showed microangiopathic and age related atrophic changes  Review of Systems: Review of Systems   Unable to perform ROS: Dementia   HENT: Positive for dental problem  Reported toothache : #10 lateral incisor: not cracked     Per history of present illness, all other systems reviewed and negative      HISTORY:  Medical Hx: Reviewed, unchanged  Family Hx: Reviewed, unchanged  Soc Hx: Reviewed,  Unchanged    ALLERGY: Reviewed, unchanged  Allergies   Allergen Reactions    Heparin        PHYSICAL EXAM:  Vital Signs:  T97 6F -P64 -R18 BP: 109/73 SpO2: 94% RA  Weight: 104 6 lbs (3/9/2020) <= 100 4 lbs (3/3/2020)      Physical Exam   Constitutional: She appears well-developed  No distress  Frail stature  Alert, cooperative but easily distracted  Not in distress  HENT:   Head: Normocephalic and atraumatic  Right Ear: External ear normal    Left Ear: External ear normal    Nose: Nose normal    Mouth/Throat: Oropharynx is clear and moist  No oropharyngeal exudate  Eyes: Pupils are equal, round, and reactive to light  Conjunctivae are normal  Right eye exhibits no discharge  Left eye exhibits no discharge  No scleral icterus  Neck: Neck supple  No JVD present  No tracheal deviation present  No thyromegaly present  Cardiovascular: Normal rate, regular rhythm and normal heart sounds  Exam reveals no gallop and no friction rub  No murmur heard  Pulmonary/Chest: Effort normal  No stridor  No respiratory distress  She has no wheezes  She has no rales  She exhibits no tenderness  Noted active coughing - sounds moist    Abdominal: Soft  Bowel sounds are normal  She exhibits no distension and no mass  There is no tenderness  There is no rebound and no guarding  Musculoskeletal: She exhibits edema  She exhibits no tenderness  Ambulatory dysfunction - uses manual wheel chair ad full dependent on staff  RLE (+1) pitting edema to ankle only; LLE no edema  Lymphadenopathy:     She has no cervical adenopathy  Neurological: She is alert  Oriented to name/b-day/month/year only   Skin: Skin is warm and dry  No rash noted  She is not diaphoretic  No erythema  No pallor  Psychiatric: She has a normal mood and affect  Her behavior is normal        Laboratory results / Imaging: Reviewed from [address] [place] [address] record:    * CBC (3/3/2020)  * CMP (3/3/2020)  * HbA1C (2/29/2020)  * TSH (2/29/2020)    * CT scan of head (2/28/2020)  = Showed microangiopathic and age related atrophic changes    = Polypoid mucosal thickening of the B/L maxillary sinuses  * CXR (2/28/2020)  = Cardiomegaly  = Compression deformity of the T11 vertebrae  = No acute cardiopulmonary disease process  Current Medications:   All medications reviewed and updated in [address] [place] [address] [id number]  3/9/2020

## 2020-03-10 NOTE — ASSESSMENT & PLAN NOTE
Lab Results   Component Value Date    HGBA1C 6 9 (H) 02/29/2020     FBG range (3/5-9/2020) = 81 to 144  Continue Glimepiride 1mg daily

## 2020-03-10 NOTE — ASSESSMENT & PLAN NOTE
Consult Dental ASAP  Nursing to assess for pain Q shift  Offer PRN Acetaminophen 640mg Q4 hours as needed

## 2020-03-10 NOTE — ASSESSMENT & PLAN NOTE
Wt Readings from Last 3 Encounters:   03/04/20 45 5 kg (100 lb 6 4 oz)   03/01/20 45 4 kg (100 lb)   02/13/20 45 8 kg (101 lb)     Noted weight gain by: 2 04 kgs in 5 days (3/9/2020)  Please see management on #2

## 2020-03-10 NOTE — ASSESSMENT & PLAN NOTE
Stable  Continue 24/7 SNF supportive care and management  Continue PT/OT/ST as scheduled  Continue re-orientation and redirection  Continue fall precaution  Continue Mirtazapine 7 5mg at bedtime  Nursing to maximize daily oral fluid restriction to maintain adequate hydration  Long term plan: Transition to LTC management

## 2020-03-10 NOTE — ASSESSMENT & PLAN NOTE
Wt Readings from Last 3 Encounters:   03/04/20 45 5 kg (100 lb 6 4 oz)   03/01/20 45 4 kg (100 lb)   02/13/20 45 8 kg (101 lb)     BP range (3/4-9/2020) = 118/78 to 142/66  Current wt: 47 54 kgs  (3/9/2020)  Will start Torsemide 20mg daily - HOLD for SBP less than 100  Continue Spironolactone 25mg daily  Continue KCl 10mEq daily  Continue Diltiazem 24H ER 240mg daily - with HOLD parameter  Continue Metoprolol Succ ER 50mg daily - with HOLD parameter  Continue daily weight  Nursing to report wt gain 3lbs in 24 hours or 5 lbs in 1 week    CMP on 3/11/2020

## 2020-03-13 NOTE — PROGRESS NOTES
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 ([facility])      Wt Readings from Last 3 Encounters:   03/04/20 45 5 kg (100 lb 6 4 oz)   03/01/20 45 4 kg (100 lb)   02/13/20 45 8 kg (101 lb)      BP range (3/4-13/2020) = 112/71 to 142/66  Current wt: 48 45 kgs (3/13/2020) <= 47 54 kgs  (3/9/2020)  Continue Torsemide 20mg daily - HOLD for SBP less than 100  Will D/C Spironolactone 25mg daily due to elevated renal functions (3/13/2020)  Continue KCl 10mEq daily  Continue Diltiazem 24H ER 240mg daily - with HOLD parameter  Continue Metoprolol Succ ER 50mg daily - with HOLD parameter  Continue daily weight  Nursing to report wt gain 3lbs in 24 hours or 5 lbs in 1 week  CMP on 3/16/2020     Chronic combined systolic and diastolic CHF (congestive heart failure) ([facility])      Wt Readings from Last 3 Encounters:   03/04/20 45 5 kg (100 lb 6 4 oz)   03/01/20 45 4 kg (100 lb)   02/13/20 45 8 kg (101 lb)      Current wt: 48 45 kgs (3/13/2020) <= 47 54 kgs  (3/9/2020)  Significantly reduced swelling to Right ankle and feet  Please see management on #2     Paroxysmal A-fib ([facility])  BP and HR controlled and stable    Continue Eliquis 2 5mg BID  Continue ASA 81mg daily     Type 2 diabetes mellitus with stage 4 chronic kidney disease, without long-term current use of insulin ([facility])        Lab Results   Component Value Date     HGBA1C 6 9 (H) 02/29/2020      FBG range (3/5-13/2020) = 81 to 144  Continue Glimepiride 1mg daily  Continue daily BG checks  Chief complaint / Reason for visit: Follow-up visit     History of Present Illness: This is a 80 y o  Female patient currently admitted at [facility] (3/3/2020 to present) following discharge from acute care hospitalization [facility]) with Dx of Hypothermia, Fall, REHANA, Rhabdomyolysis and Vascular Dementia with behavioral disturbance (REHANA and Rhabdomyolysis deemed resolved prior to acute care discharge)      Patient is seen and examined today to follow-up acute and chronic medical conditions as mentioned above and Chronic Combined CHF, Paroxysmal Afib, DM Type 2, Deconditioning and Ambulatory dysfunction      Patient is still in bed, alert, cooperative, verbal with clear coherent speech  Oriented x 3 (name/birthday/month/year)  Patient reported, " I don't feel good  My stomach bothers me" - no other acute medical concerns reported  When asked if she needs to go to the bathroom, said Yes"  Not in distress  Per nursing, patient often presents " sun downing" around 4PM - described as repetitive illogical speech and more confusion  No behavior disturbance during day time  Review of bowel movement log showed bowel movement 1-2 times per day  Will D/C Senna daily and continue with Prune juice  On examination noted still with (+1) pitting edema to Right ankle but Left LE no edema seen - significantly reduces in size and tightness  Continue Tubi  daily (ON prior to OOB/ Off HS)  The rest of the other system examination deemed unremarkable  V/S: T97 5F -P62 -R18 BP: 120/56 SpO2: 98% RA      Reviewed BMP results done today (3/13/2020) and showed mildly elevated BUN and creatinine - possibly from poor oral fluid intake  Will D/C Spironolactone and continue with Torsemide  Repeat CBC w/o diff and BMP on 3/16/2020  Review of Systems:  Per history of present illness, all other systems reviewed and negative      HISTORY:  Medical Hx: Reviewed, unchanged  Family Hx: Reviewed, unchanged  Soc Hx: Reviewed, Unchanged    ALLERGY: Reviewed,  Unchanged  Allergies   Allergen Reactions    Heparin        PHYSICAL EXAM:  Vital Signs: T97 5F -P62 -R18 BP: 120/56 SpO2: 98% RA  Weight: 106 6 lbs (3/13/2020)    Physical Exam   Constitutional: She is oriented to person, place, and time  She appears well-developed  No distress  Frail stature  Cooperative, alert  Pleasant  Not in distress  HENT:   Head: Normocephalic and atraumatic  Right Ear: External ear normal    Left Ear: External ear normal    Nose: Nose normal    Mouth/Throat: Oropharynx is clear and moist  No oropharyngeal exudate  Eyes: Pupils are equal, round, and reactive to light  Conjunctivae are normal  Right eye exhibits no discharge  Left eye exhibits no discharge  No scleral icterus  Neck: Neck supple  No JVD present  No tracheal deviation present  No thyromegaly present  Cardiovascular: Normal rate, regular rhythm and normal heart sounds  Exam reveals no gallop and no friction rub  No murmur heard  Pulmonary/Chest: Effort normal and breath sounds normal  No stridor  No respiratory distress  She has no wheezes  She has no rales  She exhibits no tenderness  Abdominal: Soft  Bowel sounds are normal  She exhibits no distension and no mass  There is no tenderness  There is no rebound and no guarding  Musculoskeletal: She exhibits edema  She exhibits no tenderness or deformity  Ambulatory dysfunction - uses manual wheel chair ad full dependent on staff  RLE (+1) pitting edema to ankle only; LLE no edema - reduced significantly  Lymphadenopathy:     She has no cervical adenopathy  Neurological: She is alert and oriented to person, place, and time  Oriented to name/birthday/moth/year  Verbal with clear coherent speech  Skin: Skin is warm and dry  No rash noted  She is not diaphoretic  No erythema  No pallor  Multiple erythema to BLE (tibial areas) - resolving (dark purple)  Dry scab to Right outer lateral tibia       Psychiatric: She has a normal mood and affect  Her behavior is normal      Laboratory results / Imaging: Hard copies in medical chart:    * BMP (3/13/2020) = WNL except:  Glu: 100  BUN: 47 <= 41 (3/12/2020)  Crea: 1 40 <= 1 35 (3/12/2020)  Cl: 111 <= 113 (3/12/2020)  GFR: 33 <= 34 (3/12/2020)       Current Medications:   All medications reviewed and updated in [address] [place] [address], [id number]  3/13/2020

## 2020-03-16 NOTE — TELEPHONE ENCOUNTER
03/16/20 6:55 PM     Thank you for your request  Your request has been received, reviewed, and the patient chart updated  The PCP has successfully been removed with a patient attribution note  This message will now be completed      Thank you  Lisandro Brumfield

## 2020-03-16 NOTE — TELEPHONE ENCOUNTER
03/16/20 6:55 PM     Thank you for your request  Your request has been received, reviewed, and the patient chart updated  The PCP has successfully been removed with a patient attribution note  This message will now be completed      Thank you  Ontario Cooter

## 2020-03-16 NOTE — PROGRESS NOTES
[facility]  Małachkatelynn Brian 79  [phone]  [address] [place] [address] St: [facility]  POS: 31: SNF/Short Term Rehab      NAME: Maria Victoria Marshall  AGE: 80 y o  SEX: female  DATE OF ADMISSION: MARCH 3, 2020  DATE OF DISCHARGE: MARCH 16, 26  DISCHARGE DISPOSITION: [facility] (snf)    Reason for admission: Patient was admitted from [facility] for rehabilitation after hospitalization for Hypothermia, REHANA, Fall and Vascular Dementia  This is a 80 y o  Female patient currently admitted at [facility] (3/3/2020 to present) following discharge from acute care hospitalization [facility]) with Dx of Hypothermia, Fall, REHANA, Rhabdomyolysis and Vascular Dementia with behavioral disturbance (REHANA and Rhabdomyolysis deemed resolved prior to acute care discharge)  Admission Diagnoses: Hypothermia  Additional Problems: REHANA  Discharge Diagnoses:     * Deconditioning and ambulatory dysfunction  * REHANA  * Vascular Dementia with Behavioral Disturbance  * Chronic Combined CHF  * Paroxysmal Atrial Fibrillation  * DM Type 2      Course of stay: Patient was admitted to [facility] for rehabilitation due to deconditioning and ambulatory dysfunction  Patient received 24/7 SNF supportive care to manage acute and chronic medical conditions and PT/OT/ST services  Patient resumed on loop diuretic on STR ( was on HOLD while in acute care due to REHANA) due to weight gain, increasing edema to BLE  Edema significantly reduced today but renal functions have progressively increased as well  Nursing missed order to D/C spironolactone on 3/13/2020  Will continue to D/C spironolactone and reduce Torsemide from 20mg daily to Torsemide 10mg daily  Continue fall precaution - patient had 1 report of fall over the weekend  Patient is also reported to have significant "sun downing" event - often observed with increasing agitation and illogical verbalizations   Per nursing report, patient is deemed fair in meal completion and oral fluid intake - otherwise, to date, stay in [facility] is uneventful  Patient is scheduled for discharge to [facility] on 3/17/2020  PT Progress Note: Per PT/OT progress note, patient needs:     * Contact guard assistance for ambulation  = Able to ambulate approximately 55 feet with roller walker  * Minimum assistance for Upper body / Lower body dressing  * Minimum assistance for toileting      Labs and testing performed during stay: Copies of laboratory/ imaging/ consult will be provided to PCP upon discharge  * BMP (3/16/2020, 3/13/2020, 2/13/2020, 3/12/2020)  * CMP (3/11/2020  * CBC w/o diff (3/11/2020)    * SLUMs: 9/30  (3/4/2020)    Discharge Medications: See discharge medication list which was reviewed and signed  Status at time of discharge: Stable    Today's Visit: 3/16/46951:22 PM       Subjective:" I'm tired  These exercise is tiring me out"  Vitals: T98 7F -P78 -R18 BP: 122/52 SpO2: 98% RA  Weight: 105 8 lbs (3/16/2020) <= 105 4 lbs (3/15/2020) <= 101 6 lbs (3/3/2020)      Review of Systems   Constitutional: Positive for fatigue  HENT: Negative  Eyes: Negative  Respiratory: Negative  Cardiovascular: Negative  Gastrointestinal: Negative  Endocrine: Negative  Genitourinary: Negative  Musculoskeletal: Negative  Skin: Negative  Allergic/Immunologic: Negative  Neurological: Negative  Hematological: Negative  Psychiatric/Behavioral: Negative  Exam: Physical Exam   Constitutional: She appears well-developed and well-nourished  No distress  Alert, cooperative, pleasant  Not in distress  HENT:   Head: Normocephalic and atraumatic  Right Ear: External ear normal    Left Ear: External ear normal    Nose: Nose normal    Mouth/Throat: Oropharynx is clear and moist  No oropharyngeal exudate  Eyes: Pupils are equal, round, and reactive to light  Conjunctivae are normal  Right eye exhibits no discharge   Left eye exhibits no discharge  No scleral icterus  Neck: Neck supple  No JVD present  No tracheal deviation present  No thyromegaly present  Cardiovascular: Normal rate, regular rhythm and normal heart sounds  Exam reveals no gallop and no friction rub  No murmur heard  Pulmonary/Chest: Effort normal and breath sounds normal  No stridor  No respiratory distress  She has no wheezes  She has no rales  She exhibits no tenderness  Abdominal: Soft  Bowel sounds are normal  She exhibits no distension and no mass  There is no tenderness  There is no rebound and no guarding  Musculoskeletal: She exhibits edema  She exhibits no tenderness or deformity  Ambulatory dysfunction - uses manual wheel chair  Significantly improved RLE (+1) pitting edema to ankle only - tubi  in place  Lymphadenopathy:     She has no cervical adenopathy  Neurological: She is alert  Oriented to name / birthday/ month/ year  Skin: Skin is warm and dry  No rash noted  She is not diaphoretic  No erythema  No pallor  Psychiatric: She has a normal mood and affect  Her behavior is normal    Verbal with clear coherent speech  Calm and pleasant  Discussion with patient/family and further instructions:  -Fall precautions  -Aspiration precautions  -Bleeding precautions  -Monitor for signs/symptoms of infection  -Medication list was reviewed and signed  -DME form deemed not needed at this time  Follow-up Recommendations:     * Please follow-up with your primary care physician within 7-10 days of discharge to review medication changes and current status  [facility] has set up the [place] appointment with the facility PCP on 3/19/2020  Please make every effort to attend this appointment  Should there be a change in your medical condition and an earlier appointment is needed please call the number provided above      * Recommend PT/OT as an out-patient service to continue strengthening, gait, balance and overall functional independence improvement gained during in-patient rehabilitation  * Recommend VNA to assist in medication management and other skilled nursing needs during transition to home  Problem List Follow-up Recommendations:    Dementia  Stable  Per nursing, patient presents with "sun downing"  Continue re-orientation and redirection  Continue fall precaution  Continue Mirtazapine 7 5mg at bedtime  Nursing to maximize daily oral fluid restriction to maintain adequate hydration  Scheduled for D/C to [facility] on 3/17/2020      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 ([facility])        Wt Readings from Last 3 Encounters:   03/04/20 45 5 kg (100 lb 6 4 oz)   03/01/20 45 4 kg (100 lb)   02/13/20 45 8 kg (101 lb)      Current wt: 48 09 kgs (3/16/2020) <= 48 45 kgs (3/13/2020) <= 47 54 kgs  (3/9/2020)    BP range (3/10-16/2020) = 112/71 to 128/70  Will decrease Torsemide to 10mg daily - HOLD for SBP less than 100  Spironolactone discontinued (elevated renal functions)  Continue KCl 10mEq daily  Continue Diltiazem 24H ER 240mg daily - with HOLD parameter  Continue Metoprolol Succ ER 50mg daily - with HOLD parameter  Continue daily weight  Nursing to report wt gain 3lbs in 24 hours or 5 lbs in 1 week  BMP on 3/18/2020 (script made)     Chronic combined systolic and diastolic CHF (congestive heart failure) ([facility])        Wt Readings from Last 3 Encounters:   03/04/20 45 5 kg (100 lb 6 4 oz)   03/01/20 45 4 kg (100 lb)   02/13/20 45 8 kg (101 lb)      Current wt: 48 09 kgs (3/16/2020) <= 48 45 kgs (3/13/2020) <= 47 54 kgs  (3/9/2020)  Significant improvement to Right ankle and feet edema  Continue tubi- daily  Elevated renal functions (3/16/2020)  Please see management on #2     Paroxysmal A-fib ([facility])  BP and HR controlled and stable    Continue Eliquis 2 5mg BID  Continue ASA 81mg daily     Type 2 diabetes mellitus with stage 4 chronic kidney disease, without long-term current use of insulin [facility])            Lab Results   Component Value Date     HGBA1C 6 9 (H) 02/29/2020      FBG range (3/5-16/2020) = 81 to 144  Continue Glimepiride 1mg daily  Continue daily BG checks  Discharge Statement:    * Spent more than 30 minutes discharging the patient; greater than 50% spent on physical examination of patient, answering questions, discussion of plan of care, recommendations and providing post discharge instructions  Additional time spend on other discharge related activities including documentation        [address] [place] [address]  3/16/72528:22 PM

## 2020-03-16 NOTE — TELEPHONE ENCOUNTER
PATIENT'S DAUGHTER [place] CALLED TO CANCEL ALL UPCOMING APPOINTMENTS WITH THIS OFFICE, STATES PATIENT IS BEING ADMITTED TO NURSING HOME AND WILL NO LONGER NEED DR Carlie Goodpasture TO BE HER PCP

## 2020-03-18 NOTE — PROGRESS NOTES
3/18/20 - Received Careport notification patient discharged from [facility] 3/17/20  Per review of Nursing Home Visit note dated 3/16/20 - patient to discharge to [facility]  Therefore no longer eligible for REHANA/CKD   Pharmacy requesting refill on Tylenol  LOV 4/11/19  Last refill ***  Last lab ***

## 2020-03-19 NOTE — ED PROVIDER NOTES
History  Chief Complaint   Patient presents with   [place] Fall     pt was using her walker when she fell  states that she did no hit her head, but hit her back  c/o no pain at this time  +thinners, -loc     HPI   59-year-old woman, lives at [facility], presents to the emergency department after a fall  Patient ambulates with a walker  Has frequent falls  Also has dementia  Says that she was exhausted today because she has been caring for her  who is ill  Says that she fell backward and landed on her back  Insist that she kept her head held upright and did not strike the ground  She denies loss of consciousness  She is asymptomatic and says she is only here because nursing home staff insisted that she be evaluated  She is denying any headache, neck pain, back pain, extremity pain, chest pain, shortness of breath, no abdominal pain  She is on aspirin and apixaban  Prior to Admission Medications   Prescriptions Last Dose Informant Patient Reported? Taking?    ELIQUIS 2 5 MG  Child No Yes   Sig: ONE TABLET BY MOUTH TWO TIMES DAILY   Lancets (FREESTYLE) lancets  Child No Yes   Sig: Use as instructed   QUEtiapine (SEROquel) 25 mg tablet Not Taking at Unknown time  No No   Sig: ONE-HALF TABLET (12 5MG) BY MOUTH AT BEDTIME   Patient not taking: Reported on 3/19/2020   acetaminophen (TYLENOL) 325 mg tablet  Child No Yes   Sig: Take 1 tablet (325 mg total) by mouth every 6 (six) hours as needed for mild pain   aspirin 81 mg chewable tablet  Child No Yes   Sig: Chew 1 tablet daily   atorvastatin (LIPITOR) 20 mg tablet   No Yes   Sig: ONE TABLET BY MOUTH DAILY WITH DINNER   calcitonin, salmon, (MIACALCIN) 200 units/act nasal spray  Child No Yes   Sig: USE 1 SPRAY IN ONE NOSTRIL DAILY--ALTERNATE NOSTRILS   cetirizine (ZyrTEC) 5 MG tablet   Yes Yes   Sig: Take 5 mg by mouth daily   diltiazem (CARDIZEM CD) 240 mg 24 hr capsule  Child No Yes   Sig: ONE CAPSULE BY MOUTH DAILY   famotidine (PEPCID) 20 mg tablet Child No Yes   Sig: ONE TABLET BY MOUTH DAILY   glimepiride (AMARYL) 1 mg tablet   No Yes   Sig: ONE TABLET BY MOUTH DAILY WITH BREAKFAST   glucose blood (FREESTYLE PRECISION ELIA TEST) test strip  Child No Yes   Sig: Use as instructed to test sugar 2 times daily DX: E11 9   glucose monitoring kit (FREESTYLE) monitoring kit  Child No Yes   Si each by Does not apply route daily   megestrol (MEGACE) 20 mg tablet Not Taking at Unknown time Child No No   Sig: ONE-HALF TABLET BY MOUTH DAILY FOR WEIGHT GAIN   Patient not taking: Reported on 3/19/2020   metoprolol succinate (TOPROL-XL) 50 mg 24 hr tablet  Child No Yes   Sig: ONE TABLET BY MOUTH DAILY   potassium chloride (K-DUR,KLOR-CON) 10 mEq tablet  Child No Yes   Sig: ONE TABLET BY MOUTH DAILY   spironolactone (ALDACTONE) 25 mg tablet Not Taking at Unknown time Child No No   Sig: ONE-HALF TABLET (12 5MG) BY MOUTH DAILY   Patient not taking: Reported on 3/19/2020   torsemide (DEMADEX) 10 mg tablet   Yes Yes   Sig: Take 10 mg by mouth daily      Facility-Administered Medications: None       Past Medical History:   Diagnosis Date    Acute ischemic right MCA stroke (HCC)     Anticoagulant long-term use     last assessed: 17    Aortic stenosis     Atrial fibrillation (HCC)     Blurred vision     last assessed: 10/25/13    CHF (congestive heart failure) (HCC)     Chronic kidney disease     Coronary artery disease     Dementia (HCC)     Diabetes mellitus (HCC)     Dysuria     last assessed: 8/3/15    Hyperlipidemia     Hypertension     Nonrheumatic aortic (valve) insufficiency     Old myocardial infarction     Rectal carcinoma (HCC)        Past Surgical History:   Procedure Laterality Date    BALLOON ANGIOPLASTY, ARTERY      CORONARY ARTERY BYPASS GRAFT      RECTAL SURGERY      TONSILLECTOMY         Family History   Problem Relation Age of Onset    Stroke Mother     Stroke Father     Diabetes Son     No Known Problems Daughter      I have reviewed and agree with the history as documented  E-Cigarette/Vaping    E-Cigarette Use Never User      E-Cigarette/Vaping Substances     Social History     Tobacco Use    Smoking status: Former Smoker     Packs/day: 1 00     Years: 20 00     Pack years: 20 00     Last attempt to quit: 1975     Years since quittin 2    Smokeless tobacco: Never Used    Tobacco comment: 1 pack a week; No secondhand smoke exposure   Substance Use Topics    Alcohol use: Not Currently    Drug use: No        Review of Systems   Constitutional: Negative for chills and fever  Respiratory: Negative for shortness of breath  Cardiovascular: Negative for chest pain  Gastrointestinal: Negative for abdominal pain, nausea and vomiting  Genitourinary: Negative for dysuria and flank pain  Musculoskeletal: Negative for arthralgias, back pain and neck pain  Skin: Negative for pallor and wound  Neurological: Negative for dizziness, weakness, numbness and headaches  All other systems reviewed and are negative  Physical Exam  ED Triage Vitals [20]   Temperature Pulse Respirations Blood Pressure SpO2   97 5 °F (36 4 °C) 66 16 144/64 98 %      Temp Source Heart Rate Source Patient Position - Orthostatic VS BP Location FiO2 (%)   Oral Monitor Lying Right arm --      Pain Score       --             Orthostatic Vital Signs  Vitals:    20 2336 20 2337 20 0045 20 0126   BP: 144/64 144/64 156/69 141/65   Pulse: 66 66 60 60   Patient Position - Orthostatic VS: Lying Lying Lying Lying       Physical Exam   Constitutional: She is oriented to person, place, and time  She appears well-developed and well-nourished  No distress  HENT:   Head: Normocephalic and atraumatic  Eyes: Pupils are equal, round, and reactive to light  Conjunctivae and EOM are normal  No scleral icterus  Neck: Normal range of motion  Neck supple  No cervical spine tenderness to palpation     Cardiovascular: Normal rate and regular rhythm  Exam reveals no gallop and no friction rub  No murmur heard  Pulmonary/Chest: Breath sounds normal  She has no wheezes  She has no rales  Abdominal: Soft  She exhibits no distension  There is no tenderness  There is no rebound and no guarding  Musculoskeletal: Normal range of motion  She exhibits no edema or tenderness  No tenderness to palpation over the thoracic or lumbar spine  No extremity tenderness to palpation x4  Neurological: She is alert and oriented to person, place, and time  No cranial nerve deficit or sensory deficit  She exhibits normal muscle tone  Skin: Skin is warm and dry  She is not diaphoretic  No erythema  No pallor  Psychiatric: She has a normal mood and affect  Her behavior is normal    Nursing note and vitals reviewed  ED Medications  Medications - No data to display    Diagnostic Studies  Results Reviewed     None                 CT head without contrast   Final Result by Chiqui Etienne DO (03/19 0221)      No acute intracranial abnormality  Workstation performed: [id number]         CT head without contrast   Final Result by Yasmeen Uribe MD (03/19 0043)      1  No intracranial hemorrhage or calvarial fracture  2   Redemonstration of atrophy and chronic small vessel ischemic changes  Workstation performed: [id number]               Procedures  Procedures      ED Course           Identification of Seniors at Risk      Most Recent Value   (ISAR) Identification of Seniors at Risk   Before the illness or injury that brought you to the Emergency, did you need someone to help you on a regular basis? 1 Filed at: 03/18/2020 2337   In the last 24 hours, have you needed more help than usual?  1 Filed at: 03/18/2020 2337   Have you been hospitalized for one or more nights during the past 6 months?   0 Filed at: 03/18/2020 2337   In general, do you see well?  0 Filed at: 03/18/2020 2337   In general, do you have serious problems with your memory? 0 Filed at: 03/18/2020 2337   Do you take more than three different medications every day? 1 Filed at: 03/18/2020 2337   ISAR Score  3 Filed at: 03/18/2020 2337          MDM  Number of Diagnoses or Management Options  Fall from standing, initial encounter: new and requires workup  On apixaban therapy: minor     Amount and/or Complexity of Data Reviewed  Tests in the radiology section of CPT®: ordered and reviewed  Decide to obtain previous medical records or to obtain history from someone other than the patient: yes  Obtain history from someone other than the patient: yes  Review and summarize past medical records: yes  Independent visualization of images, tracings, or specimens: yes    Patient Progress  Patient progress: resolved    80-year-old woman here for fall at nursing home, takes apixaban and aspirin  She is at her baseline mental status  Denies pain or other symptoms  Vital signs are all normal   Plan is CT head given anticoagulated status  CT head is negative for any intracranial injury  Patient reassessed and continues to deny any complaints  Will discharge back to [facility]  Called to patient's room at 1:20 a m     While patient pending transport back to her nursing home she had gotten out of bed without a walker or assistance and attempted to walk into the hallway  Patient fell and landed on her backside again  Was evaluated immediately with no apparent loss of consciousness  Patient helped back into bed  Currently denies any pain as before  Repeat physical exam is negative for any signs of traumatic injury  Will repeat CT head  Repeat CT head is again negative  Patient to be discharged back to [facility]      Disposition  Final diagnoses:   Fall from standing, initial encounter   On apixaban therapy     Time reflects when diagnosis was documented in both MDM as applicable and the Disposition within this note     Time User Action Codes Description Comment    3/19/2020 12:53 AM Jomar Chauhan [C91  XXXA] Fall from standing, initial encounter     3/19/2020 12:53 AM Jomar Chauhan [Z79 01] On apixaban therapy       ED Disposition     ED Disposition Condition Date/Time Comment    Discharge Good u Mar 19, 2020 12:58 AM Virginia Ruiz discharge to [facility]  Follow-up Information     Follow up With Specialties Details Why Contact Info Additional Information    Braeden Claudio MD Family Medicine Call  As needed [address] [place] [address]  [phone]       [address] [place] [address] Emergency Department Emergency Medicine  If symptoms worsen [address]  [phone]  ED, [address] [place] [address], [facility]   [phone]          Patient's Medications   Discharge Prescriptions    No medications on file     No discharge procedures on file  PDMP Review     None           ED Provider  Attending physically available and evaluated Virginia Ruiz  I managed the patient along with the ED Attending      Electronically Signed by         Maria Ines Stern MD  03/19/20 Cassandra Garcia MD  03/19/20 0784

## 2020-03-19 NOTE — ED ATTENDING ATTESTATION
3/18/2020  IAnibal MD, saw and evaluated the patient  I have discussed the patient with the resident/non-physician practitioner and agree with the resident's/non-physician practitioner's findings, Plan of Care, and MDM as documented in the resident's/non-physician practitioner's note, except where noted  All available labs and Radiology studies were reviewed  I was present for key portions of any procedure(s) performed by the resident/non-physician practitioner and I was immediately available to provide assistance  At this point I agree with the current assessment done in the Emergency Department  I have conducted an independent evaluation of this patient a history and physical is as follows:    ED Course     Emergency Department Note- Andra Bell 80 y o  female MRN: [id number]    Unit/Bed#: ED 24 Encounter: [id number]    Andra Bell is a 80 y o  female who presents with   Chief Complaint   Patient presents with   [place] Fall     pt was using her walker when she fell  states that she did no hit her head, but hit her back  c/o no pain at this time  +thinners, -loc         History of Present Illness   HPI:  Andra Bell is a 80 y o  female who presents for evaluation of:  Fall  Patient states that she was using a walker when she fell backwards  The patient denies striking her head  She denies any back pain and neck pain  She denies nausea and vomiting      Review of Systems   Unable to perform ROS: Dementia       Historical Information   Past Medical History:   Diagnosis Date    Acute ischemic right MCA stroke ([facility]     Anticoagulant long-term use     last assessed: 8/17/17    Aortic stenosis     Atrial fibrillation ([facility]     Blurred vision     last assessed: 10/25/13    [facility]est[facility]     Chronic kidney disease     Coronary artery disease     Dementia ([facility]     [facility]     Dysuria     last assessed: 8/3/15    Hyperlipidemia     Hypertension     Nonrheumatic aortic (valve) insufficiency     Old myocardial infarction     Rectal carcinoma (HCC)      Past Surgical History:   Procedure Laterality Date    BALLOON ANGIOPLASTY, ARTERY      CORONARY ARTERY BYPASS GRAFT      RECTAL SURGERY      TONSILLECTOMY       Social History   Social History     Substance and Sexual Activity   Alcohol Use Not Currently     Social History     Substance and Sexual Activity   Drug Use No     Social History     Tobacco Use   Smoking Status Former Smoker    Packs/day: 1 00    Years: 20 00    Pack years: 20 00    Last attempt to quit: 1975    Years since quittin 2   Smokeless Tobacco Never Used   Tobacco Comment    1 pack a week; No secondhand smoke exposure     Family History: non-contributory    Meds/Allergies   all medications and allergies reviewed  Allergies   Allergen Reactions    Heparin        Objective   First Vitals:   Blood Pressure: 144/64 (20)  Pulse: 66 (20)  Temperature: 97 5 °F (36 4 °C) (20)  Temp Source: Oral (20)  Respirations: 16 (20)  Weight - Scale: 45 5 kg (100 lb 5 oz) (20)  SpO2: 98 % (20)    Current Vitals:   Blood Pressure: 144/64 (20)  Pulse: 66 (20)  Temperature: 97 5 °F (36 4 °C) (20)  Temp Source: Oral (20)  Respirations: 16 (20)  Weight - Scale: 45 5 kg (100 lb 5 oz) (20)  SpO2: 98 % (20)    No intake or output data in the 24 hours ending 20 0005    Invasive Devices     None                 Physical Exam   Constitutional: She is oriented to person, place, and time  She appears well-developed and well-nourished  HENT:   Head: Normocephalic and atraumatic  Eyes: Pupils are equal, round, and reactive to light  EOM are normal    Musculoskeletal: Normal range of motion  She exhibits no deformity     Neurological: She is alert and oriented to person, place, and time    Skin: Skin is warm and dry  Capillary refill takes less than 2 seconds  Psychiatric:   Decision making capacity, thought content, and judgment are impaired secondary to dementia  Nursing note and vitals reviewed  79-year-old female presents no distress    Medical Decision Makin  Acute fall with possible closed head trauma in patient with chronic dementia:  Plan to obtain CT scan of head to rule out intracranial injury  2  Anticoagulation with apixaban    No results found for this or any previous visit (from the past 36 hour(s))  CT head without contrast    (Results Pending)         Portions of the record may have been created with voice recognition software  Occasional wrong word or "sound a like" substitutions may have occurred due to the inherent limitations of voice recognition software  Read the chart carefully and recognize, using context, where substitutions have occurred          Critical Care Time  Procedures

## 2020-03-19 NOTE — ED NOTES
Left a message at [facility] to let them know that the pt is returning to the facility       [address], RN  03/19/20 8618

## 2020-05-18 PROBLEM — D64.9 ANEMIA: Status: ACTIVE | Noted: 2020-01-01

## 2020-05-18 PROBLEM — A41.9 SEPSIS (HCC): Status: ACTIVE | Noted: 2020-01-01

## 2020-05-18 PROBLEM — D69.6 THROMBOCYTOPENIA (HCC): Status: ACTIVE | Noted: 2020-01-01

## 2020-05-18 PROBLEM — R79.89 ELEVATED BRAIN NATRIURETIC PEPTIDE (BNP) LEVEL: Status: ACTIVE | Noted: 2020-01-01

## 2020-05-21 PROBLEM — A41.9 SEPSIS (HCC): Status: RESOLVED | Noted: 2020-01-01 | Resolved: 2020-01-01

## 2020-07-13 PROBLEM — U07.1 COVID-19 VIRUS INFECTION: Status: ACTIVE | Noted: 2020-01-01

## 2020-07-13 PROBLEM — N39.0 UTI (URINARY TRACT INFECTION): Status: ACTIVE | Noted: 2020-01-01

## 2020-07-13 PROBLEM — N17.9 ACUTE KIDNEY INJURY SUPERIMPOSED ON CKD (HCC): Status: ACTIVE | Noted: 2020-01-01

## 2020-07-13 PROBLEM — N18.9 ACUTE KIDNEY INJURY SUPERIMPOSED ON CKD (HCC): Status: ACTIVE | Noted: 2020-01-01

## 2020-07-13 NOTE — ASSESSMENT & PLAN NOTE
Received ceftriaxone in er  Will give keflex po empirically for now  Pt is on comfort measures irina, will hold off aggressive measures including iv abx

## 2020-07-13 NOTE — ASSESSMENT & PLAN NOTE
· Discussed with patient's son Nicolasa Limon who shares power of  with his sister and he mentioned to me that they want patient to be on comfort measures only  The would prefer for patient to be at hospice house if possible  · Hospice team consulted  ER team discussed with palliative care

## 2020-07-13 NOTE — ASSESSMENT & PLAN NOTE
Resides at dementia unit at [facility]  Delirium precautions and supportive care

## 2020-07-13 NOTE — ED PROVIDER NOTES
History  Chief Complaint   Patient presents with    Cough     Patient coming from [facility] locked dementia unit at her baseline GCS of 14; staff there reports a cough; EMS reports two episodes of coughing that sound wet and "clearing of the throat"; family would also like patient evaluated for hospice care      80year-old female presents from [facility] locked dementia unit for evaluation of cough  Per EMS, one patient in the unit did test positive for COVID and facility is concerned for patient  In transport EMS reports two episodes of cough however patient did not going to coughing fit  Patient is GCS 14 with confusion and cannot contribute to history  Patient is tachycardic in the room with sustained AFib with RVR, she is saturating 93-95% on room air in no respiratory distress  She is moving all of her extremities  Patient is oriented to self, birthday  Patient does have a history of atrial fibrillation on Eliquis and Cardizem 24hr capsule  Prior to Admission Medications   Prescriptions Last Dose Informant Patient Reported? Taking?    ELIQUIS 2 5 MG Unknown at Unknown time Child No No   Sig: ONE TABLET BY MOUTH TWO TIMES DAILY   Lancets (FREESTYLE) lancets Unknown at Unknown time Child No No   Sig: Use as instructed   OLANZapine (ZyPREXA) 2 5 mg tablet Unknown at Unknown time  Yes No   Sig: Take 2 5 mg by mouth daily at bedtime   acetaminophen (TYLENOL) 325 mg tablet Unknown at Unknown time Child No No   Sig: Take 1 tablet (325 mg total) by mouth every 6 (six) hours as needed for mild pain   aspirin 81 mg chewable tablet Unknown at Unknown time Child No No   Sig: Chew 1 tablet daily   atorvastatin (LIPITOR) 20 mg tablet Unknown at Unknown time  No No   Sig: ONE TABLET BY MOUTH DAILY WITH DINNER   bisacodyl (DULCOLAX) 10 mg suppository Unknown at Unknown time  No No   Sig: Insert 1 suppository (10 mg total) into the rectum daily as needed for constipation   calcitonin, salmon, (MIACALCIN) 200 units/act nasal spray Unknown at Unknown time Child No No   Sig: USE 1 SPRAY IN ONE NOSTRIL DAILY--ALTERNATE NOSTRILS   cetirizine (ZyrTEC) 5 MG tablet Unknown at Unknown time  Yes No   Sig: Take 5 mg by mouth daily   diltiazem (CARDIZEM CD) 240 mg 24 hr capsule Unknown at Unknown time Child No No   Sig: ONE CAPSULE BY MOUTH DAILY   famotidine (PEPCID) 20 mg tablet Unknown at Unknown time Child No No   Sig: ONE TABLET BY MOUTH DAILY   glimepiride (AMARYL) 1 mg tablet Unknown at Unknown time  No No   Sig: ONE TABLET BY MOUTH DAILY WITH BREAKFAST   glucose blood (FREESTYLE PRECISION ELIA TEST) test strip Unknown at Unknown time Child No No   Sig: Use as instructed to test sugar 2 times daily DX: E11 9   glucose monitoring kit (FREESTYLE) monitoring kit Unknown at Unknown time Child No No   Si each by Does not apply route daily   megestrol (MEGACE) 20 mg tablet Unknown at Unknown time Child No No   Sig: ONE-HALF TABLET BY MOUTH DAILY FOR WEIGHT GAIN   Patient not taking: Reported on 3/19/2020   metoprolol succinate (TOPROL-XL) 50 mg 24 hr tablet Unknown at Unknown time Child No No   Sig: ONE TABLET BY MOUTH DAILY   mirtazapine (REMERON) 7 5 MG tablet Unknown at Unknown time  Yes No   Sig: Take 7 5 mg by mouth daily at bedtime   polyethylene glycol (MIRALAX) 17 g packet Unknown at Unknown time  No No   Sig: Take 17 g by mouth daily   potassium chloride (K-DUR,KLOR-CON) 10 mEq tablet Unknown at Unknown time Child No No   Sig: ONE TABLET BY MOUTH DAILY   spironolactone (ALDACTONE) 25 mg tablet Unknown at Unknown time  No No   Sig: ONE-HALF TABLET BY MOUTH DAILY   torsemide (DEMADEX) 10 mg tablet Unknown at Unknown time  Yes No   Sig: Take 10 mg by mouth daily      Facility-Administered Medications: None       Past Medical History:   Diagnosis Date    Acute ischemic right MCA stroke (HCC)     Anticoagulant long-term use     last assessed: 17    Aortic stenosis     Atrial fibrillation (HCC)     Blurred vision last assessed: 10/25/13    CHF (congestive heart failure) (HCC)     Chronic kidney disease     Coronary artery disease     [facility]     Diabetes mellitus ([facility] )     Dysuria     last assessed: 8/3/15    Hyperlipidemia     Hypertension     Nonrheumatic aortic (valve) insufficiency     Old myocardial infarction     Rectal carcinoma (HCC)        Past Surgical History:   Procedure Laterality Date    BALLOON ANGIOPLASTY, ARTERY      CORONARY ARTERY BYPASS GRAFT      RECTAL SURGERY      TONSILLECTOMY         Family History   Problem Relation Age of Onset    Stroke Mother     Stroke Father     Diabetes Son     No Known Problems Daughter      I have reviewed and agree with the history as documented  E-Cigarette/Vaping    E-Cigarette Use Never User      E-Cigarette/Vaping Substances    Nicotine No     THC No     CBD No     Flavoring No     Other No     Unknown No      Social History     Tobacco Use    Smoking status: Former Smoker     Packs/day:      Years:      Pack years:      Last attempt to quit:      Years since quittin 5    Smokeless tobacco: Never Used    Tobacco comment: 1 pack a week; No secondhand smoke exposure   Substance Use Topics    Alcohol use: Not Currently    Drug use: No        Review of Systems   Unable to perform ROS: Dementia       Physical Exam  ED Triage Vitals [20 1330]   Temperature Pulse Respirations Blood Pressure SpO2   98 3 °F (36 8 °C) (!) 110 20 149/78 95 %      Temp Source Heart Rate Source Patient Position - Orthostatic VS BP Location FiO2 (%)   Oral Monitor Lying Right arm --      Pain Score       No Pain             Orthostatic Vital Signs  Vitals:    20 1515 20 1628 20 1636 20 1700   BP: 145/63 122/82 122/82    Pulse: 90 (!) 131 (!) 136 90   Patient Position - Orthostatic VS:   Lying        Physical Exam   Constitutional: She appears well-developed     Thin, petite female; position of comfort with knees flexed   HENT:   Head: Normocephalic and atraumatic  Eyes: Right eye exhibits no discharge  Left eye exhibits no discharge  No scleral icterus  Cardiovascular:   Murmur heard  Tachycardic, irregular; afib with rvr   Pulmonary/Chest: Effort normal and breath sounds normal  No respiratory distress  Abdominal: Soft  She exhibits no distension  Musculoskeletal: She exhibits no edema or deformity  Neurological: She is alert  She exhibits normal muscle tone  Skin: Skin is warm  No pallor  Vitals reviewed        ED Medications  Medications   aspirin chewable tablet 81 mg (81 mg Oral Refused 7/14/20 1102)   loratadine (CLARITIN) tablet 10 mg (10 mg Oral Refused 7/14/20 1102)   mirtazapine (REMERON) tablet 7 5 mg (7 5 mg Oral Refused 7/13/20 2113)   OLANZapine (ZyPREXA) tablet 2 5 mg (2 5 mg Oral Refused 7/13/20 2113)   polyethylene glycol (MIRALAX) packet 17 g (17 g Oral Refused 7/14/20 1102)   acetaminophen (TYLENOL) tablet 650 mg (has no administration in time range)   bisacodyl (DULCOLAX) rectal suppository 10 mg (has no administration in time range)   atorvastatin (LIPITOR) tablet 20 mg (20 mg Oral Not Given 7/14/20 1607)   diltiazem (CARDIZEM CD) 24 hr capsule 240 mg (240 mg Oral Refused 7/14/20 1102)   apixaban (ELIQUIS) tablet 2 5 mg (2 5 mg Oral Refused 7/14/20 1700)   famotidine (PEPCID) tablet 20 mg (20 mg Oral Refused 7/14/20 1102)   potassium chloride (K-DUR,KLOR-CON) CR tablet 10 mEq (10 mEq Oral Refused 7/14/20 1103)   guaiFENesin (ROBITUSSIN) oral solution 200 mg (has no administration in time range)   benzonatate (TESSALON PERLES) capsule 100 mg (100 mg Oral Not Given 7/14/20 1607)   cholecalciferol (VITAMIN D3) tablet 2,000 Units (2,000 Units Oral Refused 7/14/20 1102)   metoprolol (LOPRESSOR) injection 2 5 mg (2 5 mg Intravenous Given 7/13/20 1656)   cephalexin (KEFLEX) capsule 250 mg (250 mg Oral Refused 7/14/20 1700)   glycopyrrolate (ROBINUL) injection 0 1 mg (0 1 mg Intravenous Given 7/14/20 0740)   LORazepam (ATIVAN) injection 0 5 mg (0 5 mg Intravenous Given 7/14/20 1617)   HYDROmorphone (DILAUDID) injection 0 5 mg (0 5 mg Intravenous Given 7/14/20 1609)   metoprolol succinate (TOPROL-XL) 24 hr tablet 100 mg (has no administration in time range)   diltiazem (CARDIZEM) injection 12 5 mg (12 5 mg Intravenous Given 7/13/20 1351)   diltiazem (CARDIZEM) injection 17 5 mg (17 5 mg Intravenous Given 7/13/20 1432)   cefTRIAXone (ROCEPHIN) 1,000 mg in dextrose 5 % 50 mL IVPB (0 mg Intravenous Stopped 7/13/20 2201)       Diagnostic Studies  Results Reviewed     Procedure Component Value Units Date/Time    Urine culture [[id number]] Collected:  07/13/20 1413    Lab Status:  Preliminary result Specimen:  Urine, Clean Catch Updated:  07/14/20 1211     Urine Culture Culture results to follow  Urine Microscopic [[id number]]  (Abnormal) Collected:  07/13/20 1413    Lab Status:  Final result Specimen:  Urine, Clean Catch Updated:  07/13/20 1506     RBC, UA None Seen /hpf      WBC, UA Innumerable /hpf      Epithelial Cells Occasional /hpf      Bacteria, UA Innumerable /hpf      OTHER OBSERVATIONS WBCs Clumped    Novel Coronavirus (Covid-19),PCR [facility] [[id number]]  (Abnormal) Collected:  07/13/20 1351    Lab Status:  Final result Specimen:  Nares from Nose Updated:  07/13/20 1450     SARS-CoV-2 Positive    Narrative: The specimen collection materials, transport medium, and/or testing methodology utilized in the production of these test results have been proven to be reliable in a limited validation with an abbreviated program under the Emergency Utilization Authorization provided by the FDA  Testing reported as "Presumptive positive" will be confirmed with secondary testing with a reference laboratory to ensure result accuracy  Clinical caution and judgement should be used with the interpretation of these results with consideration of the clinical impression and other laboratory testing    Testing reported as "Positive" or "Negative" has been proven to be accurate according to standard laboratory validation requirements  All testing is performed with control materials showing appropriate reactivity at standard intervals  TSH, 3rd generation with Free T4 reflex [[id number]]  (Normal) Collected:  07/13/20 1351    Lab Status:  Final result Specimen:  Blood from Arm, Left Updated:  07/13/20 1427     TSH 3RD GENERATON 2 280 uIU/mL     Narrative:       Patients undergoing fluorescein dye angiography may retain small amounts of fluorescein in the body for 48-72 hours post procedure  Samples containing fluorescein can produce falsely depressed TSH values  If the patient had this procedure,a specimen should be resubmitted post fluorescein clearance        Comprehensive metabolic panel [[id number]]  (Abnormal) Collected:  07/13/20 1351    Lab Status:  Final result Specimen:  Blood from Arm, Left Updated:  07/13/20 1427     Sodium 139 mmol/L      Potassium 4 8 mmol/L      Chloride 111 mmol/L      CO2 19 mmol/L      ANION GAP 9 mmol/L      BUN 50 mg/dL      Creatinine 1 56 mg/dL      Glucose 287 mg/dL      Calcium 8 9 mg/dL      AST 29 U/L      ALT 44 U/L      Alkaline Phosphatase 93 U/L      Total Protein 7 2 g/dL      Albumin 3 2 g/dL      Total Bilirubin 1 07 mg/dL      eGFR 28 ml/min/1 73sq m     Narrative:       Alyssa guidelines for Chronic Kidney Disease (CKD):     Stage 1 with normal or high GFR (GFR > 90 mL/min/1 73 square meters)    Stage 2 Mild CKD (GFR = 60-89 mL/min/1 73 square meters)    Stage 3A Moderate CKD (GFR = 45-59 mL/min/1 73 square meters)    Stage 3B Moderate CKD (GFR = 30-44 mL/min/1 73 square meters)    Stage 4 Severe CKD (GFR = 15-29 mL/min/1 73 square meters)    Stage 5 End Stage CKD (GFR <15 mL/min/1 73 square meters)  Note: GFR calculation is accurate only with a steady state creatinine    Troponin I [[id number]]  (Abnormal) Collected:  07/13/20 0906 Lab Status:  Final result Specimen:  Blood from Arm, Left Updated:  07/13/20 1420     Troponin I 1 08 ng/mL     POCT urinalysis dipstick [[id number]]  (Normal) Resulted:  07/13/20 1413    Lab Status:  Final result Specimen:  Urine Updated:  07/13/20 1414     Color, UA see chart    Urine Macroscopic, POC [[id number]]  (Abnormal) Collected:  07/13/20 1413    Lab Status:  Final result Specimen:  Urine Updated:  07/13/20 1412     Color, UA Yellow     Clarity, UA Clear     pH, UA 5 5     Leukocytes, UA Moderate     Nitrite, UA Negative     Protein,  (2+) mg/dl      Glucose, UA Negative mg/dl      Ketones, UA Negative mg/dl      Urobilinogen, UA 0 2 E U /dl      Bilirubin, UA Negative     Blood, UA Small     Specific [place], UA 1 020    Narrative:       CLINITEK RESULT    CBC and differential [[id number]]  (Abnormal) Collected:  07/13/20 1351    Lab Status:  Final result Specimen:  Blood from Arm, Left Updated:  07/13/20 1401     WBC 7 34 Thousand/uL      RBC 3 60 Million/uL      Hemoglobin 10 8 g/dL      Hematocrit 35 3 %      MCV 98 fL      MCH 30 0 pg      MCHC 30 6 g/dL      RDW 16 8 %      MPV 12 1 fL      Platelets 973 Thousands/uL      nRBC 0 /100 WBCs      Neutrophils Relative 89 %      Immat GRANS % 0 %      Lymphocytes Relative 7 %      Monocytes Relative 4 %      Eosinophils Relative 0 %      Basophils Relative 0 %      Neutrophils Absolute 6 56 Thousands/µL      Immature Grans Absolute 0 02 Thousand/uL      Lymphocytes Absolute 0 48 Thousands/µL      Monocytes Absolute 0 27 Thousand/µL      Eosinophils Absolute 0 00 Thousand/µL      Basophils Absolute 0 01 Thousands/µL                  XR chest 1 view portable   Final Result by Alejandra Ozuna MD (07/13 1511)      Cardiomegaly with mild central vascular congestion and a small left pleural effusion  Hyperinflation in keeping with COPD              Workstation performed: [id number]               Procedures  Procedures      ED Course  ED Course as of Jul 14 1752   Mon Jul 13, 2020   1354 Procedure Note: EKG  Date/Time: 07/13/20 1:54 PM   Interpreted by: Louann Stephenson  Indications / Diagnosis: tachycardia  ECG reviewed by me, the ED Provider: yes   The EKG demonstrates:  Rhythm: afib with RVR 129bpm  Intervals: QT/Qtc within normal  Axis: right axis  QRS/Blocks: normal QRS  ST Changes: No MURPHY/STD, t-wave inversions to lateral precordial leads and aVL likely rate related        1423 Bump from baseline 0 5   Troponin I(!): 1 08   1453 EXT SARS-COV-2(!): Positive   1500 Cr 1 in March 2020   Creatinine(!): 1 56   1538 Inpt med [facility], Dr Due[facility]as [facility] AUDIT      Most Recent Value   Initial Alcohol Screen: US AUDIT-C    1  How often do you have a drink containing alcohol?  0 Filed at: 07/13/2020 1331   2  How many drinks containing alcohol do you have on a typical day you are drinking? 0 Filed at: 07/13/2020 1331   3a  Male UNDER 65: How often do you have five or more drinks on one occasion? 0 Filed at: 07/13/2020 1331   3b  FEMALE Any Age, or MALE 65+: How often do you have 4 or more drinks on one occassion? 0 Filed at: 07/13/2020 1331   Audit-C Score  0 Filed at: 07/13/2020 1331              Identification of Seniors at Risk      Most Recent Value   (ISAR) Identification of Seniors at Risk   Before the illness or injury that brought you to the Emergency, did you need someone to help you on a regular basis? 1 Filed at: 07/13/2020 1332   In the last 24 hours, have you needed more help than usual?  0 Filed at: 07/13/2020 1332   Have you been hospitalized for one or more nights during the past 6 months? 1 Filed at: 07/13/2020 1332   In general, do you see well?  0 Filed at: 07/13/2020 1332   In general, do you have serious problems with your memory? 1 Filed at: 07/13/2020 1332   Do you take more than three different medications every day?   1 Filed at: 07/13/2020 1332   ISAR Score  4 Filed at: 07/13/2020 1332          CLAUDIA/DAST-10      Most Recent Value How many times in the past year have you    Used an illegal drug or used a prescription medication for non-medical reasons? Never Filed at: 07/13/2020 1332                              MDM  Number of Diagnoses or Management Options  REHANA (acute kidney injury) [facility]):   Atrial fibrillation with RVR ([facility] ):   COVID-19 virus infection:   UTI (urinary tract infection):   Diagnosis management comments: 81yo female presents from locked dementia unit for concern of COVID infection with known infected person in unit  On arrival she is noted to be in afib with rvr, she received two boluses of cardizem and became rate controlled  She cannot contribute to history  Labs shows positive COVID infection, REHANA, UTI  She was admitted to [place] [id number]  On facesheet, note from facility on behalf of family asking for hospice placement  Dr Enola Schaumann reached out to and states [facility] is accepting COVID patient's however there are no IPU beds available for COVID patients at this time  Disposition  Final diagnoses:   ILUPO-85 virus infection   Atrial fibrillation with RVR ([facility] )   UTI (urinary tract infection)   [facility] ([facility] kidney [facility]) ([facility] )     Time reflects when diagnosis was documented in both MDM as applicable and the Disposition within this note     Time User Action Codes Description Comment    7/13/2020  3:39 PM Rebbeca Conrad Add [U07 1] COVID-19 virus infection     7/13/2020  3:39 PM Sharlene Bhakta [I48 91] Atrial fibrillation with RVR ([facility] )     7/13/2020  3:39 PM Rebbeca Conrad Add [N39 0] UTI (urinary tract infection)     7/13/2020  3:39 PM Rebbeca Conrad Add [N17 9] REHANA (acute kidney injury) [facility])       ED Disposition     ED Disposition Condition Date/Time Comment    Admit Stable Mon Jul 13, 2020  3:38 PM Case was discussed with RADHA and the patient's admission status was agreed to be Admission Status: inpatient status to the service of Dr Frederico Sacks           Follow-up Information    None         Current Discharge Medication List      CONTINUE these medications which have NOT CHANGED    Details   acetaminophen (TYLENOL) 325 mg tablet Take 1 tablet (325 mg total) by mouth every 6 (six) hours as needed for mild pain  Qty: 30 tablet, Refills: 0    Associated Diagnoses: Acute on chronic diastolic congestive heart failure (HCC)      aspirin 81 mg chewable tablet Chew 1 tablet daily  Refills: 0      atorvastatin (LIPITOR) 20 mg tablet ONE TABLET BY MOUTH DAILY WITH DINNER  Qty: 90 tablet, Refills: 0    Associated Diagnoses: Hyperlipidemia, unspecified hyperlipidemia type      bisacodyl (DULCOLAX) 10 mg suppository Insert 1 suppository (10 mg total) into the rectum daily as needed for constipation  Qty: 12 suppository, Refills: 0    Associated Diagnoses: Constipation      calcitonin, salmon, (MIACALCIN) 200 units/act nasal spray USE 1 SPRAY IN ONE NOSTRIL DAILY--ALTERNATE NOSTRILS  Qty: 11 1 mL, Refills: 0    Comments: **Patient requests 90 days supply**  Associated Diagnoses: Chronic bilateral low back pain without sciatica      cetirizine (ZyrTEC) 5 MG tablet Take 5 mg by mouth daily      diltiazem (CARDIZEM CD) 240 mg 24 hr capsule ONE CAPSULE BY MOUTH DAILY  Qty: 30 capsule, Refills: 2    Associated Diagnoses: Permanent atrial fibrillation ([facility])      ELIQUIS 2 5 MG ONE TABLET BY MOUTH TWO TIMES DAILY  Qty: 180 tablet, Refills: 1    Associated Diagnoses: Atrial fibrillation with rapid ventricular response ([facility])      famotidine (PEPCID) 20 mg tablet ONE TABLET BY MOUTH DAILY  Qty: 90 tablet, Refills: 1    Associated Diagnoses: Acute on chronic diastolic congestive heart failure (HCC)      glimepiride (AMARYL) 1 mg tablet ONE TABLET BY MOUTH DAILY WITH BREAKFAST  Qty: 90 tablet, Refills: 0    Associated Diagnoses: Type 2 diabetes mellitus without complication, without long-term current use of insulin ([facility])      glucose blood (FREESTYLE PRECISION ELIA TEST) test strip Use as instructed to test sugar 2 times daily DX: E11 9  Qty: 200 each, Refills: 3    Associated Diagnoses: Controlled type 2 diabetes mellitus without complication, without long-term current use of insulin ([facility])      glucose monitoring kit (FREESTYLE) monitoring kit 1 each by Does not apply route daily  Qty: 1 each, Refills: 0    Associated Diagnoses: Type 2 diabetes mellitus without complication, without long-term current use of insulin ([facility])      Lancets (FREESTYLE) lancets Use as instructed  Qty: 100 each, Refills: 2    Associated Diagnoses: Type 2 diabetes mellitus without complication, without long-term current use of insulin ([facility])      megestrol (MEGACE) 20 mg tablet ONE-HALF TABLET BY MOUTH DAILY FOR WEIGHT GAIN  Qty: 45 tablet, Refills: 1    Associated Diagnoses: Moderate protein-calorie malnutrition ([facility])      metoprolol succinate (TOPROL-XL) 50 mg 24 hr tablet ONE TABLET BY MOUTH DAILY  Qty: 90 tablet, Refills: 1    Associated Diagnoses: Acute on chronic diastolic congestive heart failure ([facility])      mirtazapine (REMERON) 7 5 MG tablet Take 7 5 mg by mouth daily at bedtime      OLANZapine (ZyPREXA) 2 5 mg tablet Take 2 5 mg by mouth daily at bedtime      polyethylene glycol (MIRALAX) 17 g packet Take 17 g by mouth daily  Qty: 14 each, Refills: 0    Associated Diagnoses: Constipation      potassium chloride (K-DUR,KLOR-CON) 10 mEq tablet ONE TABLET BY MOUTH DAILY  Qty: 30 tablet, Refills: 5    Associated Diagnoses: Benign essential HTN; Coronary artery disease involving native heart without angina pectoris, unspecified vessel or lesion type      spironolactone (ALDACTONE) 25 mg tablet ONE-HALF TABLET BY MOUTH DAILY  Qty: 45 tablet, Refills: 0    Associated Diagnoses: 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 failure ([facility] ); Atrial fibrillation with RVR ([facility])      torsemide (DEMADEX) 10 mg tablet Take 10 mg by mouth daily           No discharge procedures on file      PDMP Review     None           ED Provider  Attending physically available and evaluated Christina Goodwin I managed the patient along with the ED Attending      Electronically Signed by         Harper Rucker DO  07/14/20 4157

## 2020-07-13 NOTE — H&P
H&P- Vinod Rausch 4/4/1927, 80 y o  female MRN: [id number]    Unit/Bed#: [facility] 819-01 Encounter: [id number]    Primary Care Provider: Elenita Corey MD   Date and time admitted to hospital: 7/13/2020  1:28 PM        * COVID-19 virus infection  Assessment & Plan  · Resides in [facility] in dementia unit and has had a positive contact with a COVID patient there  · Saturating well on room air  · Vitamin d supplementation  · Incentive spirometry  · Family does not want to pursue any aggressive care and they want comfort measures only for patient  · Hospice team consulted  Family wants patient to be in hospice house if possible  Paroxysmal A-fib (HCC)  Assessment & Plan  · Presented with RVR requiring IV Cardizem  · Likely secondary to COVID  · Continue home dose Cardizem and metoprolol p o   · P r n  Metoprolol for persistent RVR  If needs more IV dose, can increase home dose metoprolol to 100 mg  · Family would not want to pursue any further workup or intervention including cardiology evaluation or echocardiogram   Patient is on comfort measures only  · On eliquis for anticoagulation    Goals of care, counseling/discussion  Assessment & Plan  · Discussed with patient's son Radha Hedrick who shares power of  with his sister and he mentioned to me that they want patient to be on comfort measures only  The would prefer for patient to be at hospice house if possible  · Hospice team consulted  ER team discussed with palliative care  Suspected UTI (urinary tract infection)  Assessment & Plan  Received ceftriaxone in er  Will give keflex po empirically for now  Pt is on comfort measures irina, will hold off aggressive measures including iv abx  Acute kidney injury superimposed on CKD [facility])  Assessment & Plan  Has been between 1 1-1 4 at baseline  Variable baseline based on previous labs  Presented with creatinine of 1 56  UA concerning for possible UTI    Patient is on hospice measures and comfort care as per family  Due to not want any further workup or aggressive treatment  Non MI Elevated troponin  Assessment & Plan  Likely due to AFib with RVR and COVID  Family does not want any blood work, echocardiogram or cardiology evaluation  Patient is on comfort measures only  Dementia [facility])  Assessment & Plan  Resides at dementia unit at [facility]  Delirium precautions and supportive care  Type 2 diabetes mellitus with stage 4 chronic kidney disease, without long-term current use of insulin (HCC)  Assessment & Plan  Lab Results   Component Value Date    HGBA1C 6 9 (H) 02/29/2020       No results for input(s): POCGLU in the last 72 hours  Blood Sugar Average: Last 72 hrs:     Hold home p o  Medications  Insulin sliding scale and Accu-Cheks  Benign essential HTN  Assessment & Plan  Continue Cardizem and metoprolol  Hold Lasix and spironolactone due to acute kidney injury  VTE Prophylaxis: Apixaban (Eliquis)  / sequential compression device   Code Status: level 4 per son    Discussion with family:  Discussed with son Qamar Paige and daughter-in-law on phone and they will update patient's daughter  Anticipated Length of Stay:  Patient will be admitted on an Inpatient basis with an anticipated length of stay of  More than 2 midnights  Justification for Hospital Stay:  Ongoing evaluation    Total Time for Visit, including Counseling / Coordination of Care: 45 minutes  Greater than 50% of this total time spent on direct patient counseling and coordination of care  Chief Complaint:   Cough    History of Present Illness: Aries العلي is a 80 y o  female with history of AFib on Eliquis, dementia resides in dementia unit, was brought in from [facility] dementia unit because of cough and concerns regarding COVID infection    Patient had exposure to COVID patient at the facility and she was coughing with shortness of breath which prompted them to send patient to emergency room  Patient was saturating well on room air on arrival although her heart rate was in 140s with AFib  She received 2 doses of IV Cardizem in ER with improvement in her heart rate  Patient is a very poor historian given his dementia history  She knows her name but does not know where she is  Unable to provide significant history  Denied any pain  Spoke to patient's son Shanel Jaquez and his wife on phone who mentioned that the want patient to be on hospice and they would prefer for patient to go to [address] [place] [address]  They do not want any aggressive care or evaluation for the patient  Review of Systems:    Review of Systems   Unable to perform ROS: Dementia       Past Medical and Surgical History:     Past Medical History:   Diagnosis Date    Acute ischemic right MCA stroke ([facility] )     Anticoagulant long-term use     last assessed: 8/17/17    Aortic stenosis     Atrial fibrillation ([facility] )     Blurred vision     last assessed: 10/25/13    [facility]est[facility] failure[facility]     Chronic kidney disease     Coronary artery disease     Dementia ([facility] )     [facility]     Dysuria     last assessed: 8/3/15    Hyperlipidemia     Hypertension     Nonrheumatic aortic (valve) insufficiency     Old myocardial infarction     Rectal carcinoma (HCC)        Past Surgical History:   Procedure Laterality Date    BALLOON ANGIOPLASTY, ARTERY      CORONARY ARTERY BYPASS GRAFT      RECTAL SURGERY      TONSILLECTOMY         Meds/Allergies:    Prior to Admission medications    Medication Sig Start Date End Date Taking?  Authorizing Provider   acetaminophen (TYLENOL) 325 mg tablet Take 1 tablet (325 mg total) by mouth every 6 (six) hours as needed for mild pain 5/8/18  Yes Sheryle Malone, MD   aspirin 81 mg chewable tablet Chew 1 tablet daily 8/7/17  Yes Clinton Gee DO   atorvastatin (LIPITOR) 20 mg tablet ONE TABLET BY MOUTH DAILY WITH DINNER 2/21/20  Yes Shahida Manzano MD   bisacodyl (DULCOLAX) 10 mg suppository Insert 1 suppository (10 mg total) into the rectum daily as needed for constipation 5/21/20  Yes SALVADOR Lucas   calcitonin, salmon, (MIACALCIN) 200 units/act nasal spray USE 1 SPRAY IN ONE NOSTRIL DAILY--ALTERNATE NOSTRILS 3/15/18  Yes Shahida Manzano MD   cetirizine (ZyrTEC) 5 MG tablet Take 5 mg by mouth daily   Yes Historical Provider, MD   diltiazem (CARDIZEM CD) 240 mg 24 hr capsule ONE CAPSULE BY MOUTH DAILY 1/23/20  Yes Shahida Manzano MD   ELIQUIS 2 5 MG ONE TABLET BY MOUTH TWO TIMES DAILY 11/27/19  Yes Shahida Manzano MD   famotidine (PEPCID) 20 mg tablet ONE TABLET BY MOUTH DAILY 12/26/19  Yes Shahida Manzano MD   glimepiride (AMARYL) 1 mg tablet ONE TABLET BY MOUTH DAILY WITH BREAKFAST 2/21/20  Yes Shahida Manzano MD   glucose blood (FREESTYLE PRECISION ELIA TEST) test strip Use as instructed to test sugar 2 times daily DX: E11 9 7/1/19  Yes Shahida Manzano MD   glucose monitoring kit (FREESTYLE) monitoring kit 1 each by Does not apply route daily 6/1/18  Yes Shahida Manzano MD   metoprolol succinate (TOPROL-XL) 50 mg 24 hr tablet ONE TABLET BY MOUTH DAILY 11/27/19  Yes Shahida Manzano MD   mirtazapine (REMERON) 7 5 MG tablet Take 7 5 mg by mouth daily at bedtime   Yes Historical Provider, MD   OLANZapine (ZyPREXA) 2 5 mg tablet Take 2 5 mg by mouth daily at bedtime   Yes Historical Provider, MD   polyethylene glycol (MIRALAX) 17 g packet Take 17 g by mouth daily 5/22/20  Yes SALVADOR Lucas   potassium chloride (K-DUR,KLOR-CON) 10 mEq tablet ONE TABLET BY MOUTH DAILY 10/1/19  Yes Shahida Manzano MD   spironolactone (ALDACTONE) 25 mg tablet ONE-HALF TABLET BY MOUTH DAILY 5/27/20  Yes Shahida Manzano MD   torsemide (DEMADEX) 10 mg tablet Take 10 mg by mouth daily   Yes Historical Provider, MD   Lancets (FREESTYLE) lancets Use as instructed 6/1/18   Shahida Manzano MD   megestrol (MEGACE) 20 mg tablet ONE-HALF TABLET BY MOUTH DAILY FOR WEIGHT GAIN  Patient not taking: Reported on 3/19/2020 11/27/19   Unknown MD David       Allergies: Allergies   Allergen Reactions    Heparin        Social History:     Marital Status: /Civil Union     Substance Use History:   Social History     Substance and Sexual Activity   Alcohol Use Not Currently     Social History     Tobacco Use   Smoking Status Former Smoker    Packs/day: 1 00    Years: 20 00    Pack years: 20 00    Last attempt to quit:     Years since quittin 5   Smokeless Tobacco Never Used   Tobacco Comment    1 pack a week; No secondhand smoke exposure     Social History     Substance and Sexual Activity   Drug Use No       Family History:    non-contributory    Physical Exam:     Vitals:   Blood Pressure: 122/82 (20 1636)  Pulse: (!) 136 (20 1636)  Temperature: 97 7 °F (36 5 °C) (20 163)  Temp Source: Oral (20 163)  Respirations: (!) 28 (20 163)  Height: 5' 1" (154 9 cm) (20 1330)  Weight - Scale: 47 6 kg (105 lb) (20 1330)  SpO2: 99 % (20 1636)    Physical Exam   Constitutional: No distress  Cardiovascular: Normal rate  Murmur heard  Irregularly irregular rhythm   Pulmonary/Chest: Effort normal  She has no wheezes  She has no rales  Breath sounds decreased bilaterally   Abdominal: Soft  Bowel sounds are normal  She exhibits no distension  There is no tenderness  Musculoskeletal: She exhibits no edema  Neurological: She is alert  Oriented to self   Skin: Skin is warm  Additional Data:     Lab Results: I have personally reviewed pertinent reports        Results from last 7 days   Lab Units 20  1351   WBC Thousand/uL 7 34   HEMOGLOBIN g/dL 10 8*   HEMATOCRIT % 35 3   PLATELETS Thousands/uL 179   NEUTROS PCT % 89*   LYMPHS PCT % 7*   MONOS PCT % 4   EOS PCT % 0     Results from last 7 days   Lab Units 20  1351   SODIUM mmol/L 139   POTASSIUM mmol/L 4 8   CHLORIDE mmol/L 111*   CO2 mmol/L 19*   BUN mg/dL 50*   CREATININE mg/dL 1 56*   ANION GAP mmol/L 9   CALCIUM mg/dL 8 9   ALBUMIN g/dL 3 2*   TOTAL BILIRUBIN mg/dL 1 07*   ALK PHOS U/L 93   ALT U/L 44   AST U/L 29   GLUCOSE RANDOM mg/dL 287*                       Imaging: I have personally reviewed pertinent reports  XR chest 1 view portable   Final Result by Mirna Ortiz MD (07/13 7441)      Cardiomegaly with mild central vascular congestion and a small left pleural effusion  Hyperinflation in keeping with COPD  Workstation performed: [id number]               [facility] Review[facility]: Yes     ** Please Note: This note has been constructed using a voice recognition system   **

## 2020-07-13 NOTE — ASSESSMENT & PLAN NOTE
Likely due to AFib with RVR and COVID  Family does not want any blood work, echocardiogram or cardiology evaluation  Patient is on comfort measures only

## 2020-07-13 NOTE — ASSESSMENT & PLAN NOTE
Has been between 1 1-1 4 at baseline  Variable baseline based on previous labs  Presented with creatinine of 1 56  UA concerning for possible UTI  Patient is on hospice measures and comfort care as per family  Due to not want any further workup or aggressive treatment

## 2020-07-13 NOTE — ASSESSMENT & PLAN NOTE
Lab Results   Component Value Date    HGBA1C 6 9 (H) 02/29/2020       No results for input(s): POCGLU in the last 72 hours  Blood Sugar Average: Last 72 hrs:     Hold home p o  Medications  Insulin sliding scale and Accu-Cheks

## 2020-07-13 NOTE — ASSESSMENT & PLAN NOTE
· Presented with RVR requiring IV Cardizem  · Likely secondary to COVID  · Continue home dose Cardizem and metoprolol p o   · P r n  Metoprolol for persistent RVR  If needs more IV dose, can increase home dose metoprolol to 100 mg  · Family would not want to pursue any further workup or intervention including cardiology evaluation or echocardiogram   Patient is on comfort measures only    · On eliquis for anticoagulation

## 2020-07-13 NOTE — PLAN OF CARE
Problem: Potential for Falls  Goal: Patient will remain free of falls  Description  INTERVENTIONS:  - Assess patient frequently for physical needs  -  Identify cognitive and physical deficits and behaviors that affect risk of falls    -  [place] fall precautions as indicated by assessment   - Educate patient/family on patient safety including physical limitations  - Instruct patient to call for assistance with activity based on assessment  - Modify environment to reduce risk of injury  - Consider OT/PT consult to assist with strengthening/mobility  Outcome: Progressing     Problem: Prexisting or High Potential for Compromised Skin Integrity  Goal: Skin integrity is maintained or improved  Description  INTERVENTIONS:  - Identify patients at risk for skin breakdown  - Assess and monitor skin integrity  - Assess and monitor nutrition and hydration status  - Monitor labs   - Assess for incontinence   - Turn and reposition patient  - Assist with mobility/ambulation  - Relieve pressure over bony prominences  - Avoid friction and shearing  - Provide appropriate hygiene as needed including keeping skin clean and dry  - Evaluate need for skin moisturizer/barrier cream  - Collaborate with interdisciplinary team   - Patient/family teaching  - Consider wound care consult   Outcome: Progressing     Problem: PAIN - ADULT  Goal: Verbalizes/displays adequate comfort level or baseline comfort level  Description  Interventions:  - Encourage patient to monitor pain and request assistance  - Assess pain using appropriate pain scale  - Administer analgesics based on type and severity of pain and evaluate response  - Implement non-pharmacological measures as appropriate and evaluate response  - Consider cultural and social influences on pain and pain management  - Notify physician/advanced practitioner if interventions unsuccessful or patient reports new pain  Outcome: Progressing     Problem: INFECTION - ADULT  Goal: Absence or prevention of progression during hospitalization  Description  INTERVENTIONS:  - Assess and monitor for signs and symptoms of infection  - Monitor lab/diagnostic results  - Monitor all insertion sites, i e  indwelling lines, tubes, and drains  - Monitor endotracheal if appropriate and nasal secretions for changes in amount and color  - [place] appropriate cooling/warming therapies per order  - Administer medications as ordered  - Instruct and encourage patient and family to use good hand hygiene technique  - Identify and instruct in appropriate isolation precautions for identified infection/condition  Outcome: Progressing  Goal: Absence of fever/infection during neutropenic period  Description  INTERVENTIONS:  - Monitor WBC    Outcome: Progressing     Problem: SAFETY ADULT  Goal: Patient will remain free of falls  Description  INTERVENTIONS:  - Assess patient frequently for physical needs  -  Identify cognitive and physical deficits and behaviors that affect risk of falls    -  [place] fall precautions as indicated by assessment   - Educate patient/family on patient safety including physical limitations  - Instruct patient to call for assistance with activity based on assessment  - Modify environment to reduce risk of injury  - Consider OT/PT consult to assist with strengthening/mobility  Outcome: Progressing  Goal: Maintain or return to baseline ADL function  Description  INTERVENTIONS:  -  Assess patient's ability to carry out ADLs; assess patient's baseline for ADL function and identify physical deficits which impact ability to perform ADLs (bathing, care of mouth/teeth, toileting, grooming, dressing, etc )  - Assess/evaluate cause of self-care deficits   - Assess range of motion  - Assess patient's mobility; develop plan if impaired  - Assess patient's need for assistive devices and provide as appropriate  - Encourage maximum independence but intervene and supervise when necessary  - Involve family in performance of ADLs  - Assess for home care needs following discharge   - Consider OT consult to assist with ADL evaluation and planning for discharge  - Provide patient education as appropriate  Outcome: Progressing  Goal: Maintain or return mobility status to optimal level  Description  INTERVENTIONS:  - Assess patient's baseline mobility status (ambulation, transfers, stairs, etc )    - Identify cognitive and physical deficits and behaviors that affect mobility  - Identify mobility aids required to assist with transfers and/or ambulation (gait belt, sit-to-stand, lift, walker, cane, etc )  - [place] fall precautions as indicated by assessment  - Record patient progress and toleration of activity level on Mobility SBAR; progress patient to next Phase/Stage  - Instruct patient to call for assistance with activity based on assessment  - Consider rehabilitation consult to assist with strengthening/weightbearing, etc   Outcome: Progressing     Problem: DISCHARGE PLANNING  Goal: Discharge to home or other facility with appropriate resources  Description  INTERVENTIONS:  - Identify barriers to discharge w/patient and caregiver  - Arrange for needed discharge resources and transportation as appropriate  - Identify discharge learning needs (meds, wound care, etc )  - Arrange for interpretive services to assist at discharge as needed  - Refer to Case Management Department for coordinating discharge planning if the patient needs post-hospital services based on physician/advanced practitioner order or complex needs related to functional status, cognitive ability, or social support system  Outcome: Progressing     Problem: Knowledge Deficit  Goal: Patient/family/caregiver demonstrates understanding of disease process, treatment plan, medications, and discharge instructions  Description  Complete learning assessment and assess knowledge base    Interventions:  - Provide teaching at level of understanding  - Provide teaching via preferred learning methods  Outcome: Progressing

## 2020-07-13 NOTE — ASSESSMENT & PLAN NOTE
· Resides in [facility] in dementia unit and has had a positive contact with a COVID patient there  · Saturating well on room air  · Vitamin d supplementation  · Incentive spirometry  · Family does not want to pursue any aggressive care and they want comfort measures only for patient  · Hospice team consulted  Family wants patient to be in hospice house if possible

## 2020-07-14 NOTE — ASSESSMENT & PLAN NOTE
· Discussed with patient's son Sean Valverde who shares power of  with his sister and he mentioned to me that they want patient to be on comfort measures only  The would prefer for patient to be at hospice house if possible  · Hospice team consulted  ER team discussed with palliative care

## 2020-07-14 NOTE — NURSING NOTE
Patient resting comfortably, PRN IV medications given per order  Changed and oral care completed  Refusing all medications PO or any food/drink  Dr Aliya Hodgson made aware  Will continue to monitor

## 2020-07-14 NOTE — PROGRESS NOTES
Patient is comfort care  Patient has episodes of confusion and agitation, and has been medicated with morphine and ativan as ordered  Patient is now comfortable and does not appear to be in any distress

## 2020-07-14 NOTE — QUICK NOTE
Death confirmation assessment:    -patient's identity confirmed via wrist band  - patient in bed with eyes closed, no signs of life such as respiratory effort or response to verbal or physical stimuli   -no respiratory effort noted both with chest rise/movement or respiratory sounds after auscultating lung fields for 2 minutes  -no response to verbal stimuli  -no response to painful stimuli to include sternal rub, application of ocular pressure, and fingernail pressure   -no carotid or radial pulse palpable  -pupils fixed and dilated bilaterally; unresponsive to external light   -no heart sounds noted during 2 minutes of auscultation  Time of death 20:05 on 7/14/2020  Family notified of patient's passing

## 2020-07-14 NOTE — ASSESSMENT & PLAN NOTE
· Presented with RVR requiring IV Cardizem  · Likely secondary to COVID  · Continue home dose Cardizem and metoprolol p o   · P r n  Metoprolol for persistent RVR  If needs more IV dose, can increase home dose metoprolol to 100 mg  · Family would not want to pursue any further workup or intervention including cardiology evaluation or echocardiogram   Patient is on comfort measures only  · On eliquis for anticoagulation    7/14-patient with increased heart rate; blood pressure stable-will increase metoprolol 100 mg b i d

## 2020-07-14 NOTE — PLAN OF CARE
Problem: Potential for Falls  Goal: Patient will remain free of falls  Description  INTERVENTIONS:  - Assess patient frequently for physical needs  -  Identify cognitive and physical deficits and behaviors that affect risk of falls    -  [place] fall precautions as indicated by assessment   - Educate patient/family on patient safety including physical limitations  - Instruct patient to call for assistance with activity based on assessment  - Modify environment to reduce risk of injury  - Consider OT/PT consult to assist with strengthening/mobility  7/14/2020 0720 by Lucy Meadows RN  Outcome: Progressing  7/13/2020 [address] [place] [address] by Lucy Meadows RN  Outcome: Progressing     Problem: Prexisting or High Potential for Compromised Skin Integrity  Goal: Skin integrity is maintained or improved  Description  INTERVENTIONS:  - Identify patients at risk for skin breakdown  - Assess and monitor skin integrity  - Assess and monitor nutrition and hydration status  - Monitor labs   - Assess for incontinence   - Turn and reposition patient  - Assist with mobility/ambulation  - Relieve pressure over bony prominences  - Avoid friction and shearing  - Provide appropriate hygiene as needed including keeping skin clean and dry  - Evaluate need for skin moisturizer/barrier cream  - Collaborate with interdisciplinary team   - Patient/family teaching  - Consider wound care consult   7/14/2020 0720 by Lucy Meadows RN  Outcome: Progressing  7/13/2020 [address] [place] [address] by Lucy Meadows RN  Outcome: Progressing     Problem: PAIN - ADULT  Goal: Verbalizes/displays adequate comfort level or baseline comfort level  Description  Interventions:  - Encourage patient to monitor pain and request assistance  - Assess pain using appropriate pain scale  - Administer analgesics based on type and severity of pain and evaluate response  - Implement non-pharmacological measures as appropriate and evaluate response  - Consider cultural and social influences on pain and pain management  - Notify physician/advanced practitioner if interventions unsuccessful or patient reports new pain  7/14/2020 0720 by Crista Herring RN  Outcome: Progressing  7/13/2020 [facility] [place] by Crista Herring RN  Outcome: Progressing     Problem: INFECTION - ADULT  Goal: Absence or prevention of progression during hospitalization  Description  INTERVENTIONS:  - Assess and monitor for signs and symptoms of infection  - Monitor lab/diagnostic results  - Monitor all insertion sites, i e  indwelling lines, tubes, and drains  - Monitor endotracheal if appropriate and nasal secretions for changes in amount and color  - [place] appropriate cooling/warming therapies per order  - Administer medications as ordered  - Instruct and encourage patient and family to use good hand hygiene technique  - Identify and instruct in appropriate isolation precautions for identified infection/condition  7/14/2020 0720 by Crista Herring RN  Outcome: Progressing  7/13/2020 [facility] [place] by Crista Herring RN  Outcome: Progressing  Goal: Absence of fever/infection during neutropenic period  Description  INTERVENTIONS:  - Monitor WBC    7/14/2020 0720 by Crista Herring RN  Outcome: Progressing  7/13/2020 [facility] [place] by Crista Herring RN  Outcome: Progressing     Problem: SAFETY ADULT  Goal: Patient will remain free of falls  Description  INTERVENTIONS:  - Assess patient frequently for physical needs  -  Identify cognitive and physical deficits and behaviors that affect risk of falls    -  [place] fall precautions as indicated by assessment   - Educate patient/family on patient safety including physical limitations  - Instruct patient to call for assistance with activity based on assessment  - Modify environment to reduce risk of injury  - Consider OT/PT consult to assist with strengthening/mobility  7/14/2020 0720 by Crista Herring RN  Outcome: Progressing  7/13/2020 [address] by Crista Herring RN  Outcome: Progressing  Goal: Maintain or return to baseline ADL function  Description  INTERVENTIONS:  -  Assess patient's ability to carry out ADLs; assess patient's baseline for ADL function and identify physical deficits which impact ability to perform ADLs (bathing, care of mouth/teeth, toileting, grooming, dressing, etc )  - Assess/evaluate cause of self-care deficits   - Assess range of motion  - Assess patient's mobility; develop plan if impaired  - Assess patient's need for assistive devices and provide as appropriate  - Encourage maximum independence but intervene and supervise when necessary  - Involve family in performance of ADLs  - Assess for home care needs following discharge   - Consider OT consult to assist with ADL evaluation and planning for discharge  - Provide patient education as appropriate  7/14/2020 0720 by Raghav Cornelius RN  Outcome: Progressing  7/13/2020 [address] [place] [address] by Raghav Cornelius RN  Outcome: Progressing  Goal: Maintain or return mobility status to optimal level  Description  INTERVENTIONS:  - Assess patient's baseline mobility status (ambulation, transfers, stairs, etc )    - Identify cognitive and physical deficits and behaviors that affect mobility  - Identify mobility aids required to assist with transfers and/or ambulation (gait belt, sit-to-stand, lift, walker, cane, etc )  - [place] fall precautions as indicated by assessment  - Record patient progress and toleration of activity level on Mobility SBAR; progress patient to next Phase/Stage  - Instruct patient to call for assistance with activity based on assessment  - Consider rehabilitation consult to assist with strengthening/weightbearing, etc   7/14/2020 0720 by Raghav Cornelius RN  Outcome: Progressing  7/13/2020 [address] [place] [address] by Raghav Cornelius RN  Outcome: Progressing     Problem: DISCHARGE PLANNING  Goal: Discharge to home or other facility with appropriate resources  Description  INTERVENTIONS:  - Identify barriers to discharge w/patient and caregiver  - Arrange for needed discharge resources and transportation as appropriate  - Identify discharge learning needs (meds, wound care, etc )  - Arrange for interpretive services to assist at discharge as needed  - Refer to Case Management Department for coordinating discharge planning if the patient needs post-hospital services based on physician/advanced practitioner order or complex needs related to functional status, cognitive ability, or social support system  7/14/2020 0720 by Crista Herring RN  Outcome: Progressing  7/13/2020 [address] [place], [place] [address] by Crista Herring RN  Outcome: Progressing     Problem: Knowledge Deficit  Goal: Patient/family/caregiver demonstrates understanding of disease process, treatment plan, medications, and discharge instructions  Description  Complete learning assessment and assess knowledge base    Interventions:  - Provide teaching at level of understanding  - Provide teaching via preferred learning methods  7/14/2020 0720 by Crista Herring RN  Outcome: Progressing  7/13/2020 [address] [place], [place] [address] by Crista Herring RN  Outcome: Progressing

## 2020-07-14 NOTE — ASSESSMENT & PLAN NOTE
· Resides in [facility] in dementia unit and has had a positive contact with a COVID patient there  · Saturating well on room air  · Vitamin d supplementation  · Incentive spirometry  · Family does not want to pursue any aggressive care and they want comfort measures only for patient  · Hospice team consulted  Family wants patient to be in hospice house if possible

## 2020-07-14 NOTE — HOSPICE NOTE
Hospice referral received  Pt was on [facility] at [facility]  Pt was discharged due to hospitalization  Called Rose Marie at [facility] and updated her that pts family is requesting our inpatient unit, but currently no IPU beds available  Pt will be going into 2nd benefit period for hospice, her [facility] on hospice was 5/27/2020  Called son, Stone Duncan and made him aware there are currently no beds  Will follow up with CM and son when COVID+ bed becomes available

## 2020-07-14 NOTE — PROGRESS NOTES
Progress Note - Liliana Chacon 4/4/1927, 80 y o  female MRN: [id number]    Unit/Bed#: [facility] 819-01 Encounter: [id number]    Primary Care Provider: Ree Lane MD   Date and time admitted to hospital: 7/13/2020  1:28 PM        Suspected UTI (urinary tract infection)  Assessment & Plan  Received ceftriaxone in er  Will give keflex po empirically for now  Pt is on comfort measures irina, will hold off aggressive measures including iv abx  Acute kidney injury superimposed on CKD [facility])  Assessment & Plan  Has been between 1 1-1 4 at baseline  Variable baseline based on previous labs  Presented with creatinine of 1 56  UA concerning for possible UTI  Patient is on hospice measures and comfort care as per family  Due to not want any further workup or aggressive treatment  Non MI Elevated troponin  Assessment & Plan  Likely due to AFib with RVR and COVID  Family does not want any blood work, echocardiogram or cardiology evaluation  Patient is on comfort measures only  Dementia [facility])  Assessment & Plan  Resides at dementia unit at [facility]  Delirium precautions and supportive care  Goals of care, counseling/discussion  Assessment & Plan  · Discussed with patient's son Bhaskar Gutierrez who shares power of  with his sister and he mentioned to me that they want patient to be on comfort measures only  The would prefer for patient to be at hospice house if possible  · Hospice team consulted  ER team discussed with palliative care  Paroxysmal A-fib (HCC)  Assessment & Plan  · Presented with RVR requiring IV Cardizem  · Likely secondary to COVID  · Continue home dose Cardizem and metoprolol p o   · P r n  Metoprolol for persistent RVR  If needs more IV dose, can increase home dose metoprolol to 100 mg  · Family would not want to pursue any further workup or intervention including cardiology evaluation or echocardiogram   Patient is on comfort measures only    · On eliquis for anticoagulation    7/14-patient with increased heart rate; blood pressure stable-will increase metoprolol 100 mg b i d  Type 2 diabetes mellitus with stage 4 chronic kidney disease, without long-term current use of insulin (HCC)  Assessment & Plan  Lab Results   Component Value Date    HGBA1C 6 9 (H) 02/29/2020       No results for input(s): POCGLU in the last 72 hours  Blood Sugar Average: Last 72 hrs:     Hold home p o  Medications  Insulin sliding scale and Accu-Cheks  Benign essential HTN  Assessment & Plan  Continue Cardizem and metoprolol  Hold Lasix and spironolactone due to acute kidney injury  * COVID-19 virus infection  Assessment & Plan  · Resides in [facility] in dementia unit and has had a positive contact with a COVID patient there  · Saturating well on room air  · Vitamin d supplementation  · Incentive spirometry  · Family does not want to pursue any aggressive care and they want comfort measures only for patient  · Hospice team consulted  Family wants patient to be in hospice house if possible  VTE Pharmacologic Prophylaxis:   Pharmacologic: Pharmacologic VTE Prophylaxis contraindicated due to Comfort care measures  Mechanical VTE Prophylaxis in Place: No    Patient Centered Rounds: I have performed bedside rounds with nursing staff today  Discussions with Specialists or Other Care Team Provider:  Palliative care    Education and Discussions with Family / Patient: Discussed treatment plan with family and patient who agree with current plan; encouraged to ask questions and participate  Time Spent for Care: 30 minutes  More than 50% of total time spent on counseling and coordination of care as described above  Current Length of Stay: 1 day(s)    Current Patient Status: Inpatient   Certification Statement: The patient will continue to require additional inpatient hospital stay due to Awaiting placement at hospice tomorrow    Discharge Plan:   To be determined, likely hospice tomorrow    Code Status: Level 4 - Comfort Care      Subjective:   Patient seen examined bedside, patient nonresponsive to multiple verbal and physical stimuli  Had agonal breathing, appropriate for hospice placement tomorrow  Comfort care measures and will discuss with family  Monitor overnight and ensure patient is comfortable; transferred to hospice tomorrow  Objective:     Vitals:   No data recorded  Body mass index is 19 84 kg/m²  Input and Output Summary (last 24 hours): Intake/Output Summary (Last 24 hours) at 7/14/2020 1738  Last data filed at 7/14/2020 0900  Gross per 24 hour   Intake 0 ml   Output 600 ml   Net -600 ml       Physical Exam:     Physical Exam   Constitutional: She appears well-developed  No distress  HENT:   Head: Normocephalic and atraumatic  Nose: Nose normal    Mouth/Throat: No oropharyngeal exudate  Eyes: Right eye exhibits no discharge  Left eye exhibits no discharge  No scleral icterus  Neck: No JVD present  No tracheal deviation present  Cardiovascular: Normal rate and regular rhythm  Exam reveals no gallop and no friction rub  No murmur heard  Pulmonary/Chest: Effort normal  No stridor  No respiratory distress  She has no wheezes  She has no rales  Abdominal: Soft  She exhibits no distension and no mass  There is no tenderness  There is no rebound and no guarding  Musculoskeletal: She exhibits no edema, tenderness or deformity  Neurological:   Nonresponsive   Skin: She is not diaphoretic  Nursing note and vitals reviewed          Additional Data:     Labs:    Results from last 7 days   Lab Units 07/13/20  1351   WBC Thousand/uL 7 34   HEMOGLOBIN g/dL 10 8*   HEMATOCRIT % 35 3   PLATELETS Thousands/uL 179   NEUTROS PCT % 89*   LYMPHS PCT % 7*   MONOS PCT % 4   EOS PCT % 0     Results from last 7 days   Lab Units 07/13/20  1351   SODIUM mmol/L 139   POTASSIUM mmol/L 4 8   CHLORIDE mmol/L 111*   CO2 mmol/L 19*   BUN mg/dL 50*   CREATININE mg/dL 1 56*   ANION GAP mmol/L 9   CALCIUM mg/dL 8 9   ALBUMIN g/dL 3 2*   TOTAL BILIRUBIN mg/dL 1 07*   ALK PHOS U/L 93   ALT U/L 44   AST U/L 29   GLUCOSE RANDOM mg/dL 287*                           * I Have Reviewed All Lab Data Listed Above  * Additional Pertinent Lab Tests Reviewed: No New Labs Available For Today    Imaging:    Imaging Reports Reviewed Today Include:  Chest x-ray  Imaging Personally Reviewed by Myself Includes:  Chest x-ray    Recent Cultures (last 7 days):     Results from last 7 days   Lab Units 07/13/20  1413   URINE CULTURE  Culture results to follow         Last 24 Hours Medication List:     Current Facility-Administered Medications:  acetaminophen 650 mg Oral Q6H PRN Sandee Posey MD   apixaban 2 5 mg Oral BID Sandee Posey MD   aspirin 81 mg Oral Daily Sandee Posey MD   atorvastatin 20 mg Oral Daily With Valentin Hussein MD   benzonatate 100 mg Oral TID Sandee Posey MD   bisacodyl 10 mg Rectal Daily PRN Sandee Posey MD   cephalexin 250 mg Oral Q6H [address] Sandee Posey MD   cholecalciferol 2,000 Units Oral Daily Sandee Posey MD   diltiazem 240 mg Oral Daily Sandee Posey MD   famotidine 20 mg Oral Daily Sandee Posey MD   glycopyrrolate 0 1 mg Intravenous Q1H PRN Ruth Ann Mcmillan PA-C   guaiFENesin 200 mg Oral Q4H PRN Sandee Posey MD   HYDROmorphone 0 5 mg Intravenous Q3H PRN Ruth Ann Mcmillan PA-C   loratadine 10 mg Oral Daily Sandee Posey MD   LORazepam 0 5 mg Intravenous Q4H PRN Ruth Ann Mcmillan PA-C   metoprolol 2 5 mg Intravenous Q6H PRN Sandee Posey MD   [START ON 7/15/2020] metoprolol succinate 100 mg Oral Daily Shamika Hunter MD   mirtazapine 7 5 mg Oral HS Sandee Posey MD   OLANZapine 2 5 mg Oral HS Sandee Posey MD   polyethylene glycol 17 g Oral Daily Sandee Posey MD   potassium chloride 10 mEq Oral Daily Sandee Posey MD        Today, Patient Was Seen By: Holly Sanchez MD    ** Please Note: Dictation voice to text software may have been used in the creation of this document   **

## 2020-07-14 NOTE — SOCIAL WORK
Resident of Assisted Living at [facility]  Per Lady patient is max assist at base line can walk with walker  Resides in skilled dementia unit  Family requested [address] in the Ed  Referrals sent

## 2020-07-15 LAB
BACTERIA UR CULT: ABNORMAL
BACTERIA UR CULT: ABNORMAL

## 2020-07-15 NOTE — ASSESSMENT & PLAN NOTE
· Resides in [facility] in dementia unit and has had a positive contact with a COVID patient there  · Saturating well on room air  · Vitamin d supplementation  · Incentive spirometry  · Family does not want to pursue any aggressive care and they want comfort measures only for patient  · Hospice team consulted  Family wants patient to be in hospice house if possible

## 2020-07-15 NOTE — ASSESSMENT & PLAN NOTE
· Discussed with patient's son Bhaskar Gutierrez who shares power of  with his sister and he mentioned to me that they want patient to be on comfort measures only  The would prefer for patient to be at hospice house if possible  · Hospice team consulted  ER team discussed with palliative care  -addendum; notified by case management the patient was to be transferred to hospice house on 07/15; however patient passed at 8:05 p m  On   Family notified as well as  home and medical examiner's office

## 2020-07-15 NOTE — DISCHARGE SUMMARY
Discharge- Uli Maldonado 1927, 80 y o  female MRN: [id number]    [facility] Encounter: [id number]    Primary Care Provider: Gio Carter MD   Date and time admitted to hospital: 2020  1:28 PM        Suspected UTI (urinary tract infection)  Assessment & Plan  Received ceftriaxone in er  Will give keflex po empirically for now  Pt is on comfort measures irina, will hold off aggressive measures including iv abx  Acute kidney injury superimposed on CKD [facility])  Assessment & Plan  Has been between 1 1-1 4 at baseline  Variable baseline based on previous labs  Presented with creatinine of 1 56  UA concerning for possible UTI  Patient is on hospice measures and comfort care as per family  Due to not want any further workup or aggressive treatment  Non MI Elevated troponin  Assessment & Plan  Likely due to AFib with RVR and COVID  Family does not want any blood work, echocardiogram or cardiology evaluation  Patient is on comfort measures only  Dementia [facility])  Assessment & Plan  Resides at dementia unit at [facility]  Delirium precautions and supportive care  Goals of care, counseling/discussion  Assessment & Plan  · Discussed with patient's son Ratna Briones who shares power of  with his sister and he mentioned to me that they want patient to be on comfort measures only  The would prefer for patient to be at hospice house if possible  · Hospice team consulted  ER team discussed with palliative care  -addendum; notified by case management the patient was to be transferred to hospice house on 07/15; however patient passed at 8:05 p m  On   Family notified as well as  home and medical examiner's office  Paroxysmal A-fib (HCC)  Assessment & Plan  · Presented with RVR requiring IV Cardizem  · Likely secondary to COVID  · Continue home dose Cardizem and metoprolol p o   · P r n  Metoprolol for persistent RVR    If needs more IV dose, can increase home dose metoprolol to 100 mg  · Family would not want to pursue any further workup or intervention including cardiology evaluation or echocardiogram   Patient is on comfort measures only  · On eliquis for anticoagulation    7/14-patient with increased heart rate; blood pressure stable-will increase metoprolol 100 mg b i d  Type 2 diabetes mellitus with stage 4 chronic kidney disease, without long-term current use of insulin (HCC)  Assessment & Plan  Lab Results   Component Value Date    HGBA1C 6 9 (H) 02/29/2020       No results for input(s): POCGLU in the last 72 hours  Blood Sugar Average: Last 72 hrs:     Hold home p o  Medications  Insulin sliding scale and Accu-Cheks  Benign essential HTN  Assessment & Plan  Continue Cardizem and metoprolol  Hold Lasix and spironolactone due to acute kidney injury  * COVID-19 virus infection  Assessment & Plan  · Resides in [facility] in dementia unit and has had a positive contact with a COVID patient there  · Patient had experience labor breathing and borderline saturations on room air  · Vitamin d supplementation  · Incentive spirometry  · Family does not want to pursue any aggressive care and they want comfort measures only for patient  · Hospice team consulted  Family wants patient to be in hospice house if possible          Discharging Physician / Practitioner: Clarke Cardona MD  PCP: Meri Stahl MD  Admission Date:   Admission Orders (From admission, onward)     Ordered        07/13/20 1540  Inpatient Admission (expected length of stay for this patient Order details is greater than two midnights)  Once                   Discharge Date: 07/14/20    Resolved Problems  Date Reviewed: 7/14/2020    None          Consultations During Hospital Stay:  · Hospice    Procedures Performed:   · Chest x-ray    Significant Findings / Test Results:   · COVID-19 pneumonia with hypoxia    Incidental Findings:   · [place] , UTI, AFib RVR    Test Results Pending at Discharge (will require follow up): · None     Outpatient Tests Requested:  · None    Complications:  Death    Reason for Admission:  Acute respiratory failure    Hospital Course:       Please see above list of diagnoses and related plan for additional information  Condition at Discharge:      Discharge Day Visit / Exam:     Subjective:  Notified by nursing at approximately 8:00 p m  That patient was nonresponsive; on examination; patient found to be nonresponsive to verbal and noxious physical stimuli; pupils fixed dilated; absent radial and carotid pulses; no respiratory effort are heart sounds noted after 2 minutes of auscultation  Patient pronounced dead at 20 0 5 hours on 2020  Family notified as well as medical examiner and  home  Vitals: Blood Pressure: 122/82 (20 1636)  Pulse: 90(treated tachycardia per provider) (20 1700)  Temperature: 97 7 °F (36 5 °C) (20 1636)  Temp Source: Oral (20 1636)  Respirations: 20 (20 1700)  Height: 5' 1" (154 9 cm) (20 1330)  Weight - Scale: 47 6 kg (105 lb) (20 1330)  SpO2: 99 % (20 1636)    Discussion with Family:  See above    Discharge instructions/Information to patient and family:   See after visit summary for information provided to patient and family  Provisions for Follow-Up Care:  See after visit summary for information related to follow-up care and any pertinent home health orders  Disposition:     Other:     For Discharges to [facility] SNF:   · Not Applicable to this Patient - Not Applicable to this Patient    Planned Readmission:  None     Discharge Statement:  I spent 35 minutes discharging the patient  This time was spent on the day of discharge  I had direct contact with the patient on the day of discharge   Greater than 50% of the total time was spent examining patient, answering all patient questions, arranging and discussing plan of care with patient as well as directly providing post-discharge instructions  Additional time then spent on discharge activities  Discharge Medications:  See after visit summary for reconciled discharge medications provided to patient and family        ** Please Note: This note has been constructed using a voice recognition system **

## 2020-07-17 ENCOUNTER — TELEPHONE (OUTPATIENT)
Dept: CARDIOLOGY CLINIC | Facility: CLINIC | Age: 85
End: 2020-07-17

## 2020-07-17 NOTE — TELEPHONE ENCOUNTER
Patients daughter in law Primus Free called to let you know Miya Andres passed away on 7/14  She said Miya Andres loved you and wanted to thank you for everything

## 2020-07-22 NOTE — ED ATTENDING ATTESTATION
7/13/2020  I, Douglas Simons MD, saw and evaluated the patient  I have discussed the patient with the resident/non-physician practitioner and agree with the resident's/non-physician practitioner's findings, Plan of Care, and MDM as documented in the resident's/non-physician practitioner's note, except where noted  All available labs and Radiology studies were reviewed  I was present for key portions of any procedure(s) performed by the resident/non-physician practitioner and I was immediately available to provide assistance  At this point I agree with the current assessment done in the Emergency Department  I have conducted an independent evaluation of this patient a history and physical is as follows:  12-year-old female with dementia sent to ED by nursing home for Covid-19 testing  Patient demented at baseline, unable to provide any history  States she does not want to wear a mask, though agrees to do so for staff safety  Per nursing home, another resident tested positive for Covid-19 and patient has had a cough  Family interested in hospice consult  Patient also noted to be in rapid afib on presentation  ROS unobtainable due to dementia  PE:   Gen NAD  Cards Afib with RVR  Pulm Normal WOB  Psych Anxious  Neuro Oriented to person, disoriented    A/P: 80year-old female sent to ED for Covid-19 testing  Positive rapid swab  Will admit for Hospice consult, as requested by family  Patient does not require additional O2 at this point, though will closely monitor respiratory status      ED Course  ED Course as of Jul 21 2359 Mon Jul 13, 2020   1430 Baseline 1 1   Creatinine(!): 1 56   1453 EXT SARS-COV-2(!): Positive         Critical Care Time  Procedures

## 2020-07-23 NOTE — DEATH NOTE
INPATIENT DEATH NOTE  Gosia Santana 80 y o  female MRN: [id number]  Unit/Bed#: [facility] 540-94 Encounter: [id number]    Date, Time and Cause of Death    Date of Death:  20  Time of Death:   8:05 PM  Preliminary Cause of Death:  Respiratory failure, unspecified          Patient's Information  Pronounced by: Dr Emmie Espinoza  Did the patient's death occur in the ED?: No  Did the patient's death occur in the OR?: No  Did the patient's death occur less than 10 days post-op?: No  Did the patient's death occur within 24 hours of admission?: No  Was code status DNR at the time of death?: Yes    PHYSICAL EXAM:    -patient's identity confirmed via wrist band  - patient in bed with eyes closed, no signs of life such as respiratory effort or response to verbal or physical stimuli   -no respiratory effort noted both with chest rise/movement or respiratory sounds after auscultating lung fields for 2 minutes  -no response to verbal stimuli  -no response to painful stimuli to include sternal rub, application of ocular pressure, and fingernail pressure   -no carotid or radial pulse palpable  -pupils fixed and dilated bilaterally; unresponsive to external light   -no heart sounds noted during 2 minutes of auscultation      Time of death 20:05 on 2020      Family notified of patient's passing      Medical Examiner notification criteria:  NONE APPLICABLE   Medical Examiner's office notified?:  Yes   Medical Examiner accepted case?:  No  Name of Medical Examiner:     Family Notification  Was the family notified?: Yes  Date Notified: 20  Time Notified:   Notified by: Dr Emmie Espinoza  Name of Family Notified of Death: María Alarcon   Relationship to Patient: Daughter  Family Notification Route: Telephone  Was the family told to contact a  home?: Yes  Name of [facility][de-identified] [address], [place]    Autopsy Options:  Decision for post-mortem examination not yet made by next of kin      Primary Service Attending Physician notified?:  yes    Physician/Resident responsible for completing Discharge Summary:  Micky Dougherty MD

## 2021-09-09 NOTE — TELEPHONE ENCOUNTER
Pt daughter notified of results  Fabrizio Rivers advises she is not sure how frequent pt tests her bs  Pls advise  [FreeTextEntry1] : colchicine\par possbile addn eval/meds if recurrent episodes

## 2023-10-05 NOTE — TELEPHONE ENCOUNTER
Problem: OT Goals  Goal: ADLs  Description: Patient will complete ADLs with Mod I, using AE as needed, in order to increase patient's safety and independence with self-care tasks.  Outcome: Progressing  Goal: Functional Transfers  Description: Patient will complete functional txfers with Mod I, in order to increase patient's safety and independence with ADL tasks.  Outcome: Progressing  Goal: Precautions  Description: Patient will be able to recall knee precautions 100% of the time for patient's safety.  Outcome: Progressing  Goal: IADLs  Description: Patient will complete item retrieval and functional mobility tasks with Mod I, using AE as needed, in order to increase safety and independence with daily functional tasks.  Outcome: Progressing      Left message for patient to call us back  3rd Attempt  Please send certified letter
